# Patient Record
Sex: MALE | Race: WHITE | HISPANIC OR LATINO | Employment: PART TIME | URBAN - METROPOLITAN AREA
[De-identification: names, ages, dates, MRNs, and addresses within clinical notes are randomized per-mention and may not be internally consistent; named-entity substitution may affect disease eponyms.]

---

## 2017-01-25 ENCOUNTER — LAB (OUTPATIENT)
Dept: LAB | Age: 57
End: 2017-01-25
Payer: COMMERCIAL

## 2017-01-25 ENCOUNTER — TRANSCRIBE ORDERS (OUTPATIENT)
Dept: ADMINISTRATIVE | Age: 57
End: 2017-01-25

## 2017-01-25 ENCOUNTER — APPOINTMENT (OUTPATIENT)
Dept: LAB | Age: 57
End: 2017-01-25
Payer: COMMERCIAL

## 2017-01-25 DIAGNOSIS — R74.8 OTHER NONSPECIFIC ABNORMAL SERUM ENZYME LEVELS: Primary | ICD-10-CM

## 2017-01-25 DIAGNOSIS — R74.8 OTHER NONSPECIFIC ABNORMAL SERUM ENZYME LEVELS: ICD-10-CM

## 2017-01-25 DIAGNOSIS — D69.6 THROMBOCYTOPENIA (HCC): ICD-10-CM

## 2017-01-25 DIAGNOSIS — R74.8 ABNORMAL LEVELS OF OTHER SERUM ENZYMES: ICD-10-CM

## 2017-01-25 DIAGNOSIS — E78.5 HYPERLIPIDEMIA, UNSPECIFIED HYPERLIPIDEMIA TYPE: ICD-10-CM

## 2017-01-25 DIAGNOSIS — E78.5 HYPERLIPIDEMIA, UNSPECIFIED HYPERLIPIDEMIA TYPE: Primary | ICD-10-CM

## 2017-01-25 DIAGNOSIS — E66.9 OBESITY: ICD-10-CM

## 2017-01-25 LAB
ALBUMIN SERPL BCP-MCNC: 3.7 G/DL (ref 3.5–5)
ALP SERPL-CCNC: 49 U/L (ref 46–116)
ALT SERPL W P-5'-P-CCNC: 35 U/L (ref 12–78)
ANION GAP SERPL CALCULATED.3IONS-SCNC: 5 MMOL/L (ref 4–13)
AST SERPL W P-5'-P-CCNC: 20 U/L (ref 5–45)
BASOPHILS # BLD AUTO: 0.02 THOUSANDS/ΜL (ref 0–0.1)
BASOPHILS NFR BLD AUTO: 0 % (ref 0–1)
BILIRUB SERPL-MCNC: 0.66 MG/DL (ref 0.2–1)
BUN SERPL-MCNC: 19 MG/DL (ref 5–25)
CALCIUM SERPL-MCNC: 9 MG/DL (ref 8.3–10.1)
CHLORIDE SERPL-SCNC: 105 MMOL/L (ref 100–108)
CHOLEST SERPL-MCNC: 196 MG/DL (ref 50–200)
CO2 SERPL-SCNC: 29 MMOL/L (ref 21–32)
CREAT SERPL-MCNC: 1.06 MG/DL (ref 0.6–1.3)
EOSINOPHIL # BLD AUTO: 0.25 THOUSAND/ΜL (ref 0–0.61)
EOSINOPHIL NFR BLD AUTO: 4 % (ref 0–6)
ERYTHROCYTE [DISTWIDTH] IN BLOOD BY AUTOMATED COUNT: 12.6 % (ref 11.6–15.1)
GFR SERPL CREATININE-BSD FRML MDRD: >60 ML/MIN/1.73SQ M
GLUCOSE SERPL-MCNC: 99 MG/DL (ref 65–140)
HCT VFR BLD AUTO: 42.9 % (ref 36.5–49.3)
HDLC SERPL-MCNC: 50 MG/DL (ref 40–60)
HGB BLD-MCNC: 14.9 G/DL (ref 12–17)
IGA SERPL-MCNC: 456 MG/DL (ref 70–400)
IGG SERPL-MCNC: 1460 MG/DL (ref 700–1600)
IGM SERPL-MCNC: 115 MG/DL (ref 40–230)
LDLC SERPL CALC-MCNC: 125 MG/DL (ref 0–100)
LYMPHOCYTES # BLD AUTO: 1.36 THOUSANDS/ΜL (ref 0.6–4.47)
LYMPHOCYTES NFR BLD AUTO: 23 % (ref 14–44)
MCH RBC QN AUTO: 31 PG (ref 26.8–34.3)
MCHC RBC AUTO-ENTMCNC: 34.7 G/DL (ref 31.4–37.4)
MCV RBC AUTO: 89 FL (ref 82–98)
MONOCYTES # BLD AUTO: 0.6 THOUSAND/ΜL (ref 0.17–1.22)
MONOCYTES NFR BLD AUTO: 10 % (ref 4–12)
NEUTROPHILS # BLD AUTO: 3.59 THOUSANDS/ΜL (ref 1.85–7.62)
NEUTS SEG NFR BLD AUTO: 63 % (ref 43–75)
NRBC BLD AUTO-RTO: 0 /100 WBCS
PLATELET # BLD AUTO: 164 THOUSANDS/UL (ref 149–390)
PMV BLD AUTO: 11.2 FL (ref 8.9–12.7)
POTASSIUM SERPL-SCNC: 4 MMOL/L (ref 3.5–5.3)
PROT SERPL-MCNC: 8.1 G/DL (ref 6.4–8.2)
RBC # BLD AUTO: 4.8 MILLION/UL (ref 3.88–5.62)
SODIUM SERPL-SCNC: 139 MMOL/L (ref 136–145)
TRIGL SERPL-MCNC: 107 MG/DL
WBC # BLD AUTO: 5.84 THOUSAND/UL (ref 4.31–10.16)

## 2017-01-25 PROCEDURE — 80061 LIPID PANEL: CPT

## 2017-01-25 PROCEDURE — 36415 COLL VENOUS BLD VENIPUNCTURE: CPT

## 2017-01-25 PROCEDURE — 83883 ASSAY NEPHELOMETRY NOT SPEC: CPT

## 2017-01-25 PROCEDURE — 84165 PROTEIN E-PHORESIS SERUM: CPT

## 2017-01-25 PROCEDURE — 83704 LIPOPROTEIN BLD QUAN PART: CPT

## 2017-01-25 PROCEDURE — 85025 COMPLETE CBC W/AUTO DIFF WBC: CPT

## 2017-01-25 PROCEDURE — 82784 ASSAY IGA/IGD/IGG/IGM EACH: CPT

## 2017-01-25 PROCEDURE — 80053 COMPREHEN METABOLIC PANEL: CPT

## 2017-01-26 LAB
ALBUMIN SERPL ELPH-MCNC: 4.31 G/DL (ref 3.5–5)
ALBUMIN SERPL ELPH-MCNC: 57.4 % (ref 52–65)
ALPHA1 GLOB SERPL ELPH-MCNC: 0.23 G/DL (ref 0.1–0.4)
ALPHA1 GLOB SERPL ELPH-MCNC: 3 % (ref 2.5–5)
ALPHA2 GLOB SERPL ELPH-MCNC: 0.62 G/DL (ref 0.4–1.2)
ALPHA2 GLOB SERPL ELPH-MCNC: 8.2 % (ref 7–13)
BETA GLOB ABNORMAL SERPL ELPH-MCNC: 0.46 G/DL (ref 0.4–0.8)
BETA1 GLOB SERPL ELPH-MCNC: 6.1 % (ref 5–13)
BETA2 GLOB SERPL ELPH-MCNC: 7.1 % (ref 2–8)
BETA2+GAMMA GLOB SERPL ELPH-MCNC: 0.53 G/DL (ref 0.2–0.5)
CHOLEST SERPL-MCNC: 196 MG/DL (ref 100–199)
GAMMA GLOB ABNORMAL SERPL ELPH-MCNC: 1.37 G/DL (ref 0.5–1.6)
GAMMA GLOB SERPL ELPH-MCNC: 18.2 % (ref 12–22)
HDL SERPL-SCNC: 28.4 UMOL/L
HDLC SERPL-MCNC: 43 MG/DL
IGG/ALB SER: 1.35 {RATIO} (ref 1.1–1.8)
KAPPA LC FREE SER-MCNC: 28.71 MG/L (ref 3.3–19.4)
KAPPA LC FREE/LAMBDA FREE SER: 2.01 {RATIO} (ref 0.26–1.65)
LAMBDA LC FREE SERPL-MCNC: 14.31 MG/L (ref 5.71–26.3)
LDL SERPL QN: 20.8 NM
LDL SERPL QN: 531 NMOL/L
LDL SERPL-SCNC: 1537 NMOL/L
LDLC SERPL CALC-MCNC: 131 MG/DL (ref 0–99)
LP-IR SCORE SERPL: 52
PROT PATTERN SERPL ELPH-IMP: ABNORMAL
PROT SERPL-MCNC: 7.5 G/DL (ref 6.4–8.2)
TRIGL SERPL-MCNC: 111 MG/DL (ref 0–149)

## 2017-01-31 ENCOUNTER — ALLSCRIPTS OFFICE VISIT (OUTPATIENT)
Dept: OTHER | Facility: OTHER | Age: 57
End: 2017-01-31

## 2017-02-03 ENCOUNTER — ALLSCRIPTS OFFICE VISIT (OUTPATIENT)
Dept: OTHER | Facility: OTHER | Age: 57
End: 2017-02-03

## 2017-02-23 ENCOUNTER — ALLSCRIPTS OFFICE VISIT (OUTPATIENT)
Dept: OTHER | Facility: OTHER | Age: 57
End: 2017-02-23

## 2017-07-10 ENCOUNTER — OFFICE VISIT (OUTPATIENT)
Dept: URGENT CARE | Age: 57
End: 2017-07-10
Payer: COMMERCIAL

## 2017-07-10 PROCEDURE — 99213 OFFICE O/P EST LOW 20 MIN: CPT | Performed by: FAMILY MEDICINE

## 2017-07-18 ENCOUNTER — ALLSCRIPTS OFFICE VISIT (OUTPATIENT)
Dept: OTHER | Facility: OTHER | Age: 57
End: 2017-07-18

## 2017-07-27 LAB — 25(OH)D3 SERPL-MCNC: 41 NG/ML (ref 30–100)

## 2017-07-29 ENCOUNTER — LAB CONVERSION - ENCOUNTER (OUTPATIENT)
Dept: OTHER | Facility: OTHER | Age: 57
End: 2017-07-29

## 2017-07-29 LAB
APOLIPOPROTEIN B (HISTORICAL): 83 MG/DL (ref 52–109)
CHOLEST SERPL-MCNC: 172 MG/DL (ref 125–200)
CHOLEST/HDLC SERPL: 4.2 CALC
HDLC SERPL-MCNC: 41 MG/DL
HIGH SENSITIVITY C-REACTIVE PROTEIN (HISTORICAL): 1.9 MG/L
LDL CHOLESTEROL (HISTORICAL): 105 MG/DL
LDL SIZE (HISTORICAL): 217.4 ANGSTROM
LIPOPROTEIN (A) (HISTORICAL): 24 NMOL/L
Lab: 113 NMOL/MIN/ML (ref 70–153)
Lab: 1300 NMOL/L (ref 1016–2185)
Lab: 255 NMOL/L (ref 123–441)
Lab: 377 NMOL/L (ref 167–465)
Lab: 6118 NMOL/L (ref 4334–10815)
Lab: ABNORMAL PATTERN
NON-HDL-CHOL (CHOL-HDL) (HISTORICAL): 131 MG/DL
TRIGL SERPL-MCNC: 131 MG/DL

## 2017-08-01 ENCOUNTER — GENERIC CONVERSION - ENCOUNTER (OUTPATIENT)
Dept: OTHER | Facility: OTHER | Age: 57
End: 2017-08-01

## 2017-08-02 ENCOUNTER — ALLSCRIPTS OFFICE VISIT (OUTPATIENT)
Dept: OTHER | Facility: OTHER | Age: 57
End: 2017-08-02

## 2017-08-03 ENCOUNTER — TRANSCRIBE ORDERS (OUTPATIENT)
Dept: ADMINISTRATIVE | Facility: HOSPITAL | Age: 57
End: 2017-08-03

## 2017-08-03 DIAGNOSIS — R01.1 UNDIAGNOSED CARDIAC MURMURS: Primary | ICD-10-CM

## 2017-08-18 ENCOUNTER — HOSPITAL ENCOUNTER (OUTPATIENT)
Dept: NON INVASIVE DIAGNOSTICS | Facility: CLINIC | Age: 57
Discharge: HOME | End: 2017-08-18
Payer: COMMERCIAL

## 2017-08-18 DIAGNOSIS — R01.1 UNDIAGNOSED CARDIAC MURMURS: ICD-10-CM

## 2017-08-18 PROCEDURE — 93306 TTE W/DOPPLER COMPLETE: CPT

## 2017-08-20 ENCOUNTER — GENERIC CONVERSION - ENCOUNTER (OUTPATIENT)
Dept: OTHER | Facility: OTHER | Age: 57
End: 2017-08-20

## 2017-10-10 ENCOUNTER — GENERIC CONVERSION - ENCOUNTER (OUTPATIENT)
Dept: OTHER | Facility: OTHER | Age: 57
End: 2017-10-10

## 2017-11-28 ENCOUNTER — APPOINTMENT (OUTPATIENT)
Dept: RADIOLOGY | Age: 57
End: 2017-11-28
Payer: COMMERCIAL

## 2017-11-28 ENCOUNTER — TRANSCRIBE ORDERS (OUTPATIENT)
Dept: URGENT CARE | Age: 57
End: 2017-11-28

## 2017-11-28 ENCOUNTER — OFFICE VISIT (OUTPATIENT)
Dept: URGENT CARE | Age: 57
End: 2017-11-28
Payer: COMMERCIAL

## 2017-11-28 DIAGNOSIS — R05.9 COUGH: ICD-10-CM

## 2017-11-28 PROCEDURE — 71020 HB CHEST X-RAY 2VW FRONTAL&LATL: CPT

## 2017-11-28 PROCEDURE — 99213 OFFICE O/P EST LOW 20 MIN: CPT

## 2017-12-04 ENCOUNTER — ALLSCRIPTS OFFICE VISIT (OUTPATIENT)
Dept: OTHER | Facility: OTHER | Age: 57
End: 2017-12-04

## 2017-12-06 NOTE — PROGRESS NOTES
Assessment    1  Asthmatic bronchitis (493 90) (J45 909)  Assessment and plan 1  Asthmatic bronchitis  x-ray was normal at the urgent [facility] patient has completed Z-Maximo without relief of the symptoms I will prescribe the patient Augmentin 875 mg 1 tablet p  o  b i d  times 10 days, albuterol solution for his nebulizer used every 4-6 hours as needed, use Mucinex as directed p r n  plenty of fluids and rest of the symptoms not jhoan next several days he is to notify me RTO as scheduled call if any problems   Plan  Asthmatic bronchitis    · Albuterol Sulfate (2 5 MG/3ML) 0 083% Inhalation Nebulization Solution; USE 1UNIT DOSE EVERY 4-6 HOURS AS NEEDED FOR WHEEZING    · Amoxicillin-Pot Clavulanate 875-125 MG Oral Tablet (Augmentin); TAKE 1 TABLETTWICE DAILY AFTER MEALS UNTIL FINISHED    Chief Complaint  Patient presents today c/o a productive cough with green/white sputum and wheezing since before Thanksgiving  History of Present Illness  HPI: 57-year-old male coming in with a chief complaint of cough he reports me that he developed a scratchy throat before thanks giving , tightness in the chest, hears wheezing , using mucinex green at firs and now white no temp eating and drinking well, mild fatigue he is on call  He reports me on November 28 he did go to [facility] and was      Review of Systems   Constitutional: feeling poorly, but-- no fever,-- no recent weight gain,-- no chills,-- not feeling tired-- and-- no recent weight loss  Cardiovascular: no complaints of slow or fast heart rate, no chest pain, no palpitations, no leg claudication or lower extremity edema  Respiratory: shortness of breath,-- cough,-- wheezing-- and-- with coughing spells, but-- no orthopnea,-- no shortness of breath during exertion-- and-- no PND  Gastrointestinal: no complaints of abdominal pain, no constipation, no nausea or vomiting, no diarrhea or bloody stools    Genitourinary: no complaints of dysuria or incontinence, no hesitancy, no nocturia, no genital lesion, no inadequacy of penile erection  ROS reviewed  Active Problems  1  Abnormal blood sugar (790 29) (R73 09)   2  Acute upper respiratory infection (465 9) (J06 9)   3  Alpha-1-antitrypsin deficiency carrier (V83 89) (E88 01)   4  Blurry vision (368 8) (H53 8)   5  Body aches (780 96) (R52)   6  Cardiac murmur (785 2) (R01 1)   7  Chest pain (786 50) (R07 9)   8  Cough (786 2) (R05)   9  Dry eye syndrome of bilateral lacrimal glands (375 15) (H04 123)   10  Enlarged prostate without lower urinary tract symptoms (luts) (600 00) (N40 0)   11  Esophageal reflux (530 81) (K21 9)   12  Exercise-induced bronchospasm (493 81) (J45 990)   13  Fatigue (780 79) (R53 83)   14  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)   15  Hyperlipidemia (272 4) (E78 5)   16  Interstitial lung disease (515) (J84 9)   17  Lung, cysts, congenital (748 4) (Q33 0)   18  Lymph node enlargement (785 6) (R59 9)   19  Need for diphtheria-tetanus-pertussis (Tdap) vaccine (V06 1) (Z23)   20  Need for pneumococcal vaccination (V03 82) (Z23)   21  Need for prophylactic vaccination and inoculation against influenza (V04 81) (Z23)   22  Obesity (278 00) (E66 9)   23  Organic impotence (607 84) (N52 9)   24  Plantar fasciitis (728 71) (M72 2)   25  Platelets decreased (287 5) (D69 6)   26  Screen for colon cancer (V76 51) (Z12 11)   27  Sinusitis, acute frontal (461 1) (J01 10)   28  Sleep apnea (780 57) (G47 30)   29  Special screening examination for neoplasm of prostate (V76 44) (Z12 5)   30  Strain of thoracic region, initial encounter (847 1) (S29 019A)   31  Upper back pain on left side (724 5) (M54 9)   32  Vitamin B12 deficiency (266 2) (E53 8)   33  Vitamin D deficiency (268 9) (E55 9)    Past Medical History  Active Problems And Past Medical History Reviewed: The active problems and past medical history were reviewed and updated today  Surgical History  Surgical History Reviewed:    The surgical history was reviewed and updated today  Social History     · Being A Social Drinker   · Caffeine Use   · Denied: History of Cigars (0  A Day)   · occasional cigar use   · Denied: History of Drug Use   · Former smoker (V15 82) (D18 287)   · He smoked a pack a day of cigarettes for 8 years  He quit at the age of 22  · Working Full Time   ·  for [employer]  · Work-related Stress (V62 1)  The social history was reviewed and updated today  The social history was reviewed and is unchanged  Family History  Family History Reviewed: The family history was reviewed and updated today  Current Meds   1  Albuterol Sulfate (2 5 MG/3ML) 0 083% Inhalation Nebulization Solution; Therapy: (Recorded:28Nov2017) to Recorded   2  Azithromycin 500 MG Oral Tablet; 1 chito for 5 days; Therapy: 74BEH5683 to (Last Rx:28Nov2017)  Requested for: 28Nov2017 Ordered   3  Benzonatate 100 MG Oral Capsule; TAKE 1 CAPSULE 2-3 TIMES DAILY AS DIRECTED; Therapy: 20WXO5635 to (Trevon Romo)  Requested for: 57AVJ0904; Last Rx:28Nov2017 Ordered   4  Cinnamon 500 MG Oral Capsule; TAKE AS DIRECTED; Therapy: (ZUWEUQAX:68XBR2653) to Recorded   5  [facility] Vitamin B-12 1000 MCG Oral Tablet; Therapy: (ONVYWNQP:82XEO9864) to Recorded   6  Esomeprazole Magnesium 40 MG Oral Capsule Delayed Release; take 1 capsule daily; Therapy: 85HFY8252 to (Last Rx:20Hqy2245)  Requested for: 30Gjc2802 Ordered   7  Fish Oil 1000 MG Oral Capsule; TAKE 3 CAPSULE Daily; Therapy: (FBYJQEFH:50XZK9960) to Recorded   8  ProAir  (90 Base) MCG/ACT Inhalation Aerosol Solution; INHALE 2 PUFFS 15 MINUTES PRIOR TO EXERCISE; Therapy: 54OGJ2983 to (03 17 74 30 53)  Requested for: 68EMK6994; Last Rx:44Jun9035 Ordered   9  Vitamin D 1000 UNIT CAPS; Therapy: (Recorded:28Nov2017) to Recorded    The medication list was reviewed and updated today  Allergies  1  No Known Drug Allergies  2  No Known Environmental Allergies   3   No Known Food Allergies    Vitals   Recorded: 38TFT5547 03:24PM   Temperature 97 9 F   Heart Rate 72   Respiration 16   Systolic 760, LUE, Sitting   Diastolic 74, LUE, Sitting   Height 5 ft 7 in   Weight 231 lb 0 8 oz   BMI Calculated 36 19   BSA Calculated 2 15   O2 Saturation 97       Physical Exam   Constitutional  General appearance: No acute distress, well appearing and well nourished  Eyes  Conjunctiva and lids: No swelling, erythema, or discharge  Pupils and irises: Equal, round and reactive to light  Ears, Nose, Mouth, and Throat  External inspection of ears and nose: Normal    Otoscopic examination: Tympanic membrance translucent with normal light reflex  Canals patent without erythema  Nasal mucosa, septum, and turbinates: Normal without edema or erythema  Oropharynx: Abnormal  -- (Postnasal drip) The posterior pharynx was erythematous, but-- did not have an exudate  Pulmonary  Respiratory effort: No increased work of breathing or signs of respiratory distress  Auscultation of lungs: Clear to auscultation, equal breath sounds bilaterally, no wheezes, no rales, no rhonci  Cardiovascular  Auscultation of heart: Normal rate and rhythm, normal S1 and S2, without murmurs  Lymphatic  Palpation of lymph nodes in neck: No lymphadenopathy     Psychiatric  Mood and affect: Normal          Future Appointments    Date/Time Provider Specialty Site   02/14/2018 05:00 PM Justyna Arellano DO [facility]       Signatures   Electronically signed by : Donna Ly DO; Dec  4 2017  8:32PM EST                       (Author)

## 2017-12-07 NOTE — PROGRESS NOTES
Assessment    1  Acute upper respiratory infection (465 9) (J06 9)    Plan  Acute upper respiratory infection, Alpha-1-antitrypsin deficiency carrier    · Azithromycin 500 MG Oral Tablet; 1 chito for 5 days   · Benzonatate 100 MG Oral Capsule (Tessalon Perles); TAKE 1 CAPSULE 2-3  TIMES DAILY AS DIRECTED    Discussion/Summary  Discussion Summary:   1  Azithromycin as prescribed  2  Increase fluids  3  Tessalon Pearls as prescribed  4  If symptoms worsen or do not improve in 2-3 days, F/U with PCP  Medication Side Effects Reviewed: Possible side effects of new medications were reviewed with the patient/guardian today  Understands and agrees with treatment plan: The treatment plan was reviewed with the patient/guardian  The patient/guardian understands and agrees with the treatment plan      Chief Complaint    1  Cold Symptoms  Chief Complaint Free Text Note Form: x 12 days - has harsh, spasmatic cough with nasal congestion and PND, occ  chills, occ  chest tightness and ALLEN  Notes some wheeze when supine or exertion  Using nebulizer BID (not using HFA) and taking Mucinex DM  No N/V/diarrhea  No Flu vaccine yet  History of Present Illness  HPI: Patient presents with 12 day hx of worsening moist productive cough not relieved by OTC Mucinex  Denies fever but with occasional chills  Denies CP/SOB  Denies N/V/D  Adequate PO intake  Hx of pneumonia 2 years ago  Has been utilizing his PRN nebulizer occasionally  Hospital Based Practices Required Assessment:   Pain Assessment   the patient states they have pain  The pain is located in the cough  (on a scale of 0 to 10, the patient rates the pain at 7 )   Abuse And Domestic Violence Screen    Yes, the patient is safe at home  The patient states no one is hurting them  Depression And Suicide Screen  No, the patient has not had thoughts of hurting themself  No, the patient has not felt depressed in the past 7 days  Prefered Language is  [place]     Primary Language is  Slovenian  Review of Systems  Focused-Male:   Constitutional: chills, but no fever  ENT: no complaints of earache, no loss of hearing, no nosebleeds or nasal discharge, no sore throat or hoarseness  Cardiovascular: no complaints of slow or fast heart rate, no chest pain, no palpitations, no leg claudication or lower extremity edema  Respiratory: cough and wheezing, but no shortness of breath  Genitourinary: no complaints of dysuria or incontinence, no hesitancy, no nocturia, no genital lesion, no inadequacy of penile erection  Musculoskeletal: no complaints of arthralgia, no myalgia, no joint swelling or stiffness, no limb pain or swelling  Integumentary: no complaints of skin rash or lesion, no itching or dry skin, no skin wounds  Neurological: no complaints of headache, no confusion, no numbness or tingling, no dizziness or fainting  Active Problems    1  Abnormal blood sugar (790 29) (R73 09)   2  Acute upper respiratory infection (465 9) (J06 9)   3  Alpha-1-antitrypsin deficiency carrier (V83 89) (E88 01)   4  Blurry vision (368 8) (H53 8)   5  Body aches (780 96) (R52)   6  Cardiac murmur (785 2) (R01 1)   7  Chest pain (786 50) (R07 9)   8  Dry eye syndrome of bilateral lacrimal glands (375 15) (H04 123)   9  Enlarged prostate without lower urinary tract symptoms (luts) (600 00) (N40 0)   10  Esophageal reflux (530 81) (K21 9)   11  Exercise-induced bronchospasm (493 81) (J45 990)   12  Fatigue (780 79) (R53 83)   13  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)   14  Hyperlipidemia (272 4) (E78 5)   15  Interstitial lung disease (515) (J84 9)   16  Lung, cysts, congenital (748 4) (Q33 0)   17  Lymph node enlargement (785 6) (R59 9)   18  Need for diphtheria-tetanus-pertussis (Tdap) vaccine (V06 1) (Z23)   19  Need for pneumococcal vaccination (V03 82) (Z23)   20  Need for prophylactic vaccination and inoculation against influenza (V04 81) (Z23)   21  Obesity (278 00) (E66 9)   22  Organic impotence (607 84) (N52 9)   23  Plantar fasciitis (728 71) (M72 2)   24  Platelets decreased (287 5) (D69 6)   25  Screen for colon cancer (V76 51) (Z12 11)   26  Sinusitis, acute frontal (461 1) (J01 10)   27  Sleep apnea (780 57) (G47 30)   28  Special screening examination for neoplasm of prostate (V76 44) (Z12 5)   29  Strain of thoracic region, initial encounter (847 1) (S29 019A)   30  Upper back pain on left side (724 5) (M54 9)   31  Vitamin B12 deficiency (266 2) (E53 8)   32  Vitamin D deficiency (268 9) (E55 9)    Past Medical History    1  Acute sinusitis (461 9) (J01 90)   2  History of Backache (724 5) (M54 9)   3  History of Erectile dysfunction of non-organic origin (302 72) (F52 21)   4  History of fatigue (V13 89) (Z87 898)   5  Need for pneumococcal vaccination (V03 82) (Z23)  Active Problems And Past Medical History Reviewed: The active problems and past medical history were reviewed and updated today  Family History  Mother    1  Family history of Asthma (V17 5)   2  Family history of Heart Disease (V17 49)   3  Family history of Rheumatoid Arthritis  Father    4  Family history of Diabetes Mellitus (V18 0)   5  Family history of Heart Disease (V17 49)  Son    6  Family history of Thyroid Disorder (V18 19)  Sister    7  Family history of Asthma (V17 5)   8  Family history of Diabetes Mellitus (V18 0)  Family History Reviewed: The family history was reviewed and updated today  Social History    · Being A Social Drinker   · Caffeine Use   · Denied: History of Cigars (0  A Day)   · Denied: History of Drug Use   · Former smoker (V15 82) (H43 478)   · Working Full Time   · Work-related Stress (V62 1)  Social History Reviewed: The social history was reviewed and updated today  Surgical History    1  History of Tonsillectomy With Adenoidectomy   2  History of Umbilical Hernia Repair  Surgical History Reviewed: The surgical history was reviewed and updated today  Current Meds   1  Albuterol Sulfate (2 5 MG/3ML) 0 083% Inhalation Nebulization Solution; Therapy: (Recorded:28Nov2017) to Recorded   2  Cinnamon 500 MG Oral Capsule; TAKE AS DIRECTED; Therapy: (REJCLKPE:66BHD6825) to Recorded   3  [employer]S Vitamin B-12 1000 MCG Oral Tablet; Therapy: (IMKBVERZ:98WEC6004) to Recorded   4  Esomeprazole Magnesium 40 MG Oral Capsule Delayed Release; take 1 capsule daily; Therapy: 08MMZ6381 to (Last Rx:45Vwv2583)  Requested for: 31Pbg6621 Ordered   5  Fish Oil 1000 MG Oral Capsule; TAKE 3 CAPSULE Daily; Therapy: (VLKUHYEK:16CXD6923) to Recorded   6  ProAir  (90 Base) MCG/ACT Inhalation Aerosol Solution; INHALE 2 PUFFS 15   MINUTES PRIOR TO EXERCISE; Therapy: 72EYV2231 to (Femilet Ohm)  Requested for: 50ERK3243; Last   Rx:25Bet1017 Ordered   7  Vitamin D 1000 UNIT CAPS; Therapy: (Recorded:28Nov2017) to Recorded  Medication List Reviewed: The medication list was reviewed and updated today  Allergies    1  No Known Drug Allergies    2  No Known Environmental Allergies   3  No Known Food Allergies    Vitals  Signs   Recorded: 74BJE7359 07:55PM   Temperature: 98 6 F, Oral  Heart Rate: 88  Pulse Quality: Regular  Respiration: 20  Systolic: 571  Diastolic: 72  BP Cuff Size: Large  Height: 5 ft 7 in  Weight: 232 lb 12 8 oz  BMI Calculated: 36 46  BSA Calculated: 2 16  O2 Saturation: 97  Pain Scale: 5    Physical Exam    Constitutional   General appearance: No acute distress, well appearing and well nourished  Eyes   Conjunctiva and lids: No swelling, erythema, or discharge  Pupils and irises: Equal, round and reactive to light  Ears, Nose, Mouth, and Throat   External inspection of ears and nose: Normal     Otoscopic examination: Tympanic membrance translucent with normal light reflex  Canals patent without erythema  Nasal mucosa, septum, and turbinates: Normal without edema or erythema      Oropharynx: Normal with no erythema, edema, exudate or lesions  Pulmonary   Respiratory effort: No increased work of breathing or signs of respiratory distress  Auscultation of lungs: Abnormal   rhonchi bilateral bases  Cardiovascular   Palpation of heart: Normal PMI, no thrills  Auscultation of heart: Normal rate and rhythm, normal S1 and S2, without murmurs  Examination of extremities for edema and/or varicosities: Normal     Abdomen   Abdomen: Non-tender, no masses  Liver and spleen: No hepatomegaly or splenomegaly  Lymphatic   Palpation of lymph nodes in neck: No lymphadenopathy  Skin   Skin and subcutaneous tissue: Normal without rashes or lesions  Psychiatric   Orientation to person, place and time: Normal     Mood and affect: Normal        Results/Data  Diagnostic Studies Reviewed: I personally reviewed the films/images/results in the office today  My interpretation follows  X-ray Review CXR AP/Lateral: No active disease observed        Future Appointments    Date/Time Provider Specialty Site   02/14/2018 05:00 PM Adria Draper DO [facility]     Signatures   Electronically signed by : Cheli Escamilla NP; Nov 28 1700  8:29PM EST                       (Author)

## 2018-01-10 NOTE — RESULT NOTES
Message   Notified the patient elevation of the IgA level again please have the patient follow-up with me to discuss and have the patient see hematology Dr Roxanna Chacon        Verified Results  (1) CELIAC DISEASE AB PROFILE 50Qiw2666 11:19AM Perla Solares     Test Name Result Flag Reference   tTG IGG <2 U/mL  0 - 5   Negative        0 - 5                                Weak Positive   6 - 9                                Positive           >9   tTG IGA <2 U/mL  0 - 3   Negative        0 -  3                                Weak Positive   4 - 10                                Positive           >10   Tissue Transglutaminase (tTG) has been identified   as the endomysial antigen  Studies have demonstr-   ated that endomysial IgA antibodies have over 99%   specificity for gluten sensitive enteropathy     GLIADA 10 units  0 - 19   Negative                   0 - 19                     Weak Positive             20 - 30                     Moderate to Strong Positive   >30   GLIADG 6 units  0 - 19   Negative                   0 - 19                     Weak Positive             20 - 30                     Moderate to Strong Positive   >30   ENDOMYSIAL AB IGA Negative  Negative   Performed at:  [facility] [address]  [id number]  : Clovis Bonner MD, Phone:  [phone]    mg/dL H 90 - 386       Signatures   Electronically signed by : Kianna Evans DO; Oct  2 2016  4:02PM EST                       (Author)

## 2018-01-11 NOTE — RESULT NOTES
Message   Notify the patient the blood culture is negative follow up as scheduled        Verified Results  (1) CULTURE, BLOOD BACT 25PUE3971 08:32AM Yandy Grow Order Number: [id number]     Test Name Result Flag Reference   CLINICAL REPORT (Report)     Test:        Blood culture  Specimen Source:  Arm, Right  Specimen Type:   Blood  Specimen Date:   11/17/2016 8:32 AM  Result Date:    11/22/2016 12:01 PM  Result Status:   Final result  Resulting Lab:   [place] [address]            Tel: [phone]      CULTURE                                       ------------------                                   No Growth After 5 Days         Signatures   Electronically signed by : Moustapha Hobbs DO; Nov 22 2016  7:26PM EST                       (Author)

## 2018-01-11 NOTE — RESULT NOTES
Message   Notify the patient's throat culture negative follow-up as scheduled        Verified Results  (1) THROAT CULTURE (CULTURE, UPPER RESPIRATORY) 25Jan2016 03:52PM Jesus Alberto Shah Order Number: [id number]     Test Name Result Flag Reference   CLINICAL REPORT (Report)     Test:        Throat culture  Specimen Type:   Throat  Specimen Date:   1/25/2016 3:52 PM  Result Date:    1/28/2016 8:10 AM  Result Status:   Final result  Resulting Lab:   [address] [address]            Tel: [phone]                 CULTURE                                       ------------------                                   No Beta-hemolytic Streptococcus Group A, C or G isolated       Signatures   Electronically signed by : Obdulio Martin DO; Jan 29 2016  4:13PM EST                       (Author)

## 2018-01-12 NOTE — RESULT NOTES
Message   notify the pt xray of the chest xray shows cystic changes noted in the lingula - no active pulmonary disease =have the pt see pulmonary Dr Sonu Zelaya and f/u to discuss        Verified Results  * XR CHEST PA & LATERAL [id number] 08:17AM Recardo Crate Order Number: [id number]     Test Name Result Flag Reference   XR CHEST PA & LATERAL (Report)     CHEST      INDICATION: Cold symptoms  History of pneumonia  COMPARISON: 7/8/2016     VIEWS: Frontal and lateral projections; 2 images     FINDINGS:        Cardiomediastinal silhouette appears stable  Cystic changes are noted in the lingula  No pneumothorax or pleural effusion  Visualized osseous structures appear within normal limits for the patient's age  IMPRESSION:     Cystic changes noted in the lingula  No active pulmonary disease  Workstation performed: [id number]     Signed by:    Marylen Glad, MD   11/17/16       Signatures   Electronically signed by : Gloria Rudolph DO; Nov 17 2016  5:55PM EST                       (Author)

## 2018-01-12 NOTE — RESULT NOTES
Sahara Mercedes notify the patient elevation of the IgA level please have the patient recheck an IgA level in 4 weeks follow up as scheduled        Verified Results  (1) IgG,IgA,IgM QUANTITATIVE, BLOOD 18Bia6169 07:46AM Irvin Vila    Order Number: [id number]     Test Name Result Flag Reference   GAMMAGLOBULIN; IGG 1277 mg/dL  700 - 1600    mg/dL  20 - 172    mg/dL H 90 - 386   Performed at:  [address] [place] [address]  [id number]  : Ivanna Hernandez MD, Phone:  [phone]       Signatures   Electronically signed by : Aracely Pierce DO; Aug 16 2016  1:23PM EST                       (Author)

## 2018-01-12 NOTE — RESULT NOTES
Message   Stanislav notify the patient slightly low platelet count and mildly elevated hemoglobin A1c in the prediabetic range; please have the patient recheck a platelet count in 2 weeks and follow up as scheduled        Verified Results  (1) CBC/ PLT (NO DIFF) 37RQE3038 08:32AM Klaus Uribe Order Number: [id number]     Test Name Result Flag Reference   HEMATOCRIT 38 7 %  36 5-49 3   HEMOGLOBIN 12 7 g/dL  12 0-17 0   MCHC 32 8 g/dL  31 4-37 4   MCH 30 0 pg  26 8-34 3   MCV 91 fL  82-98   PLATELET COUNT 203 Thousands/uL L 149-390   RBC COUNT 4 24 Million/uL  3 88-5 62   RDW 12 8 %  11 6-15 1   WBC COUNT 7 90 Thousand/uL  4 31-10 16   MPV 11 4 fL  8 9-12 7     (1) HEMOGLOBIN A1C [id number] 08:32AM Klaus Uribe Order Number: [id number]     Test Name Result Flag Reference   HEMOGLOBIN A1C 5 7 %  4 2-6 3   EST  AVG   GLUCOSE 117 mg/dl         Signatures   Electronically signed by : Teetee Wagoner DO; Nov 17 2016  7:21PM EST                       (Author)

## 2018-01-12 NOTE — RESULT NOTES
Message   Veto Becki Notify the patient chest x-ray persistent although perhaps slightly improved patchy infiltrate laterally in the right mid lung field please have the patient see pulmonary Dr Rosine Klinefelter and recheck the chest x-ray in  months follow-up as scheduled- f/u as scheduled; let me know how the pt is currently doing        Verified Results  * XR CHEST PA & LATERAL [id number] 10:44AM Calvert Brine Order Number: [id number]     Test Name Result Flag Reference   XR CHEST PA & LATERAL (Report)     CHEST      INDICATION: Follow-up pneumonia  Symptoms resolving     COMPARISON: 1/26/2016     VIEWS: Frontal and lateral projections; 2 images     FINDINGS:        Cardiomediastinal silhouette appears unremarkable  The minimal patchy infiltrate laterally in the right midlung field is still visible  It might be very slightly diminished  No new findings  No pleural fluid or pneumothorax  Visualized osseous structures appear within normal limits for the patient's age  IMPRESSION:     Persistent though perhaps slightly improved small patchy infiltrate laterally in the right midlung field  Consider reevaluation in one to 2 months         Workstation performed: [id number]     Signed by:   Caitlin Jimenez MD   2/15/16       Signatures   Electronically signed by : Marleen Magaña DO; Feb 15 2016  4:06PM EST                       (Author)

## 2018-01-13 VITALS
HEART RATE: 68 BPM | RESPIRATION RATE: 25 BRPM | OXYGEN SATURATION: 95 % | WEIGHT: 230.03 LBS | HEIGHT: 66 IN | DIASTOLIC BLOOD PRESSURE: 70 MMHG | BODY MASS INDEX: 36.97 KG/M2 | SYSTOLIC BLOOD PRESSURE: 130 MMHG

## 2018-01-13 VITALS
DIASTOLIC BLOOD PRESSURE: 68 MMHG | RESPIRATION RATE: 16 BRPM | HEIGHT: 66 IN | SYSTOLIC BLOOD PRESSURE: 120 MMHG | BODY MASS INDEX: 36.64 KG/M2 | WEIGHT: 228 LBS | HEART RATE: 83 BPM | TEMPERATURE: 98.3 F | OXYGEN SATURATION: 96 %

## 2018-01-13 NOTE — RESULT NOTES
Verified Results  (Q) CARDIO IQ ADVANCED LIPID PANEL AND INFLAMMATION PANEL [id number] 09:58AM Rut Bang   REPORT COMMENT:  FASTING:YES     Test Name Result Flag Reference   CHOLESTEROL, TOTAL 172 mg/dL  125-200   Adult Reference Ranges for Cholesterol, Total:**     > or = 20 Years:  125-200 mg/dL      <200    (Desirable)   200-239 (Borderline)   >=240   (Higher Risk)     References:     ** An executive summary of the NCEP guidelines,  the 'Third Report of the NCEP Expert Panel on  Detection, Evaluation, and Treatment of High Blood  Cholesterol in Adults ' Journal of the Exam[address]  May 16, 2001  HDL CHOLESTEROL 41 mg/dL  > OR = 40   TRIGLICERIDES 675 mg/dL     Adult Reference Ranges for Triglycerides**:        <150 mg/dL     (Normal)     150-199 mg/dL  (Borderline High)     200-499 mg/dL  (High)     >=500 mg/dL    (Very High)     References:     ** An executive summary of the NCEP guidelines,  the 'Third Report of the NCEP Expert Panel on  Detection, Evaluation, and Treatment of High Blood  Cholesterol in Adults ' Journal of the [employer]am[address]  May 16, 2001  LDL-CHOLESTEROL 105 mg/dL     Reference Range:  <130     (DESIRABLE)  130-159  (BORDERLINE)  >=160    (HIGH)     Desirable range <100 mg/dL for patients with CHD or diabetes  and <70 mg/dL for diabetic patients with known heart  disease  CHOL/HDLC RATIO 4 2 calc  < OR = 5 0   NON HDL CHOLESTEROL 131 mg/dL     Target for non-HDL cholesterol is 30 mg/dL  higher than LDL cholesterol target   LDL PARTICLE NUMBER 1300 nmol/L  6888-4902   Risk: Optimal <1260; Moderate 9673-1866; High >1538   LDL SMALL 255 nmol/L  123-441   Risk: Optimal <162; Moderate 162-217; High >217   LDL MEDIUM 377 nmol/L  167-465   Risk: Optimal <201; Moderate 201-271; High >271   HDL LARGE 6118 nmol/L  4874-96472   Risk: Optimal >9386; Moderate 6603-9703; High <6996   LDL PATTERN B Pattern A A   Risk: Optimal Pattern A;  High Pattern B   LDL PEAK SIZE 217 4 Angstrom L > OR = 218 2   Risk: Optimal >222 5; Moderate 222 5-218 2; High <218 2     Adult cardiovascular event risk category cut points  (optimal, moderate, high) are based on adult [place]  reference  population  Association between lipoprotein subfractions and  cardiovascular events is based on Can et al  Elen Limon  8354;14:4895  This test was developed and its analytical performance  characteristics have been determined by [facility]  It has not been  cleared or approved by FDA  This assay has been validated  pursuant to the CLIA regulations and is used for clinical  purposes  APOLIPOPROTEIN B 83 mg/dL     Risk: Optimal < 80 mg/dL; Moderate  mg/dL; High > or =  120 mg/dL Cardiovascular event risk category cut points  (optimal, moderate, high) are based on National Lipid  Association recommendations - Leona Hodgkin al  J Clin  Lipidol  2011;5:338   LIPOPROTEIN (a) 24 nmol/L  <75   Risk: Optimal < 75 nmol/L; Moderate  nmol/L; High >  125 nmol/L Cardiovascular event risk category cut points  (optimal, moderate, high) are based on Jeffry et al  Clin  Chem  0325;22:9062 and Clemgaakath et al   Heart J   8816;95:0184 (results of meta-analysis and expert panel  recommendations)  HS CRP 1 9 mg/L     For Ages > 17 Years:     hs-CRP mg/L   Risk According to AHA/CDC Guidelines  -----------   ------------------------------------    <1 0      Lower Relative Cardiovascular Risk  1 0-3 0    Average Relative Cardiovascular Risk   3 1-10 0   Higher Relative Cardiovascular Risk  Consider retesting in 1 to 2 weeks to              exclude a benign transient elevation              in the baseline CRP value secondary to              infection or inflammation     >10 0     Persistent elevations upon retesting,              may be associated with infection and              inflammation     LP PLA2 ACTIVITY 113 nmol/min/mL   Risk: Optimal <=123 nmol/min/mL; High >123 nmol/min/mL  The Lp-PLA2 Activity test measures the function of the  Lp-PLA2 enzyme versus the concentration (mass) of the  enzyme  As a result of the differences in reporting ranges,  patient test results from the Lp-PLA2 (PLAC(R)) mass assay  cannot be used for direct comparison to the results of the  Cardio IQ Lp-PLA2 Activity assay, but for your reference the  risk cut points for the discontinued Lp-PLA2 Mass test were  Optimal <200 ng/mL; Moderate 200-235 ng/mL; High >235 ng/mL  This test was developed and its analytical performance  characteristics have been determined by [facility]  It has not been  cleared or approved by FDA  This assay has been validated  pursuant to the CLIA regulations and is used for clinical  purposes

## 2018-01-13 NOTE — RESULT NOTES
Message   Notify the patient the celiac disease antibody profile is positive for elevation of the IgA level otherwise negative please have the patient see GI doctor Adrian follow up as scheduled        Verified Results  (1) CELIAC DISEASE AB PROFILE [id number] 07:46AM Angie Martinez     Test Name Result Flag Reference   tTG IGG <2 U/mL  0 - 5   Negative        0 - 5                                Weak Positive   6 - 9                                Positive           >9   tTG IGA <2 U/mL  0 - 3   Negative        0 -  3                                Weak Positive   4 - 10                                Positive           >10   Tissue Transglutaminase (tTG) has been identified   as the endomysial antigen  Studies have demonstr-   ated that endomysial IgA antibodies have over 99%   specificity for gluten sensitive enteropathy     GLIADA 9 units  0 - 19   Negative                   0 - 19                     Weak Positive             20 - 30                     Moderate to Strong Positive   >30   GLIADG 5 units  0 - 19   Negative                   0 - 19                     Weak Positive             20 - 30                     Moderate to Strong Positive   >30   ENDOMYSIAL AB IGA Negative  Negative   Performed at:  [facility] [address]  [id number]  : Xin Ndiaye MD, Phone:  [phone]    mg/dL H 90 - 386       Signatures   Electronically signed by : Estrella Mccarthy DO; Aug 18 2016  6:46PM EST                       (Author)

## 2018-01-13 NOTE — RESULT NOTES
Message   Notify the patient normal vitamin D level follow up as scheduled        Verified Results  (1) IgG,IgA,IgM QUANTITATIVE, BLOOD [id number] 07:46AM Kasey Pal   TW Order Number: [id number]     Test Name Result Flag Reference   GAMMAGLOBULIN; IGG 1277 mg/dL  700 - 1600    mg/dL  20 - 172    mg/dL H 90 - 386   Performed at:  [address] [place] [address]  [id number]  : Prashant Boyd MD, Phone:  [phone]     (1) VITAMIN D 25-HYDROXY [id number] 07:46AM Kasey Pal     Test Name Result Flag Reference   VIT D 25-HYDROX 43 5 ng/mL  30 0-100 0   This assay is a certified procedure of the CDC Vitamin D Standardization Certification Program (VDSCP)     Deficiency <20ng/ml   Insufficiency 20-30ng/ml   Sufficient  ng/ml     *Patients undergoing fluorescein dye angiography may retain small amounts of fluorescein in the body for 48-72 hours post procedure  Samples containing fluorescein can produce falsely elevated Vitamin D values  If the patient had this procedure, a specimen should be resubmitted post fluorescein clearance         Signatures   Electronically signed by : Rick Caal DO; Aug 16 2016  1:23PM EST                       (Author)

## 2018-01-14 VITALS
WEIGHT: 229.01 LBS | SYSTOLIC BLOOD PRESSURE: 152 MMHG | DIASTOLIC BLOOD PRESSURE: 92 MMHG | HEIGHT: 66 IN | BODY MASS INDEX: 36.81 KG/M2 | HEART RATE: 83 BPM | RESPIRATION RATE: 16 BRPM | OXYGEN SATURATION: 97 %

## 2018-01-14 NOTE — RESULT NOTES
Message   notify the patient ultrasound does show normal-appearing cervical lymph nodes without suspicious mass please have the patient follow up as scheduled to discuss his report        Verified Results  [facility] SOFT TISSUE [id number] 12:09PM Gary Oven     Test Name Result Flag Reference   US HEAD NECK SOFT TISSUE (Report)     Soft tissue neck ULTRASOUND     INDICATION: Enlarged lymph nodes  Coughing and chest cold  Palpable abnormality lateral left neck  COMPARISON: 3/6/2007 thyroid ultrasound     TECHNIQUE:  Real-time ultrasound of the soft tissues of the neck bilaterally was performed with a linear transducer with both volumetric sweeps and still imaging techniques  FINDINGS: Sonographically normal-appearing lymph nodes noted bilaterally  No suspicious collection or mass detected  IMPRESSION:     Scattered sonographically normal-appearing cervical lymph nodes without suspicious mass or collection detected  Further clinical evaluation and follow-up advised  Signed by: Rafat Aguila MD   1/26/16     * XR CHEST PA & LATERAL [id number] 11:53AM Megan Pour Order Number: [id number]     Test Name Result Flag Reference   XR CHEST PA & LATERAL (Report)     CHEST     INDICATION: Cough, congestion, shortness of breath  Evaluate for asthma versus pneumonia  COMPARISON: January 31, 2014     VIEWS: PA and lateral; 2 images     FINDINGS:     The cardiomediastinal silhouette is unremarkable  Small patchy density is noted at the lateral base of the right upper lobe  Remainder of the lungs are well aerated without opacities  No pleural effusion  No pneumothorax  Bony thorax unremarkable  IMPRESSION:     Focal infiltrate right upper lobe suspicious for pneumonia  ##imslh##imslh                   Signed by:   Pao Rolle MD   1/26/16       Signatures   Electronically signed by : Magnus Seip, DO; Jan 26 2016  7:38PM EST (Author)

## 2018-01-15 VITALS
HEIGHT: 66 IN | RESPIRATION RATE: 16 BRPM | WEIGHT: 227.01 LBS | DIASTOLIC BLOOD PRESSURE: 66 MMHG | SYSTOLIC BLOOD PRESSURE: 108 MMHG | HEART RATE: 72 BPM | BODY MASS INDEX: 36.48 KG/M2

## 2018-01-15 NOTE — RESULT NOTES
Message   notify the patient chest x-ray does show a right upper lobe pneumonia, please have the patient complete the antibiotics and have the patient follow-up with me in 1 week, the patient should remain out of work for the next 1 week  Please let me know how the patient is currently doing        Verified Results  * XR CHEST PA & LATERAL [id number] 11:53AM Laxmi Bui Order Number: [id number]     Test Name Result Flag Reference   XR CHEST PA & LATERAL (Report)     CHEST     INDICATION: Cough, congestion, shortness of breath  Evaluate for asthma versus pneumonia  COMPARISON: January 31, 2014     VIEWS: PA and lateral; 2 images     FINDINGS:     The cardiomediastinal silhouette is unremarkable  Small patchy density is noted at the lateral base of the right upper lobe  Remainder of the lungs are well aerated without opacities  No pleural effusion  No pneumothorax  Bony thorax unremarkable  IMPRESSION:     Focal infiltrate right upper lobe suspicious for pneumonia  ##imslh##imslh                   Signed by:   Jorge Koo MD   1/26/16       Signatures   Electronically signed by : Laila Louis DO; Jan 26 2016  7:37PM EST                       (Author)

## 2018-01-16 NOTE — RESULT NOTES
Message   Notify the patient the chest x-ray no acute abnormalities in the chest, resolution of the mid right lung pneumonia follow up as scheduled        Verified Results  * XR CHEST PA & LATERAL [id number] 08:16AM Hayley Somerset Order Number: [id number]     Test Name Result Flag Reference   XR CHEST PA & LATERAL (Report)     CHEST      INDICATION: Follow-up mid right lung infiltrate  COMPARISON: February 15, 2016  VIEWS: Frontal and lateral projections; 2 images     FINDINGS:        Cardiomediastinal silhouette appears unremarkable  The lungs are clear  Patchy opacities in the lateral aspect of the mid right lung, demonstrated on the last study, have resolved  The pleural spaces are clear  Visualized osseous structures appear within normal limits for the patient's age  IMPRESSION:     No acute abnormality in the chest    Resolution of mid right lung infiltrate since February 15, 2016         Workstation performed: [id number]     Signed by:   Disha Katz MD   7/8/16       Signatures   Electronically signed by : Dellia Gosselin, DO; Jul 8 2016  4:56PM EST                       (Author)

## 2018-01-16 NOTE — RESULT NOTES
Message   Notify the patient the echo trace tricuspid and pulmonic valve regurgitation these are very minor findings otherwise it was a normal echo; follow-up as scheduled to discuss; please reassure the patient no major problems        Verified Results  ECHO COMPLETE WITH CONTRAST IF INDICATED [id number] 08:37AM Irvin Vila     Test Name Result Flag Reference   ECHO COMPLETE WITH CONTRAST IF INDICATED (Report)     [address]   [address] [facility]   Ricarda Kamara, [address] [facility]   [phone]     Transthoracic Echocardiogram   2D, M-mode, Doppler, and Color Doppler     Study date: 18-Aug-2017     Patient: Lisseth Manuel   MR number: [id number]   Account number: [de-identified]   : 1960   Age: 59 years   Gender: Male   Status: Outpatient   Location: [address] [place] [address] and [facility]   Height: 66 in   Weight: 227 lb   BP: 108/ 66 mmHg     Indications: Murmur     Diagnoses: R01 1 - Cardiac murmur, unspecified     Sonographer: SHARIF Valencia   Referring Physician: Aracely Pierce DO   Group: [facility]   Interpreting Physician: Candance Netter, MD     SUMMARY     LEFT VENTRICLE:   Size was normal    Systolic function was normal  Ejection fraction was estimated to be 60 %  There were no regional wall motion abnormalities  Wall thickness was at the upper limits of normal      TRICUSPID VALVE:   There was trace regurgitation  PULMONIC VALVE:   There was trace regurgitation  HISTORY: PRIOR HISTORY: Hyperlipidemia, former smoker     PROCEDURE: The study was performed in the [place] [address] and [facility]  This was a routine study  The transthoracic approach was used  The study included complete 2D imaging, M-mode, complete spectral Doppler, and color Doppler  This   was a technically difficult study  LEFT VENTRICLE: Size was normal  Systolic function was normal  Ejection fraction was estimated to be 60 %   There were no regional wall motion abnormalities  Wall thickness was at the upper limits of normal      RIGHT VENTRICLE: The size was normal  Systolic function was normal  Wall thickness was normal      LEFT ATRIUM: Size was at the upper limits of normal      RIGHT ATRIUM: Size was normal      MITRAL VALVE: Valve structure was normal  There was normal leaflet separation  DOPPLER: The transmitral velocity was within the normal range  There was no evidence for stenosis  There was no regurgitation  AORTIC VALVE: The valve was trileaflet  Leaflets exhibited normal thickness and normal cuspal separation  DOPPLER: Transaortic velocity was within the normal range  There was no evidence for stenosis  There was no regurgitation  TRICUSPID VALVE: The valve structure was normal  There was normal leaflet separation  DOPPLER: The transtricuspid velocity was within the normal range  There was no evidence for stenosis  There was trace regurgitation  PULMONIC VALVE: Leaflets exhibited normal thickness, no calcification, and normal cuspal separation  DOPPLER: The transpulmonic velocity was within the normal range  There was trace regurgitation  PERICARDIUM: There was no pericardial effusion  The pericardium was normal in appearance  AORTA: The root exhibited normal size       SYSTEM MEASUREMENT TABLES     2D   %FS: 33 82 %   AV Diam: 3 43 cm   EDV(Teich): 125 26 ml   EF(Cube): 71 02 %   EF(Teich): 62 34 %   ESV(Cube): 39 03 ml   ESV(Teich): 47 17 ml   IVSd: 1 05 cm   LA Area: 18 13 cm2   LA Diam: 3 79 cm   LVEDV MOD A4C: 139 94 ml   LVEF MOD A4C: 54 34 %   LVESV MOD A4C: 63 89 ml   LVIDd: 5 13 cm   LVIDs: 3 39 cm   LVLd A4C: 8 1 cm   LVLs A4C: 6 98 cm   LVPWd: 1 08 cm   RA Area: 16 5 cm2   RV Diam : 2 85 cm   SV MOD A4C: 76 05 ml   SV(Cube): 95 63 ml   SV(Teich): 78 09 ml     MM   TAPSE: 2 1 cm     PW   E': 0 06 m/s   E/E': 15 29   MV A John: 1 11 m/s   MV Dec [place]: 3 21 m/s2   MV DecT: 283 ms   MV E John: 0 91 m/s   MV E/A Ratio: 0 [address] [facility]redited [facility]     Prepared and electronically signed by     Dimple Malone MD   Signed 18-Aug-2017 14:55:07       Signatures   Electronically signed by : Marleen Magaña DO; Aug 20 2017 10:47AM EST                       (Author)

## 2018-01-16 NOTE — PROGRESS NOTES
Assessment    1  Hyperlipidemia (272 4) (E78 5)    Plan  Hyperlipidemia    · Follow-up visit in 4 Months Evaluation and Treatment  Follow-up  Status: Hold For -  Scheduling  Requested for: [id number]   · (Q) CARDIO IQ ADVANCED LIPID PANEL AND INFLAMMATION PANEL; Status:Active; Requested OLJ:[id number];     Discussion/Summary    I had the pleasure of seeing Lora Members for further evaluation or disability me  He is a pleasant 64-year-old gentleman without a history of hypertension  He does have prediabetes with his A1c around 5 7  Does not have a history of dyslipidemia-last lipid profile showed a total cholesterol 196, triglycerides 107, HDL 50,   However also underwent an advanced lipid profile that showed high LDL particle number-1500 (<1000), high small LDL-531 (50th percentile)  At baseline, he is physically active mostly doing weights without any cardiopulmonary symptoms  Aerobic his activity is low because of shortness of breath-from cystic lung disease - apparently he is a carrier for alpha-1 antitrypsin deficiency  He did have an exercise stress test in 2013-12 minutes-13 4 METs, no ischemia    Currently he is asymptomatic from a cardiac standpoint  Plan:    Dyslipidemia: LDL of 107 but high LDL particle number and high small LDL  Overall risk profile is still intermediate considering that overall his LDL level is normal  However he does have the typical pattern that is found in patients with insulin resistance/metabolic syndrome  Therapeutic lifestyle changes were discussed with the patient- Specifically:  1  Decreasing saturated fat intake to less than 13 g per day  Watch intake of cheese  2  Increase soluble fiber in the diet-beans, pears, oatmeal  3  Weight loss of even 5-10 pounds will also help  Decrease caloric intake by about 100 brandon a day-should lead to a weight loss of 1-2 pounds over one month  4  Increase aerobic physical activity    The above changes will decrease his LDL, change the pattern of his LDL subfractions (to lower small LDL levels and particle number) as well as decrease his risk helping diabetes  I will see him in 4 months with a repeat advanced lipid profile  total time of encounter was 45 minutes and 35 minutes was spent counseling  Chief Complaint  Pt is here today for a lipid consult per Dr Dieudonne Martin  Pt has no cardiac complaints  History of Present Illness  I had the pleasure of seeing Ashley Huntley for further evaluation or disability me  He is a pleasant 57-year-old gentleman without a history of hypertension  He does have prediabetes with his A1c around 5 7  Does not have a history of dyslipidemia-last lipid profile showed a total cholesterol 196, triglycerides 107, HDL 50,   However also underwent an advanced lipid profile that showed high LDL particle number-1500 (<1000), high small LDL-531 (50th percentile)  At baseline, he is physically active mostly doing weights without any cardiopulmonary symptoms  Aerobic his activity is low because of shortness of breath-from cystic lung disease - apparently he is a carrier for alpha-1 antitrypsin deficiency  He did have an exercise stress test in 2013-12 minutes-13 4 METs, no ischemia    Currently he is asymptomatic from a cardiac standpoint  Review of Systems      Cardiac: No complaints of chest pain, no palpitations, no fainiting  Skin: No complaints of nonhealing sores or skin rash  Genitourinary: No complaints of recurrent urinary tract infections, frequent urination at night, difficult urination, blood in urine, kidney stones, loss of bladder control, no kidney or prostate problems, no erectile dysfunction  Psychological: No complaints of feeling depressed, anxiety, panic attacks, or difficulty concentrating  General: No complaints of trouble sleeping, lack of energy, fatigue, appetite changes, weight changes, fever, frequent infections, or night sweats     Respiratory: No complaints of shortness of breath, cough with sputum, or wheezing  HEENT: No complaints of serious problems, hearing problems, nose problems, throat problems, or snoring  Gastrointestinal: No complaints of liver problems, nausea, vomiting, heartburn, constipation, bloody stools, diarrhea, problems swallowing, adbominal pain, or rectal bleeding  Hematologic: No complaints of bleeding disorders, anemia, blood clots, or excessive brusing  Neurological: No complaints of numbness, tingling, dizziness, weakness, seizures, headaches, syncope or fainting, AM fatigue, daytime sleepiness, no witnessed apnea episodes  Musculoskeletal: No complaints of arthritis, back pain, or painfull swelling  Active Problems    1  Abnormal blood sugar (790 29) (R73 09)   2  Acute upper respiratory infection (465 9) (J06 9)   3  Alpha-1-antitrypsin deficiency carrier (V83 89) (E88 01)   4  Blurry vision (368 8) (H53 8)   5  Body aches (780 96) (R52)   6  Chest pain (786 50) (R07 9)   7  Dry eye syndrome of bilateral lacrimal glands (375 15) (H04 123)   8  Enlarged prostate without lower urinary tract symptoms (luts) (600 00) (N40 0)   9  Esophageal reflux (530 81) (K21 9)   10  Exercise-induced bronchospasm (493 81) (J45 990)   11  Fatigue (780 79) (R53 83)   12  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)   13  Hyperlipidemia (272 4) (E78 5)   14  Interstitial lung disease (515) (J84 9)   15  Lung, cysts, congenital (748 4) (Q33 0)   16  Lymph node enlargement (785 6) (R59 9)   17  Need for diphtheria-tetanus-pertussis (Tdap) vaccine (V06 1) (Z23)   18  Need for pneumococcal vaccination (V03 82) (Z23)   19  Need for prophylactic vaccination and inoculation against influenza (V04 81) (Z23)   20  Obesity (278 00) (E66 9)   21  Organic impotence (607 84) (N52 9)   22  Plantar fasciitis (728 71) (M72 2)   23  Platelets decreased (287 5) (D69 6)   24  Screen for colon cancer (V76 51) (Z12 11)   25   Sinusitis, acute frontal (461 1) (J01 10)   26  Sleep apnea (780 57) (G47 30)   27  Special screening examination for neoplasm of prostate (V76 44) (Z12 5)   28  Vitamin B12 deficiency (266 2) (E53 8)   29  Vitamin D deficiency (268 9) (E55 9)    Past Medical History    1  Acute sinusitis (461 9) (J01 90)   2  History of Backache (724 5) (M54 9)   3  History of Erectile dysfunction of non-organic origin (302 72) (F52 21)   4  History of fatigue (V13 89) (Z87 898)   5  Need for pneumococcal vaccination (V03 82) (Z23)    The active problems and past medical history were reviewed and updated today  Surgical History    1  History of Tonsillectomy With Adenoidectomy   2  History of Umbilical Hernia Repair    The surgical history was reviewed and updated today  Family History  Mother    1  Family history of Asthma (V17 5)   2  Family history of Heart Disease (V17 49)   3  Family history of Rheumatoid Arthritis  Father    4  Family history of Diabetes Mellitus (V18 0)   5  Family history of Heart Disease (V17 49)  Son    6  Family history of Thyroid Disorder (V18 19)  Sister    7  Family history of Asthma (V17 5)   8  Family history of Diabetes Mellitus (V18 0)    The family history was reviewed and updated today  Social History    · Being A Social Drinker   · Caffeine Use   · Denied: History of Cigars (0  A Day)   · Denied: History of Drug Use   · Former smoker (V15 82) (W47 444)   · Working Full Time   · Work-related Stress (V62 1)  The social history was reviewed and updated today  The social history was reviewed and is unchanged  Current Meds   1  Cinnamon 500 MG Oral Capsule; TAKE AS DIRECTED; Therapy: (BWNJXDZY:02KDT0741) to Recorded   2  [employer] Vitamin B-12 1000 MCG Oral Tablet; Therapy: (VHIGMMMK:40GME8679) to Recorded   3  Esomeprazole Magnesium 40 MG Oral Capsule Delayed Release; take 1 capsule daily; Therapy: 81RDL4600 to (Last Rx:89Lhe1652)  Requested for: 29Uyx6585 Ordered   4   Fish Oil 1000 MG Oral Capsule; TAKE 3 CAPSULE Daily; Therapy: (JHEKMZYW:87ANO8097) to Recorded   5  ProAir  (90 Base) MCG/ACT Inhalation Aerosol Solution; INHALE 2 PUFFS 15   MINUTES PRIOR TO EXERCISE; Therapy: 40ATQ7133 to (Vikas Sebastian)  Requested for: 02DYB5233; Last   Rx:23Oji6440 Ordered   6  Vitamin D 2000 UNIT Oral Tablet; Take 1 tablet daily; Therapy: (Recorded:46Bdv2582) to Recorded    The medication list was reviewed and updated today  Allergies    1  No Known Drug Allergies    2  No Known Environmental Allergies   3  No Known Food Allergies    Vitals  Signs   Recorded: 23Feb2017 03:57PM   Heart Rate: 76  Systolic: 621, RUE, Sitting  Diastolic: 84, RUE, Sitting  Height: 5 ft 6 in  Weight: 230 lb 8 oz  BMI Calculated: 37 2  BSA Calculated: 2 12    Physical Exam    Constitutional   General appearance: No acute distress, well appearing and well nourished  Eyes   Conjunctiva and Sclera examination: Conjunctiva pink, sclera anicteric  Ears, Nose, Mouth, and Throat - Oropharynx: Clear, nares are clear, mucous membranes are moist    Neck   Neck and thyroid: Normal, supple, trachea midline, no thyromegaly  Pulmonary   Respiratory effort: No increased work of breathing or signs of respiratory distress  Auscultation of lungs: Clear to auscultation, no rales, no rhonchi, no wheezing, good air movement  Cardiovascular   Auscultation of heart: Normal rate and rhythm, normal S1 and S2, no murmurs  Carotid pulses: Normal, 2+ bilaterally  Peripheral vascular exam: Normal pulses throughout, no tenderness, erythema or swelling  Pedal pulses: Normal, 2+ bilaterally  Examination of extremities for edema and/or varicosities: Normal     Abdomen   Abdomen: Non-tender and no distention  Liver and spleen: No hepatomegaly or splenomegaly  Musculoskeletal Gait and station: Normal gait  Digits and nails: Normal without clubbing or cyanosis   Inspection/palpation of joints, bones, and muscles: Normal, ROM normal  Skin - Skin and subcutaneous tissue: Normal without rashes or lesions  Skin is warm and well perfused, normal turgor  Neurologic - Cranial nerves: II - XII intact  Speech: Normal     Psychiatric - Orientation to person, place, and time: Normal  Mood and affect: Normal       End of Encounter Meds    1  Esomeprazole Magnesium 40 MG Oral Capsule Delayed Release; take 1 capsule daily; Therapy: 10DJI9901 to (Last Rx:03Ycr9203)  Requested for: 76Egd2074 Ordered    2  ProAir  (90 Base) MCG/ACT Inhalation Aerosol Solution; INHALE 2 PUFFS 15   MINUTES PRIOR TO EXERCISE; Therapy: 61ZPY9856 to [phone])  Requested for: 19ZXE8368; Last   Rx:05Gcd2561 Ordered    3  Cinnamon 500 MG Oral Capsule; TAKE AS DIRECTED; Therapy: (REPLEZJF:70IEN6916) to Recorded   4  [employer] Vitamin B-12 1000 MCG Oral Tablet; Therapy: (AYMNSCXO:35OQE0483) to Recorded   5  Fish Oil 1000 MG Oral Capsule; TAKE 3 CAPSULE Daily; Therapy: (FEOJTIDM:14FCB8786) to Recorded   6  Vitamin D 2000 UNIT Oral Tablet; Take 1 tablet daily; Therapy: (Recorded:92Ffw7806) to Recorded    Future Appointments    Date/Time Provider Specialty Site   08/02/2017 05:00 PM Wellington Peraza DO Internal Medicine [facility]     Signatures   Electronically signed by :  DANDRE Sanches ; Feb 23 2017  5:01PM EST                       (Author)

## 2018-01-18 NOTE — RESULT NOTES
Message   notify the patient pertussis test was negative follow-up as scheduled        Verified Results  (1) B  PERTUSSIS/ PARAPERTUSSIS BY PCR [id number] 08:27PM Holly Shine   A Negative result does not rule out the presence of PCR inhibitors in the specimen or Bordetella DNA concentrations below the level of detection of the assay  Test performed at [place], [place]  A negative result does not rule out the presence of PCR inhibitors in the specimen or Bordetella DNA concentrations below the level of detection of the assay  Test performed at [place], [place]  Test Name Result Flag Reference   B  Parapertussis, PCR Negative  Negative   B   Pertussis,PCR Negative  Negative       Signatures   Electronically signed by : Vasyl Cornelius DO; Jan 28 2016  5:19PM EST                       (Author)

## 2018-01-22 VITALS
HEART RATE: 76 BPM | WEIGHT: 230.5 LBS | HEIGHT: 66 IN | BODY MASS INDEX: 37.04 KG/M2 | DIASTOLIC BLOOD PRESSURE: 84 MMHG | SYSTOLIC BLOOD PRESSURE: 128 MMHG

## 2018-01-23 VITALS
WEIGHT: 231.05 LBS | HEIGHT: 67 IN | HEART RATE: 72 BPM | RESPIRATION RATE: 16 BRPM | DIASTOLIC BLOOD PRESSURE: 74 MMHG | BODY MASS INDEX: 36.26 KG/M2 | SYSTOLIC BLOOD PRESSURE: 119 MMHG | TEMPERATURE: 97.9 F | OXYGEN SATURATION: 97 %

## 2018-02-02 DIAGNOSIS — R01.1 CARDIAC MURMUR: ICD-10-CM

## 2018-02-02 DIAGNOSIS — R73.09 OTHER ABNORMAL GLUCOSE: ICD-10-CM

## 2018-02-02 DIAGNOSIS — E53.8 DEFICIENCY OF OTHER SPECIFIED B GROUP VITAMINS: ICD-10-CM

## 2018-02-11 ENCOUNTER — APPOINTMENT (OUTPATIENT)
Dept: LAB | Age: 58
End: 2018-02-11
Payer: COMMERCIAL

## 2018-02-11 ENCOUNTER — TRANSCRIBE ORDERS (OUTPATIENT)
Dept: ADMINISTRATIVE | Age: 58
End: 2018-02-11

## 2018-02-11 DIAGNOSIS — E55.9 AVITAMINOSIS D: ICD-10-CM

## 2018-02-11 DIAGNOSIS — R01.1 CARDIAC MURMUR: ICD-10-CM

## 2018-02-11 DIAGNOSIS — E53.8 DEFICIENCY OF OTHER SPECIFIED B GROUP VITAMINS: ICD-10-CM

## 2018-02-11 DIAGNOSIS — E78.5 HYPERLIPIDEMIA, UNSPECIFIED HYPERLIPIDEMIA TYPE: Primary | ICD-10-CM

## 2018-02-11 DIAGNOSIS — R73.09 OTHER ABNORMAL GLUCOSE: ICD-10-CM

## 2018-02-11 DIAGNOSIS — E78.5 HYPERLIPIDEMIA, UNSPECIFIED HYPERLIPIDEMIA TYPE: ICD-10-CM

## 2018-02-11 LAB
25(OH)D3 SERPL-MCNC: 25.4 NG/ML (ref 30–100)
ALBUMIN SERPL BCP-MCNC: 3.8 G/DL (ref 3.5–5)
ALP SERPL-CCNC: 48 U/L (ref 46–116)
ALT SERPL W P-5'-P-CCNC: 31 U/L (ref 12–78)
ANION GAP SERPL CALCULATED.3IONS-SCNC: 3 MMOL/L (ref 4–13)
AST SERPL W P-5'-P-CCNC: 14 U/L (ref 5–45)
BILIRUB SERPL-MCNC: 0.34 MG/DL (ref 0.2–1)
BUN SERPL-MCNC: 17 MG/DL (ref 5–25)
CALCIUM SERPL-MCNC: 9.4 MG/DL (ref 8.3–10.1)
CHLORIDE SERPL-SCNC: 106 MMOL/L (ref 100–108)
CO2 SERPL-SCNC: 30 MMOL/L (ref 21–32)
CREAT SERPL-MCNC: 1.05 MG/DL (ref 0.6–1.3)
EST. AVERAGE GLUCOSE BLD GHB EST-MCNC: 108 MG/DL
GFR SERPL CREATININE-BSD FRML MDRD: 77 ML/MIN/1.73SQ M
GLUCOSE P FAST SERPL-MCNC: 104 MG/DL (ref 65–99)
HBA1C MFR BLD: 5.4 % (ref 4.2–6.3)
POTASSIUM SERPL-SCNC: 4.5 MMOL/L (ref 3.5–5.3)
PROT SERPL-MCNC: 8 G/DL (ref 6.4–8.2)
SODIUM SERPL-SCNC: 139 MMOL/L (ref 136–145)
VIT B12 SERPL-MCNC: 654 PG/ML (ref 100–900)

## 2018-02-11 PROCEDURE — 80053 COMPREHEN METABOLIC PANEL: CPT

## 2018-02-11 PROCEDURE — 83036 HEMOGLOBIN GLYCOSYLATED A1C: CPT

## 2018-02-11 PROCEDURE — 82607 VITAMIN B-12: CPT

## 2018-02-11 PROCEDURE — 36415 COLL VENOUS BLD VENIPUNCTURE: CPT

## 2018-02-11 PROCEDURE — 82306 VITAMIN D 25 HYDROXY: CPT

## 2018-02-12 RX ORDER — CHLORAL HYDRATE 500 MG
3 CAPSULE ORAL DAILY
COMMUNITY

## 2018-02-12 RX ORDER — ESOMEPRAZOLE MAGNESIUM 40 MG/1
1 CAPSULE, DELAYED RELEASE ORAL DAILY
COMMUNITY
Start: 2016-03-01 | End: 2018-08-17 | Stop reason: ALTCHOICE

## 2018-02-12 RX ORDER — AMPICILLIN TRIHYDRATE 250 MG
CAPSULE ORAL
COMMUNITY

## 2018-02-12 RX ORDER — MAG HYDROX/ALUMINUM HYD/SIMETH 400-400-40
5000 SUSPENSION, ORAL (FINAL DOSE FORM) ORAL
COMMUNITY

## 2018-02-12 RX ORDER — ALBUTEROL SULFATE 2.5 MG/3ML
SOLUTION RESPIRATORY (INHALATION)
COMMUNITY

## 2018-02-12 RX ORDER — ALBUTEROL SULFATE 90 UG/1
2 AEROSOL, METERED RESPIRATORY (INHALATION)
COMMUNITY
Start: 2016-03-02 | End: 2018-10-10 | Stop reason: SDUPTHER

## 2018-02-14 ENCOUNTER — OFFICE VISIT (OUTPATIENT)
Dept: INTERNAL MEDICINE CLINIC | Facility: CLINIC | Age: 58
End: 2018-02-14
Payer: COMMERCIAL

## 2018-02-14 VITALS
WEIGHT: 229.2 LBS | BODY MASS INDEX: 35.97 KG/M2 | RESPIRATION RATE: 16 BRPM | HEART RATE: 72 BPM | HEIGHT: 67 IN | OXYGEN SATURATION: 96 % | DIASTOLIC BLOOD PRESSURE: 70 MMHG | SYSTOLIC BLOOD PRESSURE: 128 MMHG

## 2018-02-14 DIAGNOSIS — R73.09 ABNORMAL BLOOD SUGAR: ICD-10-CM

## 2018-02-14 DIAGNOSIS — K21.9 GASTROESOPHAGEAL REFLUX DISEASE WITHOUT ESOPHAGITIS: Primary | ICD-10-CM

## 2018-02-14 DIAGNOSIS — E66.09 CLASS 2 OBESITY DUE TO EXCESS CALORIES WITHOUT SERIOUS COMORBIDITY WITH BODY MASS INDEX (BMI) OF 35.0 TO 35.9 IN ADULT: ICD-10-CM

## 2018-02-14 DIAGNOSIS — Z12.11 SCREENING FOR MALIGNANT NEOPLASM OF COLON: ICD-10-CM

## 2018-02-14 DIAGNOSIS — G47.30 SLEEP APNEA, UNSPECIFIED TYPE: ICD-10-CM

## 2018-02-14 DIAGNOSIS — E55.9 VITAMIN D DEFICIENCY: ICD-10-CM

## 2018-02-14 DIAGNOSIS — E78.5 HYPERLIPIDEMIA, UNSPECIFIED HYPERLIPIDEMIA TYPE: ICD-10-CM

## 2018-02-14 PROCEDURE — 99214 OFFICE O/P EST MOD 30 MIN: CPT | Performed by: INTERNAL MEDICINE

## 2018-02-14 NOTE — PROGRESS NOTES
Assessment/Plan:    No problem-specific Assessment & Plan notes found for this encounter  Diagnoses and all orders for this visit:    Gastroesophageal reflux disease without esophagitis  Comments:  Currently stable continue with PPI    Sleep apnea, unspecified type  Comments:  Continue with CPAP, recommend weight loss    Abnormal blood sugar  Comments:  I have counselled the pt to follow a healthy and balanced diet ,and recommend routine exercise  Class 2 obesity due to excess calories without serious comorbidity with body mass index (BMI) of 35 0 to 35 9 in adult  Comments:  Obesity -I have counseled patient following healthy and balanced diet, I would like the patient to lose weight, I would like the patient exercise routinely;   Orders:  -     Comprehensive metabolic panel; Future  -     Hemoglobin A1c; Future  -     Lipid Panel with Direct LDL reflex; Future    Hyperlipidemia, unspecified hyperlipidemia type  Comments:  Per Cardiology, low-cholesterol diet    Screening for malignant neoplasm of colon  Comments:  I will set up a screening colonoscopy  Orders:  -     Cologuard    Vitamin D deficiency  Comments:  Patient will take vitamin D 5000 international units once a day and will recheck a vitamin-D level  Orders:  -     Vitamin B12; Future  -     Vitamin D 25 hydroxy; Future        Return to office 6 months  call if any problems  Subjective:      Patient ID: Trace So is a 59 y o  male  HPI [age]-year old -year old male coming in for a follow up visit regarding obesity, hyperlipidemia, vitamin-D deficiency, prediabetes, GERD and vitamin-D deficiency; The patient reports me compliant taking medications without untoward side effects the  The patient is here to review his medical condition, update me on the medical condition and the patient reports me no hospitalizations and no ER visits    Patient does report me less active but now starting to become more active with the baseball season he is trying to follow healthy diet  The following portions of the patient's history were reviewed and updated as appropriate: allergies, current medications, past family history, past social history, past surgical history and problem list     Review of Systems   Constitutional: Negative for activity change, chills, fatigue and fever  HENT: Negative for congestion and postnasal drip  Respiratory: Negative for cough and shortness of breath  Cardiovascular: Negative for chest pain and palpitations  Psychiatric/Behavioral: The patient is not nervous/anxious  Objective:    Vitals:    02/14/18 1648   BP: 128/70   Pulse: 72   Resp: 16   SpO2: 96%                     No Follow-up on file  No Known Allergies    Past Medical History:   Diagnosis Date    Erectile dysfunction of non-organic origin     Resolved: 10/15/2014     Past Surgical History:   Procedure Laterality Date    TONSILLECTOMY AND ADENOIDECTOMY      UMBILICAL HERNIA REPAIR       Current Outpatient Prescriptions on File Prior to Visit   Medication Sig Dispense Refill    albuterol (2 5 mg/3 mL) 0 083 % nebulizer solution Inhale      albuterol (PROAIR HFA) 90 mcg/act inhaler Inhale 2 puffs      Cholecalciferol (VITAMIN D3) 1000 units CAPS Take by mouth      Cinnamon 500 MG capsule Take by mouth      cyanocobalamin ([employer] VITAMIN B-12) 1000 MCG tablet Take by mouth      esomeprazole (NexIUM) 40 MG capsule Take 1 capsule by mouth daily      Omega-3 Fatty Acids (FISH OIL) 1,000 mg Take 3 capsules by mouth daily       No current facility-administered medications on file prior to visit        Family History   Problem Relation Age of Onset    Asthma Mother     Heart disease Mother     Rheum arthritis Mother     Diabetes Father     Heart disease Father     Asthma Sister     Diabetes Sister     Other Son      Thyroid disorder     Social History     Social History    Marital status: /Civil Union     Spouse name: N/NIKKO Mancia Number of children: N/A    Years of education: N/A     Occupational History    Not on file  Social History Main Topics    Smoking status: Former Smoker    Smokeless tobacco: Never Used      Comment: He smoked a pack a day of cigarettes for 8 years  He quite at the age of 22  Occasional cigar use   Alcohol use Yes      Comment: Social drinker    Drug use: No    Sexual activity: Not on file     Other Topics Concern    Not on file     Social History Narrative    Caffeine use    Work-related stress     Vitals:    02/14/18 1648   BP: 128/70   BP Location: Left arm   Patient Position: Sitting   Cuff Size: Large   Pulse: 72   Resp: 16   SpO2: 96%   Weight: 104 kg (229 lb 3 2 oz)   Height: 5' 7" (1 702 m)     Results for orders placed or performed in visit on 02/11/18   Comprehensive metabolic panel   Result Value Ref Range    Sodium 139 136 - 145 mmol/L    Potassium 4 5 3 5 - 5 3 mmol/L    Chloride 106 100 - 108 mmol/L    CO2 30 21 - 32 mmol/L    Anion Gap 3 (L) 4 - 13 mmol/L    BUN 17 5 - 25 mg/dL    Creatinine 1 05 0 60 - 1 30 mg/dL    Glucose, Fasting 104 (H) 65 - 99 mg/dL    Calcium 9 4 8 3 - 10 1 mg/dL    AST 14 5 - 45 U/L    ALT 31 12 - 78 U/L    Alkaline Phosphatase 48 46 - 116 U/L    Total Protein 8 0 6 4 - 8 2 g/dL    Albumin 3 8 3 5 - 5 0 g/dL    Total Bilirubin 0 34 0 20 - 1 00 mg/dL    eGFR 77 ml/min/1 73sq m   Hemoglobin A1c   Result Value Ref Range    Hemoglobin A1C 5 4 4 2 - 6 3 %     mg/dl   Vitamin B12   Result Value Ref Range    Vitamin B-12 654 100 - 900 pg/mL   Vitamin D 25 hydroxy   Result Value Ref Range    Vit D, 25-Hydroxy 25 4 (L) 30 0 - 100 0 ng/mL     Weight (last 2 days)     Date/Time   Weight    02/14/18 1648  104 (229 2)            Body mass index is 35 9 kg/m²    BP      Temp      Pulse     Resp      SpO2        Vitals:    02/14/18 1648   Weight: 104 kg (229 lb 3 2 oz)     Vitals:    02/14/18 1648   Weight: 104 kg (229 lb 3 2 oz)        Physical Exam   Constitutional: He appears well-developed and well-nourished  No distress  HENT:   Head: Normocephalic and atraumatic  Right Ear: External ear normal    Left Ear: External ear normal    Mouth/Throat: Oropharynx is clear and moist    Eyes: Conjunctivae are normal  Pupils are equal, round, and reactive to light  Right eye exhibits no discharge  Left eye exhibits no discharge  No scleral icterus  Neck: Neck supple  Cardiovascular: Normal rate, regular rhythm and normal heart sounds  Exam reveals no gallop and no friction rub  No murmur heard  Pulmonary/Chest: No respiratory distress  He has no wheezes  He has no rales  Abdominal: Soft  Bowel sounds are normal  He exhibits no distension and no mass  There is no tenderness  There is no rebound and no guarding  Musculoskeletal: He exhibits no edema or deformity  Lymphadenopathy:     He has no cervical adenopathy  Neurological: He is alert  Skin: He is not diaphoretic  Psychiatric: He has a normal mood and affect

## 2018-03-12 LAB
APO B SERPL-MCNC: 84 MG/DL (ref 52–109)
CHOLEST SERPL-MCNC: 166 MG/DL
CHOLEST/HDLC SERPL: 3.5 CALC
CRP SERPL HS-MCNC: 1.1 MG/L
HDLC SERPL-MCNC: 48 MG/DL
HLD.LARGE SERPL-SCNC: 4534 NMOL/L (ref 4334–10815)
LDL SERPL QN: 214 ANGSTROM
LDL SERPL-SCNC: 1189 NMOL/L (ref 1016–2185)
LDL SMALL SERPL-SCNC: 272 NMOL/L (ref 123–441)
LDLC REAL SIZE PAT SERPL: ABNORMAL PATTERN
LDLC SERPL CALC-MCNC: 101 MG/DL
LP-PLA2 SERPL-CCNC: 111 NMOL/MIN/ML (ref 70–153)
LPA SERPL-SCNC: 16 NMOL/L
NONHDLC SERPL-MCNC: 118 MG/DL (CALC)
SL AMB LDL MEDIUM: 272 NMOL/L (ref 167–465)
TRIGL SERPL-MCNC: 78 MG/DL

## 2018-06-18 ENCOUNTER — OFFICE VISIT (OUTPATIENT)
Dept: CARDIOLOGY CLINIC | Facility: CLINIC | Age: 58
End: 2018-06-18
Payer: COMMERCIAL

## 2018-06-18 VITALS
BODY MASS INDEX: 35.79 KG/M2 | SYSTOLIC BLOOD PRESSURE: 110 MMHG | DIASTOLIC BLOOD PRESSURE: 62 MMHG | HEART RATE: 77 BPM | HEIGHT: 67 IN | WEIGHT: 228 LBS

## 2018-06-18 DIAGNOSIS — E78.5 HYPERLIPIDEMIA, UNSPECIFIED HYPERLIPIDEMIA TYPE: Primary | ICD-10-CM

## 2018-06-18 DIAGNOSIS — Z01.810 PREOP CARDIOVASCULAR EXAM: ICD-10-CM

## 2018-06-18 PROCEDURE — 99214 OFFICE O/P EST MOD 30 MIN: CPT | Performed by: INTERNAL MEDICINE

## 2018-06-18 PROCEDURE — 93000 ELECTROCARDIOGRAM COMPLETE: CPT | Performed by: INTERNAL MEDICINE

## 2018-06-18 NOTE — PROGRESS NOTES
Cardiology Follow Up    Marcelino Pacheco  1960  [id number]  [address] [facility]  [address] [place] [address] [address]    1  Hyperlipidemia, unspecified hyperlipidemia type  POCT ECG       I had the pleasure of seeing Marcelino Pacheco for a follow up visit  Interval History: none    History of the presenting illness, Discussion/Summary and My Plan are as follows:    He is a pleasant now 78-year-old gentleman without a history of hypertension,   With mild dyslipidemia with elevated LDL particle number and small LDL -last evaluated by me in 2017  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  He did have prediabetes with his A1c was around 5 7 - most recently 5 4 (Feb 2018)  In 2017 lipid profile showed a total cholesterol 196, triglycerides 107, HDL 50,    in February 2017- underwent an advanced lipid profile that showed high LDL particle number-1500 (<1000), high small LDL-531 (50th percentile)  more recently -March 2018 -total cholesterol 166, triglycerides 78, average LDL size-214, density pattern -pattern B   LDL particle number-1300 in  [id number], small LDL particle measurements were not available      At baseline, he is physically active mostly doing weights without any cardiopulmonary symptoms  Aerobic his activity is low because of shortness of breath-from cystic lung disease - apparently he is a carrier for alpha-1 antitrypsin deficiency      He did have an exercise stress test in 2013-12 minutes-13 4 METs, no ischemia     Currently he is asymptomatic from a cardiac standpoint      Plan:     Dyslipidemia: LDL of 107  In January 2017, 105-July 2017, but high LDL particle number and high small LDL  As above, marginal improvement  Over the last 18 months   Overall risk profile is still intermediate considering that overall his LDL level is normal  However he does have the typical pattern that is found in patients with insulin resistance/metabolic syndrome       Therapeutic lifestyle changes were discussed with the patient- Specifically:  1  Decreasing saturated fat intake to less than 13 g per day  Watch intake of cheese  2  Increase soluble fiber in the diet-beans, pears, oatmeal  3  Weight loss of even 5-10 pounds will also help  Decrease caloric intake by about 100 brandon a day-should lead to a weight loss of 1-2 pounds over one month -   This is very important if we need to avoid any medications in the future  4  Increase aerobic physical activity -   Does mostly aerobic activity -doing weights  The above changes will decrease his LDL, change the pattern of his LDL subfractions (to lower small LDL levels and particle number) as well as decrease his risk helping diabetes  Preoperative evaluation prior to bilateral blepharoplasty:  Can proceed at low cardiac risk without further cardiac testing  He remains asymptomatic at good levels of physical activity  I will recheck an advanced lipid profile in 6 months  I will follow up with him in 1 year  Patient Active Problem List   Diagnosis    Abnormal blood sugar    Enlarged prostate without lower urinary tract symptoms (luts)    Esophageal reflux    Obesity    Sleep apnea    Hyperlipidemia     Past Medical History:   Diagnosis Date    Erectile dysfunction of non-organic origin     Resolved: 10/15/2014     Social History     Social History    Marital status: /Civil Union     Spouse name: N/A    Number of children: N/A    Years of education: N/A     Occupational History    Not on file  Social History Main Topics    Smoking status: Former Smoker    Smokeless tobacco: Never Used      Comment: He smoked a pack a day of cigarettes for 8 years  He quite at the age of 22  Occasional cigar use      Alcohol use Yes      Comment: Social drinker    Drug use: No    Sexual activity: Not on file     Other Topics Concern    Not on file     Social History Narrative    Caffeine use    Work-related stress      Family History   Problem Relation Age of Onset    Asthma Mother     Heart disease Mother     Rheum arthritis Mother     Diabetes Father     Heart disease Father     Asthma Sister     Diabetes Sister     Other Son         Thyroid disorder     Past Surgical History:   Procedure Laterality Date    TONSILLECTOMY AND ADENOIDECTOMY      UMBILICAL HERNIA REPAIR         Current Outpatient Prescriptions:     albuterol (2 5 mg/3 mL) 0 083 % nebulizer solution, Inhale, Disp: , Rfl:     albuterol (PROAIR HFA) 90 mcg/act inhaler, Inhale 2 puffs, Disp: , Rfl:     Cholecalciferol (VITAMIN D3) 5000 units CAPS, Take by mouth, Disp: , Rfl:     Cinnamon 500 MG capsule, Take by mouth, Disp: , Rfl:     cyanocobalamin (CVS VITAMIN B-12) 1000 MCG tablet, Take by mouth, Disp: , Rfl:     Omega-3 Fatty Acids (FISH OIL) 1,000 mg, Take 3 capsules by mouth daily, Disp: , Rfl:     esomeprazole (NexIUM) 40 MG capsule, Take 1 capsule by mouth daily, Disp: , Rfl:   No Known Allergies    Labs:not applicable  Imaging: No results found  Review of Systems:  Review of Systems   Constitutional: Negative  HENT: Negative  Eyes: Negative  Respiratory: Negative  Cardiovascular: Negative  Gastrointestinal: Negative  Endocrine: Negative  Genitourinary: Negative  Musculoskeletal: Negative  Skin: Negative  Allergic/Immunologic: Negative  Neurological: Negative  Hematological: Negative  Psychiatric/Behavioral: Negative  Physical Exam:  Physical Exam   Constitutional: He appears well-developed and well-nourished  No distress  HENT:   Head: Normocephalic and atraumatic  Eyes: Conjunctivae and EOM are normal  Pupils are equal, round, and reactive to light  Right eye exhibits no discharge  Left eye exhibits no discharge  Neck: Normal range of motion  Neck supple  No JVD present  No tracheal deviation present   No thyromegaly present  Cardiovascular: Normal rate, normal heart sounds and intact distal pulses  Exam reveals no friction rub  No murmur heard  Pulmonary/Chest: Effort normal  No stridor  No respiratory distress  He has no wheezes  He has no rales  He exhibits no tenderness  Abdominal: Soft  He exhibits no distension  There is no tenderness  There is no rebound  Musculoskeletal: Normal range of motion  He exhibits no edema or deformity  Neurological: He is alert  No cranial nerve deficit  Coordination normal    Skin: Skin is warm and dry  No rash noted  He is not diaphoretic  No erythema  No pallor

## 2018-07-11 ENCOUNTER — OFFICE VISIT (OUTPATIENT)
Dept: INTERNAL MEDICINE CLINIC | Facility: CLINIC | Age: 58
End: 2018-07-11
Payer: COMMERCIAL

## 2018-07-11 VITALS
HEIGHT: 67 IN | HEART RATE: 82 BPM | WEIGHT: 226 LBS | OXYGEN SATURATION: 96 % | SYSTOLIC BLOOD PRESSURE: 114 MMHG | RESPIRATION RATE: 16 BRPM | DIASTOLIC BLOOD PRESSURE: 72 MMHG | BODY MASS INDEX: 35.47 KG/M2

## 2018-07-11 DIAGNOSIS — Z01.818 PREOP EXAMINATION: Primary | ICD-10-CM

## 2018-07-11 DIAGNOSIS — G47.30 SLEEP APNEA, UNSPECIFIED TYPE: ICD-10-CM

## 2018-07-11 PROCEDURE — 99214 OFFICE O/P EST MOD 30 MIN: CPT | Performed by: INTERNAL MEDICINE

## 2018-07-11 PROCEDURE — 3008F BODY MASS INDEX DOCD: CPT | Performed by: INTERNAL MEDICINE

## 2018-07-11 NOTE — PROGRESS NOTES
Assessment/Plan:           Problem List Items Addressed This Visit     Sleep apnea     Continue CPAP the patient is to bring the CPAP along with him to be used in the postop time frame         Preop examination - Primary     Requested by Ophthalmology for lid lift, the patient will be having this procedure at an outpatient center the patient is low risk and this procedure is low risk; the patient is clinically stable I did ask the patient to notify the anesthesiologist about his CPAP and sleep apnea and to bring the CPAP along with him   He is to stop aspirin and anti-inflammatories/fish oil 7 days prior to the procedure  The patient is clinically stable doing well  RTO as scheduled call if any problems                 Subjective:      Patient ID: Omar Malik is a 59 y o  male  HPI 59year old male coming in for a preop evaluation requested by Ophthalmology; the patient does have a history of obstructive sleep apnea; the patient does report me that he is doing clinically well no chest pain, no exertional chest pain, no shortness of breath  No DVT, no PE, no liver problems, no kidney problem  He does not have easy bleeding, he does not have difficulty with anesthesia  The patient does exercise on routine basis at a high intensity without any difficulty no chest pain  The following portions of the patient's history were reviewed and updated as appropriate: allergies, current medications, past family history, past medical history, past social history, past surgical history and problem list     Review of Systems   Constitutional: Negative for activity change, appetite change and unexpected weight change  HENT: Negative for postnasal drip  Eyes: Negative for visual disturbance  Respiratory: Negative for cough and shortness of breath  Cardiovascular: Negative for chest pain  Gastrointestinal: Negative for abdominal pain, diarrhea, nausea and vomiting     Neurological: Negative for dizziness, light-headedness and headaches  Hematological: Negative for adenopathy  Objective:                    No Follow-up on file  No Known Allergies    Past Medical History:   Diagnosis Date    Erectile dysfunction of non-organic origin     Resolved: 10/15/2014     Past Surgical History:   Procedure Laterality Date    TONSILLECTOMY AND ADENOIDECTOMY      UMBILICAL HERNIA REPAIR       Current Outpatient Prescriptions on File Prior to Visit   Medication Sig Dispense Refill    albuterol (2 5 mg/3 mL) 0 083 % nebulizer solution Inhale      albuterol (PROAIR HFA) 90 mcg/act inhaler Inhale 2 puffs      Cholecalciferol (VITAMIN D3) 5000 units CAPS Take by mouth      Cinnamon 500 MG capsule Take by mouth      cyanocobalamin (CVS VITAMIN B-12) 1000 MCG tablet Take by mouth      esomeprazole (NexIUM) 40 MG capsule Take 1 capsule by mouth daily      Omega-3 Fatty Acids (FISH OIL) 1,000 mg Take 3 capsules by mouth daily       No current facility-administered medications on file prior to visit  Family History   Problem Relation Age of Onset    Asthma Mother     Heart disease Mother     Rheum arthritis Mother     Diabetes Father     Heart disease Father     Asthma Sister     Diabetes Sister     Other Son         Thyroid disorder     Social History     Social History    Marital status: /Civil Union     Spouse name: N/A    Number of children: N/A    Years of education: N/A     Occupational History    Not on file  Social History Main Topics    Smoking status: Former Smoker    Smokeless tobacco: Never Used      Comment: He smoked a pack a day of cigarettes for 8 years  He quite at the age of 22  Occasional cigar use      Alcohol use Yes      Comment: Social drinker    Drug use: No    Sexual activity: Not on file     Other Topics Concern    Not on file     Social History Narrative    Caffeine use    Work-related stress     Vitals:    07/11/18 1155   BP: 114/72   BP Location: Right arm   Patient Position: Sitting   Cuff Size: Standard   Pulse: 82   Resp: 16   SpO2: 96%   Weight: 103 kg (226 lb)   Height: 5' 7" (1 702 m)     Results for orders placed or performed in visit on 03/08/18   CARDIO IQ(R) ADVANCED LIPID PANEL & INFLAMMATION PANEL   Result Value Ref Range    Total Cholesterol 166 <200 mg/dL    HDL 48 >40 mg/dL    Triglycerides 78 <150 mg/dL    LDL Direct 101 (H) <100 mg/dL    Chol HDLC Ratio 3 5 <5 0 calc    Non-HDL Cholesterol 118 <130 mg/dL (calc)    LDL-P 1,189 1,016 - 2,185 nmol/L    LDL Small 272 123 - 441 nmol/L    LDL Medium 272 167 - 465 nmol/L    HDL Large 4,534 4,334 - 10,815 nmol/L    LDL Density Pattern B (A) A Pattern    LDL Size 214 0 (L) > OR = 218 2 Angstrom    Apolipoprotein B 84 52 - 109 mg/dL    Lipoprotein (a) 16 <75 nmol/L    HS CRP 1 1 mg/L    LP PLAC2 ACTIVITY 111 70 - 153 nmol/min/mL     Weight (last 2 days)     Date/Time   Weight    07/11/18 1155  103 (226)            Body mass index is 35 4 kg/m²  BP      Temp      Pulse     Resp      SpO2        Vitals:    07/11/18 1155   Weight: 103 kg (226 lb)     Vitals:    07/11/18 1155   Weight: 103 kg (226 lb)       /72 (BP Location: Right arm, Patient Position: Sitting, Cuff Size: Standard)   Pulse 82   Resp 16   Ht 5' 7" (1 702 m)   Wt 103 kg (226 lb)   SpO2 96%   BMI 35 40 kg/m²          Physical Exam   Constitutional: He appears well-developed and well-nourished  No distress  HENT:   Head: Normocephalic and atraumatic  Right Ear: External ear normal    Left Ear: External ear normal    Mouth/Throat: Oropharynx is clear and moist    Eyes: Conjunctivae are normal  Pupils are equal, round, and reactive to light  Right eye exhibits no discharge  Left eye exhibits no discharge  No scleral icterus  Neck: Neck supple  Cardiovascular: Normal rate, regular rhythm and normal heart sounds  Exam reveals no gallop and no friction rub  No murmur heard  Pulmonary/Chest: No respiratory distress   He has no wheezes  He has no rales  Abdominal: Soft  Bowel sounds are normal  He exhibits no distension and no mass  There is no tenderness  There is no rebound and no guarding  Musculoskeletal: He exhibits no edema or deformity  Lymphadenopathy:     He has no cervical adenopathy  Neurological: He is alert  Skin: He is not diaphoretic  Psychiatric: He has a normal mood and affect

## 2018-07-12 NOTE — ASSESSMENT & PLAN NOTE
Requested by Ophthalmology for lid lift, the patient will be having this procedure at an outpatient center the patient is low risk and this procedure is low risk; the patient is clinically stable I did ask the patient to notify the anesthesiologist about his CPAP and sleep apnea and to bring the CPAP along with him   He is to stop aspirin and anti-inflammatories/fish oil 7 days prior to the procedure  The patient is clinically stable doing well    RTO as scheduled call if any problems

## 2018-08-01 RX ORDER — ERYTHROMYCIN 5 MG/G
OINTMENT OPHTHALMIC
Refills: 1 | COMMUNITY
Start: 2018-07-16 | End: 2018-08-17 | Stop reason: ALTCHOICE

## 2018-08-13 ENCOUNTER — TRANSCRIBE ORDERS (OUTPATIENT)
Dept: ADMINISTRATIVE | Age: 58
End: 2018-08-13

## 2018-08-13 ENCOUNTER — APPOINTMENT (OUTPATIENT)
Dept: LAB | Age: 58
End: 2018-08-13
Payer: COMMERCIAL

## 2018-08-13 DIAGNOSIS — E55.9 VITAMIN D DEFICIENCY: ICD-10-CM

## 2018-08-13 DIAGNOSIS — E66.09 CLASS 2 OBESITY DUE TO EXCESS CALORIES WITHOUT SERIOUS COMORBIDITY WITH BODY MASS INDEX (BMI) OF 35.0 TO 35.9 IN ADULT: ICD-10-CM

## 2018-08-13 LAB
25(OH)D3 SERPL-MCNC: 47.9 NG/ML (ref 30–100)
ALBUMIN SERPL BCP-MCNC: 3.6 G/DL (ref 3.5–5)
ALP SERPL-CCNC: 43 U/L (ref 46–116)
ALT SERPL W P-5'-P-CCNC: 36 U/L (ref 12–78)
ANION GAP SERPL CALCULATED.3IONS-SCNC: 7 MMOL/L (ref 4–13)
AST SERPL W P-5'-P-CCNC: 23 U/L (ref 5–45)
BILIRUB SERPL-MCNC: 0.66 MG/DL (ref 0.2–1)
BUN SERPL-MCNC: 19 MG/DL (ref 5–25)
CALCIUM SERPL-MCNC: 8.7 MG/DL (ref 8.3–10.1)
CHLORIDE SERPL-SCNC: 106 MMOL/L (ref 100–108)
CHOLEST SERPL-MCNC: 168 MG/DL (ref 50–200)
CO2 SERPL-SCNC: 26 MMOL/L (ref 21–32)
CREAT SERPL-MCNC: 1.03 MG/DL (ref 0.6–1.3)
EST. AVERAGE GLUCOSE BLD GHB EST-MCNC: 114 MG/DL
GFR SERPL CREATININE-BSD FRML MDRD: 79 ML/MIN/1.73SQ M
GLUCOSE P FAST SERPL-MCNC: 89 MG/DL (ref 65–99)
HBA1C MFR BLD: 5.6 % (ref 4.2–6.3)
HDLC SERPL-MCNC: 46 MG/DL (ref 40–60)
LDLC SERPL CALC-MCNC: 100 MG/DL (ref 0–100)
POTASSIUM SERPL-SCNC: 4 MMOL/L (ref 3.5–5.3)
PROT SERPL-MCNC: 7.4 G/DL (ref 6.4–8.2)
SODIUM SERPL-SCNC: 139 MMOL/L (ref 136–145)
TRIGL SERPL-MCNC: 109 MG/DL
VIT B12 SERPL-MCNC: 596 PG/ML (ref 100–900)

## 2018-08-13 PROCEDURE — 82306 VITAMIN D 25 HYDROXY: CPT

## 2018-08-13 PROCEDURE — 83036 HEMOGLOBIN GLYCOSYLATED A1C: CPT

## 2018-08-13 PROCEDURE — 80061 LIPID PANEL: CPT

## 2018-08-13 PROCEDURE — 80053 COMPREHEN METABOLIC PANEL: CPT

## 2018-08-13 PROCEDURE — 36415 COLL VENOUS BLD VENIPUNCTURE: CPT

## 2018-08-13 PROCEDURE — 82607 VITAMIN B-12: CPT

## 2018-08-17 ENCOUNTER — OFFICE VISIT (OUTPATIENT)
Dept: INTERNAL MEDICINE CLINIC | Facility: CLINIC | Age: 58
End: 2018-08-17
Payer: COMMERCIAL

## 2018-08-17 VITALS
OXYGEN SATURATION: 96 % | HEIGHT: 67 IN | HEART RATE: 58 BPM | BODY MASS INDEX: 35.41 KG/M2 | WEIGHT: 225.6 LBS | RESPIRATION RATE: 16 BRPM | SYSTOLIC BLOOD PRESSURE: 118 MMHG | DIASTOLIC BLOOD PRESSURE: 68 MMHG

## 2018-08-17 DIAGNOSIS — K21.9 GASTROESOPHAGEAL REFLUX DISEASE WITHOUT ESOPHAGITIS: ICD-10-CM

## 2018-08-17 DIAGNOSIS — E66.09 CLASS 2 OBESITY DUE TO EXCESS CALORIES WITHOUT SERIOUS COMORBIDITY WITH BODY MASS INDEX (BMI) OF 35.0 TO 35.9 IN ADULT: Primary | ICD-10-CM

## 2018-08-17 DIAGNOSIS — E55.9 VITAMIN D DEFICIENCY: ICD-10-CM

## 2018-08-17 DIAGNOSIS — J30.2 SEASONAL ALLERGIC RHINITIS, UNSPECIFIED TRIGGER: ICD-10-CM

## 2018-08-17 DIAGNOSIS — E78.5 HYPERLIPIDEMIA, UNSPECIFIED HYPERLIPIDEMIA TYPE: ICD-10-CM

## 2018-08-17 DIAGNOSIS — G47.30 SLEEP APNEA, UNSPECIFIED TYPE: ICD-10-CM

## 2018-08-17 DIAGNOSIS — R73.09 ABNORMAL BLOOD SUGAR: ICD-10-CM

## 2018-08-17 DIAGNOSIS — Z11.59 ENCOUNTER FOR HEPATITIS C SCREENING TEST FOR LOW RISK PATIENT: ICD-10-CM

## 2018-08-17 DIAGNOSIS — Z11.4 SCREENING FOR HIV (HUMAN IMMUNODEFICIENCY VIRUS): ICD-10-CM

## 2018-08-17 PROCEDURE — 99214 OFFICE O/P EST MOD 30 MIN: CPT | Performed by: INTERNAL MEDICINE

## 2018-08-17 RX ORDER — FLUTICASONE PROPIONATE 50 MCG
SPRAY, SUSPENSION (ML) NASAL
Qty: 16 G | Refills: 0 | Status: SHIPPED | OUTPATIENT
Start: 2018-08-17 | End: 2018-10-04 | Stop reason: SDUPTHER

## 2018-08-17 NOTE — ASSESSMENT & PLAN NOTE
Reduce carbohydrates, reduce sweets, reduce weight continue with a healthy imbalance diet the fasting blood sugars mildly elevated but the hemoglobin A1c is currently stable 5 6

## 2018-08-17 NOTE — ASSESSMENT & PLAN NOTE
Will check a vitamin-D level patient does not want to reduce the dose of vitamin-D at this point in time  I think it is okay and reasonable the fact that we are coming up on the winter months but I would still like to monitor his vitamin-D level  In the past when we had reduce his dose of vitamin-D level had dropped

## 2018-08-17 NOTE — PROGRESS NOTES
Assessment/Plan:           Problem List Items Addressed This Visit        Digestive    Esophageal reflux     Very mild currently stable doing well I have asked the patient to discontinue the Nexium he may use either Pepcid AC or Zantac OTC or Tums as needed his symptoms are very minimal and infrequent at this point in time  Respiratory    Sleep apnea     Currently stable continue with CPAP continue with weight loss         Seasonal allergic rhinitis     Will treat with Flonase 2 sprays in each nostril once a day as needed         Relevant Medications    fluticasone (FLONASE) 50 mcg/act nasal spray       Other    Abnormal blood sugar     Reduce carbohydrates, reduce sweets, reduce weight continue with a healthy imbalance diet the fasting blood sugars mildly elevated but the hemoglobin A1c is currently stable 5 6         Relevant Orders    Comprehensive metabolic panel    Hemoglobin A1C    Obesity - Primary     Obesity -I have counseled patient following healthy and balanced diet, I would like the patient to lose weight, I would like the patient exercise routinely; we will continue monitor the patient's progress  Hyperlipidemia     Hyperlipidemia controlled continue with current medical regiment recommend a low-cholesterol diet and recommend routine exercise we will continue to monitor the progress  Relevant Orders    Lipid Panel with Direct LDL reflex    Screening for HIV (human immunodeficiency virus)     Counseled, will check a 4 generation HIV combination  Relevant Orders    HIV 1/2 AG-AB combo    Encounter for hepatitis C screening test for low risk patient    Relevant Orders    Hepatitis C antibody    Vitamin D deficiency     Will check a vitamin-D level patient does not want to reduce the dose of vitamin-D at this point in time  I think it is okay and reasonable the fact that we are coming up on the winter months but I would still like to monitor his vitamin-D level    In the past when we had reduce his dose of vitamin-D level had dropped  Relevant Orders    Vitamin D 25 hydroxy          Return to office 6  months  call if any problems  Subjective:      Patient ID: Manda Greenfield is a 59 y o  male  HPI 62-year old male coming in for a follow up visit regarding obesity, hyperlipidemia, GERD, abnormal blood sugar, allergic rhinitis, screening for hepatitis-C, screening for HIV, vitamin-D deficiency and sleep apnea; The patient reports me compliant taking medications without untoward side effects the  The patient is here to review his medical condition, update me on the medical condition and the patient reports me no hospitalizations and no ER visits  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  He is now part-time at work he is spending more time exercising and also doing cardiovascular exercise  diet good and exercize good and now part time     The following portions of the patient's history were reviewed and updated as appropriate: allergies, current medications, past family history, past medical history, past social history, past surgical history and problem list     Review of Systems   Constitutional: Negative for activity change, appetite change and unexpected weight change  HENT: Positive for postnasal drip  Negative for congestion  Eyes: Negative for visual disturbance  Respiratory: Negative for cough and shortness of breath  Cardiovascular: Negative for chest pain  Gastrointestinal: Negative for abdominal pain, diarrhea, nausea and vomiting  Objective:                  No Follow-up on file        No Known Allergies    Past Medical History:   Diagnosis Date    Erectile dysfunction of non-organic origin     Resolved: 10/15/2014     Past Surgical History:   Procedure Laterality Date    TONSILLECTOMY AND ADENOIDECTOMY      UMBILICAL HERNIA REPAIR       Current Outpatient Prescriptions on File Prior to Visit   Medication Sig Dispense Refill    albuterol (2 5 mg/3 mL) 0 083 % nebulizer solution Inhale      albuterol (PROAIR HFA) 90 mcg/act inhaler Inhale 2 puffs      Cholecalciferol (VITAMIN D3) 5000 units CAPS Take by mouth      Cinnamon 500 MG capsule Take by mouth      cyanocobalamin (CVS VITAMIN B-12) 1000 MCG tablet Take by mouth      Omega-3 Fatty Acids (FISH OIL) 1,000 mg Take 3 capsules by mouth daily      [DISCONTINUED] erythromycin (ILOTYCIN) ophthalmic ointment 1/4"STRIP OINTMENT (GRAM) OPHTHALMICALLY TWICE A DAY IN EACH EYE APPLY TO INCISIONS X 10 DAYS  1    [DISCONTINUED] esomeprazole (NexIUM) 40 MG capsule Take 1 capsule by mouth daily       No current facility-administered medications on file prior to visit  Family History   Problem Relation Age of Onset    Asthma Mother     Heart disease Mother     Rheum arthritis Mother     Diabetes Father     Heart disease Father     Asthma Sister     Diabetes Sister     Other Son         Thyroid disorder     Social History     Social History    Marital status: /Civil Union     Spouse name: N/A    Number of children: N/A    Years of education: N/A     Occupational History    Not on file  Social History Main Topics    Smoking status: Former Smoker    Smokeless tobacco: Never Used      Comment: He smoked a pack a day of cigarettes for 8 years  He quite at the age of 22  Occasional cigar use      Alcohol use Yes      Comment: Social drinker    Drug use: No    Sexual activity: Not on file     Other Topics Concern    Not on file     Social History Narrative    Caffeine use    Work-related stress     Vitals:    08/17/18 0800   BP: 118/68   BP Location: Left arm   Patient Position: Sitting   Cuff Size: Large   Pulse: 58   Resp: 16   SpO2: 96%   Weight: 102 kg (225 lb 9 6 oz)   Height: 5' 7" (1 702 m)     Results for orders placed or performed in visit on 08/13/18   Comprehensive metabolic panel   Result Value Ref Range    Sodium 139 136 - 145 mmol/L    Potassium 4 0 3 5 - 5 3 mmol/L    Chloride 106 100 - 108 mmol/L    CO2 26 21 - 32 mmol/L    Anion Gap 7 4 - 13 mmol/L    BUN 19 5 - 25 mg/dL    Creatinine 1 03 0 60 - 1 30 mg/dL    Glucose, Fasting 89 65 - 99 mg/dL    Calcium 8 7 8 3 - 10 1 mg/dL    AST 23 5 - 45 U/L    ALT 36 12 - 78 U/L    Alkaline Phosphatase 43 (L) 46 - 116 U/L    Total Protein 7 4 6 4 - 8 2 g/dL    Albumin 3 6 3 5 - 5 0 g/dL    Total Bilirubin 0 66 0 20 - 1 00 mg/dL    eGFR 79 ml/min/1 73sq m   Hemoglobin A1c   Result Value Ref Range    Hemoglobin A1C 5 6 4 2 - 6 3 %     mg/dl   Lipid Panel with Direct LDL reflex   Result Value Ref Range    Cholesterol 168 50 - 200 mg/dL    Triglycerides 109 <=150 mg/dL    HDL, Direct 46 40 - 60 mg/dL    LDL Calculated 100 0 - 100 mg/dL   Vitamin B12   Result Value Ref Range    Vitamin B-12 596 100 - 900 pg/mL   Vitamin D 25 hydroxy   Result Value Ref Range    Vit D, 25-Hydroxy 47 9 30 0 - 100 0 ng/mL     Weight (last 2 days)     Date/Time   Weight    08/17/18 0800  102 (225 6)            Body mass index is 35 33 kg/m²  BP      Temp      Pulse     Resp      SpO2        Vitals:    08/17/18 0800   Weight: 102 kg (225 lb 9 6 oz)     Vitals:    08/17/18 0800   Weight: 102 kg (225 lb 9 6 oz)         /68 (BP Location: Left arm, Patient Position: Sitting, Cuff Size: Large)   Pulse 58   Resp 16   Ht 5' 7" (1 702 m)   Wt 102 kg (225 lb 9 6 oz)   SpO2 96%   BMI 35 33 kg/m²          Physical Exam   Constitutional: He appears well-developed and well-nourished  No distress  HENT:   Head: Normocephalic and atraumatic  Right Ear: External ear normal    Left Ear: External ear normal    Mouth/Throat: Oropharynx is clear and moist    Eyes: Conjunctivae are normal  Pupils are equal, round, and reactive to light  Right eye exhibits no discharge  Left eye exhibits no discharge  No scleral icterus  Neck: Neck supple     Cardiovascular: Normal rate, regular rhythm and normal heart sounds  Exam reveals no gallop and no friction rub  No murmur heard  Pulmonary/Chest: No respiratory distress  He has no wheezes  He has no rales  Abdominal: Soft  Bowel sounds are normal  He exhibits no distension and no mass  There is no tenderness  There is no rebound and no guarding  Musculoskeletal: He exhibits no edema or deformity  Lymphadenopathy:     He has no cervical adenopathy  Neurological: He is alert  Skin: He is not diaphoretic  Psychiatric: He has a normal mood and affect

## 2018-08-17 NOTE — ASSESSMENT & PLAN NOTE
Very mild currently stable doing well I have asked the patient to discontinue the Nexium he may use either Pepcid AC or Zantac OTC or Tums as needed his symptoms are very minimal and infrequent at this point in time

## 2018-10-04 DIAGNOSIS — J30.2 SEASONAL ALLERGIC RHINITIS, UNSPECIFIED TRIGGER: ICD-10-CM

## 2018-10-04 RX ORDER — FLUTICASONE PROPIONATE 50 MCG
SPRAY, SUSPENSION (ML) NASAL
Qty: 1 BOTTLE | Refills: 0 | Status: SHIPPED | OUTPATIENT
Start: 2018-10-04 | End: 2018-11-06 | Stop reason: SDUPTHER

## 2018-10-10 DIAGNOSIS — J45.909 MILD ASTHMA WITHOUT COMPLICATION, UNSPECIFIED WHETHER PERSISTENT: Primary | ICD-10-CM

## 2018-10-10 RX ORDER — ALBUTEROL SULFATE 90 UG/1
AEROSOL, METERED RESPIRATORY (INHALATION)
Qty: 18 G | Refills: 1 | Status: SHIPPED | OUTPATIENT
Start: 2018-10-10 | End: 2018-11-06 | Stop reason: SDUPTHER

## 2018-11-06 ENCOUNTER — OFFICE VISIT (OUTPATIENT)
Dept: PULMONOLOGY | Facility: CLINIC | Age: 58
End: 2018-11-06
Payer: COMMERCIAL

## 2018-11-06 VITALS
HEART RATE: 72 BPM | OXYGEN SATURATION: 96 % | HEIGHT: 67 IN | SYSTOLIC BLOOD PRESSURE: 126 MMHG | BODY MASS INDEX: 36.29 KG/M2 | WEIGHT: 231.2 LBS | DIASTOLIC BLOOD PRESSURE: 68 MMHG | TEMPERATURE: 96.9 F

## 2018-11-06 DIAGNOSIS — J30.2 SEASONAL ALLERGIC RHINITIS, UNSPECIFIED TRIGGER: ICD-10-CM

## 2018-11-06 DIAGNOSIS — J45.990 EXERCISE-INDUCED ASTHMA: Primary | ICD-10-CM

## 2018-11-06 DIAGNOSIS — Q33.0: ICD-10-CM

## 2018-11-06 DIAGNOSIS — Z14.8 ALPHA-1-ANTITRYPSIN DEFICIENCY CARRIER: ICD-10-CM

## 2018-11-06 DIAGNOSIS — J84.9 ILD (INTERSTITIAL LUNG DISEASE) (HCC): ICD-10-CM

## 2018-11-06 DIAGNOSIS — Z23 INFLUENZA VACCINE NEEDED: ICD-10-CM

## 2018-11-06 PROCEDURE — 90471 IMMUNIZATION ADMIN: CPT

## 2018-11-06 PROCEDURE — 90682 RIV4 VACC RECOMBINANT DNA IM: CPT

## 2018-11-06 PROCEDURE — 99213 OFFICE O/P EST LOW 20 MIN: CPT | Performed by: PHYSICIAN ASSISTANT

## 2018-11-06 RX ORDER — ALBUTEROL SULFATE 90 UG/1
AEROSOL, METERED RESPIRATORY (INHALATION)
Qty: 18 G | Refills: 11 | Status: SHIPPED | OUTPATIENT
Start: 2018-11-06 | End: 2019-08-19 | Stop reason: SDUPTHER

## 2018-11-06 RX ORDER — FLUTICASONE PROPIONATE 50 MCG
SPRAY, SUSPENSION (ML) NASAL
Qty: 1 BOTTLE | Refills: 11 | Status: SHIPPED | OUTPATIENT
Start: 2018-11-06 | End: 2020-03-17 | Stop reason: SDUPTHER

## 2018-11-06 NOTE — PROGRESS NOTES
Assessment:    1  Exercise-induced asthma  influenza vaccine, [id number], quadrivalent, recombinant, PF, 0 5 mL, for patients 18 yr+ (FLUBLOK)    albuterol (PROAIR HFA) 90 mcg/act inhaler   2  Lung, cysts, congenital     3  Alpha-1-antitrypsin deficiency carrier ([facility] [address] )     4  ILD (interstitial lung disease) ([facility] [address] )     5  Seasonal allergic rhinitis, unspecified trigger  fluticasone (FLONASE) 50 mcg/act nasal spray   6  Influenza vaccine needed  influenza vaccine, [id number], quadrivalent, recombinant, PF, 0 5 mL, for patients 18 yr+ (FLUBLOK)     Chest x-ray PA and lateral 11/28/2017 reviewed  Showing no active pulmonary disease  CT of chest without contrast dated 09/08/2016 reviewed patient  Showing stable thin wall cyst scattered throughout the lungs  Plan:       Mr David Perez here today for routine follow-up office visit  States his Pulmonary/breathing  Symptoms have remained stable  Admits to occasional shortness of breath/wheezing if does not use inhaler prior to exercise  Otherwise denies any shortness of breath  ProAir reordered with 2 inhalations Q 4 p r n  And prior to exercise with 11 refills  Also Flonase was reordered 2 sprays each nostril q day with 11 refills  Patient scheduled to follow-up with us in 1 year or sooner if needed  Influenza vaccination was administered in our office today  Patient instructed to call us with any questions/ concerns or worsening / recurrent symptoms  Subjective:     Patient ID: Ramon Willis is a 59 y o  male  Chief Complaint:  HPI    59-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  with history of exercise-induced asthma, multiple lung cysts which are stable on CT, alpha 1 antitrypsin carrier who presents today for routine follow-up visit  Patient last seen in our office September 2016  Patient ran out of his ProAir and needs refill    Patient states pulmonary symptoms are stable and is still using his ProAir inhaler prior to exercise which seems to help quite a bit  Patient states that he does not uses inhaler he does notice some audible wheezing with exercise  Otherwise review of systems is negative except for occasional watery eyes /sneezing and environmental allergy symptoms  Patient states Flonase does seem to help control of symptoms  Patient states that his son's spouse was tested for alpha 1 antitrypsin deficiency which was negative  His daughter in law is currently pregnant  Review of Systems   Constitutional: Negative for activity change, appetite change, chills, diaphoresis, fatigue and fever  HENT: Positive for congestion (  Reports occasional clear nasal secretions and sneezing) and sneezing  Negative for postnasal drip, rhinorrhea, sinus pain, sinus pressure and sore throat  Respiratory: Positive for wheezing ( occasional audible wheezing with exercise)  Negative for apnea, cough, choking, chest tightness, shortness of breath and stridor  Cardiovascular: Negative  Negative for chest pain, palpitations and leg swelling  Gastrointestinal: Negative  Negative for abdominal pain  Genitourinary: Negative  Musculoskeletal: Negative for myalgias, neck pain and neck stiffness  Skin: Negative for rash  Neurological: Negative for dizziness and headaches  Psychiatric/Behavioral: Negative  Past Medical History:   Diagnosis Date    Asthma     Erectile dysfunction of non-organic origin     Resolved: 10/15/2014       Current Outpatient Prescriptions on File Prior to Visit   Medication Sig Dispense Refill    albuterol (2 5 mg/3 mL) 0 083 % nebulizer solution Inhale      Cholecalciferol (VITAMIN D3) 5000 units CAPS Take by mouth      Cinnamon 500 MG capsule Take by mouth      cyanocobalamin ([employer] VITAMIN B-12) 1000 MCG tablet Take by mouth      Omega-3 Fatty Acids (FISH OIL) 1,000 mg Take 3 capsules by mouth daily      [DISCONTINUED] albuterol (PROAIR HFA) 90 mcg/act inhaler Use 2 puffs prior to exercise   18 g 1    [DISCONTINUED] fluticasone (FLONASE) 50 mcg/act nasal spray INHALE TWO SPRAYS INTO EACH NOSTRIL ONCE A DAY AS NEEDED 1 Bottle 0     No current facility-administered medications on file prior to visit  Objective:  /68 (BP Location: Left arm, Patient Position: Sitting)   Pulse 72   Temp (!) 96 9 °F (36 1 °C) (Tympanic)   Ht 5' 7" (1 702 m)   Wt 105 kg (231 lb 3 2 oz)   SpO2 96%   BMI 36 21 kg/m²    room-air  Physical Exam   Constitutional: He appears well-developed and well-nourished  No distress  HENT:   Head: Normocephalic and atraumatic  Nose: Nose normal    Mouth/Throat: Oropharynx is clear and moist    Eyes: Pupils are equal, round, and reactive to light  Conjunctivae are normal  No scleral icterus  Neck: Neck supple  Cardiovascular: Normal rate, regular rhythm, normal heart sounds and intact distal pulses  Exam reveals no gallop and no friction rub  No murmur heard  Pulmonary/Chest: Effort normal and breath sounds normal  No respiratory distress  He has no wheezes  He has no rales  He exhibits no tenderness  Abdominal: Soft  Bowel sounds are normal  There is no tenderness  Musculoskeletal: He exhibits no edema or tenderness  Lymphadenopathy:     He has no cervical adenopathy  Neurological: He is alert  Skin: No rash noted  He is not diaphoretic  Psychiatric: He has a normal mood and affect  Nursing note and vitals reviewed

## 2018-12-28 ENCOUNTER — TELEPHONE (OUTPATIENT)
Dept: INTERNAL MEDICINE CLINIC | Facility: CLINIC | Age: 58
End: 2018-12-28

## 2018-12-28 ENCOUNTER — OFFICE VISIT (OUTPATIENT)
Dept: URGENT CARE | Age: 58
End: 2018-12-28
Payer: COMMERCIAL

## 2018-12-28 VITALS
OXYGEN SATURATION: 97 % | SYSTOLIC BLOOD PRESSURE: 128 MMHG | HEART RATE: 74 BPM | HEIGHT: 67 IN | WEIGHT: 232 LBS | DIASTOLIC BLOOD PRESSURE: 64 MMHG | BODY MASS INDEX: 36.41 KG/M2 | RESPIRATION RATE: 16 BRPM | TEMPERATURE: 98.3 F

## 2018-12-28 DIAGNOSIS — J20.8 ACUTE BACTERIAL BRONCHITIS: Primary | ICD-10-CM

## 2018-12-28 DIAGNOSIS — B96.89 ACUTE BACTERIAL BRONCHITIS: Primary | ICD-10-CM

## 2018-12-28 PROCEDURE — 99213 OFFICE O/P EST LOW 20 MIN: CPT | Performed by: FAMILY MEDICINE

## 2018-12-28 RX ORDER — DOXYCYCLINE 100 MG/1
100 TABLET ORAL 2 TIMES DAILY
Qty: 20 TABLET | Refills: 0 | Status: SHIPPED | OUTPATIENT
Start: 2018-12-28 | End: 2019-01-07

## 2018-12-28 RX ORDER — METHYLPREDNISOLONE 4 MG/1
TABLET ORAL
Qty: 21 TABLET | Refills: 0 | Status: SHIPPED | OUTPATIENT
Start: 2018-12-28 | End: 2019-02-25 | Stop reason: ALTCHOICE

## 2018-12-29 NOTE — PATIENT INSTRUCTIONS
Rest and drink extra fluids  Start antibiotic  Take probiotic  Cough medication as prescribed  Cool mist humidification can be helpful  Follow up with PCP if no improvement  Go to ER with worsening symptoms  Acute Bronchitis   AMBULATORY CARE:   Acute bronchitis  is swelling and irritation in the air passages of your lungs  This irritation may cause you to cough or have other breathing problems  Acute bronchitis often starts because of another illness, such as a cold or the flu  The illness spreads from your nose and throat to your windpipe and airways  Bronchitis is often called a chest cold  Acute bronchitis lasts about 3 to 6 weeks and is usually not a serious illness  Your cough can last for several weeks  You may have any of the following symptoms:   · A cough with sputum that may be clear, yellow, or green    · Feeling more tired than usual, and body aches    · A fever and chills    · Wheezing when you breathe    · A tight chest or pain when you breathe or cough  Seek care immediately if:   · You cough up blood  · Your lips or fingernails turn blue  · You feel like you are not getting enough air when you breathe  Contact your healthcare provider if:   · You have a fever  · Your breathing problems do not go away or get worse  · Your cough does not get better within 4 weeks  · You have questions or concerns about your condition or care  Self-care:   · Get more rest   Rest helps your body to heal  Slowly start to do more each day  Rest when you feel it is needed  · Avoid irritants in the air  Avoid chemicals, fumes, and dust  Wear a face mask if you must work around dust or fumes  Stay inside on days when air pollution levels are high  If you have allergies, stay inside when pollen counts are high  Do not use aerosol products, such as spray-on deodorant, bug spray, and hair spray  · Do not smoke or be around others who smoke    Nicotine and other chemicals in cigarettes and cigars damages the cilia that move mucus out of your lungs  Ask your healthcare provider for information if you currently smoke and need help to quit  E-cigarettes or smokeless tobacco still contain nicotine  Talk to your healthcare provider before you use these products  · Drink liquids as directed  Liquids help keep your air passages moist and help you cough up mucus  You may need to drink more liquids when you have acute bronchitis  Ask how much liquid to drink each day and which liquids are best for you  · Use a humidifier or vaporizer  Use a cool mist humidifier or a vaporizer to increase air moisture in your home  This may make it easier for you to breathe and help decrease your cough  Prevent acute bronchitis by doing the following:   · Get the vaccinations you need  Ask your healthcare provider if you should get vaccinated against the flu or pneumonia  · Prevent the spread of germs  You can decrease your risk of acute bronchitis and other illnesses by doing the following:     [facility] your hands often with soap and water  Carry germ-killing hand lotion or gel with you  You can use the lotion or gel to clean your hands when soap and water are not available  ¨ Do not touch your eyes, nose, or mouth unless you have washed your hands first     ¨ Always cover your mouth when you cough to prevent the spread of germs  It is best to cough into a tissue or your shirt sleeve instead of into your hand  Ask those around you cover their mouths when they cough  ¨ Try to avoid people who have a cold or the flu  If you are sick, stay away from others as much as possible  Medicines: Your healthcare provider may  give you any of the following:  · Ibuprofen or acetaminophen  are medicines that help lower your fever  They are available without a doctor's order  Ask your healthcare provider which medicine is right for you  Ask how much to take and how often to take it  Follow directions   These medicines can cause stomach bleeding if not taken correctly  Ibuprofen can cause kidney damage  Do not take ibuprofen if you have kidney disease, an ulcer, or allergies to aspirin  Acetaminophen can cause liver damage  Do not take more than 4,000 milligrams in 24 hours  · Decongestants  help loosen mucus in your lungs and make it easier to cough up  This can help you breathe easier  · Cough suppressants  decrease your urge to cough  If your cough produces mucus, do not take a cough suppressant unless your healthcare provider tells you to  Your healthcare provider may suggest that you take a cough suppressant at night so you can rest     · Inhalers  may be given  Your healthcare provider may give you one or more inhalers to help you breathe easier and cough less  An inhaler gives your medicine to open your airways  Ask your healthcare provider to show you how to use your inhaler correctly  Follow up with your healthcare provider as directed:  Write down questions you have so you will remember to ask them during your follow-up visits  © 2017 [facility] Information is for End User's use only and may not be sold, redistributed or otherwise used for commercial purposes  All illustrations and images included in CareNotes® are the copyrighted property of [employer]  or [employer]  The above information is an  only  It is not intended as medical advice for individual conditions or treatments  Talk to your doctor, nurse or pharmacist before following any medical regimen to see if it is safe and effective for you

## 2018-12-29 NOTE — PROGRESS NOTES
[address]        NAME: Star Ramos is a 59 y o  male  : 1960    MRN: [id number]  DATE: 2018  TIME: 10:22 PM    Assessment and Plan   Acute bacterial bronchitis [J20 8, B96 89]  1  Acute bacterial bronchitis  doxycycline (ADOXA) 100 MG tablet    methylprednisolone (MEDROL) 4 mg tablet         Patient Instructions     Patient Instructions     Rest and drink extra fluids  Start antibiotic  Take probiotic  Cough medication as prescribed  Cool mist humidification can be helpful  Follow up with PCP if no improvement  Go to ER with worsening symptoms  Chief Complaint     Chief Complaint   Patient presents with    Cough     pt c/o cough x10 days and chest congestion that started today, pt states he uses a cpap at night and has a productive cough only in the morning with mostly clear mucus         History of Present Illness   Star Ramos presents to the clinic c/o    pt c/o coug    This is a 59year old male here today with cough and congestion  Symptoms started about 10 days ago  He states cough worsened today  He denies any fever, body aches or chills  He has had some chest tightness and wheezing  He has exercise induced asthma  He used inhaler at 4:30 pm which help  Sputum is clear, worse in AM    No chest pain  No fever, body aches or chills  He states zpack does not work for him  Review of Systems   Review of Systems   Constitutional: Positive for activity change and fatigue  Negative for chills and fever  HENT: Positive for congestion and sore throat  Negative for sinus pain and sinus pressure  Respiratory: Positive for cough, chest tightness and wheezing  Negative for shortness of breath  Cardiovascular: Negative  Neurological: Negative  Psychiatric/Behavioral: Negative            Current Medications     Long-Term Prescriptions   Medication Sig Dispense Refill    Cholecalciferol (VITAMIN D3) 5000 units CAPS Take by mouth      cyanocobalamin ([employer] VITAMIN B-12) 1000 MCG tablet Take by mouth      fluticasone (FLONASE) 50 mcg/act nasal spray Two sprays each nostril once daily as needed  1 Bottle 11    Omega-3 Fatty Acids (FISH OIL) 1,000 mg Take 3 capsules by mouth daily         Current Allergies     Allergies as of 12/28/2018    (No Known Allergies)            The following portions of the patient's history were reviewed and updated as appropriate: allergies, current medications, past family history, past medical history, past social history, past surgical history and problem list     Objective   /64 (BP Location: Right arm, Patient Position: Sitting, Cuff Size: Standard)   Pulse 74   Temp 98 3 °F (36 8 °C) (Temporal)   Resp 16   Ht 5' 7" (1 702 m)   Wt 105 kg (232 lb)   SpO2 97%   BMI 36 34 kg/m²        Physical Exam     Physical Exam   Constitutional: He is oriented to person, place, and time  He appears well-developed and well-nourished  HENT:   Head: Normocephalic  Right Ear: External ear normal    Left Ear: External ear normal    Neck: Normal range of motion  Neck supple  Cardiovascular: Normal rate, regular rhythm and normal heart sounds  Pulmonary/Chest: Effort normal and breath sounds normal  No respiratory distress  He has no wheezes  He has no rales  He exhibits no tenderness  No wheeze on exam     Musculoskeletal: Normal range of motion  Neurological: He is alert and oriented to person, place, and time  Psychiatric: He has a normal mood and affect  His behavior is normal    Nursing note and vitals reviewed

## 2019-02-22 ENCOUNTER — APPOINTMENT (OUTPATIENT)
Dept: LAB | Age: 59
End: 2019-02-22
Payer: COMMERCIAL

## 2019-02-22 ENCOUNTER — TRANSCRIBE ORDERS (OUTPATIENT)
Dept: ADMINISTRATIVE | Age: 59
End: 2019-02-22

## 2019-02-22 DIAGNOSIS — Z11.4 SCREENING FOR HIV (HUMAN IMMUNODEFICIENCY VIRUS): ICD-10-CM

## 2019-02-22 DIAGNOSIS — Z11.59 ENCOUNTER FOR HEPATITIS C SCREENING TEST FOR LOW RISK PATIENT: ICD-10-CM

## 2019-02-22 DIAGNOSIS — R73.09 ABNORMAL BLOOD SUGAR: ICD-10-CM

## 2019-02-22 DIAGNOSIS — E78.5 HYPERLIPIDEMIA, UNSPECIFIED HYPERLIPIDEMIA TYPE: ICD-10-CM

## 2019-02-22 DIAGNOSIS — E55.9 VITAMIN D DEFICIENCY: ICD-10-CM

## 2019-02-22 LAB
25(OH)D3 SERPL-MCNC: 56.2 NG/ML (ref 30–100)
ALBUMIN SERPL BCP-MCNC: 3.7 G/DL (ref 3.5–5)
ALP SERPL-CCNC: 45 U/L (ref 46–116)
ALT SERPL W P-5'-P-CCNC: 39 U/L (ref 12–78)
ANION GAP SERPL CALCULATED.3IONS-SCNC: 9 MMOL/L (ref 4–13)
AST SERPL W P-5'-P-CCNC: 20 U/L (ref 5–45)
BILIRUB SERPL-MCNC: 0.66 MG/DL (ref 0.2–1)
BUN SERPL-MCNC: 23 MG/DL (ref 5–25)
CALCIUM SERPL-MCNC: 9.3 MG/DL (ref 8.3–10.1)
CHLORIDE SERPL-SCNC: 108 MMOL/L (ref 100–108)
CHOLEST SERPL-MCNC: 176 MG/DL (ref 50–200)
CO2 SERPL-SCNC: 23 MMOL/L (ref 21–32)
CREAT SERPL-MCNC: 1.01 MG/DL (ref 0.6–1.3)
EST. AVERAGE GLUCOSE BLD GHB EST-MCNC: 117 MG/DL
GFR SERPL CREATININE-BSD FRML MDRD: 80 ML/MIN/1.73SQ M
GLUCOSE P FAST SERPL-MCNC: 102 MG/DL (ref 65–99)
HBA1C MFR BLD: 5.7 % (ref 4.2–6.3)
HCV AB SER QL: NORMAL
HDLC SERPL-MCNC: 49 MG/DL (ref 40–60)
LDLC SERPL CALC-MCNC: 105 MG/DL (ref 0–100)
POTASSIUM SERPL-SCNC: 4.2 MMOL/L (ref 3.5–5.3)
PROT SERPL-MCNC: 7.7 G/DL (ref 6.4–8.2)
SODIUM SERPL-SCNC: 140 MMOL/L (ref 136–145)
TRIGL SERPL-MCNC: 110 MG/DL

## 2019-02-22 PROCEDURE — 36415 COLL VENOUS BLD VENIPUNCTURE: CPT

## 2019-02-22 PROCEDURE — 83036 HEMOGLOBIN GLYCOSYLATED A1C: CPT

## 2019-02-22 PROCEDURE — 86803 HEPATITIS C AB TEST: CPT

## 2019-02-22 PROCEDURE — 87389 HIV-1 AG W/HIV-1&-2 AB AG IA: CPT

## 2019-02-22 PROCEDURE — 82306 VITAMIN D 25 HYDROXY: CPT

## 2019-02-22 PROCEDURE — 80053 COMPREHEN METABOLIC PANEL: CPT

## 2019-02-22 PROCEDURE — 80061 LIPID PANEL: CPT

## 2019-02-23 LAB — HIV 1+2 AB+HIV1 P24 AG SERPL QL IA: NORMAL

## 2019-02-25 ENCOUNTER — OFFICE VISIT (OUTPATIENT)
Dept: INTERNAL MEDICINE CLINIC | Facility: CLINIC | Age: 59
End: 2019-02-25
Payer: COMMERCIAL

## 2019-02-25 VITALS
HEIGHT: 67 IN | WEIGHT: 226.6 LBS | DIASTOLIC BLOOD PRESSURE: 72 MMHG | BODY MASS INDEX: 35.56 KG/M2 | HEART RATE: 72 BPM | SYSTOLIC BLOOD PRESSURE: 116 MMHG | OXYGEN SATURATION: 97 %

## 2019-02-25 DIAGNOSIS — R73.09 ABNORMAL BLOOD SUGAR: Primary | ICD-10-CM

## 2019-02-25 DIAGNOSIS — E66.09 CLASS 2 OBESITY DUE TO EXCESS CALORIES WITHOUT SERIOUS COMORBIDITY WITH BODY MASS INDEX (BMI) OF 35.0 TO 35.9 IN ADULT: ICD-10-CM

## 2019-02-25 DIAGNOSIS — E55.9 VITAMIN D DEFICIENCY: ICD-10-CM

## 2019-02-25 DIAGNOSIS — Z12.5 SCREENING PSA (PROSTATE SPECIFIC ANTIGEN): ICD-10-CM

## 2019-02-25 DIAGNOSIS — E78.5 HYPERLIPIDEMIA, UNSPECIFIED HYPERLIPIDEMIA TYPE: ICD-10-CM

## 2019-02-25 PROBLEM — Z11.59 ENCOUNTER FOR HEPATITIS C SCREENING TEST FOR LOW RISK PATIENT: Status: RESOLVED | Noted: 2018-08-17 | Resolved: 2019-02-25

## 2019-02-25 PROCEDURE — 3008F BODY MASS INDEX DOCD: CPT | Performed by: INTERNAL MEDICINE

## 2019-02-25 PROCEDURE — 1036F TOBACCO NON-USER: CPT | Performed by: INTERNAL MEDICINE

## 2019-02-25 PROCEDURE — 99214 OFFICE O/P EST MOD 30 MIN: CPT | Performed by: INTERNAL MEDICINE

## 2019-02-25 NOTE — PROGRESS NOTES
Assessment/Plan:    Abnormal blood sugar  Pre diabetes -I have counseled the patient to follow a healthy balanced diet, I have counseled patient reduce carbohydrates and sweets in the diet, I would like the patient exercise routinely  I will be checking hemoglobin A1c and comprehensive metabolic panel  Have counseled patient about the prevention of diabetes, and the risk of progression to type 2 diabetes  Hyperlipidemia  Hyperlipidemia controlled continue with current medical regiment recommend a low-cholesterol diet and recommend routine exercise we will continue to monitor the progress  Obesity  Obesity -I have counseled patient following healthy and balanced diet, I would like the patient to lose weight, I would like the patient exercise routinely; we will continue monitor the patient's progress  Screening PSA (prostate specific antigen)  Counseled, will check a screening PSA    Vitamin D deficiency  Repleted will have the patient reduce the vitamin-D 3   4000 international units once a day check a vitamin-D level in 6 months  Problem List Items Addressed This Visit        Other    Abnormal blood sugar     Pre diabetes -I have counseled the patient to follow a healthy balanced diet, I have counseled patient reduce carbohydrates and sweets in the diet, I would like the patient exercise routinely  I will be checking hemoglobin A1c and comprehensive metabolic panel  Have counseled patient about the prevention of diabetes, and the risk of progression to type 2 diabetes  Relevant Orders    Comprehensive metabolic panel    Hemoglobin A1C    Obesity     Obesity -I have counseled patient following healthy and balanced diet, I would like the patient to lose weight, I would like the patient exercise routinely; we will continue monitor the patient's progress           Hyperlipidemia     Hyperlipidemia controlled continue with current medical regiment recommend a low-cholesterol diet and recommend routine exercise we will continue to monitor the progress  Relevant Orders    Lipid Panel with Direct LDL reflex    Screening PSA (prostate specific antigen)     Counseled, will check a screening PSA         Relevant Orders    PSA, Total Screen    Vitamin D deficiency     Repleted will have the patient reduce the vitamin-D 3   4000 international units once a day check a vitamin-D level in 6 months  Relevant Orders    Vitamin D 25 hydroxy    RESOLVED: Encounter for hepatitis C screening test for low risk patient - Primary          Return to office 6  months  call if any problems  Subjective:      Patient ID: Octavia Thorne is a 62 y o  male  HPI 62 male prediabetes, hyperlipidemia, vitamin-D deficiency, obesity; The patient reports me compliant taking medications without untoward side effects the  The patient is here to review his medical condition, update me on the medical condition and the patient reports me no hospitalizations and no ER visits  He is here to review laboratories  Overall is feeling well he is now partially retired  rhinorrorea  Since the cold  No sinus pain and pressure using flonse; diet doing good , and doing cardion with the illness    The following portions of the patient's history were reviewed and updated as appropriate: allergies, current medications, past family history, past medical history, past social history, past surgical history and problem list     Review of Systems   Constitutional: Negative for activity change, appetite change and unexpected weight change  HENT: Negative for congestion and postnasal drip  Clear rhinorrhea   Eyes: Negative for visual disturbance  Respiratory: Negative for cough and shortness of breath  Cardiovascular: Negative for chest pain  Gastrointestinal: Negative for abdominal pain, diarrhea, nausea and vomiting  Neurological: Negative for dizziness, light-headedness and headaches     Hematological: Negative for adenopathy  Objective:    No follow-ups on file  No results found  Recent Results (from the past 09570 hour(s))   POCT ECG    Impression    Normal sinus rhythm  Septal Q waves  Unchanged ECG   Echo complete with contrast if indicated    Narrative    [facility]  [address]  [phone]    Transthoracic Echocardiogram  2D, M-mode, Doppler, and Color Doppler    Study date:  18-Aug-2017    Patient: Ayush Graham  MR number: [id number]  Account number: [de-identified]  : 1960  Age: 59 years  Gender: Male  Status: Outpatient  Location: [address] [place] [address] [facility] [place]  Height: 66 in  Weight: 227 lb  BP: 108/ 66 mmHg    Indications: Murmur    Diagnoses: R01 1 - Cardiac murmur, unspecified    Sonographer:  SHRAIF Feldman  Referring Physician:  Ashley Moy DO  Group:  [id number]  Interpreting Physician:  Dilia Covarrubias MD    SUMMARY    LEFT VENTRICLE:  Size was normal   Systolic function was normal  Ejection fraction was estimated to be 60 %  There were no regional wall motion abnormalities  Wall thickness was at the upper limits of normal     TRICUSPID VALVE:  There was trace regurgitation  PULMONIC VALVE:  There was trace regurgitation  HISTORY: PRIOR HISTORY: Hyperlipidemia, former smoker    PROCEDURE: The study was performed in the [address] [facility] [place]  This was a routine study  The transthoracic approach was used  The study included complete 2D imaging, M-mode, complete spectral Doppler, and color Doppler  This  was a technically difficult study  LEFT VENTRICLE: Size was normal  Systolic function was normal  Ejection fraction was estimated to be 60 %  There were no regional wall motion abnormalities   Wall thickness was at the upper limits of normal     RIGHT VENTRICLE: The size was normal  Systolic function was normal  Wall thickness was normal     LEFT ATRIUM: Size was at the upper limits of normal     RIGHT ATRIUM: Size was normal     MITRAL VALVE: Valve structure was normal  There was normal leaflet separation  DOPPLER: The transmitral velocity was within the normal range  There was no evidence for stenosis  There was no regurgitation  AORTIC VALVE: The valve was trileaflet  Leaflets exhibited normal thickness and normal cuspal separation  DOPPLER: Transaortic velocity was within the normal range  There was no evidence for stenosis  There was no regurgitation  TRICUSPID VALVE: The valve structure was normal  There was normal leaflet separation  DOPPLER: The transtricuspid velocity was within the normal range  There was no evidence for stenosis  There was trace regurgitation  PULMONIC VALVE: Leaflets exhibited normal thickness, no calcification, and normal cuspal separation  DOPPLER: The transpulmonic velocity was within the normal range  There was trace regurgitation  PERICARDIUM: There was no pericardial effusion  The pericardium was normal in appearance  AORTA: The root exhibited normal size      SYSTEM MEASUREMENT TABLES    2D  %FS: 33 82 %  AV Diam: 3 43 cm  EDV(Teich): 125 26 ml  EF(Cube): 71 02 %  EF(Teich): 62 34 %  ESV(Cube): 39 03 ml  ESV(Teich): 47 17 ml  IVSd: 1 05 cm  LA Area: 18 13 cm2  LA Diam: 3 79 cm  LVEDV MOD A4C: 139 94 ml  LVEF MOD A4C: 54 34 %  LVESV MOD A4C: 63 89 ml  LVIDd: 5 13 cm  LVIDs: 3 39 cm  LVLd A4C: 8 1 cm  LVLs A4C: 6 98 cm  LVPWd: 1 08 cm  RA Area: 16 5 cm2  RV Diam : 2 85 cm  SV MOD A4C: 76 05 ml  SV(Cube): 95 63 ml  SV(Teich): 78 09 ml    MM  TAPSE: 2 1 cm    PW  E': 0 06 m/s  E/E': 15 29  MV A John: 1 11 m/s  MV Dec [place]: 3 21 m/s2  MV DecT: 283 ms  MV E John: 0 91 m/s  MV E/A Ratio: 0 82    [employer] Accredited Echocardiography Laboratory    Prepared and electronically signed by    Abelino Doyle MD  Signed 18-Aug-2017 14:55:07         No Known Allergies    Past Medical History:   Diagnosis Date    Asthma     Erectile dysfunction of non-organic origin     Resolved: 10/15/2014     Past Surgical History:   Procedure Laterality Date    EYE SURGERY      TONSILLECTOMY AND ADENOIDECTOMY      UMBILICAL HERNIA REPAIR       Current Outpatient Medications on File Prior to Visit   Medication Sig Dispense Refill    albuterol (2 5 mg/3 mL) 0 083 % nebulizer solution Inhale      albuterol (PROAIR HFA) 90 mcg/act inhaler Use 2 puffs prior to exercise  18 g 11    Cholecalciferol (VITAMIN D3) 5000 units CAPS Take by mouth      Cinnamon 500 MG capsule Take by mouth      cyanocobalamin ([employer] VITAMIN B-12) 1000 MCG tablet Take by mouth      fluticasone (FLONASE) 50 mcg/act nasal spray Two sprays each nostril once daily as needed  1 Bottle 11    Omega-3 Fatty Acids (FISH OIL) 1,000 mg Take 3 capsules by mouth daily      [DISCONTINUED] methylprednisolone (MEDROL) 4 mg tablet Medrol dose pack Take as directed  21 tablet 0     No current facility-administered medications on file prior to visit        Family History   Problem Relation Age of Onset   Layojessica Liming Asthma Mother     Heart disease Mother     Rheum arthritis Mother     Diabetes Father     Heart disease Father     Asthma Sister     Diabetes Sister     Other Son         Thyroid disorder     Social History     Socioeconomic History    Marital status: /Civil Union     Spouse name: Not on file    Number of children: Not on file    Years of education: Not on file    Highest education level: Not on file   Occupational History    Not on file   Social Needs    Financial resource strain: Not on file    Food insecurity:     Worry: Not on file     Inability: Not on file    Transportation needs:     Medical: Not on file     Non-medical: Not on file   Tobacco Use    Smoking status: Former Smoker     Packs/day: 1 00     Years: 13 00     Pack years: 13 00     Types: Cigarettes     Last attempt to quit:      Years since quittin 1    Smokeless tobacco: Never Used    Tobacco comment: He smoked a pack a day of cigarettes for 8 years  He quite at the age of 22  Occasional cigar use     Substance and Sexual Activity    Alcohol use: Yes     Comment: Social drinker    Drug use: No    Sexual activity: Not on file   Lifestyle    Physical activity:     Days per week: Not on file     Minutes per session: Not on file    Stress: Not on file   Relationships    Social connections:     Talks on phone: Not on file     Gets together: Not on file     Attends Nondenominational service: Not on file     Active member of club or organization: Not on file     Attends meetings of clubs or organizations: Not on file     Relationship status: Not on file    Intimate partner violence:     Fear of current or ex partner: Not on file     Emotionally abused: Not on file     Physically abused: Not on file     Forced sexual activity: Not on file   Other Topics Concern    Not on file   Social History Narrative    Caffeine use    Work-related stress     Vitals:    02/25/19 0758   BP: 116/72   Pulse: 72   SpO2: 97%   Weight: 103 kg (226 lb 9 6 oz)   Height: 5' 7" (1 702 m)     Results for orders placed or performed in visit on 02/22/19   Hepatitis C antibody   Result Value Ref Range    Hepatitis C Ab Non-reactive Non-reactive   Comprehensive metabolic panel   Result Value Ref Range    Sodium 140 136 - 145 mmol/L    Potassium 4 2 3 5 - 5 3 mmol/L    Chloride 108 100 - 108 mmol/L    CO2 23 21 - 32 mmol/L    ANION GAP 9 4 - 13 mmol/L    BUN 23 5 - 25 mg/dL    Creatinine 1 01 0 60 - 1 30 mg/dL    Glucose, Fasting 102 (H) 65 - 99 mg/dL    Calcium 9 3 8 3 - 10 1 mg/dL    AST 20 5 - 45 U/L    ALT 39 12 - 78 U/L    Alkaline Phosphatase 45 (L) 46 - 116 U/L    Total Protein 7 7 6 4 - 8 2 g/dL    Albumin 3 7 3 5 - 5 0 g/dL    Total Bilirubin 0 66 0 20 - 1 00 mg/dL    eGFR 80 ml/min/1 73sq m   Hemoglobin A1C   Result Value Ref Range    Hemoglobin A1C 5 7 4 2 - 6 3 %     mg/dl   HIV 1/2 AG-AB combo   Result Value Ref Range    HIV-1/HIV-2 Ab Non-Reactive Non-Reactive   Lipid Panel with Direct LDL reflex   Result Value Ref Range    Cholesterol 176 50 - 200 mg/dL    Triglycerides 110 <=150 mg/dL    HDL, Direct 49 40 - 60 mg/dL    LDL Calculated 105 (H) 0 - 100 mg/dL   Vitamin D 25 hydroxy   Result Value Ref Range    Vit D, 25-Hydroxy 56 2 30 0 - 100 0 ng/mL     Weight (last 2 days)     Date/Time   Weight    02/25/19 0758   103 (226 6)            Body mass index is 35 49 kg/m²  BP      Temp      Pulse     Resp      SpO2        Vitals:    02/25/19 0758   Weight: 103 kg (226 lb 9 6 oz)     Vitals:    02/25/19 0758   Weight: 103 kg (226 lb 9 6 oz)       /72   Pulse 72   Ht 5' 7" (1 702 m)   Wt 103 kg (226 lb 9 6 oz)   SpO2 97%   BMI 35 49 kg/m²          Physical Exam   Constitutional: He appears well-developed and well-nourished  No distress  HENT:   Head: Normocephalic and atraumatic  Right Ear: External ear normal    Left Ear: External ear normal    Mouth/Throat: Oropharynx is clear and moist    Eyes: Pupils are equal, round, and reactive to light  Conjunctivae are normal  Right eye exhibits no discharge  Left eye exhibits no discharge  No scleral icterus  Neck: Neck supple  Cardiovascular: Normal rate, regular rhythm and normal heart sounds  Exam reveals no gallop and no friction rub  No murmur heard  Pulmonary/Chest: No respiratory distress  He has no wheezes  He has no rales  Abdominal: Soft  Bowel sounds are normal  He exhibits no distension and no mass  There is no tenderness  There is no rebound and no guarding  Musculoskeletal: He exhibits no edema or deformity  Lymphadenopathy:     He has no cervical adenopathy  Neurological: He is alert  Skin: He is not diaphoretic  Psychiatric: He has a normal mood and affect

## 2019-02-26 NOTE — ASSESSMENT & PLAN NOTE
Repleted will have the patient reduce the vitamin-D 3   4000 international units once a day check a vitamin-D level in 6 months

## 2019-05-31 LAB
APO B SERPL-MCNC: 86 MG/DL (ref 52–109)
CHOLEST SERPL-MCNC: 168 MG/DL
CHOLEST/HDLC SERPL: 3.3 CALC
HDLC SERPL-MCNC: 51 MG/DL
HLD.LARGE SERPL-SCNC: 5064 NMOL/L (ref 3382–9376)
LDL SERPL QN: 215 ANGSTROM
LDL SERPL-SCNC: 1278 NMOL/L (ref 732–2035)
LDL SMALL SERPL-SCNC: 267 NMOL/L (ref 85–473)
LDLC REAL SIZE PAT SERPL: ABNORMAL PATTERN
LDLC SERPL CALC-MCNC: 98 MG/DL
LPA SERPL-SCNC: 13 NMOL/L
NONHDLC SERPL-MCNC: 117 MG/DL (CALC)
SL AMB LDL MEDIUM: 330 NMOL/L (ref 122–498)
TRIGL SERPL-MCNC: 98 MG/DL

## 2019-06-04 ENCOUNTER — TELEPHONE (OUTPATIENT)
Dept: CARDIOLOGY CLINIC | Facility: CLINIC | Age: 59
End: 2019-06-04

## 2019-06-05 ENCOUNTER — OFFICE VISIT (OUTPATIENT)
Dept: CARDIOLOGY CLINIC | Facility: CLINIC | Age: 59
End: 2019-06-05
Payer: COMMERCIAL

## 2019-06-05 VITALS
DIASTOLIC BLOOD PRESSURE: 74 MMHG | HEART RATE: 73 BPM | BODY MASS INDEX: 35.2 KG/M2 | SYSTOLIC BLOOD PRESSURE: 118 MMHG | HEIGHT: 67 IN | OXYGEN SATURATION: 95 % | WEIGHT: 224.3 LBS

## 2019-06-05 DIAGNOSIS — E78.5 HYPERLIPIDEMIA, UNSPECIFIED HYPERLIPIDEMIA TYPE: Primary | ICD-10-CM

## 2019-06-05 PROCEDURE — 99214 OFFICE O/P EST MOD 30 MIN: CPT | Performed by: INTERNAL MEDICINE

## 2019-06-13 ENCOUNTER — DOCUMENTATION (OUTPATIENT)
Dept: CARDIOLOGY CLINIC | Facility: CLINIC | Age: 59
End: 2019-06-13

## 2019-06-17 ENCOUNTER — HOSPITAL ENCOUNTER (OUTPATIENT)
Dept: CT IMAGING | Facility: HOSPITAL | Age: 59
Discharge: HOME | End: 2019-06-17
Attending: INTERNAL MEDICINE
Payer: COMMERCIAL

## 2019-06-17 DIAGNOSIS — E78.5 HYPERLIPIDEMIA, UNSPECIFIED HYPERLIPIDEMIA TYPE: ICD-10-CM

## 2019-06-25 ENCOUNTER — TELEPHONE (OUTPATIENT)
Dept: CARDIOLOGY CLINIC | Facility: CLINIC | Age: 59
End: 2019-06-25

## 2019-06-28 ENCOUNTER — TELEPHONE (OUTPATIENT)
Dept: INTERNAL MEDICINE CLINIC | Facility: CLINIC | Age: 59
End: 2019-06-28

## 2019-06-28 NOTE — TELEPHONE ENCOUNTER
Patient has been having pain on the outside of his R foot    Patient is asking if you can refer him to a podiatrist     Please advise

## 2019-07-01 DIAGNOSIS — M79.671 RIGHT FOOT PAIN: Primary | ICD-10-CM

## 2019-07-05 ENCOUNTER — CONSULT (OUTPATIENT)
Dept: OBGYN CLINIC | Facility: CLINIC | Age: 59
End: 2019-07-05
Payer: COMMERCIAL

## 2019-07-05 ENCOUNTER — APPOINTMENT (OUTPATIENT)
Dept: RADIOLOGY | Facility: AMBULARY SURGERY CENTER | Age: 59
End: 2019-07-05
Attending: ORTHOPAEDIC SURGERY
Payer: COMMERCIAL

## 2019-07-05 ENCOUNTER — TELEPHONE (OUTPATIENT)
Dept: OBGYN CLINIC | Facility: CLINIC | Age: 59
End: 2019-07-05

## 2019-07-05 VITALS
HEART RATE: 79 BPM | WEIGHT: 234 LBS | HEIGHT: 67 IN | DIASTOLIC BLOOD PRESSURE: 79 MMHG | BODY MASS INDEX: 36.73 KG/M2 | SYSTOLIC BLOOD PRESSURE: 126 MMHG

## 2019-07-05 DIAGNOSIS — M72.2 PLANTAR FASCIAL FIBROMATOSIS: ICD-10-CM

## 2019-07-05 DIAGNOSIS — M72.2 PLANTAR FASCIITIS OF RIGHT FOOT: Primary | ICD-10-CM

## 2019-07-05 DIAGNOSIS — M72.2 PLANTAR FASCIITIS, RIGHT: ICD-10-CM

## 2019-07-05 DIAGNOSIS — M79.671 RIGHT FOOT PAIN: ICD-10-CM

## 2019-07-05 PROCEDURE — 73630 X-RAY EXAM OF FOOT: CPT

## 2019-07-05 PROCEDURE — 99203 OFFICE O/P NEW LOW 30 MIN: CPT | Performed by: PHYSICIAN ASSISTANT

## 2019-07-05 NOTE — TELEPHONE ENCOUNTER
Dr Sarmad Elena' [employer] is requiring prior authorization in order for him to attend physical therapy for his r plantar fasciitis  Please call once received so he can schedule physical therapy    [phone]  Thank you

## 2019-07-05 NOTE — PROGRESS NOTES
DANDRE Jo  Attending, Orthopaedic Surgery  [facility] [address] [facility]        ORTHOPAEDIC FOOT AND ANKLE CLINIC VISIT     Assessment:     Encounter Diagnoses   Name Primary?  Right foot pain     Plantar fascial fibromatosis     Plantar fasciitis of right foot Yes              Plan:   · The patient verbalized understanding of exam findings and treatment plan  We engaged in the shared decision-making process and treatment options were discussed at length with the patient  Surgical and conservative management discussed today along with risks and benefits  · Visco gel heel cups were ordered and instructed to be worn in all shoes  · Night splint also ordered and instructed to be worn every night for bed  · Plantar fascitis specific PT stretching ordered  Return in about 3 months (around 10/5/2019)  History of Present Illness:   Chief Complaint:   Chief Complaint   Patient presents with    Right Foot - Pain     Vasyl Nathan is a 62 y o  male who is being seen for right foot pain  Patiuent was referred to be by Dr Luz Miguel their PCP for consultation of right foot pain  Pain is localized at lateral aspect of foot over the cuboid with minimal radiating and described as sharp and severe  He stated that he has been suffering from Plantars fascitis for the last 3-4 years but over the last 3-4 months the pain has increased in intensity and frequency  His first step of the morning is painful and it will decrease throughout the day but he will experience pain at the end of the day  He likes to work out on treadmill and after long peroids of walking the pain increases and with limp afterwards and long periods of standing  He has been taking tylenol for the pain and will use a tennis ball to stretch the bottom of the foot  But he has never performed formal physical   Patient denies numbness, tingling or radicular pain  Denies history of neuropathy    Patient does not smoke, does not have diabetes and does not take blood thinners  Patient denies family history of anesthesia complications and has not had any complications with anesthesia  Pain/symptom timing:  Worse during the day when active  Pain/symptom context:  Worse with activites and work  Pain/symptom modifying factors:  Rest makes better, activities make worse  Pain/symptom associated signs/symptoms: none    Prior treatment   · NSAIDsNo    · Injections No   · Bracing/Orthotics No   · Physical Therapy No     Orthopedic Surgical History:   None    Past Medical, Surgical and Social History:  Past Medical History:  has a past medical history of Asthma and Erectile dysfunction of non-organic origin  Problem List: does not have any pertinent problems on file  Past Surgical History:  has a past surgical history that includes Tonsillectomy and adenoidectomy; Umbilical hernia repair; and Eye surgery  Family History: family history includes Asthma in his mother and sister; Diabetes in his father and sister; Heart disease in his father and mother; Other in his son; Rheum arthritis in his mother  Social History:  reports that he quit smoking about 40 years ago  His smoking use included cigarettes  He has a 13 00 pack-year smoking history  He has never used smokeless tobacco  He reports that he drinks alcohol  He reports that he does not use drugs  Current Medications: has a current medication list which includes the following prescription(s): albuterol, albuterol, vitamin d3, cinnamon, cyanocobalamin, fluticasone, and fish oil  Allergies: has No Known Allergies       Review of Systems:  General- denies fever/chills  HEENT- denies hearing loss or sore throat  Eyes- denies eye pain or visual disturbances, denies red eyes  Respiratory- denies cough or SOB  Cardio- denies chest pain or palpitations  GI- denies abdominal pain  Endocrine- denies urinary frequency  Urinary- denies pain with urination  Musculoskeletal- Negative except noted above  Skin- denies rashes or wounds  Neurological- denies dizziness or headache  Psychiatric- denies anxiety or difficulty concentrating    Physical Exam:   /79 (BP Location: Left arm, Patient Position: Sitting, Cuff Size: Adult)   Pulse 79   Ht 5' 7" (1 702 m)   Wt 106 kg (234 lb)   BMI 36 65 kg/m²   General/Constitutional: No apparent distress: well-nourished and well developed  Eyes: normal ocular motion  Lymphatic: No appreciable lymphadenopathy  Respiratory: Non-labored breathing  Vascular: No edema, swelling or tenderness, except as noted in detailed exam   Integumentary: No impressive skin lesions present, except as noted in detailed exam   Neuro: No ataxia or tremors noted  Psych: Normal mood and affect, oriented to person, place and time  Appropriate affect  Musculoskeletal: Normal, except as noted in detailed exam and in HPI  Examination    Right    Gait Normal   Musculoskeletal Tender to palpation at medial calcaneal tuberosity    Skin Normal     Nails Normal    Range of Motion  20 degrees dorsiflexion, 40 degrees plantarflexion  Subtalar motion: normal    Stability Stable    Muscle Strength 5/5 tibialis anterior  5/5 gastrocnemius-soleus  5/5 posterior tibialis  5/5 peroneal/eversion strength  5/5 EHL  5/5 FHL    Neurologic Normal    Sensation Intact to light touch throughout sural, saphenous, superficial peroneal, deep peroneal and medial/lateral plantar nerve distributions  Valmora-Ashli 5 07 filament (10g) testing deferred  Cardiovascular Brisk capillary refill < 2 seconds,intact DP and PT pulses    Special Tests None      Imaging Studies:   3 views of the right foot were taken, reviewed and interpreted independently that demonstrate no acute fracture or osseous abnormality  Reviewed by me personally  Calton Elder Lachman, MD  Foot & Ankle Surgery   Department [address]      I personally performed the service  Rosalind Riding Lachman, MD

## 2019-07-31 ENCOUNTER — EVALUATION (OUTPATIENT)
Dept: PHYSICAL THERAPY | Facility: REHABILITATION | Age: 59
End: 2019-07-31
Payer: COMMERCIAL

## 2019-07-31 DIAGNOSIS — G89.29 CHRONIC PAIN IN RIGHT FOOT: Primary | ICD-10-CM

## 2019-07-31 DIAGNOSIS — M79.671 CHRONIC PAIN IN RIGHT FOOT: Primary | ICD-10-CM

## 2019-07-31 DIAGNOSIS — M72.2 PLANTAR FASCIAL FIBROMATOSIS: ICD-10-CM

## 2019-07-31 PROCEDURE — 97161 PT EVAL LOW COMPLEX 20 MIN: CPT | Performed by: PHYSICAL THERAPIST

## 2019-07-31 PROCEDURE — 97140 MANUAL THERAPY 1/> REGIONS: CPT | Performed by: PHYSICAL THERAPIST

## 2019-07-31 NOTE — PROGRESS NOTES
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  He reports that he has most of his pain when he getting up out of bed, or after sitting for awhile  He reports the pain is not related to his level of activity days prior  He had an x-ray completed which was negative  He is a  part time, and reports pain when in weight bearing, he reports the pain is worse with dress shoes on  He reports he is wearing a heel cup with gel to help support his plantars fasciitis  He has had this lateral foot pain in February or March  Patient complains of tenderness on tubercle of 5th metatarsal, as well as superiorly towards lateral malleolus  He has no limitations in range of motion or strength, compared to left  Patient made aware of condition as well as the proposed treatment plan, including risks, benefits and alternatives  Impairments: abnormal or restricted ROM, activity intolerance, impaired physical strength, lacks appropriate home exercise program and pain with function     Prognosis: good    Goals  ST- demonstrate compliancy with HEP in 1 week     Decrease reported pain to 3/10 at worst and with activity, to improve ability to complete tasks at home, in 2 weeks    LT- Improve FOTO score to specified value to improve patients perceived benefit of therapy, in 8 weeks   Return to full gym activities with no complaints of pain, in 10 weeks     Plan  Plan details: Physical therapy with focus on there ex and manual therapy to improve ability to complete tasks around the house and complete functional activities, use of modalities as needed  Patient would benefit from: skilled physical therapy  Referral necessary: No  Planned modality interventions: cryotherapy, TENS and thermotherapy: hydrocollator packs  Planned therapy interventions: ADL training, balance, balance/weight bearing training, gait training, manual therapy, joint mobilization, neuromuscular re-education, strengthening, stretching, therapeutic activities and therapeutic exercise  Frequency: 2x week  Duration in weeks: 12  Plan of Care beginning date: 2019  Plan of Care expiration date: 10/23/2019  Treatment plan discussed with: patient        Subjective Evaluation    History of Present Illness  Date of onset: 2019  Mechanism of injury: Increase in activity with baseball   Pain  Current pain ratin  At best pain ratin  At worst pain ratin  Location: lateral R foot   Quality: sharp  Relieving factors: change in position, relaxation and rest  Aggravating factors: standing, walking and stair climbing    Patient Goals  Patient goals for therapy: decreased pain and independence with ADLs/IADLs  Patient goal: complete workouts with no complaints of pain         Objective     General Comments:       Ankle/Foot Comments   Patient presents with mildly flat footed posture statically and dynamically     ttp along 5th metatarsal posteriorly     Strength   Ankle DF 5/5 B/L  Ankle PF 5/5 B/L  Ankle inversion 5/5 B/L  Ankle eversion 5/5 B/L     ROM  Ankle Df L=12 R=10   Ankle PF L=40 R=38  Ankle inversion 32 B/L   Ankle eversion L=20 R=18     Special tests  (-) talar tilt, windlass                  Precautions: none      Manual              IASTM R 5th met tubercle  CW 10'                                                                     Exercise Diary              Bike Towel squeezes              Inversion/eversion stretch             Inv/chad for sliding board                                                                                                                                                                                                                                  Modalities

## 2019-08-08 ENCOUNTER — EVALUATION (OUTPATIENT)
Dept: PHYSICAL THERAPY | Facility: REHABILITATION | Age: 59
End: 2019-08-08
Payer: COMMERCIAL

## 2019-08-08 DIAGNOSIS — M72.2 PLANTAR FASCIAL FIBROMATOSIS: Primary | ICD-10-CM

## 2019-08-08 DIAGNOSIS — G89.29 CHRONIC PAIN IN RIGHT FOOT: ICD-10-CM

## 2019-08-08 DIAGNOSIS — M79.671 CHRONIC PAIN IN RIGHT FOOT: ICD-10-CM

## 2019-08-08 PROCEDURE — 97140 MANUAL THERAPY 1/> REGIONS: CPT | Performed by: PHYSICAL THERAPIST

## 2019-08-08 PROCEDURE — 97110 THERAPEUTIC EXERCISES: CPT | Performed by: PHYSICAL THERAPIST

## 2019-08-08 NOTE — PROGRESS NOTES
Daily Note     Today's date: 2019  Patient name: Cassandra Osborn  : 1960  MRN: [id number]  Referring provider: Orlando Dia PA-C  Dx:   Encounter Diagnosis     ICD-10-CM    1  Plantar fascial fibromatosis M72 2    2  Chronic pain in right foot M79 671     G89 29                   Subjective: Patient reports he feels better today than how he felt in the past 4-5 months  Objective: See treatment diary below      Assessment: Tolerated treatment well  Patient would benefit from continued PT, reported decrease in pain with IASTM, as well as with cuboid whip manual technique  Plan: Continue per plan of care        Precautions: none      Manual             IASTM R 5th met tubercle  CW 10'  CW 10'            Cuboid whip R   CW 2'                                                       Exercise Diary               Bike 7'             Towel squeezes  np            Inversion/eversion stretch 5x30"                                                                                                                                                                                                                                              Modalities

## 2019-08-19 DIAGNOSIS — J45.990 EXERCISE-INDUCED ASTHMA: ICD-10-CM

## 2019-08-19 RX ORDER — ALBUTEROL SULFATE 90 UG/1
2 AEROSOL, METERED RESPIRATORY (INHALATION) 2 TIMES DAILY PRN
Qty: 18 G | Refills: 5 | Status: SHIPPED | OUTPATIENT
Start: 2019-08-19 | End: 2020-03-16 | Stop reason: SDUPTHER

## 2019-08-22 ENCOUNTER — OFFICE VISIT (OUTPATIENT)
Dept: PHYSICAL THERAPY | Facility: REHABILITATION | Age: 59
End: 2019-08-22
Payer: COMMERCIAL

## 2019-08-22 DIAGNOSIS — M72.2 PLANTAR FASCIAL FIBROMATOSIS: Primary | ICD-10-CM

## 2019-08-22 DIAGNOSIS — G89.29 CHRONIC PAIN IN RIGHT FOOT: ICD-10-CM

## 2019-08-22 DIAGNOSIS — M79.671 CHRONIC PAIN IN RIGHT FOOT: ICD-10-CM

## 2019-08-22 PROCEDURE — 97110 THERAPEUTIC EXERCISES: CPT | Performed by: PHYSICAL THERAPIST

## 2019-08-22 PROCEDURE — 97140 MANUAL THERAPY 1/> REGIONS: CPT | Performed by: PHYSICAL THERAPIST

## 2019-08-22 NOTE — PROGRESS NOTES
Daily Note     Today's date: 2019  Patient name: Lalit Velez  : 1960  MRN: [id number]  Referring provider: Benedict Graham PA-C  Dx:   Encounter Diagnosis     ICD-10-CM    1  Plantar fascial fibromatosis M72 2    2  Chronic pain in right foot M79 671     G89 29                   Subjective: Reports having a little more soreness in his right foot today  Objective: See treatment diary below      Assessment: Tolerated treatment well  Patient would benefit from continued PT, demonstrated increased restriction on his lateral R foot today, was able to reduce with IASTM, patient reported some discomfort with reduction but felt better following,       Plan: Continue per plan of care        Precautions: none      Manual            IASTM R 5th met tubercle  CW 10'  CW 10'  CW 10'           Cuboid whip R   CW 2'  CW 2'                                                      Exercise Diary             Bike 7'  10'            Towel squeezes/arch lifts  np 2'           Inversion/eversion stretch 5x30"  5x30"                                                                                                                                                                                                                                             Modalities

## 2019-09-03 ENCOUNTER — TRANSCRIBE ORDERS (OUTPATIENT)
Dept: ADMINISTRATIVE | Age: 59
End: 2019-09-03

## 2019-09-03 ENCOUNTER — APPOINTMENT (OUTPATIENT)
Dept: LAB | Age: 59
End: 2019-09-03
Payer: COMMERCIAL

## 2019-09-03 DIAGNOSIS — Z11.59 ENCOUNTER FOR HEPATITIS C SCREENING TEST FOR LOW RISK PATIENT: Primary | ICD-10-CM

## 2019-09-03 DIAGNOSIS — Z12.5 SCREENING PSA (PROSTATE SPECIFIC ANTIGEN): ICD-10-CM

## 2019-09-03 DIAGNOSIS — R73.09 ABNORMAL BLOOD SUGAR: ICD-10-CM

## 2019-09-03 DIAGNOSIS — Z11.59 ENCOUNTER FOR HEPATITIS C SCREENING TEST FOR LOW RISK PATIENT: ICD-10-CM

## 2019-09-03 DIAGNOSIS — E55.9 VITAMIN D DEFICIENCY: ICD-10-CM

## 2019-09-03 DIAGNOSIS — E78.5 HYPERLIPIDEMIA, UNSPECIFIED HYPERLIPIDEMIA TYPE: ICD-10-CM

## 2019-09-03 LAB
25(OH)D3 SERPL-MCNC: 34.1 NG/ML (ref 30–100)
ALBUMIN SERPL BCP-MCNC: 3.7 G/DL (ref 3.5–5)
ALP SERPL-CCNC: 54 U/L (ref 46–116)
ALT SERPL W P-5'-P-CCNC: 44 U/L (ref 12–78)
ANION GAP SERPL CALCULATED.3IONS-SCNC: 6 MMOL/L (ref 4–13)
AST SERPL W P-5'-P-CCNC: 30 U/L (ref 5–45)
BILIRUB SERPL-MCNC: 0.4 MG/DL (ref 0.2–1)
BUN SERPL-MCNC: 22 MG/DL (ref 5–25)
CALCIUM SERPL-MCNC: 9.3 MG/DL (ref 8.3–10.1)
CHLORIDE SERPL-SCNC: 105 MMOL/L (ref 100–108)
CHOLEST SERPL-MCNC: 176 MG/DL (ref 50–200)
CO2 SERPL-SCNC: 27 MMOL/L (ref 21–32)
CREAT SERPL-MCNC: 1.07 MG/DL (ref 0.6–1.3)
EST. AVERAGE GLUCOSE BLD GHB EST-MCNC: 108 MG/DL
GFR SERPL CREATININE-BSD FRML MDRD: 75 ML/MIN/1.73SQ M
GLUCOSE P FAST SERPL-MCNC: 99 MG/DL (ref 65–99)
HBA1C MFR BLD: 5.4 % (ref 4.2–6.3)
HCV AB SER QL: NORMAL
HDLC SERPL-MCNC: 49 MG/DL (ref 40–60)
LDLC SERPL CALC-MCNC: 104 MG/DL (ref 0–100)
POTASSIUM SERPL-SCNC: 4.5 MMOL/L (ref 3.5–5.3)
PROT SERPL-MCNC: 7.9 G/DL (ref 6.4–8.2)
PSA SERPL-MCNC: 0.2 NG/ML (ref 0–4)
SODIUM SERPL-SCNC: 138 MMOL/L (ref 136–145)
TRIGL SERPL-MCNC: 114 MG/DL

## 2019-09-03 PROCEDURE — 80053 COMPREHEN METABOLIC PANEL: CPT

## 2019-09-03 PROCEDURE — 36415 COLL VENOUS BLD VENIPUNCTURE: CPT

## 2019-09-03 PROCEDURE — 83036 HEMOGLOBIN GLYCOSYLATED A1C: CPT

## 2019-09-03 PROCEDURE — 80061 LIPID PANEL: CPT

## 2019-09-03 PROCEDURE — 82306 VITAMIN D 25 HYDROXY: CPT

## 2019-09-03 PROCEDURE — 86803 HEPATITIS C AB TEST: CPT

## 2019-09-03 PROCEDURE — G0103 PSA SCREENING: HCPCS

## 2019-09-09 ENCOUNTER — OFFICE VISIT (OUTPATIENT)
Dept: INTERNAL MEDICINE CLINIC | Facility: CLINIC | Age: 59
End: 2019-09-09
Payer: COMMERCIAL

## 2019-09-09 VITALS
OXYGEN SATURATION: 97 % | HEART RATE: 76 BPM | SYSTOLIC BLOOD PRESSURE: 124 MMHG | BODY MASS INDEX: 35.82 KG/M2 | HEIGHT: 67 IN | DIASTOLIC BLOOD PRESSURE: 68 MMHG | RESPIRATION RATE: 16 BRPM | WEIGHT: 228.2 LBS

## 2019-09-09 DIAGNOSIS — E78.5 HYPERLIPIDEMIA, UNSPECIFIED HYPERLIPIDEMIA TYPE: ICD-10-CM

## 2019-09-09 DIAGNOSIS — R73.09 ABNORMAL BLOOD SUGAR: ICD-10-CM

## 2019-09-09 DIAGNOSIS — Z23 NEED FOR VACCINATION: ICD-10-CM

## 2019-09-09 DIAGNOSIS — J30.2 SEASONAL ALLERGIC RHINITIS, UNSPECIFIED TRIGGER: ICD-10-CM

## 2019-09-09 DIAGNOSIS — E66.09 CLASS 2 OBESITY DUE TO EXCESS CALORIES WITHOUT SERIOUS COMORBIDITY WITH BODY MASS INDEX (BMI) OF 35.0 TO 35.9 IN ADULT: ICD-10-CM

## 2019-09-09 DIAGNOSIS — Z13.1 SCREENING FOR DIABETES MELLITUS: ICD-10-CM

## 2019-09-09 DIAGNOSIS — E55.9 VITAMIN D DEFICIENCY: Primary | ICD-10-CM

## 2019-09-09 PROBLEM — E66.812 CLASS 2 OBESITY DUE TO EXCESS CALORIES WITHOUT SERIOUS COMORBIDITY WITH BODY MASS INDEX (BMI) OF 35.0 TO 35.9 IN ADULT: Status: ACTIVE | Noted: 2019-09-09

## 2019-09-09 PROCEDURE — 90471 IMMUNIZATION ADMIN: CPT

## 2019-09-09 PROCEDURE — 90750 HZV VACC RECOMBINANT IM: CPT

## 2019-09-09 PROCEDURE — 99214 OFFICE O/P EST MOD 30 MIN: CPT | Performed by: INTERNAL MEDICINE

## 2019-09-09 NOTE — PROGRESS NOTES
BMI Counseling: Body mass index is 35 74 kg/m²  Discussed the patient's BMI with him  The BMI is above average  BMI counseling and education was provided to the patient  Nutrition recommendations include reducing portion sizes  Assessment/Plan:    Seasonal allergic rhinitis  Use Flonase 2 sprays each nostril once a day to the 1st frost he may also add Claritin 10 mg once daily as needed if the symptoms not improve with Flonase  Abnormal blood sugar  Improvement of the prediabetes recommend reducing carbohydrates, sweets recommend weight loss and walking will continue monitor the fasting blood sugar and hemoglobin A1c  Class 2 obesity due to excess calories without serious comorbidity with body mass index (BMI) of 35 0 to 35 9 in adult  Obesity -I have counseled patient following healthy and balanced diet, I would like the patient to lose weight, I would like the patient exercise routinely; we will continue monitor the patient's progress  Hyperlipidemia  Hyperlipidemia controlled continue with current medical regiment recommend a low-cholesterol diet and recommend routine exercise we will continue to monitor the progress  Vitamin D deficiency  He has had a lowering of the vitamin-D level with reduction of vitamin-D, with the winter months I had a bus I will increase his vitamin D 3  5000 international units once daily and recheck a vitamin-D level with the next set of labs  Problem List Items Addressed This Visit        Respiratory    Seasonal allergic rhinitis     Use Flonase 2 sprays each nostril once a day to the 1st frost he may also add Claritin 10 mg once daily as needed if the symptoms not improve with Flonase  Other    Abnormal blood sugar     Improvement of the prediabetes recommend reducing carbohydrates, sweets recommend weight loss and walking will continue monitor the fasting blood sugar and hemoglobin A1c           Hyperlipidemia     Hyperlipidemia controlled continue with current medical regiment recommend a low-cholesterol diet and recommend routine exercise we will continue to monitor the progress  Relevant Orders    Comprehensive metabolic panel    Lipid Panel with Direct LDL reflex    Vitamin D deficiency - Primary     He has had a lowering of the vitamin-D level with reduction of vitamin-D, with the winter months I had a bus I will increase his vitamin D 3  5000 international units once daily and recheck a vitamin-D level with the next set of labs  Relevant Orders    Vitamin D 25 hydroxy    Screening for diabetes mellitus    Relevant Orders    Hemoglobin A1C    Need for vaccination    Relevant Orders    Zoster Vaccine Recombinant IM (Completed)    Class 2 obesity due to excess calories without serious comorbidity with body mass index (BMI) of 35 0 to 35 9 in adult     Obesity -I have counseled patient following healthy and balanced diet, I would like the patient to lose weight, I would like the patient exercise routinely; we will continue monitor the patient's progress  Return to office 6  months  call if any problems  Subjective:      Patient ID: Claudia You is a 62 y o  male  HPI 58-year old male coming in for a follow up visit regarding vitamin-D deficiency, hyperlipidemia, obesity, prediabetes and allergies; The patient reports me compliant taking medications without untoward side effects the  The patient is here to review his medical condition, update me on the medical condition and the patient reports me no hospitalizations and no ER visits  He reports me congestion postnasal drip secondary to fall allergies  pain on the outer aspect of his right foot     Because the they do no injury account no GI issues The following portions of the patient's history were reviewed and updated as appropriate: allergies, current medications, past family history, past medical history, past social history, past surgical history and problem list   Patient is working with Orthopedics Dr Jerel Mohan regarding the foot pain and has undergone physical therapy there was some relief it 1st with physical therapy but his symptoms  Review of Systems   Constitutional: Negative for activity change, appetite change and unexpected weight change  HENT: Positive for congestion and postnasal drip  Eyes: Negative for visual disturbance  Respiratory: Negative for cough and shortness of breath  Cardiovascular: Negative for chest pain  Gastrointestinal: Negative for abdominal pain, diarrhea, nausea and vomiting  Neurological: Negative for dizziness, light-headedness and headaches  Hematological: Negative for adenopathy  Objective:    No follow-ups on file  No results found  Recent Results (from the past 82493 hour(s))   POCT ECG    Impression    Normal sinus rhythm  Septal Q waves  Unchanged ECG   Echo complete with contrast if indicated    Narrative    [address]  [phone]    Transthoracic Echocardiogram  2D, M-mode, Doppler, and Color Doppler    Study date:  18-Aug-2017    Patient: Leslee Sever  MR number: [id number]  Account number: [de-identified]  : 1960  Age: 59 years  Gender: Male  Status: Outpatient  Location: [address] [place] [address] [facility]  Height: 66 in  Weight: 227 lb  BP: 108/ 66 mmHg    Indications: Murmur    Diagnoses: R01 1 - Cardiac murmur, unspecified    Sonographer:  SHARIF Rodriguez  Referring Physician:  Negin Atwood DO  Group:  [id number]  Interpreting Physician:  Victorina Donald MD    SUMMARY    LEFT VENTRICLE:  Size was normal   Systolic function was normal  Ejection fraction was estimated to be 60 %  There were no regional wall motion abnormalities  Wall thickness was at the upper limits of normal     TRICUSPID VALVE:  There was trace regurgitation      PULMONIC VALVE:  There was trace regurgitation  HISTORY: PRIOR HISTORY: Hyperlipidemia, former smoker    PROCEDURE: The study was performed in the [address]  This was a routine study  The transthoracic approach was used  The study included complete 2D imaging, M-mode, complete spectral Doppler, and color Doppler  This  was a technically difficult study  LEFT VENTRICLE: Size was normal  Systolic function was normal  Ejection fraction was estimated to be 60 %  There were no regional wall motion abnormalities  Wall thickness was at the upper limits of normal     RIGHT VENTRICLE: The size was normal  Systolic function was normal  Wall thickness was normal     LEFT ATRIUM: Size was at the upper limits of normal     RIGHT ATRIUM: Size was normal     MITRAL VALVE: Valve structure was normal  There was normal leaflet separation  DOPPLER: The transmitral velocity was within the normal range  There was no evidence for stenosis  There was no regurgitation  AORTIC VALVE: The valve was trileaflet  Leaflets exhibited normal thickness and normal cuspal separation  DOPPLER: Transaortic velocity was within the normal range  There was no evidence for stenosis  There was no regurgitation  TRICUSPID VALVE: The valve structure was normal  There was normal leaflet separation  DOPPLER: The transtricuspid velocity was within the normal range  There was no evidence for stenosis  There was trace regurgitation  PULMONIC VALVE: Leaflets exhibited normal thickness, no calcification, and normal cuspal separation  DOPPLER: The transpulmonic velocity was within the normal range  There was trace regurgitation  PERICARDIUM: There was no pericardial effusion  The pericardium was normal in appearance  AORTA: The root exhibited normal size      SYSTEM MEASUREMENT TABLES    2D  %FS: 33 82 %  AV Diam: 3 43 cm  EDV(Teich): 125 26 ml  EF(Cube): 71 02 %  EF(Teich): 62 34 %  ESV(Cube): 39 03 ml  ESV(Teich): 47 17 ml  IVSd: 1 05 cm  LA Area: 18 13 cm2  LA Diam: 3 79 cm  LVEDV MOD A4C: 139 94 ml  LVEF MOD A4C: 54 34 %  LVESV MOD A4C: 63 89 ml  LVIDd: 5 13 cm  LVIDs: 3 39 cm  LVLd A4C: 8 1 cm  LVLs A4C: 6 98 cm  LVPWd: 1 08 cm  RA Area: 16 5 cm2  RV Diam : 2 85 cm  SV MOD A4C: 76 05 ml  SV(Cube): 95 63 ml  SV(Teich): 78 09 ml    MM  TAPSE: 2 1 cm    PW  E': 0 06 m/s  E/E': 15 29  MV A John: 1 11 m/s  MV Dec Radford: 3 21 m/s2  MV DecT: 283 ms  MV E John: 0 91 m/s  MV E/A Ratio: 0 82    [employer] Accredited Echocardiography [facility]    Prepared and electronically signed by    Karen Jensen MD  Signed 18-Aug-2017 14:55:07         No Known Allergies    Past Medical History:   Diagnosis Date    Asthma     Erectile dysfunction of non-organic origin     Resolved: 10/15/2014     Past Surgical History:   Procedure Laterality Date    EYE SURGERY      TONSILLECTOMY AND ADENOIDECTOMY      UMBILICAL HERNIA REPAIR       Current Outpatient Medications on File Prior to Visit   Medication Sig Dispense Refill    albuterol (2 5 mg/3 mL) 0 083 % nebulizer solution Inhale      albuterol (PROAIR HFA) 90 mcg/act inhaler Inhale 2 puffs 2 (two) times a day as needed for wheezing Use 2 puffs prior to exercise  18 g 5    Cholecalciferol (VITAMIN D3) 5000 units CAPS Take 3,000 Units by mouth       Cinnamon 500 MG capsule Take by mouth      cyanocobalamin ([employer] VITAMIN B-12) 1000 MCG tablet Take by mouth      fluticasone (FLONASE) 50 mcg/act nasal spray Two sprays each nostril once daily as needed  1 Bottle 11    Omega-3 Fatty Acids (FISH OIL) 1,000 mg Take 3 capsules by mouth daily       No current facility-administered medications on file prior to visit        Family History   Problem Relation Age of Onset    Asthma Mother     Heart disease Mother     Rheum arthritis Mother     Diabetes Father     Heart disease Father    Brenda Abad Asthma Sister     Diabetes Sister     Other Son         Thyroid disorder     Social History     Socioeconomic History    Marital status: /Civil Union     Spouse name: Not on file    Number of children: Not on file    Years of education: Not on file    Highest education level: Not on file   Occupational History    Not on file   Social Needs    Financial resource strain: Not on file    Food insecurity:     Worry: Not on file     Inability: Not on file    Transportation needs:     Medical: Not on file     Non-medical: Not on file   Tobacco Use    Smoking status: Former Smoker     Packs/day: 1 00     Years: 13 00     Pack years: 13 00     Types: Cigarettes     Last attempt to quit:      Years since quittin 7    Smokeless tobacco: Never Used    Tobacco comment: He smoked a pack a day of cigarettes for 8 years  He quite at the age of 22  Occasional cigar use     Substance and Sexual Activity    Alcohol use: Yes     Comment: Social drinker    Drug use: No    Sexual activity: Not on file   Lifestyle    Physical activity:     Days per week: Not on file     Minutes per session: Not on file    Stress: Not on file   Relationships    Social connections:     Talks on phone: Not on file     Gets together: Not on file     Attends [employer] service: Not on file     Active member of club or organization: Not on file     Attends meetings of clubs or organizations: Not on file     Relationship status: Not on file    Intimate partner violence:     Fear of current or ex partner: Not on file     Emotionally abused: Not on file     Physically abused: Not on file     Forced sexual activity: Not on file   Other Topics Concern    Not on file   Social History Narrative    Caffeine use    Work-related stress     Vitals:    19 1138   BP: 124/68   Pulse: 76   Resp: 16   SpO2: 97%   Weight: 104 kg (228 lb 3 2 oz)   Height: 5' 7" (1 702 m)     Results for orders placed or performed in visit on 19   Comprehensive metabolic panel   Result Value Ref Range    Sodium 138 136 - 145 mmol/L    Potassium 4 5 3 5 - 5 3 mmol/L    Chloride 105 100 - 108 mmol/L    CO2 27 21 - 32 mmol/L    ANION GAP 6 4 - 13 mmol/L    BUN 22 5 - 25 mg/dL    Creatinine 1 07 0 60 - 1 30 mg/dL    Glucose, Fasting 99 65 - 99 mg/dL    Calcium 9 3 8 3 - 10 1 mg/dL    AST 30 5 - 45 U/L    ALT 44 12 - 78 U/L    Alkaline Phosphatase 54 46 - 116 U/L    Total Protein 7 9 6 4 - 8 2 g/dL    Albumin 3 7 3 5 - 5 0 g/dL    Total Bilirubin 0 40 0 20 - 1 00 mg/dL    eGFR 75 ml/min/1 73sq m   Hemoglobin A1C   Result Value Ref Range    Hemoglobin A1C 5 4 4 2 - 6 3 %     mg/dl   Lipid Panel with Direct LDL reflex   Result Value Ref Range    Cholesterol 176 50 - 200 mg/dL    Triglycerides 114 <=150 mg/dL    HDL, Direct 49 40 - 60 mg/dL    LDL Calculated 104 (H) 0 - 100 mg/dL   Vitamin D 25 hydroxy   Result Value Ref Range    Vit D, 25-Hydroxy 34 1 30 0 - 100 0 ng/mL   PSA, Total Screen   Result Value Ref Range    PSA 0 2 0 0 - 4 0 ng/mL   Hepatitis C antibody   Result Value Ref Range    Hepatitis C Ab Non-reactive Non-reactive     Weight (last 2 days)     Date/Time   Weight    09/09/19 1138   104 (228 2)            Body mass index is 35 74 kg/m²  BP      Temp      Pulse     Resp      SpO2        Vitals:    09/09/19 1138   Weight: 104 kg (228 lb 3 2 oz)     Vitals:    09/09/19 1138   Weight: 104 kg (228 lb 3 2 oz)       /68   Pulse 76   Resp 16   Ht 5' 7" (1 702 m)   Wt 104 kg (228 lb 3 2 oz)   SpO2 97%   BMI 35 74 kg/m²          Physical Exam   Constitutional: He appears well-developed and well-nourished  No distress  HENT:   Head: Normocephalic and atraumatic  Right Ear: External ear normal    Left Ear: External ear normal    Mouth/Throat: Oropharynx is clear and moist    Eyes: Pupils are equal, round, and reactive to light  Conjunctivae are normal  Right eye exhibits no discharge  Left eye exhibits no discharge  No scleral icterus  Neck: Neck supple  Cardiovascular: Normal rate, regular rhythm and normal heart sounds  Exam reveals no gallop and no friction rub  No murmur heard  Pulmonary/Chest: No respiratory distress  He has no wheezes  He has no rales  Abdominal: Soft  Bowel sounds are normal  He exhibits no distension and no mass  There is no tenderness  There is no rebound and no guarding  Musculoskeletal: He exhibits no edema or deformity  Lymphadenopathy:     He has no cervical adenopathy  Neurological: He is alert  Skin: He is not diaphoretic  Psychiatric: He has a normal mood and affect

## 2019-09-10 ENCOUNTER — TELEPHONE (OUTPATIENT)
Dept: INTERNAL MEDICINE CLINIC | Facility: CLINIC | Age: 59
End: 2019-09-10

## 2019-09-10 NOTE — ASSESSMENT & PLAN NOTE
Use Flonase 2 sprays each nostril once a day to the 1st frost he may also add Claritin 10 mg once daily as needed if the symptoms not improve with Flonase

## 2019-09-10 NOTE — ASSESSMENT & PLAN NOTE
Improvement of the prediabetes recommend reducing carbohydrates, sweets recommend weight loss and walking will continue monitor the fasting blood sugar and hemoglobin A1c

## 2019-09-10 NOTE — ASSESSMENT & PLAN NOTE
He has had a lowering of the vitamin-D level with reduction of vitamin-D, with the winter months I had a bus I will increase his vitamin D 3  5000 international units once daily and recheck a vitamin-D level with the next set of labs

## 2019-10-08 ENCOUNTER — APPOINTMENT (OUTPATIENT)
Dept: RADIOLOGY | Facility: AMBULARY SURGERY CENTER | Age: 59
End: 2019-10-08
Attending: ORTHOPAEDIC SURGERY
Payer: COMMERCIAL

## 2019-10-08 ENCOUNTER — OFFICE VISIT (OUTPATIENT)
Dept: OBGYN CLINIC | Facility: CLINIC | Age: 59
End: 2019-10-08
Payer: COMMERCIAL

## 2019-10-08 VITALS
WEIGHT: 228 LBS | DIASTOLIC BLOOD PRESSURE: 84 MMHG | HEIGHT: 67 IN | SYSTOLIC BLOOD PRESSURE: 127 MMHG | HEART RATE: 69 BPM | BODY MASS INDEX: 35.79 KG/M2

## 2019-10-08 DIAGNOSIS — M84.374A STRESS FRACTURE OF METATARSAL BONE OF RIGHT FOOT, INITIAL ENCOUNTER: Primary | ICD-10-CM

## 2019-10-08 DIAGNOSIS — M79.671 RIGHT FOOT PAIN: ICD-10-CM

## 2019-10-08 PROCEDURE — 99213 OFFICE O/P EST LOW 20 MIN: CPT | Performed by: ORTHOPAEDIC SURGERY

## 2019-10-08 PROCEDURE — 73630 X-RAY EXAM OF FOOT: CPT

## 2019-10-08 NOTE — PROGRESS NOTES
DANDRE Miller  Attending, Orthopaedic Surgery  [facility] [address] [facility]      ORTHOPAEDIC [facility] VISIT     Assessment:     Encounter Diagnoses   Name Primary?  Right foot pain     Stress fracture of metatarsal bone of right foot, initial encounter Yes            Plan:   · The patient verbalized understanding of exam findings and treatment plan  We engaged in the shared decision-making process and treatment options were discussed at length with the patient  Surgical and conservative management discussed today along with risks and benefits  · A MRI of the right foot was ordered to evaluate for a 5th metatarsal stress fracture  · He was advised to avoid high impact exercising at this time  · Rest, ice and elevation was advised  · He can take Tylenol for pain control  Return for Recheck right foot 5th metatarsal stress fracture after MRI  Beatriz Wilde History of Present Illness:   Chief Complaint:   Chief Complaint   Patient presents with    Right Foot - Follow-up     Nikia Gamez is a 62 y o  male who is being seen in follow-up for Right plantar fasciitis  When we last saw he we recommended visco heel cups, night splint, physical therapy and HEP  Pain has 100% improved of plantar fasciitis symptoms, pain present of the peroneal tendon and cuboid bone  Residual pain is localized at peroneal tendon with minimal radiating and described as sharp and severe        Pain/symptom timing:  Worse during the day when active  Pain/symptom context:  Worse with activites and work  Pain/symptom modifying factors:  Rest makes better, activities make worse  Pain/symptom associated signs/symptoms: none    Prior treatment   · NSAIDsYes   · Injections No   · Bracing/Orthotics Yes    · Physical Therapy Yes     Orthopedic Surgical History:   See below    Past Medical, Surgical and Social History:  Past Medical History:  has a past medical history of Asthma and Erectile dysfunction of non-organic origin  Problem List: does not have any pertinent problems on file  Past Surgical History:  has a past surgical history that includes Tonsillectomy and adenoidectomy; Umbilical hernia repair; and Eye surgery  Family History: family history includes Asthma in his mother and sister; Diabetes in his father and sister; Heart disease in his father and mother; Other in his son; Rheum arthritis in his mother  Social History:  reports that he quit smoking about 40 years ago  His smoking use included cigarettes  He has a 13 00 pack-year smoking history  He has never used smokeless tobacco  He reports that he drinks alcohol  He reports that he does not use drugs  Current Medications: has a current medication list which includes the following prescription(s): albuterol, albuterol, vitamin d3, cinnamon, cyanocobalamin, fluticasone, and fish oil  Allergies: has No Known Allergies  Review of Systems:  General- denies fever/chills  HEENT- denies hearing loss or sore throat  Eyes- denies eye pain or visual disturbances, denies red eyes  Respiratory- denies cough or SOB  Cardio- denies chest pain or palpitations  GI- denies abdominal pain  Endocrine- denies urinary frequency  Urinary- denies pain with urination  Musculoskeletal- Negative except noted above  Skin- denies rashes or wounds  Neurological- denies dizziness or headache  Psychiatric- denies anxiety or difficulty concentrating    Physical Exam:   /84 (BP Location: Left arm, Patient Position: Sitting, Cuff Size: Adult)   Pulse 69   Ht 5' 7" (1 702 m)   Wt 103 kg (228 lb)   BMI 35 71 kg/m²   General/Constitutional: No apparent distress: well-nourished and well developed    Eyes: normal ocular motion  Lymphatic: No appreciable lymphadenopathy  Respiratory: Non-labored breathing  Vascular: No edema, swelling or tenderness, except as noted in detailed exam   Integumentary: No impressive skin lesions present, except as noted in detailed exam   Neuro: No ataxia or tremors noted  Psych: Normal mood and affect, oriented to person, place and time  Appropriate affect  Musculoskeletal: Normal, except as noted in detailed exam and in HPI  Examination    Right    Gait Normal   Musculoskeletal Tender to palpation at peroneal tendon,  base of the 5th metatarsal, no ttp of the cuboid bone    Skin Normal      Nails Normal    Range of Motion  15 degrees dorsiflexion, 40 degrees plantarflexion  Subtalar motion: wnl    Stability Stable    Muscle Strength 5/5 tibialis anterior  5/5 gastrocnemius-soleus  5/5 posterior tibialis  5/5 peroneal/eversion strength  5/5 EHL  5/5 FHL    Neurologic Normal    Sensation Intact to light touch throughout sural, saphenous, superficial peroneal, deep peroneal and medial/lateral plantar nerve distributions  [place] 5 07 filament (10g) testing deferred  Cardiovascular Brisk capillary refill < 2 seconds,intact DP and PT pulses    Special Tests [facility]:  Able to perform      Imaging Studies:   Xray 3 views right foot weight bearing: no visible fracture present, no dislocation  No degenerative changes present  Reviewed by me personally  Scribe Attestation    I,:   Tamiko Ugarte am acting as a scribe while in the presence of the attending physician :        I,:   Judge Guillaume MD personally performed the services described in this documentation    as scribed in my presence :                Esau Soho Lachman, MD  Foot & Ankle Surgery   Department [address]      I personally performed the service  Esau Soho Lachman, MD

## 2019-10-16 ENCOUNTER — HOSPITAL ENCOUNTER (OUTPATIENT)
Dept: RADIOLOGY | Age: 59
Discharge: HOME | End: 2019-10-16
Payer: COMMERCIAL

## 2019-10-16 DIAGNOSIS — M84.374A STRESS FRACTURE OF METATARSAL BONE OF RIGHT FOOT, INITIAL ENCOUNTER: ICD-10-CM

## 2019-10-16 PROCEDURE — 73718 MRI LOWER EXTREMITY W/O DYE: CPT

## 2019-10-18 ENCOUNTER — TELEPHONE (OUTPATIENT)
Dept: OBGYN CLINIC | Facility: HOSPITAL | Age: 59
End: 2019-10-18

## 2019-10-18 NOTE — TELEPHONE ENCOUNTER
[facility]  [phone]    Dr Lachman    Patient had MRI and is going on trip tomorrow and is asking if you can review them and call back if he is not suppose to be walking before his f/u on 10/29

## 2019-10-21 NOTE — TELEPHONE ENCOUNTER
Called patient twice and got voicemail both times  Let patient know that his MRI shows no fracture but there is mild bone marrow edema at the base of the 5th metatarsal consistent with a stress reaction of the bone  We recommend weight bearing as tolerated in a walking cam boot  I am not sure if the patient has a cam boot at home  If he needs an order for a boot, he will call the office, have the message forwarded to me and I will put in the order  Thanks!

## 2019-10-25 NOTE — TELEPHONE ENCOUNTER
Called and spoke with patient  We had recommended a cam boot 4 days ago via voicemail because he was out of town and I could not reach him  He called back when he got back from his vacation and will be coming by the clinic this afternoon to be fitted for a cam boot

## 2019-10-29 ENCOUNTER — OFFICE VISIT (OUTPATIENT)
Dept: OBGYN CLINIC | Facility: CLINIC | Age: 59
End: 2019-10-29
Payer: COMMERCIAL

## 2019-10-29 VITALS — BODY MASS INDEX: 36.13 KG/M2 | HEIGHT: 67 IN | WEIGHT: 230.2 LBS

## 2019-10-29 DIAGNOSIS — M84.30XA STRESS REACTION OF BONE: Primary | ICD-10-CM

## 2019-10-29 PROCEDURE — 99213 OFFICE O/P EST LOW 20 MIN: CPT | Performed by: ORTHOPAEDIC SURGERY

## 2019-10-29 NOTE — PROGRESS NOTES
DANDRE Patten  Attending, Orthopaedic Surgery  Foot and [facility] [address] [facility]      ORTHOPAEDIC FOOT AND ANKLE CLINIC VISIT     Assessment:     Encounter Diagnosis   Name Primary?  Stress reaction of bone Yes            Plan:   · The patient verbalized understanding of exam findings and treatment plan  We engaged in the shared decision-making process and treatment options were discussed at length with the patient  Surgical and conservative management discussed today along with risks and benefits  · MRI of the right foot demonstrated bone marrow edema within the 5th metatarsal, consistent with stress reaction of the bone  · Instructed to wear a cam boot while weight bearing for 6 weeks  · Advised on rest, ice, elevation  Return in about 6 weeks (around 12/10/2019)  with x-rays of the right foot only if he is having pain  History of Present Illness:   Chief Complaint:   Chief Complaint   Patient presents with   Henry Ernesto is a 62 y o  male who is being seen in follow-up for left lateral foot pain  When we last saw he we recommended MRI to evaluate for stress fracture of the 5th metatarsal   Pain has somewhat improved  Residual pain is localized at base of the 5th metatarsal with minimal radiating and described as sharp and severe  Pain/symptom timing:  Worse during the day when active  Pain/symptom context:  Worse with activites and work  Pain/symptom modifying factors:  Rest makes better, activities make worse  Pain/symptom associated signs/symptoms: none    Prior treatment   · NSAIDsYes   · Injections No   · Bracing/Orthotics Yes    · Physical Therapy No     Orthopedic Surgical History:   See below     Past Medical, Surgical and Social History:  Past Medical History:  has a past medical history of Asthma and Erectile dysfunction of non-organic origin  Problem List: does not have any pertinent problems on file    Past Surgical History:  has a past surgical history that includes Tonsillectomy and adenoidectomy; Umbilical hernia repair; and Eye surgery  Family History: family history includes Asthma in his mother and sister; Diabetes in his father and sister; Heart disease in his father and mother; Other in his son; Rheum arthritis in his mother  Social History:  reports that he quit smoking about 40 years ago  His smoking use included cigarettes  He has a 13 00 pack-year smoking history  He has never used smokeless tobacco  He reports that he drinks alcohol  He reports that he does not use drugs  Current Medications: has a current medication list which includes the following prescription(s): albuterol, albuterol, vitamin d3, cinnamon, cyanocobalamin, fluticasone, and fish oil  Allergies: has No Known Allergies  Review of Systems:  General- denies fever/chills  HEENT- denies hearing loss or sore throat  Eyes- denies eye pain or visual disturbances, denies red eyes  Respiratory- denies cough or SOB  Cardio- denies chest pain or palpitations  GI- denies abdominal pain  Endocrine- denies urinary frequency  Urinary- denies pain with urination  Musculoskeletal- Negative except noted above  Skin- denies rashes or wounds  Neurological- denies dizziness or headache  Psychiatric- denies anxiety or difficulty concentrating    Physical Exam:   Ht 5' 7" (1 702 m)   Wt 104 kg (230 lb 3 2 oz)   BMI 36 05 kg/m²   General/Constitutional: No apparent distress: well-nourished and well developed  Eyes: normal ocular motion  Lymphatic: No appreciable lymphadenopathy  Respiratory: Non-labored breathing  Vascular: No edema, swelling or tenderness, except as noted in detailed exam   Integumentary: No impressive skin lesions present, except as noted in detailed exam   Neuro: No ataxia or tremors noted  Psych: Normal mood and affect, oriented to person, place and time  Appropriate affect    Musculoskeletal: Normal, except as noted in detailed exam and in HPI     Examination    Left    Gait Normal   Musculoskeletal Tender to palpation at base of the 5th metatarsal    Skin Normal       Nails Normal    Range of Motion  20 degrees dorsiflexion, 40 degrees plantarflexion  Subtalar motion: normal    Stability Stable    Muscle Strength 5/5 tibialis anterior  5/5 gastrocnemius-soleus  5/5 posterior tibialis  5/5 peroneal/eversion strength  5/5 EHL  5/5 FHL    Neurologic Normal    Sensation Intact to light touch throughout sural, saphenous, superficial peroneal, deep peroneal and medial/lateral plantar nerve distributions  [place]-Ashli 5 07 filament (10g) testing deferred  Cardiovascular Brisk capillary refill < 2 seconds,intact DP and PT pulses    Special Tests None      Imaging Studies:   MRI available for review of left foot which demonstrates edema in the base of the 5th MT with no definitive fracture line  Reviewed by me personally  Scribe Attestation    I,:   Tracey Guzman PA-C am acting as a scribe while in the presence of the attending physician :        I,:   Len Person MD personally performed the services described in this documentation    as scribed in my presence :                  Jeremy Hull Lachman, MD  Foot & Ankle Surgery   Department of [address] [place] [address]      I personally performed the service  Jeremy Hull Lachman, MD

## 2019-11-04 ENCOUNTER — IMMUNIZATIONS (OUTPATIENT)
Dept: INTERNAL MEDICINE CLINIC | Facility: CLINIC | Age: 59
End: 2019-11-04
Payer: COMMERCIAL

## 2019-11-04 DIAGNOSIS — Z23 NEED FOR INFLUENZA VACCINATION: Primary | ICD-10-CM

## 2019-11-04 PROCEDURE — 90682 RIV4 VACC RECOMBINANT DNA IM: CPT

## 2019-11-04 PROCEDURE — 90471 IMMUNIZATION ADMIN: CPT

## 2019-12-10 ENCOUNTER — OFFICE VISIT (OUTPATIENT)
Dept: OBGYN CLINIC | Facility: CLINIC | Age: 59
End: 2019-12-10
Payer: COMMERCIAL

## 2019-12-10 VITALS
DIASTOLIC BLOOD PRESSURE: 75 MMHG | BODY MASS INDEX: 36.1 KG/M2 | HEIGHT: 67 IN | HEART RATE: 67 BPM | SYSTOLIC BLOOD PRESSURE: 120 MMHG | WEIGHT: 230 LBS

## 2019-12-10 DIAGNOSIS — M84.374D STRESS REACTION OF RIGHT FOOT WITH ROUTINE HEALING, SUBSEQUENT ENCOUNTER: Primary | ICD-10-CM

## 2019-12-10 PROCEDURE — 99213 OFFICE O/P EST LOW 20 MIN: CPT | Performed by: ORTHOPAEDIC SURGERY

## 2019-12-10 NOTE — PROGRESS NOTES
DANDRE Peña  Attending, Orthopaedic Surgery  Foot and [facility] [address] [facility]      ORTHOPAEDIC FOOT AND ANKLE CLINIC VISIT     Assessment:     Encounter Diagnosis   Name Primary?  Stress reaction of right foot with routine healing, subsequent encounter Yes            Plan:   · The patient verbalized understanding of exam findings and treatment plan  We engaged in the shared decision-making process and treatment options were discussed at length with the patient  Surgical and conservative management discussed today along with risks and benefits  · Patient may begin weaning out of the Cam boot with protocol outlined in the patient instructions  · Anti-inflammatories as needed for discomfort  Patient may return exercise and roughly 6 weeks, though he was cautioned to begin this exercise slowly as he has become deconditioned in the boot  May follow up as needed      History of Present Illness:   Chief Complaint:   Chief Complaint   Patient presents with    Right Foot - Follow-up     Rigo Smith is a 62 y o  male who is being seen in follow-up for Right foot stress reaction at the base of the 5th metatarsal  When we last saw he we recommended weight-bearing as tolerated and Cam boot walker  Pain has significantly improved and he has been mostly compliant with the Cam boot  Slight residual pain is localized at the base of the 5th metatarsal which does not radiate        Pain/symptom timing:  With increased activity  Pain/symptom context:  Worse with activites and work  Pain/symptom modifying factors:  Rest makes better, activities make worse  Pain/symptom associated signs/symptoms: none    Prior treatment   · NSAIDsNo   · Injections No   · Bracing/Orthotics Yes    · Physical Therapy No     Orthopedic Surgical History:   Not applicable    Past Medical, Surgical and Social History:  Past Medical History:  has a past medical history of Asthma and Erectile dysfunction of non-organic origin  Problem List: does not have any pertinent problems on file  Past Surgical History:  has a past surgical history that includes Tonsillectomy and adenoidectomy; Umbilical hernia repair; and Eye surgery  Family History: family history includes Asthma in his mother and sister; Diabetes in his father and sister; Heart disease in his father and mother; Other in his son; Rheum arthritis in his mother  Social History:  reports that he quit smoking about 40 years ago  His smoking use included cigarettes  He has a 13 00 pack-year smoking history  He has never used smokeless tobacco  He reports that he drinks alcohol  He reports that he does not use drugs  Current Medications: has a current medication list which includes the following prescription(s): albuterol, albuterol, vitamin d3, cinnamon, cyanocobalamin, fluticasone, and fish oil  Allergies: has No Known Allergies  Review of Systems:  General- denies fever/chills  HEENT- denies hearing loss or sore throat  Eyes- denies eye pain or visual disturbances, denies red eyes  Respiratory- denies cough or SOB  Cardio- denies chest pain or palpitations  GI- denies abdominal pain  Endocrine- denies urinary frequency  Urinary- denies pain with urination  Musculoskeletal- Negative except noted above  Skin- denies rashes or wounds  Neurological- denies dizziness or headache  Psychiatric- denies anxiety or difficulty concentrating    Physical Exam:   /75 (BP Location: Left arm, Patient Position: Sitting, Cuff Size: Adult)   Pulse 67   Ht 5' 7" (1 702 m)   Wt 104 kg (230 lb)   BMI 36 02 kg/m²   General/Constitutional: No apparent distress: well-nourished and well developed    Eyes: normal ocular motion  Lymphatic: No appreciable lymphadenopathy  Respiratory: Non-labored breathing  Vascular: No edema, swelling or tenderness, except as noted in detailed exam   Integumentary: No impressive skin lesions present, except as noted in detailed exam   Neuro: No ataxia or tremors noted  Psych: Normal mood and affect, oriented to person, place and time  Appropriate affect  Musculoskeletal: Normal, except as noted in detailed exam and in HPI  Examination    Right    Gait Normal   Musculoskeletal Tender to palpation at base of the 5th metatarsal    Skin Normal       Nails Normal    Range of Motion  Full dorsiflexion, full plantarflexion  Subtalar motion:  Full and painless    Stability Stable    Muscle Strength 5/5 tibialis anterior  5/5 gastrocnemius-soleus  5/5 posterior tibialis  5/5 peroneal/eversion strength  5/5 EHL  5/5 FHL    Neurologic Normal    Sensation Intact to light touch throughout sural, saphenous, superficial peroneal, deep peroneal and medial/lateral plantar nerve distributions  [place]-Ashli 5 07 filament (10g) testing deferred  Cardiovascular Brisk capillary refill < 2 seconds,intact DP and PT pulses    Special Tests None      Imaging Studies:   No new imaging    3 views of the Right foot were obtained at previous visit, reviewed and interpreted independently which demonstrate no acute fractures or dislocations  Reviewed by me personally  MRI available for review of Right foot obtained at previous visit demonstrates stress reaction to base of 5th metatarsal  Reviewed by me personally  Margene Maroon Lachman, MD  Foot & Ankle Surgery   Department of [address] [place] [address]      I personally performed the service  Margene Maroon Lachman, MD

## 2019-12-10 NOTE — PATIENT INSTRUCTIONS
You may begin weaning your boot and transitioning to a sneaker (12/10)  It is important to do this gradually to avoid aggravating the healing process  1  12/10, you may come out of the boot into a sneaker for 2 hours  2  12/11, you may come out of the boot into a sneaker for 4 hours,  3  The next day, you may come out of the boot into a sneaker for 6 hours  4  Continue this (adding 2 hours per day) as you tolerate  For example, if you do 6 hours out of the boot into a sneaker and your foot swells more than usual at night and it is difficult to control the discomfort, do not advance to 8 hours the next day, stay at 6 hours until you are able to tolerate it  Elevation, Ice and tylenol and staying off of it at night will be important to aide in this transition out of the boot  Swelling and soreness are normal as you begin to do more with the injured leg  May DC aspirin/lovenox, no longer needed  Compression stocking (Knee high, 20-30mm Hg) to be worn as needed  Can purchase at any pharmacy

## 2019-12-23 ENCOUNTER — CLINICAL SUPPORT (OUTPATIENT)
Dept: INTERNAL MEDICINE CLINIC | Facility: CLINIC | Age: 59
End: 2019-12-23
Payer: COMMERCIAL

## 2019-12-23 DIAGNOSIS — Z23 NEED FOR VACCINATION: Primary | ICD-10-CM

## 2019-12-23 PROCEDURE — 90750 HZV VACC RECOMBINANT IM: CPT

## 2019-12-23 PROCEDURE — 90471 IMMUNIZATION ADMIN: CPT

## 2019-12-23 NOTE — PROGRESS NOTES
The patient reports mild soreness in his arm after first Shingrix  The patient has no cold or fevers at this time  Second injection completed

## 2020-03-10 ENCOUNTER — APPOINTMENT (OUTPATIENT)
Dept: LAB | Age: 60
End: 2020-03-10
Payer: COMMERCIAL

## 2020-03-10 ENCOUNTER — TRANSCRIBE ORDERS (OUTPATIENT)
Dept: ADMINISTRATIVE | Age: 60
End: 2020-03-10

## 2020-03-10 DIAGNOSIS — E55.9 VITAMIN D DEFICIENCY: ICD-10-CM

## 2020-03-10 DIAGNOSIS — Z13.1 SCREENING FOR DIABETES MELLITUS: ICD-10-CM

## 2020-03-10 DIAGNOSIS — E78.5 HYPERLIPIDEMIA, UNSPECIFIED HYPERLIPIDEMIA TYPE: ICD-10-CM

## 2020-03-10 LAB
25(OH)D3 SERPL-MCNC: 49 NG/ML (ref 30–100)
ALBUMIN SERPL BCP-MCNC: 3.6 G/DL (ref 3.5–5)
ALP SERPL-CCNC: 47 U/L (ref 46–116)
ALT SERPL W P-5'-P-CCNC: 35 U/L (ref 12–78)
ANION GAP SERPL CALCULATED.3IONS-SCNC: 6 MMOL/L (ref 4–13)
AST SERPL W P-5'-P-CCNC: 18 U/L (ref 5–45)
BILIRUB SERPL-MCNC: 0.66 MG/DL (ref 0.2–1)
BUN SERPL-MCNC: 23 MG/DL (ref 5–25)
CALCIUM SERPL-MCNC: 8.9 MG/DL (ref 8.3–10.1)
CHLORIDE SERPL-SCNC: 111 MMOL/L (ref 100–108)
CHOLEST SERPL-MCNC: 183 MG/DL (ref 50–200)
CO2 SERPL-SCNC: 26 MMOL/L (ref 21–32)
CREAT SERPL-MCNC: 0.99 MG/DL (ref 0.6–1.3)
EST. AVERAGE GLUCOSE BLD GHB EST-MCNC: 114 MG/DL
GFR SERPL CREATININE-BSD FRML MDRD: 81 ML/MIN/1.73SQ M
GLUCOSE P FAST SERPL-MCNC: 102 MG/DL (ref 65–99)
HBA1C MFR BLD: 5.6 %
HDLC SERPL-MCNC: 45 MG/DL
LDLC SERPL CALC-MCNC: 112 MG/DL (ref 0–100)
POTASSIUM SERPL-SCNC: 4.1 MMOL/L (ref 3.5–5.3)
PROT SERPL-MCNC: 7.6 G/DL (ref 6.4–8.2)
SODIUM SERPL-SCNC: 143 MMOL/L (ref 136–145)
TRIGL SERPL-MCNC: 131 MG/DL

## 2020-03-10 PROCEDURE — 36415 COLL VENOUS BLD VENIPUNCTURE: CPT

## 2020-03-10 PROCEDURE — 80053 COMPREHEN METABOLIC PANEL: CPT

## 2020-03-10 PROCEDURE — 80061 LIPID PANEL: CPT

## 2020-03-10 PROCEDURE — 82306 VITAMIN D 25 HYDROXY: CPT

## 2020-03-10 PROCEDURE — 83036 HEMOGLOBIN GLYCOSYLATED A1C: CPT

## 2020-03-16 ENCOUNTER — OFFICE VISIT (OUTPATIENT)
Dept: INTERNAL MEDICINE CLINIC | Facility: CLINIC | Age: 60
End: 2020-03-16
Payer: COMMERCIAL

## 2020-03-16 VITALS
WEIGHT: 237.4 LBS | HEART RATE: 71 BPM | HEIGHT: 67 IN | RESPIRATION RATE: 16 BRPM | SYSTOLIC BLOOD PRESSURE: 124 MMHG | BODY MASS INDEX: 37.26 KG/M2 | DIASTOLIC BLOOD PRESSURE: 72 MMHG | OXYGEN SATURATION: 97 %

## 2020-03-16 DIAGNOSIS — K21.9 GASTROESOPHAGEAL REFLUX DISEASE WITHOUT ESOPHAGITIS: ICD-10-CM

## 2020-03-16 DIAGNOSIS — J45.990 EXERCISE-INDUCED ASTHMA: ICD-10-CM

## 2020-03-16 DIAGNOSIS — R73.09 ABNORMAL BLOOD SUGAR: ICD-10-CM

## 2020-03-16 DIAGNOSIS — E66.09 CLASS 2 OBESITY DUE TO EXCESS CALORIES WITHOUT SERIOUS COMORBIDITY WITH BODY MASS INDEX (BMI) OF 35.0 TO 35.9 IN ADULT: ICD-10-CM

## 2020-03-16 DIAGNOSIS — G47.30 SLEEP APNEA, UNSPECIFIED TYPE: ICD-10-CM

## 2020-03-16 DIAGNOSIS — J30.2 SEASONAL ALLERGIC RHINITIS, UNSPECIFIED TRIGGER: Primary | ICD-10-CM

## 2020-03-16 DIAGNOSIS — Z12.5 SCREENING PSA (PROSTATE SPECIFIC ANTIGEN): ICD-10-CM

## 2020-03-16 DIAGNOSIS — E78.5 HYPERLIPIDEMIA, UNSPECIFIED HYPERLIPIDEMIA TYPE: ICD-10-CM

## 2020-03-16 DIAGNOSIS — Z00.00 WELLNESS EXAMINATION: ICD-10-CM

## 2020-03-16 PROCEDURE — 99214 OFFICE O/P EST MOD 30 MIN: CPT | Performed by: INTERNAL MEDICINE

## 2020-03-16 PROCEDURE — 3008F BODY MASS INDEX DOCD: CPT | Performed by: INTERNAL MEDICINE

## 2020-03-16 PROCEDURE — 1036F TOBACCO NON-USER: CPT | Performed by: INTERNAL MEDICINE

## 2020-03-16 PROCEDURE — 99396 PREV VISIT EST AGE 40-64: CPT | Performed by: INTERNAL MEDICINE

## 2020-03-16 RX ORDER — DESLORATADINE 5 MG/1
5 TABLET ORAL DAILY
Qty: 90 TABLET | Refills: 1 | Status: SHIPPED | OUTPATIENT
Start: 2020-03-16 | End: 2020-08-12

## 2020-03-16 RX ORDER — ALBUTEROL SULFATE 90 UG/1
2 AEROSOL, METERED RESPIRATORY (INHALATION) EVERY 6 HOURS PRN
Qty: 18 G | Refills: 5 | Status: SHIPPED | OUTPATIENT
Start: 2020-03-16

## 2020-03-16 NOTE — ASSESSMENT & PLAN NOTE
Clinically stable and doing well continue the current medical regiment will continue monitor    Request refill of ProAir HFA 2 puffs every 4-6 hours as needed

## 2020-03-16 NOTE — PROGRESS NOTES
Assessment/Plan:    Wellness examination  Assessment and plan 1  Health maintenance annual wellness examination overall the patient is clinically stable and doing well, we encouraged the patient to follow a healthy and balanced diet  We recommend that the patient exercise routinely approximately 30 minutes 5 times per week   We have reviewed the patient's vaccines and have made recommendations for updates if necessary annual flu vaccine, consider new shingles vaccine  Check screening PSA       We will be ordering screening laboratories which are age appropriate  Return to the office in 6 months     call if any problems  Problem List Items Addressed This Visit        Respiratory    Seasonal allergic rhinitis - Primary    Relevant Medications    desloratadine (CLARINEX) 5 MG tablet    Other Relevant Orders    [facility] Allergy Panel, Adult       Other    Abnormal blood sugar    Relevant Orders    Hemoglobin A1C    Hyperlipidemia    Relevant Orders    Comprehensive metabolic panel    Lipid Panel with Direct LDL reflex    Screening PSA (prostate specific antigen)    Relevant Orders    PSA, Total Screen    Wellness examination     Assessment and plan 1  Health maintenance annual wellness examination overall the patient is clinically stable and doing well, we encouraged the patient to follow a healthy and balanced diet  We recommend that the patient exercise routinely approximately 30 minutes 5 times per week   We have reviewed the patient's vaccines and have made recommendations for updates if necessary annual flu vaccine, consider new shingles vaccine  Check screening PSA       We will be ordering screening laboratories which are age appropriate  Return to the office in 6 months     call if any problems             Other Visit Diagnoses     Exercise-induced asthma        Relevant Medications    albuterol (ProAir HFA) 90 mcg/act inhaler          Return to office 6  months  call if any problems  Subjective:      Patient ID: Kaleb Arora is a 56 y o  male  HPI 58-year-old male coming in for annual wellness examination overall the patient is clinically stable doing well he reports me drives a car safely wears a seatbelt he sees a dentist for routine cleanings and brushes/losses is teeth  Within his home he has a smoke alarm, fire extinguisher, he reports me he he wears a sunscreen  He is up-to-date on his colonoscopy  He is here to review laboratories wears seat bealt, dosent wear sunscreen ,  Brush and     The following portions of the patient's history were reviewed and updated as appropriate: allergies, current medications, past family history, past medical history, past social history, past surgical history and problem list     Review of Systems   Constitutional: Negative for activity change, appetite change and unexpected weight change  HENT: Negative for congestion and postnasal drip  Eyes: Negative for visual disturbance  Respiratory: Negative for cough and shortness of breath  Cardiovascular: Negative for chest pain  Gastrointestinal: Negative for abdominal pain, diarrhea, nausea and vomiting  Neurological: Negative for dizziness, light-headedness and headaches  Hematological: Negative for adenopathy  Objective:    Return in about 6 months (around 9/23/2020)  No results found        No Known Allergies    Past Medical History:   Diagnosis Date    Asthma     Erectile dysfunction of non-organic origin     Resolved: 10/15/2014     Past Surgical History:   Procedure Laterality Date    EYE SURGERY      TONSILLECTOMY AND ADENOIDECTOMY      UMBILICAL HERNIA REPAIR       Current Outpatient Medications on File Prior to Visit   Medication Sig Dispense Refill    albuterol (2 5 mg/3 mL) 0 083 % nebulizer solution Inhale      Cholecalciferol (VITAMIN D3) 5000 units CAPS Take 3,000 Units by mouth       Cinnamon 500 MG capsule Take by mouth      cyanocobalamin ([employer] VITAMIN B-12) 1000 MCG tablet Take by mouth      fluticasone (FLONASE) 50 mcg/act nasal spray Two sprays each nostril once daily as needed  1 Bottle 11    Omega-3 Fatty Acids (FISH OIL) 1,000 mg Take 3 capsules by mouth daily      [DISCONTINUED] albuterol (PROAIR HFA) 90 mcg/act inhaler Inhale 2 puffs 2 (two) times a day as needed for wheezing Use 2 puffs prior to exercise  18 g 5     No current facility-administered medications on file prior to visit  Family History   Problem Relation Age of Onset   Darrel Bones Asthma Mother     Heart disease Mother     Rheum arthritis Mother     Diabetes Father     Heart disease Father     Asthma Sister     Diabetes Sister     Other Son         Thyroid disorder     Social History     Socioeconomic History    Marital status: /Civil Union     Spouse name: Not on file    Number of children: Not on file    Years of education: Not on file    Highest education level: Not on file   Occupational History    Not on file   Social Needs    Financial resource strain: Not on file    Food insecurity:     Worry: Not on file     Inability: Not on file    Transportation needs:     Medical: Not on file     Non-medical: Not on file   Tobacco Use    Smoking status: Former Smoker     Packs/day: 1 00     Years: 13 00     Pack years: 13 00     Types: Cigarettes     Last attempt to quit:      Years since quittin 2    Smokeless tobacco: Never Used    Tobacco comment: He smoked a pack a day of cigarettes for 8 years  He quite at the age of 22  Occasional cigar use     Substance and Sexual Activity    Alcohol use: Yes     Comment: Social drinker    Drug use: No    Sexual activity: Not on file   Lifestyle    Physical activity:     Days per week: Not on file     Minutes per session: Not on file    Stress: Not on file   Relationships    Social connections:     Talks on phone: Not on file     Gets together: Not on file     Attends Mandaen service: Not on file Active member of club or organization: Not on file     Attends meetings of clubs or organizations: Not on file     Relationship status: Not on file    Intimate partner violence:     Fear of current or ex partner: Not on file     Emotionally abused: Not on file     Physically abused: Not on file     Forced sexual activity: Not on file   Other Topics Concern    Not on file   Social History Narrative    Caffeine use    Work-related stress     Vitals:    03/16/20 0759   BP: 124/72   Pulse: 71   Resp: 16   SpO2: 97%   Weight: 108 kg (237 lb 6 4 oz)   Height: 5' 7" (1 702 m)     Results for orders placed or performed in visit on 03/10/20   Vitamin D 25 hydroxy   Result Value Ref Range    Vit D, 25-Hydroxy 49 0 30 0 - 100 0 ng/mL   Comprehensive metabolic panel   Result Value Ref Range    Sodium 143 136 - 145 mmol/L    Potassium 4 1 3 5 - 5 3 mmol/L    Chloride 111 (H) 100 - 108 mmol/L    CO2 26 21 - 32 mmol/L    ANION GAP 6 4 - 13 mmol/L    BUN 23 5 - 25 mg/dL    Creatinine 0 99 0 60 - 1 30 mg/dL    Glucose, Fasting 102 (H) 65 - 99 mg/dL    Calcium 8 9 8 3 - 10 1 mg/dL    AST 18 5 - 45 U/L    ALT 35 12 - 78 U/L    Alkaline Phosphatase 47 46 - 116 U/L    Total Protein 7 6 6 4 - 8 2 g/dL    Albumin 3 6 3 5 - 5 0 g/dL    Total Bilirubin 0 66 0 20 - 1 00 mg/dL    eGFR 81 ml/min/1 73sq m   Hemoglobin A1C   Result Value Ref Range    Hemoglobin A1C 5 6 Normal 3 8-5 6%; PreDiabetic 5 7-6 4%; Diabetic >=6 5%; Glycemic control for adults with diabetes <7 0% %     mg/dl   Lipid Panel with Direct LDL reflex   Result Value Ref Range    Cholesterol 183 50 - 200 mg/dL    Triglycerides 131 <=150 mg/dL    HDL, Direct 45 >=40 mg/dL    LDL Calculated 112 (H) 0 - 100 mg/dL     Weight (last 2 days)     Date/Time   Weight    03/16/20 0759   108 (237 4)            Body mass index is 37 18 kg/m²    BP      Temp      Pulse     Resp      SpO2        Vitals:    03/16/20 0759   Weight: 108 kg (237 lb 6 4 oz)     Vitals:    03/16/20 0759 Weight: 108 kg (237 lb 6 4 oz)       /72   Pulse 71   Resp 16   Ht 5' 7" (1 702 m)   Wt 108 kg (237 lb 6 4 oz)   SpO2 97%   BMI 37 18 kg/m²          Physical Exam   Constitutional: He appears well-developed and well-nourished  No distress  HENT:   Head: Normocephalic and atraumatic  Right Ear: External ear normal    Left Ear: External ear normal    Mouth/Throat: Oropharynx is clear and moist    Eyes: Pupils are equal, round, and reactive to light  Conjunctivae are normal  Right eye exhibits no discharge  Left eye exhibits no discharge  No scleral icterus  Neck: Neck supple  Cardiovascular: Normal rate, regular rhythm and normal heart sounds  Exam reveals no gallop and no friction rub  No murmur heard  Pulmonary/Chest: No respiratory distress  He has no wheezes  He has no rales  Abdominal: Soft  Bowel sounds are normal  He exhibits no distension and no mass  There is no tenderness  There is no rebound and no guarding  Musculoskeletal: He exhibits no edema or deformity  Lymphadenopathy:     He has no cervical adenopathy  Neurological: He is alert  Skin: He is not diaphoretic  Psychiatric: He has a normal mood and affect

## 2020-03-16 NOTE — ASSESSMENT & PLAN NOTE
Start Clarinex 5 mg once daily, Flonase 2 sprays each nostril once a day as needed check respiratory allergy profile

## 2020-03-16 NOTE — PATIENT INSTRUCTIONS
Low Fat Diet   WHAT YOU NEED TO KNOW:   A low-fat diet is an eating plan that is low in total fat, unhealthy fat, and cholesterol  You may need to follow a low-fat diet if you have trouble digesting or absorbing fat  You may also need to follow this diet if you have high cholesterol  You can also lower your cholesterol by increasing the amount of fiber in your diet  Soluble fiber is a type of fiber that helps to decrease cholesterol levels  DISCHARGE INSTRUCTIONS:   Different types of fat in food:   · Limit unhealthy fats  A diet that is high in cholesterol, saturated fat, and trans fat may cause unhealthy cholesterol levels  Unhealthy cholesterol levels increase your risk of heart disease  ¨ Cholesterol:  Limit intake of cholesterol to less than 200 mg per day  Cholesterol is found in meat, eggs, and dairy  ¨ Saturated fat:  Limit saturated fat to less than 7% of your total daily calories  Ask your dietitian how many calories you need each day  Saturated fat is found in butter, cheese, ice cream, whole milk, and palm oil  Saturated fat is also found in meat, such as beef, pork, chicken skin, and processed meats  Processed meats include sausage, hot dogs, and bologna  ¨ Trans fat:  Avoid trans fat as much as possible  Trans fat is used in fried and baked foods  Foods that say trans fat free on the label may still have up to 0 5 grams of trans fat per serving  · Include healthy fats  Replace foods that are high in saturated and trans fat with foods high in healthy fats  This may help to decrease high cholesterol levels  ¨ Monounsaturated fats: These are found in avocados, nuts, and vegetable oils, such as olive, canola, and sunflower oil  ¨ Polyunsaturated fats: These can be found in vegetable oils, such as soybean or corn oil  Omega-3 fats can help to decrease the risk of heart disease  Omega-3 fats are found in fish, such as salmon, herring, trout, and tuna   Omega-3 fats can also be found in plant foods, such as walnuts, flaxseed, soybeans, and canola oil    Foods to limit or avoid:   · Grains:      ¨ Snacks that are made with partially hydrogenated oils, such as chips, regular crackers, and butter-flavored popcorn    ¨ High-fat baked goods, such as biscuits, croissants, doughnuts, pies, cookies, and pastries    · Dairy:      ¨ Whole milk, 2% milk, and yogurt and ice cream made with whole milk    ¨ Half and half creamer, heavy cream, and whipping cream    ¨ Cheese, cream cheese, and sour cream    · Meats and proteins:      ¨ High-fat cuts of meat (T-bone steak, regular hamburger, and ribs)    ¨ Fried meat, poultry (turkey and chicken), and fish    ¨ Poultry (chicken and turkey) with skin    ¨ Cold cuts (salami or bologna), hot dogs, corbett, and sausage    ¨ Whole eggs and egg yolks    · Vegetables and fruits with added fat:      ¨ Fried vegetables or vegetables in butter or high-fat sauces, such as cream or cheese sauces    ¨ Fried fruit or fruit served with butter or cream    · Fats:      ¨ Butter, stick margarine, and shortening    ¨ Coconut, palm oil, and palm kernel oil  Foods to include:   · Grains:      ¨ Whole-grain breads, cereals, pasta, and brown rice    ¨ Low-fat crackers and pretzels    · Vegetables and fruits:      ¨ Fresh, frozen, or canned vegetables (no salt or low-sodium)    ¨ Fresh, frozen, dried, or canned fruit (canned in light syrup or fruit juice)    ¨ Avocado    · Low-fat dairy products:      ¨ Nonfat (skim) or 1% milk    ¨ Nonfat or low-fat cheese, yogurt, and cottage cheese    · Meats and proteins:      ¨ Chicken or turkey with no skin    ¨ Baked or broiled fish    ¨ Lean beef and pork (loin, round, extra lean hamburger)    ¨ Beans and peas, unsalted nuts, soy products    ¨ Egg whites and substitutes    ¨ Seeds and nuts    · Fats:      ¨ Unsaturated oil, such as canola, olive, peanut, soybean, or sunflower oil    ¨ Soft or liquid margarine and vegetable oil spread    ¨ Low-fat salad dressing  Other ways to decrease fat:   · Read food labels before you buy foods  Choose foods that have less than 30% of calories from fat  Choose low-fat or fat-free dairy products  Remember that fat free does not mean calorie free  These foods still contain calories, and too many calories can lead to weight gain  · Trim fat from meat and avoid fried food  Trim all visible fat from meat before you cook it  Remove the skin from poultry  Do not jackman meat, fish, or poultry  Bake, roast, boil, or broil these foods instead  Avoid fried foods  Eat a baked potato instead of [place] fries  Steam vegetables instead of sautéing them in butter  · Add less fat to foods  Use imitation corbett bits on salads and baked potatoes instead of regular corbett bits  Use fat-free or low-fat salad dressings instead of regular dressings  Use low-fat or nonfat butter-flavored topping instead of regular butter or margarine on popcorn and other foods  Ways to decrease fat in recipes:  Replace high-fat ingredients with low-fat or nonfat ones  This may cause baked goods to be drier than usual  You may need to use nonfat cooking spray on pans to prevent food from sticking  You also may need to change the amount of other ingredients, such as water, in the recipe  Try the following:  · Use low-fat or light margarine instead of regular margarine or shortening  · Use lean ground turkey breast or chicken, or lean ground beef (less than 5% fat) instead of hamburger  · Add 1 teaspoon of canola oil to 8 ounces of skim milk instead of using cream or half and half  · Use grated zucchini, carrots, or apples in breads instead of coconut  · Use blenderized, low-fat cottage cheese, plain tofu, or low-fat ricotta cheese instead of cream cheese  · Use 1 egg white and 1 teaspoon of canola oil, or use ¼ cup (2 ounces) of fat-free egg substitute instead of a whole egg       · Replace half of the oil that is called for in a recipe with applesauce when you bake  Use 3 tablespoons of cocoa powder and 1 tablespoon of canola oil instead of a square of baking chocolate  How to increase fiber:  Eat enough high-fiber foods to get 20 to 30 grams of fiber every day  Slowly increase your fiber intake to avoid stomach cramps, gas, and other problems  · Eat 3 ounces of whole-grain foods each day  An ounce is about 1 slice of bread  Eat whole-grain breads, such as whole-wheat bread  Whole wheat, whole-wheat flour, or other whole grains should be listed as the first ingredient on the food label  Replace white flour with whole-grain flour or use half of each in recipes  Whole-grain flour is heavier than white flour, so you may have to add more yeast or baking powder  · Eat a high-fiber cereal for breakfast   Oatmeal is a good source of soluble fiber  Look for cereals that have bran or fiber in the name  Choose whole-grain products, such as brown rice, barley, and whole-wheat pasta  · Eat more beans, peas, and lentils  For example, add beans to soups or salads  Eat at least 5 cups of fruits and vegetables each day  Eat fruits and vegetables with the peel because the peel is high in fiber  © 2017 [employer]  Information is for End User's use only and may not be sold, redistributed or otherwise used for commercial purposes  All illustrations and images included in CareNotes® are the copyrighted property of [employer]  or [employer]  The above information is an  only  It is not intended as medical advice for individual conditions or treatments  Talk to your doctor, nurse or pharmacist before following any medical regimen to see if it is safe and effective for you

## 2020-03-16 NOTE — PROGRESS NOTES
BMI Counseling: Body mass index is 37 18 kg/m²  The BMI is above normal  Nutrition recommendations include decreasing portion sizes, encouraging healthy choices of fruits and vegetables, moderation in carbohydrate intake and increasing intake of lean protein  Exercise recommendations include exercising 3-5 times per week  Assessment/Plan:    Wellness examination  Assessment and plan 1  Health maintenance annual wellness examination overall the patient is clinically stable and doing well, we encouraged the patient to follow a healthy and balanced diet  We recommend that the patient exercise routinely approximately 30 minutes 5 times per week   We have reviewed the patient's vaccines and have made recommendations for updates if necessary annual flu vaccine, consider new shingles vaccine  Check screening PSA       We will be ordering screening laboratories which are age appropriate  Return to the office in 6 months     call if any problems  Gastroesophageal reflux disease without esophagitis  Clinically stable and doing well continue the current medical regiment will continue monitor  Exercise-induced asthma  Clinically stable and doing well continue the current medical regiment will continue monitor  Request refill of ProAir HFA 2 puffs every 4-6 hours as needed    Seasonal allergic rhinitis  Start Clarinex 5 mg once daily, Flonase 2 sprays each nostril once a day as needed check respiratory allergy profile    Sleep apnea  Currently stable he is compliant wearing CPAP weight loss encourage    Class 2 obesity due to excess calories without serious comorbidity with body mass index (BMI) of 35 0 to 35 9 in adult  Obesity -I have counseled patient following healthy and balanced diet, I would like the patient to lose weight, I would like the patient exercise routinely; we will continue monitor the patient's progress      Hyperlipidemia  Hyperlipidemia controlled continue with current medical regiment recommend a low-cholesterol diet and recommend routine exercise we will continue to monitor the progress  Screening PSA (prostate specific antigen)  Counseled, check screening PSA         Problem List Items Addressed This Visit        Digestive    Gastroesophageal reflux disease without esophagitis     Clinically stable and doing well continue the current medical regiment will continue monitor  Respiratory    Sleep apnea     Currently stable he is compliant wearing CPAP weight loss encourage         Seasonal allergic rhinitis - Primary     Start Clarinex 5 mg once daily, Flonase 2 sprays each nostril once a day as needed check respiratory allergy profile         Relevant Medications    desloratadine (CLARINEX) 5 MG tablet    Other Relevant Orders    [facility]ergy Panel, Adult    Exercise-induced asthma     Clinically stable and doing well continue the current medical regiment will continue monitor  Request refill of ProAir HFA 2 puffs every 4-6 hours as needed         Relevant Medications    albuterol (ProAir HFA) 90 mcg/act inhaler       Other    Abnormal blood sugar    Relevant Orders    Hemoglobin A1C    Hyperlipidemia     Hyperlipidemia controlled continue with current medical regiment recommend a low-cholesterol diet and recommend routine exercise we will continue to monitor the progress  Relevant Orders    Comprehensive metabolic panel    Lipid Panel with Direct LDL reflex    Screening PSA (prostate specific antigen)     Counseled, check screening PSA         Relevant Orders    PSA, Total Screen    Class 2 obesity due to excess calories without serious comorbidity with body mass index (BMI) of 35 0 to 35 9 in adult     Obesity -I have counseled patient following healthy and balanced diet, I would like the patient to lose weight, I would like the patient exercise routinely; we will continue monitor the patient's progress  Wellness examination     Assessment and plan 1    Health maintenance annual wellness examination overall the patient is clinically stable and doing well, we encouraged the patient to follow a healthy and balanced diet  We recommend that the patient exercise routinely approximately 30 minutes 5 times per week   We have reviewed the patient's vaccines and have made recommendations for updates if necessary annual flu vaccine, consider new shingles vaccine  Check screening PSA       We will be ordering screening laboratories which are age appropriate  Return to the office in 6 months     call if any problems  Return to office 6  months  call if any problems  Subjective:      Patient ID: Dru Muñoz is a 56 y o  male  HPI 57-year old male coming in for a follow up visit regarding allergies, hyperlipidemia, prediabetes, exercise-induced asthma and obesity; The patient reports me compliant taking medications without untoward side effects the  The patient is here to review his medical condition, update me on the medical condition and the patient reports me no hospitalizations and no ER visits  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  He reports me he has been less active because of a bone contusion of the foot he has recovered and will be returning to gym in near future he had been following a healthy diet  He reports me allergy symptoms including postnasal drip and a cough  from the postnasal drip  The following portions of the patient's history were reviewed and updated as appropriate: allergies, current medications, past family history, past medical history, past social history, past surgical history and problem list     Review of Systems   Constitutional: Negative for activity change, appetite change and unexpected weight change  HENT: Positive for congestion and postnasal drip  Eyes: Negative for visual disturbance  Respiratory: Positive for cough  Negative for shortness of breath  Cardiovascular: Negative for chest pain  Gastrointestinal: Negative for abdominal pain, diarrhea, nausea and vomiting  Neurological: Negative for dizziness, light-headedness and headaches  Hematological: Negative for adenopathy  Objective:    Return in about 6 months (around 9/23/2020)  No results found  No Known Allergies    Past Medical History:   Diagnosis Date    Asthma     Erectile dysfunction of non-organic origin     Resolved: 10/15/2014     Past Surgical History:   Procedure Laterality Date    EYE SURGERY      TONSILLECTOMY AND ADENOIDECTOMY      UMBILICAL HERNIA REPAIR       Current Outpatient Medications on File Prior to Visit   Medication Sig Dispense Refill    albuterol (2 5 mg/3 mL) 0 083 % nebulizer solution Inhale      Cholecalciferol (VITAMIN D3) 5000 units CAPS Take 3,000 Units by mouth       Cinnamon 500 MG capsule Take by mouth      cyanocobalamin ([facility] VITAMIN B-12) 1000 MCG tablet Take by mouth      fluticasone (FLONASE) 50 mcg/act nasal spray Two sprays each nostril once daily as needed  1 Bottle 11    Omega-3 Fatty Acids (FISH OIL) 1,000 mg Take 3 capsules by mouth daily      [DISCONTINUED] albuterol (PROAIR HFA) 90 mcg/act inhaler Inhale 2 puffs 2 (two) times a day as needed for wheezing Use 2 puffs prior to exercise  18 g 5     No current facility-administered medications on file prior to visit        Family History   Problem Relation Age of Onset   Junius Amaro Asthma Mother     Heart disease Mother     Rheum arthritis Mother     Diabetes Father     Heart disease Father     Asthma Sister     Diabetes Sister     Other Son         Thyroid disorder     Social History     Socioeconomic History    Marital status: /Civil Union     Spouse name: Not on file    Number of children: Not on file    Years of education: Not on file    Highest education level: Not on file   Occupational History    Not on file   Social Needs    Financial resource strain: Not on file    Food insecurity:     Worry: Not on file     Inability: Not on file    Transportation needs:     Medical: Not on file     Non-medical: Not on file   Tobacco Use    Smoking status: Former Smoker     Packs/day:  00     Years:      Pack years:      Types: Cigarettes     Last attempt to quit:      Years since quittin 2    Smokeless tobacco: Never Used    Tobacco comment: He smoked a pack a day of cigarettes for 8 years  He quite at the age of 22  Occasional cigar use     Substance and Sexual Activity    Alcohol use: Yes     Comment: Social drinker    Drug use: No    Sexual activity: Not on file   Lifestyle    Physical activity:     Days per week: Not on file     Minutes per session: Not on file    Stress: Not on file   Relationships    Social connections:     Talks on phone: Not on file     Gets together: Not on file     Attends Yazdanism service: Not on file     Active member of club or organization: Not on file     Attends meetings of clubs or organizations: Not on file     Relationship status: Not on file    Intimate partner violence:     Fear of current or ex partner: Not on file     Emotionally abused: Not on file     Physically abused: Not on file     Forced sexual activity: Not on file   Other Topics Concern    Not on file   Social History Narrative    Caffeine use    Work-related stress     Vitals:    20 0759   BP: 124/72   Pulse: 71   Resp: 16   SpO2: 97%   Weight: 108 kg (237 lb 6 4 oz)   Height: 5' 7" (1 702 m)     Results for orders placed or performed in visit on 03/10/20   Vitamin D 25 hydroxy   Result Value Ref Range    Vit D, 25-Hydroxy 49 0 30 0 - 100 0 ng/mL   Comprehensive metabolic panel   Result Value Ref Range    Sodium 143 136 - 145 mmol/L    Potassium 4 1 3 5 - 5 3 mmol/L    Chloride 111 (H) 100 - 108 mmol/L    CO2 26 21 - 32 mmol/L    ANION GAP 6 4 - 13 mmol/L    BUN 23 5 - 25 mg/dL    Creatinine 0 99 0 60 - 1 30 mg/dL    Glucose, Fasting 102 (H) 65 - 99 mg/dL    Calcium 8 9 8 3 - 10 1 mg/dL AST 18 5 - 45 U/L    ALT 35 12 - 78 U/L    Alkaline Phosphatase 47 46 - 116 U/L    Total Protein 7 6 6 4 - 8 2 g/dL    Albumin 3 6 3 5 - 5 0 g/dL    Total Bilirubin 0 66 0 20 - 1 00 mg/dL    eGFR 81 ml/min/1 73sq m   Hemoglobin A1C   Result Value Ref Range    Hemoglobin A1C 5 6 Normal 3 8-5 6%; PreDiabetic 5 7-6 4%; Diabetic >=6 5%; Glycemic control for adults with diabetes <7 0% %     mg/dl   Lipid Panel with Direct LDL reflex   Result Value Ref Range    Cholesterol 183 50 - 200 mg/dL    Triglycerides 131 <=150 mg/dL    HDL, Direct 45 >=40 mg/dL    LDL Calculated 112 (H) 0 - 100 mg/dL     Weight (last 2 days)     Date/Time   Weight    03/16/20 0759   108 (237 4)            Body mass index is 37 18 kg/m²  BP      Temp      Pulse     Resp      SpO2        Vitals:    03/16/20 0759   Weight: 108 kg (237 lb 6 4 oz)     Vitals:    03/16/20 0759   Weight: 108 kg (237 lb 6 4 oz)       /72   Pulse 71   Resp 16   Ht 5' 7" (1 702 m)   Wt 108 kg (237 lb 6 4 oz)   SpO2 97%   BMI 37 18 kg/m²          Physical Exam   Constitutional: He appears well-developed and well-nourished  No distress  HENT:   Head: Normocephalic and atraumatic  Right Ear: External ear normal    Left Ear: External ear normal    Mouth/Throat: Oropharynx is clear and moist    Eyes: Pupils are equal, round, and reactive to light  Conjunctivae are normal  Right eye exhibits no discharge  Left eye exhibits no discharge  No scleral icterus  Neck: Neck supple  Cardiovascular: Normal rate, regular rhythm and normal heart sounds  Exam reveals no gallop and no friction rub  No murmur heard  Pulmonary/Chest: No respiratory distress  He has no wheezes  He has no rales  Abdominal: Soft  Bowel sounds are normal  He exhibits no distension and no mass  There is no tenderness  There is no rebound and no guarding  Musculoskeletal: He exhibits no edema or deformity  Lymphadenopathy:     He has no cervical adenopathy     Neurological: He is alert    Skin: He is not diaphoretic  Psychiatric: He has a normal mood and affect

## 2020-03-16 NOTE — ASSESSMENT & PLAN NOTE
Assessment and plan 1  Health maintenance annual wellness examination overall the patient is clinically stable and doing well, we encouraged the patient to follow a healthy and balanced diet  We recommend that the patient exercise routinely approximately 30 minutes 5 times per week   We have reviewed the patient's vaccines and have made recommendations for updates if necessary annual flu vaccine, consider new shingles vaccine  Check screening PSA       We will be ordering screening laboratories which are age appropriate  Return to the office in 6 months     call if any problems

## 2020-03-17 DIAGNOSIS — J30.2 SEASONAL ALLERGIC RHINITIS, UNSPECIFIED TRIGGER: ICD-10-CM

## 2020-03-17 RX ORDER — FLUTICASONE PROPIONATE 50 MCG
SPRAY, SUSPENSION (ML) NASAL
Qty: 1 BOTTLE | Refills: 11 | Status: SHIPPED | OUTPATIENT
Start: 2020-03-17 | End: 2021-06-07

## 2020-07-09 ENCOUNTER — OFFICE VISIT (OUTPATIENT)
Dept: CARDIOLOGY CLINIC | Facility: CLINIC | Age: 60
End: 2020-07-09
Payer: COMMERCIAL

## 2020-07-09 VITALS
TEMPERATURE: 97.4 F | DIASTOLIC BLOOD PRESSURE: 66 MMHG | BODY MASS INDEX: 36.51 KG/M2 | HEART RATE: 77 BPM | SYSTOLIC BLOOD PRESSURE: 130 MMHG | HEIGHT: 67 IN | WEIGHT: 232.6 LBS

## 2020-07-09 DIAGNOSIS — E78.5 HYPERLIPIDEMIA, UNSPECIFIED HYPERLIPIDEMIA TYPE: Primary | ICD-10-CM

## 2020-07-09 PROCEDURE — 1036F TOBACCO NON-USER: CPT | Performed by: INTERNAL MEDICINE

## 2020-07-09 PROCEDURE — 3008F BODY MASS INDEX DOCD: CPT | Performed by: INTERNAL MEDICINE

## 2020-07-09 PROCEDURE — 99214 OFFICE O/P EST MOD 30 MIN: CPT | Performed by: INTERNAL MEDICINE

## 2020-07-09 PROCEDURE — 93000 ELECTROCARDIOGRAM COMPLETE: CPT | Performed by: INTERNAL MEDICINE

## 2020-07-09 NOTE — PROGRESS NOTES
Cardiology Follow Up    Allan Kinsey  1960  [id number]  [facility]  [address] [facility] [address]  [address]  [phone]    1  Hyperlipidemia, unspecified hyperlipidemia type  POCT ECG       Diagnoses and all orders for this visit:    Hyperlipidemia, unspecified hyperlipidemia type  -     POCT ECG      I had the pleasure of seeing Allan Kinsey for a follow up visit  Interval History: none    History of the presenting illness, Discussion/Summary and My Plan are as follows:::    He is a pleasant now 76-year-old gentleman without a history of hypertension but with mild dyslipidemia with elevated LDL particle number and small LDL, which with lifestyle changes has improved  He did have prediabetes with his A1c was around 5 7 - most recently 5 4 (Feb 2018)      In 2017 lipid profile showed a total cholesterol 196, triglycerides 107, HDL 50,    in February 2017- underwent an advanced lipid profile that showed high LDL particle number-1500 (<1000), high small LDL-531 (50th percentile)  March 2018 -total cholesterol 166, triglycerides 78, average LDL size-214, density pattern -pattern B    May 2019: Total cholesterol 168, triglycerides 98, HDL 51, LDL 98, non , LDL particle 1278 (optimal< 1138, moderate 2687-3442), small  (optimal< 142), LDL density pattern B, LDL size to 15 (optimal>223), apolipoprotein B 86 (optimal<80), lipoprotein a:  13 (<75)     March 2020: Total cholesterol 183, triglycerides 131, HDL 45,      At baseline, he is physically active now doing weights as well as more cardio now without any chest pains  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 - apparently he is a carrier for alpha-1 antitrypsin deficiency      Family history of coronary artery disease in his father in his 76s, possibly in his mother also in her 76s, no family history of premature vascular disease in first-degree relatives      He did have an exercise stress test in 2013-12 minutes-13 4 METs, no ischemia, remains physically active and is asymptomatic  Normal echo in 2017  Had done some weight due to a foot injury and now losing it     Plan:     Dyslipidemia:  Direct LDL, 'apo B both within normal limits, marginally low LDL size,   LDL particle number is elevated but improved since 2017 -see results above   A1c has been stable  He remains on fish oil alone  Overall risk profile is still intermediate considering that overall his LDL level is normal  However he does have the typical pattern that is found in patients with insulin resistance/metabolic syndrome       Therapeutic lifestyle changes were reiterated with the patient- Specifically:  1  Decreasing saturated fat intake to less than 13 g per day  Watch intake of cheese  2  Increase soluble fiber in the diet-beans, pears, oatmeal  3  Weight loss of even 5-10 pounds will also help  Decrease caloric intake by about 100 brandon a day-should lead to a weight loss of 1-2 pounds over one month -   This is very important if we need to avoid any medications in the future  4  Increase aerobic physical activity -   Does mostly aerobic activity -doing weights  The above changes will decrease his LDL, change the pattern of his LDL subfractions (to lower small LDL levels and particle number) as well as decrease his risk helping diabetes  Calcium score was 1- 29th percentile for age matched controls, this is reassuring  His personal preference is to stay of a statin  Will order an advanced lipid profile  Follow-up in 1 year    Results for [facility] (MRN [id number]) as of 7/9/2020 08:20   Ref   Range 1/25/2017 12:45 7/26/2017 09:58 3/8/2018 08:15 8/13/2018 08:12 2/22/2019 08:26 5/28/2019 08:48 9/3/2019 09:38 3/10/2020 07:33   Cholesterol Latest Ref Range: 50 - 200 mg/dL 196 172 166 168 176 168 176 183   Triglycerides Latest Ref Range: <=150 mg/dL  131 78 109 110 98 114 131   HDL Latest Ref Range: >=40 mg/dL  41 48 46 49 51 49 45   HDL-P(TOTAL) Latest Ref Range: >=30 5 umol/L 28 4 (L)          HDL CHOLESTEROL LIPOPROTEIN Latest Ref Range: >39 mg/dL 43          Non-HDL Cholesterol Latest Ref Range: <130 mg/dL (calc)  131 118   117     LDL CHOLESTEROL Latest Units: mg/dL  105         LDL Calculated Latest Ref Range: 0 - 100 mg/dL 131 (H)  101 (H) 100 105 (H) 98 104 (H) 112 (H)   LDL DENSITY PATTERN Latest Ref Range: A Pattern  B (A) B (A)   B (A)     Small LDL-P Latest Ref Range: <=527 nmol/L 531 (H)          LDL SIZE Latest Ref Range: > OR = 217 4 Angstrom 20 8 217 4 (L) 214 0 (L)   215 0 (L)     Chol HDLC Ratio Latest Ref Range: <5 0 calc  4 2 3 5   3 3     APOLIPOPROTEIN B Latest Ref Range: 52 - 109 mg/dL  83 84   86     LP-IR SCORE Latest Ref Range: <=45  52 (H)          LDL PARTICLE NUMBER Latest Ref Range: 1016 - 2185 nmol/L  1300         LIPOPROTEIN (A) Latest Ref Range: <75 nmol/L  24 16   13     Triglycerides Latest Ref Range: 0 - 149 mg/dL 111          Results for April Slot (MRN [id number]) as of 7/9/2020 08:20   Ref   Range 2/11/2018 10:41 8/13/2018 08:12 2/22/2019 08:26 9/3/2019 09:38 3/10/2020 07:33   Hemoglobin A1C  5 4 5 6 5 7 5 4 5 6           Patient Active Problem List   Diagnosis    Abnormal blood sugar    Enlarged prostate without lower urinary tract symptoms (luts)    Esophageal reflux    Obesity    Sleep apnea    Hyperlipidemia    Screening PSA (prostate specific antigen)    Seasonal allergic rhinitis    Vitamin D deficiency    Plantar fasciitis of right foot    Screening for diabetes mellitus    Need for vaccination    Class 2 obesity due to excess calories without serious comorbidity with body mass index (BMI) of 35 0 to 35 9 in adult    Wellness examination    Exercise-induced asthma    Gastroesophageal reflux disease without esophagitis     Past Medical History:   Diagnosis Date    Asthma     Erectile dysfunction of non-organic origin     Resolved: 10/15/2014     Social History     Socioeconomic History    Marital status: /Civil Union     Spouse name: Not on file    Number of children: Not on file    Years of education: Not on file    Highest education level: Not on file   Occupational History    Not on file   Social Needs    Financial resource strain: Not on file    Food insecurity:     Worry: Not on file     Inability: Not on file    Transportation needs:     Medical: Not on file     Non-medical: Not on file   Tobacco Use    Smoking status: Former Smoker     Packs/day: 1 00     Years: 13 00     Pack years: 13      Types: Cigarettes     Last attempt to quit:      Years since quittin 5    Smokeless tobacco: Never Used    Tobacco comment: He smoked a pack a day of cigarettes for 8 years  He quite at the age of 22  Occasional cigar use     Substance and Sexual Activity    Alcohol use: Yes     Comment: Social drinker    Drug use: No    Sexual activity: Not on file   Lifestyle    Physical activity:     Days per week: Not on file     Minutes per session: Not on file    Stress: Not on file   Relationships    Social connections:     Talks on phone: Not on file     Gets together: Not on file     Attends Jain service: Not on file     Active member of club or organization: Not on file     Attends meetings of clubs or organizations: Not on file     Relationship status: Not on file    Intimate partner violence:     Fear of current or ex partner: Not on file     Emotionally abused: Not on file     Physically abused: Not on file     Forced sexual activity: Not on file   Other Topics Concern    Not on file   Social History Narrative    Caffeine use    Work-related stress      Family History   Problem Relation Age of Onset    Asthma Mother     Heart disease Mother     Rheum arthritis Mother     Diabetes Father     Heart disease Father     Asthma Sister     Diabetes Sister    Tiara Bowers Other Son Thyroid disorder     Past Surgical History:   Procedure Laterality Date    EYE SURGERY      TONSILLECTOMY AND ADENOIDECTOMY      UMBILICAL HERNIA REPAIR         Current Outpatient Medications:     albuterol (ProAir HFA) 90 mcg/act inhaler, Inhale 2 puffs every 6 (six) hours as needed for wheezing Use 2 puffs prior to exercise , Disp: 18 g, Rfl: 5    Cholecalciferol (VITAMIN D3) 5000 units CAPS, Take 5,000 Units by mouth , Disp: , Rfl:     Cinnamon 500 MG capsule, Take by mouth, Disp: , Rfl:     cyanocobalamin (CVS VITAMIN B-12) 1000 MCG tablet, Take by mouth, Disp: , Rfl:     desloratadine (CLARINEX) 5 MG tablet, Take 1 tablet (5 mg total) by mouth daily Take till mid June (when allergy sx improve then prn), Disp: 90 tablet, Rfl: 1    fluticasone (FLONASE) 50 mcg/act nasal spray, Two sprays each nostril once daily as needed  , Disp: 1 Bottle, Rfl: 11    Omega-3 Fatty Acids (FISH OIL) 1,000 mg, Take 3 capsules by mouth daily, Disp: , Rfl:     albuterol (2 5 mg/3 mL) 0 083 % nebulizer solution, Inhale, Disp: , Rfl:   No Known Allergies    Imaging: No results found  Review of Systems:  Review of Systems   Constitutional: Negative  HENT: Negative  Eyes: Negative  Respiratory: Negative  Cardiovascular: Negative  Gastrointestinal: Negative  Endocrine: Negative  Musculoskeletal: Negative  Allergic/Immunologic: Negative  Hematological: Negative  Physical Exam:  /66 (BP Location: Right arm, Patient Position: Sitting, Cuff Size: Large)   Pulse 77   Temp (!) 97 4 °F (36 3 °C)   Ht 5' 7" (1 702 m)   Wt 106 kg (232 lb 9 6 oz)   BMI 36 43 kg/m²   Physical Exam   Constitutional: He appears well-developed and well-nourished  No distress  HENT:   Head: Normocephalic and atraumatic  Eyes: Pupils are equal, round, and reactive to light  Conjunctivae are normal  Right eye exhibits no discharge  Left eye exhibits no discharge  No scleral icterus     Neck: Normal range of motion  No JVD present  No tracheal deviation present  No thyromegaly present  Cardiovascular: Normal rate and regular rhythm  Exam reveals no friction rub  No murmur heard  Pulmonary/Chest: Effort normal and breath sounds normal  No stridor  No respiratory distress  Abdominal: Soft  Bowel sounds are normal  He exhibits no distension  There is no tenderness  There is no guarding  Musculoskeletal: Normal range of motion  He exhibits no edema or deformity  Skin: Skin is warm  No rash noted  He is not diaphoretic  No erythema  No pallor

## 2020-08-07 ENCOUNTER — TELEPHONE (OUTPATIENT)
Dept: INTERNAL MEDICINE CLINIC | Facility: CLINIC | Age: 60
End: 2020-08-07

## 2020-08-07 NOTE — TELEPHONE ENCOUNTER
Pt wants to know if there is another diagnosis code that can be used on his lipid panel Dr Celia Pittman ordered that would cover for him to have the test done? Says he spoke to his insurance and since E78 5 is used on both the IQ(R) Advanced Lipid Panel from Dr Arun Roth and Lipid Panel with Direct LDL reflex from Dr Celia Pittman they won't pay for both if the codes are the same    Spoke with Ochoa Tolentino and she said to have him do the IQ(R) Advanced Lipid Panel from Dr Arun Roth since he ordered it for a specific reason but pt is going tomorrow morning at 8:30am and just wants to know if both could be done if the diagnosis on Dr Shelley Edwards was changed    please advise, ty

## 2020-08-11 LAB
A ALTERNATA IGE QN: <0.1 KU/L
ALBUMIN SERPL-MCNC: 4.1 G/DL (ref 3.6–5.1)
ALBUMIN/GLOB SERPL: 1.5 (CALC) (ref 1–2.5)
ALP SERPL-CCNC: 43 U/L (ref 35–144)
ALT SERPL-CCNC: 29 U/L (ref 9–46)
AST SERPL-CCNC: 19 U/L (ref 10–35)
BILIRUB SERPL-MCNC: 0.5 MG/DL (ref 0.2–1.2)
BUN SERPL-MCNC: 22 MG/DL (ref 7–25)
BUN/CREAT SERPL: NORMAL (CALC) (ref 6–22)
C HERBARUM IGE QN: <0.1 KU/L
CALCIUM SERPL-MCNC: 9.3 MG/DL (ref 8.6–10.3)
CAT DANDER IGE QN: 0.36 KU/L
CHLORIDE SERPL-SCNC: 106 MMOL/L (ref 98–110)
CO2 SERPL-SCNC: 26 MMOL/L (ref 20–32)
COMMON RAGWEED IGE QN: <0.1 KU/L
CREAT SERPL-MCNC: 0.98 MG/DL (ref 0.7–1.25)
D FARINAE IGE QN: <0.1 KU/L
DEPRECATED A ALTERNATA IGE RAST QL: 0
DEPRECATED C HERBARUM IGE RAST QL: 0
DEPRECATED CAT DANDER IGE RAST QL: 1
DEPRECATED COMMON RAGWEED IGE RAST QL: 0
DEPRECATED D FARINAE IGE RAST QL: 0
DEPRECATED DOG DANDER IGE RAST QL: 0
DEPRECATED GOOSEFOOT IGE RAST QL: 0
DEPRECATED KENT BLUE GRASS IGE RAST QL: 0
DEPRECATED TIMOTHY IGE RAST QL: 0
DEPRECATED WHITE OAK IGE RAST QL: 0
DOG DANDER IGE QN: <0.1 KU/L
GLOBULIN SER CALC-MCNC: 2.8 G/DL (CALC) (ref 1.9–3.7)
GLUCOSE SERPL-MCNC: 99 MG/DL (ref 65–99)
GOOSEFOOT IGE QN: <0.1 KU/L
HBA1C MFR BLD: 5.7 % OF TOTAL HGB
KENT BLUE GRASS IGE QN: <0.1 KU/L
POTASSIUM SERPL-SCNC: 4.3 MMOL/L (ref 3.5–5.3)
PROT SERPL-MCNC: 6.9 G/DL (ref 6.1–8.1)
PSA SERPL-MCNC: 0.2 NG/ML
SL AMB EGFR AFRICAN AMERICAN: 95 ML/MIN/1.73M2
SL AMB EGFR NON AFRICAN AMERICAN: 82 ML/MIN/1.73M2
SODIUM SERPL-SCNC: 139 MMOL/L (ref 135–146)
TIMOTHY IGE QN: <0.1 KU/L
WHITE OAK IGE QN: <0.1 KU/L

## 2020-08-12 DIAGNOSIS — J30.2 SEASONAL ALLERGIC RHINITIS, UNSPECIFIED TRIGGER: ICD-10-CM

## 2020-08-12 RX ORDER — DESLORATADINE 5 MG/1
5 TABLET ORAL DAILY
Qty: 90 TABLET | Refills: 1 | Status: SHIPPED | OUTPATIENT
Start: 2020-08-12 | End: 2020-08-14

## 2020-08-13 LAB
APO B SERPL-MCNC: 95 MG/DL
CHOLEST SERPL-MCNC: 181 MG/DL
CHOLEST/HDLC SERPL: 3.9 CALC
HDLC SERPL-MCNC: 46 MG/DL
HLD.LARGE SERPL-SCNC: 5248 NMOL/L
LDL SERPL QN: 215.6 ANGSTROM
LDL SERPL-SCNC: 910 NMOL/L
LDL SMALL SERPL-SCNC: 197 NMOL/L
LDLC REAL SIZE PAT SERPL: ABNORMAL PATTERN
LDLC SERPL CALC-MCNC: 112 MG/DL (CALC)
LPA SERPL-SCNC: 16 NMOL/L
NONHDLC SERPL-MCNC: 135 MG/DL (CALC)
SL AMB LDL MEDIUM: 217 NMOL/L
TRIGL SERPL-MCNC: 120 MG/DL

## 2020-08-14 ENCOUNTER — DOCUMENTATION (OUTPATIENT)
Dept: NON INVASIVE DIAGNOSTICS | Facility: HOSPITAL | Age: 60
End: 2020-08-14

## 2020-08-14 DIAGNOSIS — J30.2 SEASONAL ALLERGIC RHINITIS, UNSPECIFIED TRIGGER: ICD-10-CM

## 2020-08-14 RX ORDER — DESLORATADINE 5 MG/1
5 TABLET ORAL DAILY
Qty: 90 TABLET | Refills: 1 | Status: SHIPPED | OUTPATIENT
Start: 2020-08-14 | End: 2020-09-22

## 2020-08-14 NOTE — PROGRESS NOTES
Update of advanced lipid profile:    May 2019:   LDL particle 1278 (optimal< 1138, moderate 8888-9919), small  (optimal< 142)    Currently:  August 2020: LDL: 98, LDL particle-910, small LDL-197, both have improved since the last 1 in May 2019    His personal preference has always been to stay off of a statin  No changes at the current time  Results were discussed with him on the phone

## 2020-09-22 ENCOUNTER — OFFICE VISIT (OUTPATIENT)
Dept: INTERNAL MEDICINE CLINIC | Facility: CLINIC | Age: 60
End: 2020-09-22
Payer: COMMERCIAL

## 2020-09-22 ENCOUNTER — TELEPHONE (OUTPATIENT)
Dept: INTERNAL MEDICINE CLINIC | Facility: CLINIC | Age: 60
End: 2020-09-22

## 2020-09-22 VITALS
HEART RATE: 71 BPM | RESPIRATION RATE: 16 BRPM | WEIGHT: 228.2 LBS | TEMPERATURE: 98.3 F | OXYGEN SATURATION: 99 % | SYSTOLIC BLOOD PRESSURE: 122 MMHG | DIASTOLIC BLOOD PRESSURE: 78 MMHG | HEIGHT: 67 IN | BODY MASS INDEX: 35.82 KG/M2

## 2020-09-22 DIAGNOSIS — J45.990 EXERCISE-INDUCED ASTHMA: ICD-10-CM

## 2020-09-22 DIAGNOSIS — G47.30 SLEEP APNEA, UNSPECIFIED TYPE: ICD-10-CM

## 2020-09-22 DIAGNOSIS — E78.5 HYPERLIPIDEMIA, UNSPECIFIED HYPERLIPIDEMIA TYPE: ICD-10-CM

## 2020-09-22 DIAGNOSIS — Z12.5 SCREENING PSA (PROSTATE SPECIFIC ANTIGEN): ICD-10-CM

## 2020-09-22 DIAGNOSIS — Q33.0 CYSTIC LUNG, CONGENITAL: ICD-10-CM

## 2020-09-22 DIAGNOSIS — J30.2 SEASONAL ALLERGIES: ICD-10-CM

## 2020-09-22 DIAGNOSIS — E66.09 CLASS 2 OBESITY DUE TO EXCESS CALORIES WITHOUT SERIOUS COMORBIDITY WITH BODY MASS INDEX (BMI) OF 35.0 TO 35.9 IN ADULT: Primary | ICD-10-CM

## 2020-09-22 DIAGNOSIS — J45.909 MILD ASTHMA WITHOUT COMPLICATION, UNSPECIFIED WHETHER PERSISTENT: ICD-10-CM

## 2020-09-22 DIAGNOSIS — R73.09 ABNORMAL BLOOD SUGAR: ICD-10-CM

## 2020-09-22 PROBLEM — R73.03 PREDIABETES: Status: ACTIVE | Noted: 2020-09-22

## 2020-09-22 PROCEDURE — 1036F TOBACCO NON-USER: CPT | Performed by: INTERNAL MEDICINE

## 2020-09-22 PROCEDURE — 99214 OFFICE O/P EST MOD 30 MIN: CPT | Performed by: INTERNAL MEDICINE

## 2020-09-22 PROCEDURE — 3725F SCREEN DEPRESSION PERFORMED: CPT | Performed by: INTERNAL MEDICINE

## 2020-09-22 RX ORDER — LEVOCETIRIZINE DIHYDROCHLORIDE 5 MG/1
5 TABLET, FILM COATED ORAL EVERY EVENING
Qty: 30 TABLET | Refills: 3 | Status: SHIPPED | OUTPATIENT
Start: 2020-09-22 | End: 2021-01-24

## 2020-09-22 NOTE — PROGRESS NOTES
Assessment/Plan:    Sleep apnea  CPAP machine is old, he has lost some weight at this point time he is interested in new CPAP machine will check a CPAP titration for re calibration in settings on the new machine    Exercise-induced asthma  Clinically stable and doing well continue the current medical regiment will continue monitor  Screening PSA (prostate specific antigen)  Counseled, check screening PSA    Hyperlipidemia  Hyperlipidemia low-cholesterol diet/Mediterranean diet working with cardiology    Class 2 obesity due to excess calories without serious comorbidity with body mass index (BMI) of 35 0 to 35 9 in adult  Obesity -I have counseled patient following healthy and balanced diet, I would like the patient to lose weight, I would like the patient exercise routinely; we will continue monitor the patient's progress  Abnormal blood sugar  Pre diabetes -I have counseled the patient to follow a healthy balanced diet, I have counseled patient reduce carbohydrates and sweets in the diet, I would like the patient exercise routinely  I will be checking hemoglobin A1c and comprehensive metabolic panel  Have counseled patient about the prevention of diabetes, and the risk of progression to type 2 diabetes  Problem List Items Addressed This Visit        Respiratory    Sleep apnea     CPAP machine is old, he has lost some weight at this point time he is interested in new CPAP machine will check a CPAP titration for re calibration in settings on the new machine         Relevant Orders    Diagnostic Sleep Study    Exercise-induced asthma     Clinically stable and doing well continue the current medical regiment will continue monitor  Other    Abnormal blood sugar     Pre diabetes -I have counseled the patient to follow a healthy balanced diet, I have counseled patient reduce carbohydrates and sweets in the diet, I would like the patient exercise routinely    I will be checking hemoglobin A1c and comprehensive metabolic panel  Have counseled patient about the prevention of diabetes, and the risk of progression to type 2 diabetes  Relevant Orders    Hemoglobin A1C    Hyperlipidemia     Hyperlipidemia low-cholesterol diet/Mediterranean diet working with cardiology         Relevant Orders    Comprehensive metabolic panel    Lipid Panel with Direct LDL reflex    Screening PSA (prostate specific antigen)     Counseled, check screening PSA         Relevant Orders    PSA, Total Screen    Class 2 obesity due to excess calories without serious comorbidity with body mass index (BMI) of 35 0 to 35 9 in adult - Primary     Obesity -I have counseled patient following healthy and balanced diet, I would like the patient to lose weight, I would like the patient exercise routinely; we will continue monitor the patient's progress  Other Visit Diagnoses     Mild asthma without complication, unspecified whether persistent        Seasonal allergies        Relevant Medications    levocetirizine (XYZAL) 5 MG tablet    Cystic lung, congenital        Relevant Medications    levocetirizine (XYZAL) 5 MG tablet    Other Relevant Orders    Ambulatory referral to Pulmonology          RTO in 6 months call if any problems  Subjective:      Patient ID: Dean Bustamante is a 56 y o  male  HPI 57-year old male coming in for a follow up visit regarding obesity, hyperlipidemia, mild asthma, sleep apnea, prediabetes, cystic lung disease and exercise-induced asthma; The patient reports me compliant taking medications without untoward side effects the  The patient is here to review his medical condition, update me on the medical condition and the patient reports me no hospitalizations and no ER visits  Reports me his lungs are doing well asthma stable reports me he has not been to Pulmonary in long period time he is trying to follow healthy/balance diet following the Mediterranean diet he continues exercise    Overall is feeling well here to review his laboratories reports me allergies ongoing    The following portions of the patient's history were reviewed and updated as appropriate: allergies, current medications, past family history, past medical history, past social history, past surgical history and problem list     Review of Systems      Objective:    No follow-ups on file  No results found  No Known Allergies    Past Medical History:   Diagnosis Date    Asthma     Erectile dysfunction of non-organic origin     Resolved: 10/15/2014     Past Surgical History:   Procedure Laterality Date    EYE SURGERY      TONSILLECTOMY AND ADENOIDECTOMY      UMBILICAL HERNIA REPAIR       Current Outpatient Medications on File Prior to Visit   Medication Sig Dispense Refill    albuterol (2 5 mg/3 mL) 0 083 % nebulizer solution Inhale      albuterol (ProAir HFA) 90 mcg/act inhaler Inhale 2 puffs every 6 (six) hours as needed for wheezing Use 2 puffs prior to exercise  18 g 5    Cholecalciferol (VITAMIN D3) 5000 units CAPS Take 5,000 Units by mouth       Cinnamon 500 MG capsule Take by mouth      cyanocobalamin (CVS VITAMIN B-12) 1000 MCG tablet Take by mouth      fluticasone (FLONASE) 50 mcg/act nasal spray Two sprays each nostril once daily as needed  1 Bottle 11    Omega-3 Fatty Acids (FISH OIL) 1,000 mg Take 3 capsules by mouth daily      [DISCONTINUED] desloratadine (CLARINEX) 5 MG tablet TAKE 1 TABLET (5 MG TOTAL) BY MOUTH DAILY TAKE TILL MID JUNE (WHEN ALLERGY SX IMPROVE THEN PRN) 90 tablet 1     No current facility-administered medications on file prior to visit        Family History   Problem Relation Age of Onset    Asthma Mother     Heart disease Mother     Rheum arthritis Mother     Diabetes Father     Heart disease Father     Asthma Sister     Diabetes Sister     Other Son         Thyroid disorder     Social History     Socioeconomic History    Marital status: /Civil Union     Spouse name: Not on file    Number of children: Not on file    Years of education: Not on file    Highest education level: Not on file   Occupational History    Not on file   Social Needs    Financial resource strain: Not on file    Food insecurity     Worry: Not on file     Inability: Not on file    Transportation needs     Medical: Not on file     Non-medical: Not on file   Tobacco Use    Smoking status: Former Smoker     Packs/day: 1 00     Years: 13 00     Pack years: 13 00     Types: Cigarettes     Last attempt to quit:      Years since quittin 7    Smokeless tobacco: Never Used    Tobacco comment: He smoked a pack a day of cigarettes for 8 years  He quite at the age of 22  Occasional cigar use     Substance and Sexual Activity    Alcohol use: Yes     Comment: Social drinker    Drug use: No    Sexual activity: Not on file   Lifestyle    Physical activity     Days per week: Not on file     Minutes per session: Not on file    Stress: Not on file   Relationships    Social connections     Talks on phone: Not on file     Gets together: Not on file     Attends Sikhism service: Not on file     Active member of club or organization: Not on file     Attends meetings of clubs or organizations: Not on file     Relationship status: Not on file    Intimate partner violence     Fear of current or ex partner: Not on file     Emotionally abused: Not on file     Physically abused: Not on file     Forced sexual activity: Not on file   Other Topics Concern    Not on file   Social History Narrative    Caffeine use    Work-related stress     Vitals:    20 0832   BP: 122/78   Pulse: 71   Resp: 16   Temp: 98 3 °F (36 8 °C)   TempSrc: Temporal   SpO2: 99%   Weight: 104 kg (228 lb 3 2 oz)   Height: 5' 7" (1 702 m)     Results for orders placed or performed in visit on 20   Cardio IQ(R) Advanced Lipid Panel   Result Value Ref Range    Total Cholesterol 181 <200 mg/dL    HDL 46 > OR = 40 mg/dL    Triglycerides 120 <150 mg/dL    LDL Calculated 112 (H) <100 mg/dL (calc)    Chol HDLC Ratio 3 9 <5 0 calc    Non-HDL Cholesterol 135 (H) <130 mg/dL (calc)    LDL-P 910 <1,138 nmol/L    LDL Small 197 (H) <142 nmol/L    LDL Medium 217 (H) <215 nmol/L    HDL Large 5,248 (L) >6,729 nmol/L    LDL Density Pattern B (A) A Pattern    LDL Size 215 6 (L) >222 9 Angstrom    Apolipoprotein B 95 (H) mg/dL    Lipoprotein (a) 16 <75 nmol/L     Weight (last 2 days)     Date/Time   Weight    09/22/20 0832   104 (228 2)            Body mass index is 35 74 kg/m²    BP      Temp      Pulse     Resp      SpO2        Vitals:    09/22/20 0832   Weight: 104 kg (228 lb 3 2 oz)     Vitals:    09/22/20 0832   Weight: 104 kg (228 lb 3 2 oz)       /78   Pulse 71   Temp 98 3 °F (36 8 °C) (Temporal)   Resp 16   Ht 5' 7" (1 702 m)   Wt 104 kg (228 lb 3 2 oz)   SpO2 99%   BMI 35 74 kg/m²          Physical Exam

## 2020-09-22 NOTE — ASSESSMENT & PLAN NOTE
CPAP machine is old, he has lost some weight at this point time he is interested in new CPAP machine will check a CPAP titration for re calibration in settings on the new machine

## 2020-09-23 ENCOUNTER — TELEPHONE (OUTPATIENT)
Dept: INTERNAL MEDICINE CLINIC | Facility: CLINIC | Age: 60
End: 2020-09-23

## 2020-09-23 NOTE — TELEPHONE ENCOUNTER
Patient scheduled an appointment with Dr Jonathan Strickland on 11/17  Dr Jonathan Strickland is requesting a chest xray done prior to this appoinement  Patient was advised to call his PCP to put the order in        Please advise

## 2020-09-24 DIAGNOSIS — Q33.0 CONGENITAL CYSTIC LUNG: Primary | ICD-10-CM

## 2020-10-29 ENCOUNTER — IMMUNIZATIONS (OUTPATIENT)
Dept: INTERNAL MEDICINE CLINIC | Facility: CLINIC | Age: 60
End: 2020-10-29
Payer: COMMERCIAL

## 2020-10-29 DIAGNOSIS — Z23 NEED FOR VACCINATION: Primary | ICD-10-CM

## 2020-10-29 PROCEDURE — 90682 RIV4 VACC RECOMBINANT DNA IM: CPT

## 2020-10-29 PROCEDURE — 90471 IMMUNIZATION ADMIN: CPT

## 2020-11-05 ENCOUNTER — TRANSCRIBE ORDERS (OUTPATIENT)
Dept: ADMINISTRATIVE | Age: 60
End: 2020-11-05

## 2020-11-06 ENCOUNTER — APPOINTMENT (OUTPATIENT)
Dept: RADIOLOGY | Age: 60
End: 2020-11-06
Payer: COMMERCIAL

## 2020-11-06 DIAGNOSIS — Q33.0 CONGENITAL CYSTIC LUNG: ICD-10-CM

## 2020-11-06 PROCEDURE — 71046 X-RAY EXAM CHEST 2 VIEWS: CPT

## 2020-11-18 ENCOUNTER — TELEPHONE (OUTPATIENT)
Dept: SLEEP CENTER | Facility: CLINIC | Age: 60
End: 2020-11-18

## 2020-12-08 ENCOUNTER — HOSPITAL ENCOUNTER (OUTPATIENT)
Dept: SLEEP CENTER | Facility: CLINIC | Age: 60
Discharge: HOME | End: 2020-12-08
Payer: COMMERCIAL

## 2020-12-08 DIAGNOSIS — G47.30 SLEEP APNEA, UNSPECIFIED TYPE: ICD-10-CM

## 2020-12-08 PROCEDURE — 95810 POLYSOM 6/> YRS 4/> PARAM: CPT

## 2020-12-15 ENCOUNTER — TELEPHONE (OUTPATIENT)
Dept: SLEEP CENTER | Facility: CLINIC | Age: 60
End: 2020-12-15

## 2020-12-23 ENCOUNTER — OFFICE VISIT (OUTPATIENT)
Dept: PULMONOLOGY | Facility: CLINIC | Age: 60
End: 2020-12-23
Payer: COMMERCIAL

## 2020-12-23 VITALS
WEIGHT: 237.2 LBS | SYSTOLIC BLOOD PRESSURE: 140 MMHG | TEMPERATURE: 96.4 F | HEIGHT: 67 IN | HEART RATE: 82 BPM | DIASTOLIC BLOOD PRESSURE: 80 MMHG | BODY MASS INDEX: 37.23 KG/M2 | OXYGEN SATURATION: 96 %

## 2020-12-23 DIAGNOSIS — R06.00 DYSPNEA ON EXERTION: ICD-10-CM

## 2020-12-23 DIAGNOSIS — G47.30 SLEEP APNEA, UNSPECIFIED TYPE: ICD-10-CM

## 2020-12-23 DIAGNOSIS — J45.990 EXERCISE-INDUCED ASTHMA: Primary | ICD-10-CM

## 2020-12-23 DIAGNOSIS — J30.2 SEASONAL ALLERGIC RHINITIS, UNSPECIFIED TRIGGER: ICD-10-CM

## 2020-12-23 DIAGNOSIS — J98.4 MULTIPLE IDIOPATHIC CYSTS OF LUNG: ICD-10-CM

## 2020-12-23 PROBLEM — R06.09 DYSPNEA ON EXERTION: Status: ACTIVE | Noted: 2020-12-23

## 2020-12-23 PROBLEM — Z14.8 CARRIER OF GENETIC DISORDER: Status: ACTIVE | Noted: 2020-12-23

## 2020-12-23 PROBLEM — Q33.0: Status: RESOLVED | Noted: 2020-09-22 | Resolved: 2020-12-23

## 2020-12-23 PROCEDURE — 3008F BODY MASS INDEX DOCD: CPT | Performed by: INTERNAL MEDICINE

## 2020-12-23 PROCEDURE — 99214 OFFICE O/P EST MOD 30 MIN: CPT | Performed by: INTERNAL MEDICINE

## 2020-12-23 PROCEDURE — 1036F TOBACCO NON-USER: CPT | Performed by: INTERNAL MEDICINE

## 2021-01-13 ENCOUNTER — TELEPHONE (OUTPATIENT)
Dept: SLEEP CENTER | Facility: CLINIC | Age: 61
End: 2021-01-13

## 2021-01-13 ENCOUNTER — IMMUNIZATIONS (OUTPATIENT)
Dept: FAMILY MEDICINE CLINIC | Facility: HOSPITAL | Age: 61
End: 2021-01-13

## 2021-01-13 ENCOUNTER — OFFICE VISIT (OUTPATIENT)
Dept: SLEEP CENTER | Facility: CLINIC | Age: 61
End: 2021-01-13
Payer: COMMERCIAL

## 2021-01-13 VITALS
DIASTOLIC BLOOD PRESSURE: 76 MMHG | BODY MASS INDEX: 37.2 KG/M2 | WEIGHT: 237 LBS | HEIGHT: 67 IN | SYSTOLIC BLOOD PRESSURE: 130 MMHG

## 2021-01-13 DIAGNOSIS — Z23 ENCOUNTER FOR IMMUNIZATION: ICD-10-CM

## 2021-01-13 DIAGNOSIS — G47.33 OSA (OBSTRUCTIVE SLEEP APNEA): Primary | ICD-10-CM

## 2021-01-13 PROCEDURE — 3008F BODY MASS INDEX DOCD: CPT | Performed by: INTERNAL MEDICINE

## 2021-01-13 PROCEDURE — 1036F TOBACCO NON-USER: CPT | Performed by: INTERNAL MEDICINE

## 2021-01-13 PROCEDURE — 0011A SARS-COV-2 / COVID-19 MRNA VACCINE (MODERNA) 100 MCG: CPT

## 2021-01-13 PROCEDURE — 99203 OFFICE O/P NEW LOW 30 MIN: CPT | Performed by: INTERNAL MEDICINE

## 2021-01-13 PROCEDURE — 91301 SARS-COV-2 / COVID-19 MRNA VACCINE (MODERNA) 100 MCG: CPT

## 2021-01-13 NOTE — PROGRESS NOTES
Consultation - [facility]  : 1960  MRN: [id number]      Assessment:  The patient has severe obstructive sleep apnea re-diagnosed on recent polysomnography  His AHI = 39 7  He denies significant daytime sleepiness, but does awaken feeling unrefreshed and fatigued  He had frequent periodic limb movements on his diagnostic study, which were often associated with arousal   It is possible that this is a cause for his poor sleep quality  Plan:  Positive airway pressure titration study    Follow up: After testing  I will also order a new machine, pending results of his positive airway pressure titration  History of Present Illness:   56 y o male with a history of obstructive sleep apnea diagnosed in the early   He denies daytime sleepiness, but does not get refreshing sleep  He underwent recent diagnostic polysomnography which showed AHI = 39 7  He also had frequent periodic limb movements which were often associated with arousal   He tries to get as little sleep as possible, because he dislikes it so much  Review of Systems      Genitourinary none   Cardiology none   Gastrointestinal none   Neurology none   Constitutional none   Integumentary none   Psychiatry none   Musculoskeletal none   Pulmonary shortness of breath with activity, wheezing and snoring   ENT throat clearing   Endocrine none   Hematological none         I have reviewed and updated the review of systems as necessary    Historical Information    Past Medical History:   Allergic rhinitis, GERD, asthma, abnormal chest x-ray/chest CT    Family History: non-contributory    Social History     Socioeconomic History    Marital status: /Civil Union     Spouse name: None    Number of children: None    Years of education: None    Highest education level: None   Occupational History    None   Social Needs    Financial resource strain: None    Food insecurity     Worry: None     Inability: None    Transportation needs     Medical: None     Non-medical: None   Tobacco Use    Smoking status: Former Smoker     Packs/day: 1 00     Years: 13 00     Pack years: 13 00     Types: Cigarettes     Quit date:      Years since quittin 0    Smokeless tobacco: Never Used    Tobacco comment: He smoked a pack a day of cigarettes for 8 years  He quite at the age of 22  Occasional cigar use  Substance and Sexual Activity    Alcohol use: Yes     Comment: Social drinker    Drug use: No    Sexual activity: None   Lifestyle    Physical activity     Days per week: None     Minutes per session: None    Stress: None   Relationships    Social connections     Talks on phone: None     Gets together: None     Attends Synagogue service: None     Active member of club or organization: None     Attends meetings of clubs or organizations: None     Relationship status: None    Intimate partner violence     Fear of current or ex partner: None     Emotionally abused: None     Physically abused: None     Forced sexual activity: None   Other Topics Concern    None   Social History Narrative    Caffeine use    Work-related stress         Sleep Schedule: unremarkable    Snoring:  No    Witnessed Apnea:  No    Medications/Allergies:    Current Outpatient Medications:     albuterol (2 5 mg/3 mL) 0 083 % nebulizer solution, Inhale, Disp: , Rfl:     albuterol (ProAir HFA) 90 mcg/act inhaler, Inhale 2 puffs every 6 (six) hours as needed for wheezing Use 2 puffs prior to exercise , Disp: 18 g, Rfl: 5    Cholecalciferol (VITAMIN D3) 5000 units CAPS, Take 5,000 Units by mouth , Disp: , Rfl:     Cinnamon 500 MG capsule, Take by mouth, Disp: , Rfl:     cyanocobalamin (CVS VITAMIN B-12) 1000 MCG tablet, Take by mouth, Disp: , Rfl:     fluticasone (FLONASE) 50 mcg/act nasal spray, Two sprays each nostril once daily as needed  , Disp: 1 Bottle, Rfl: 11    levocetirizine (XYZAL) 5 MG tablet, Take 1 tablet (5 mg total) by mouth every evening, Disp: 30 tablet, Rfl: 3    Omega-3 Fatty Acids (FISH OIL) 1,000 mg, Take 3 capsules by mouth daily, Disp: , Rfl:         No notes on file                  Objective:    Vital Signs:   Vitals:    01/13/21 1352   BP: 130/76   Weight: 108 kg (237 lb)   Height: 5' 7" (1 702 m)     Neck Circumference: 17      [place] Sleepiness Scale: Total score: 2    Physical Exam:    General: Alert, appropriate, cooperative, overweight    Head: NC/AT, mild retrognathia    Nose: No septal deviation    Throat: Airway diminished, tongue base thickened, no tonsils visualized    Neck: Normal, no thyromegaly or lymphadenopathy, no JVD    Heart: RR, normal S1 and S2, no murmurs    Chest: Clear bilaterally    Extremity: No clubbing, cyanosis, no edema    Skin: Warm, dry    Neuro: No motor abnormalities, cranial nerves appear intact    Sleep Study Results:   AHI = 39 7  PAP Pressure: To be determined  DME Provider: [address] [place] [address] Medical Equipment    Counseling / Coordination of Care  A description of the counseling / coordination of care:  Discuss treatment of SWAPNIL  Reviewed his sleep study  Board Certified Sleep Specialist    Portions of the record may have been created with voice recognition software  Occasional wrong word or "sound a like" substitutions may have occurred due to the inherent limitations of voice recognition software  Read the chart carefully and recognize, using context, where substitutions have occurred

## 2021-01-13 NOTE — TELEPHONE ENCOUNTER
1/13/21 Pt is schedule for Sleep CPAP Study on 2/15/21 in [facility]  Pt was explained about Covid test lc  Patient informed that COVID testing is to be performed 10 days prior to PAP study  Patient is to tell site that it is needed for a procedure so it is expedited  Testing site information provided

## 2021-01-14 ENCOUNTER — HOSPITAL ENCOUNTER (OUTPATIENT)
Dept: CT IMAGING | Facility: HOSPITAL | Age: 61
Discharge: HOME | End: 2021-01-14
Attending: INTERNAL MEDICINE
Payer: COMMERCIAL

## 2021-01-14 ENCOUNTER — HOSPITAL ENCOUNTER (OUTPATIENT)
Dept: PULMONOLOGY | Facility: HOSPITAL | Age: 61
Discharge: HOME | End: 2021-01-14
Attending: INTERNAL MEDICINE
Payer: COMMERCIAL

## 2021-01-14 ENCOUNTER — TELEPHONE (OUTPATIENT)
Dept: SLEEP CENTER | Facility: CLINIC | Age: 61
End: 2021-01-14

## 2021-01-14 DIAGNOSIS — J45.990 EXERCISE-INDUCED ASTHMA: ICD-10-CM

## 2021-01-14 DIAGNOSIS — R06.00 DYSPNEA ON EXERTION: ICD-10-CM

## 2021-01-14 DIAGNOSIS — J98.4 MULTIPLE IDIOPATHIC CYSTS OF LUNG: ICD-10-CM

## 2021-01-14 DIAGNOSIS — Z20.822 ENCOUNTER FOR PREPROCEDURE SCREENING LABORATORY TESTING FOR COVID-19: Primary | ICD-10-CM

## 2021-01-14 DIAGNOSIS — Z01.812 ENCOUNTER FOR PREPROCEDURE SCREENING LABORATORY TESTING FOR COVID-19: Primary | ICD-10-CM

## 2021-01-14 PROCEDURE — 94729 DIFFUSING CAPACITY: CPT

## 2021-01-14 PROCEDURE — 94726 PLETHYSMOGRAPHY LUNG VOLUMES: CPT | Performed by: INTERNAL MEDICINE

## 2021-01-14 PROCEDURE — 94726 PLETHYSMOGRAPHY LUNG VOLUMES: CPT

## 2021-01-14 PROCEDURE — 71250 CT THORAX DX C-: CPT

## 2021-01-14 PROCEDURE — G1004 CDSM NDSC: HCPCS

## 2021-01-14 PROCEDURE — 94010 BREATHING CAPACITY TEST: CPT | Performed by: INTERNAL MEDICINE

## 2021-01-14 PROCEDURE — 94010 BREATHING CAPACITY TEST: CPT

## 2021-01-14 PROCEDURE — 94760 N-INVAS EAR/PLS OXIMETRY 1: CPT

## 2021-01-14 PROCEDURE — 94729 DIFFUSING CAPACITY: CPT | Performed by: INTERNAL MEDICINE

## 2021-01-14 RX ADMIN — IOHEXOL 100 ML: 350 INJECTION, SOLUTION INTRAVENOUS at 06:48

## 2021-01-14 NOTE — TELEPHONE ENCOUNTER
1/14 Patient informed that COVID testing is to be performed 10 days prior to PAP study  Patient is to tell site that it is needed for a procedure so it is expedited  Testing site information provided  -[place]

## 2021-01-20 DIAGNOSIS — R91.1 PULMONARY NODULE: Primary | ICD-10-CM

## 2021-01-20 NOTE — PROGRESS NOTES
Telephone note- Pulmonary Medicine   Annie Morrell 56 y o  male MRN: [id number]    Reason for call:  Pulmonary function test and CT scan results      Pertinent details:  Pulmonary function test shows mild restriction and very mild impairment in diffusing capacity  This is likely on the basis of his weight and body habitus    CT scan of the chest does show stable cystic changes but did demonstrate a new pulmonary nodule which is 7 mm in size  He did have pneumonia on 2 occasions in the same lung in the interim since his last scan  This may be a scar  Overall risk factors are low but given the size, would recommend a six-month follow-up CT scan      Alex Townsend MD

## 2021-01-24 DIAGNOSIS — J30.2 SEASONAL ALLERGIES: ICD-10-CM

## 2021-01-24 RX ORDER — LEVOCETIRIZINE DIHYDROCHLORIDE 5 MG/1
TABLET, FILM COATED ORAL
Qty: 30 TABLET | Refills: 3 | Status: SHIPPED | OUTPATIENT
Start: 2021-01-24 | End: 2021-06-06

## 2021-02-05 DIAGNOSIS — Z01.812 ENCOUNTER FOR PREPROCEDURE SCREENING LABORATORY TESTING FOR COVID-19: ICD-10-CM

## 2021-02-05 DIAGNOSIS — Z20.822 ENCOUNTER FOR PREPROCEDURE SCREENING LABORATORY TESTING FOR COVID-19: ICD-10-CM

## 2021-02-06 ENCOUNTER — TELEPHONE (OUTPATIENT)
Dept: OTHER | Facility: OTHER | Age: 61
End: 2021-02-06

## 2021-02-06 ENCOUNTER — TELEMEDICINE (OUTPATIENT)
Dept: FAMILY MEDICINE CLINIC | Facility: CLINIC | Age: 61
End: 2021-02-06
Payer: COMMERCIAL

## 2021-02-06 DIAGNOSIS — U07.1 COVID-19: Primary | ICD-10-CM

## 2021-02-06 PROCEDURE — 1036F TOBACCO NON-USER: CPT | Performed by: INTERNAL MEDICINE

## 2021-02-06 PROCEDURE — 99213 OFFICE O/P EST LOW 20 MIN: CPT | Performed by: INTERNAL MEDICINE

## 2021-02-06 NOTE — TELEPHONE ENCOUNTER
[phone]/YZ is Maxwell mccallum [birth year]  Pt tested positive for Covid and is unsure if should go on Monday for his 2nd Covid Vaccine  Vaccine is at 0800  Pt would like a Dr to call him for some advice and guidance  Pt does not have symptoms  Test was only done for a Sleep study pt was supposed to have on Monday      Dr María Parr was paged at [phone]

## 2021-02-06 NOTE — PROGRESS NOTES
Virtual Regular Visit      Assessment/Plan:    Problem List Items Addressed This Visit        Other    [id number] - Primary    Relevant Orders    PAT Covid Screening               Reason for visit is   Chief Complaint   Patient presents with    Virtual Regular Visit        Encounter provider Portia Abdullahi MD    Provider located at [address] [place] [address] [address] [facility] [address] [phone]      Recent Visits  No visits were found meeting these conditions  Showing recent visits within past 7 days and meeting all other requirements     Today's Visits  Date Type Provider Dept   02/06/21 Telemedicine Ernesto Anderson MD  [facility]   Showing today's visits and meeting all other requirements     Future Appointments  No visits were found meeting these conditions  Showing future appointments within next 150 days and meeting all other requirements        The patient was identified by name and date of birth  Gia Murdock was informed that this is a telemedicine visit and that the visit is being conducted through [employer] and patient was informed that this is not a secure, HIPAA-compliant platform  He agrees to proceed     My office door was closed  No one else was in the room  He acknowledged consent and understanding of privacy and security of the video platform  The patient has agreed to participate and understands they can discontinue the visit at any time  Patient is aware this is a billable service  Subjective  Gia Murdock is a 56 y o  male   Patient had a virtual video consultation today due to his preprocedure COVID test came back positive  Patient has not been experiencing any COVID related symptoms  He denies any fever or chills  No body ache or headache or weakness  No gastrointestinal symptoms  No loss of taste or smell  He has a history of asthma with chronic cough    No known exposure to any COVID-19 infected person  He denies any other complaint   HPI     Past Medical History:   Diagnosis Date    Asthma     Erectile dysfunction of non-organic origin     Resolved: 10/15/2014       Past Surgical History:   Procedure Laterality Date    EYE SURGERY      TONSILLECTOMY AND ADENOIDECTOMY      UMBILICAL HERNIA REPAIR         Current Outpatient Medications   Medication Sig Dispense Refill    albuterol (2 5 mg/3 mL) 0 083 % nebulizer solution Inhale      albuterol (ProAir HFA) 90 mcg/act inhaler Inhale 2 puffs every 6 (six) hours as needed for wheezing Use 2 puffs prior to exercise  18 g 5    Cholecalciferol (VITAMIN D3) 5000 units CAPS Take 5,000 Units by mouth       Cinnamon 500 MG capsule Take by mouth      cyanocobalamin (CVS VITAMIN B-12) 1000 MCG tablet Take by mouth      fluticasone (FLONASE) 50 mcg/act nasal spray Two sprays each nostril once daily as needed  1 Bottle 11    levocetirizine (XYZAL) 5 MG tablet TAKE 1 TABLET BY MOUTH EVERY DAY IN THE EVENING 30 tablet 3    Omega-3 Fatty Acids (FISH OIL) 1,000 mg Take 3 capsules by mouth daily       No current facility-administered medications for this visit  No Known Allergies    Review of Systems   Constitutional: Negative for chills, fatigue and fever  HENT: Negative for congestion, nosebleeds and rhinorrhea  Eyes: Negative for discharge and visual disturbance  Respiratory: Positive for cough  Negative for chest tightness, shortness of breath and wheezing  Cardiovascular: Negative for chest pain  Gastrointestinal: Negative for abdominal distention, abdominal pain, constipation, diarrhea and vomiting  Endocrine: Negative for polydipsia and polyuria  Genitourinary: Negative for difficulty urinating, frequency and hematuria  Musculoskeletal: Negative for arthralgias, back pain and myalgias  Skin: Negative for rash  Allergic/Immunologic: Negative for environmental allergies     Neurological: Negative for dizziness, syncope, weakness, light-headedness and headaches  Hematological: Negative  Psychiatric/Behavioral: Negative for agitation, confusion, decreased concentration and dysphoric mood  The patient is not nervous/anxious  All other systems reviewed and are negative  Video Exam    There were no vitals filed for this visit  Physical Exam  Constitutional:       General: He is not in acute distress  Appearance: Normal appearance  He is obese  He is not ill-appearing  HENT:      Head: Normocephalic and atraumatic  Pulmonary:      Effort: Pulmonary effort is normal       Breath sounds: Normal breath sounds  Neurological:      Mental Status: He is alert and oriented to person, place, and time  Psychiatric:         Mood and Affect: Mood normal          Behavior: Behavior normal           I spent 21 minutes directly with the patient during this visit      Johana Saba acknowledges that he has consented to an online visit or consultation  He understands that the online visit is based solely on information provided by him, and that, in the absence of a face-to-face physical evaluation by the physician, the diagnosis he receives is both limited and provisional in terms of accuracy and completeness  This is not intended to replace a full medical face-to-face evaluation by the physician  Camryn Daniels understands and accepts these terms

## 2021-02-08 ENCOUNTER — IMMUNIZATIONS (OUTPATIENT)
Dept: FAMILY MEDICINE CLINIC | Facility: HOSPITAL | Age: 61
End: 2021-02-08
Payer: COMMERCIAL

## 2021-02-08 DIAGNOSIS — U07.1 COVID-19: ICD-10-CM

## 2021-02-08 DIAGNOSIS — Z23 ENCOUNTER FOR IMMUNIZATION: Primary | ICD-10-CM

## 2021-02-08 PROCEDURE — 0012A SARS-COV-2 / COVID-19 MRNA VACCINE (MODERNA) 100 MCG: CPT

## 2021-02-08 PROCEDURE — 91301 SARS-COV-2 / COVID-19 MRNA VACCINE (MODERNA) 100 MCG: CPT

## 2021-02-08 NOTE — TELEPHONE ENCOUNTER
Spoke to patient  He will be going for a repeat Covid test this morning per Dr Ally Cedeno  Patient was advised to quarantine

## 2021-02-09 LAB — SARS-COV-2 RNA RESP QL NAA+PROBE: NEGATIVE

## 2021-02-11 ENCOUNTER — TELEPHONE (OUTPATIENT)
Dept: SLEEP CENTER | Facility: CLINIC | Age: 61
End: 2021-02-11

## 2021-02-11 LAB — SARS-COV-2 RNA RESP QL NAA+PROBE: NEGATIVE

## 2021-02-11 NOTE — TELEPHONE ENCOUNTER
Received call from patient who states he had a COVID test on 2/5/21 for his CPAP study and it was positive but was tested 3 days later and it was negative  He's not sure what to do  According to chart, COVID test done 2/5/21 is negative  Comment under negative result states: This is a corrected result  Previous result was Positive on 2/5/2021 at 1640 EST    Patient made aware that both COVID tests are negative  Patient relieved to hear this  Advised will proceed with sleep study as scheduled on 2/15/2021

## 2021-02-16 ENCOUNTER — HOSPITAL ENCOUNTER (OUTPATIENT)
Dept: SLEEP CENTER | Facility: CLINIC | Age: 61
Discharge: HOME | End: 2021-02-16
Payer: COMMERCIAL

## 2021-02-16 DIAGNOSIS — G47.33 OSA (OBSTRUCTIVE SLEEP APNEA): ICD-10-CM

## 2021-02-16 PROCEDURE — 95811 POLYSOM 6/>YRS CPAP 4/> PARM: CPT

## 2021-02-17 NOTE — PROGRESS NOTES
Sleep Study Documentation    Pre-Sleep Study       Sleep testing procedure explained to patient:YES    Patient napped prior to study:NO    Caffeine:Dayshift worker after 12PM   Caffeine use:NO    Alcohol:Dayshift workers after 5PM: Alcohol use:NO    Typical day for patient:YES       Study Documentation    Sleep Study Indications: G47 33    Sleep Study: Treatment   Optimal PAP pressure:10   Leak:Small  Snore:Eliminated  REM Obtained:yes  Supplemental O2: no    Minimum SaO2 82  Baseline SaO2 96  PAP mask tried (list all) Respironics Dreamwisp and Resmed Airfit N30 I   PAP mask choice (final) Respironics Dreamwisp  PAP mask type:pillows  PAP pressure at which snoring was eliminated 7  Minimum SaO2 at final PAP pressure 88  Mode of Therapy:CPAP  ETCO2:No  CPAP changed to BiPAP:No    Mode of Therapy:    EKG abnormalities: no     EEG abnormalities: no    Sleep Study Recorded < 2 hours: N/A    Sleep Study Recorded > 2 hours but incomplete study: N/A    Sleep Study Recorded 6 hours but no sleep obtained: NO    Patient classification: employed       Post-Sleep Study    Medication used at bedtime or during sleep study:NO    Patient reports time it took to fall asleep:30 to 60 minutes    Patient reports waking up during study:1 to 2 times  Patient reports returning to sleep in 10 to 30 minutes  Patient reports sleeping 4 to 6 hours without dreaming  Patient reports sleep during study:typical    Patient rated sleepiness: Somewhat sleepy or tired    PAP treatment:yes: Post PAP treatment patient reports feeling unchanged and would wear PAP mask at home

## 2021-02-18 DIAGNOSIS — G47.33 OSA (OBSTRUCTIVE SLEEP APNEA): Primary | ICD-10-CM

## 2021-02-19 ENCOUNTER — TELEPHONE (OUTPATIENT)
Dept: SLEEP CENTER | Facility: CLINIC | Age: 61
End: 2021-02-19

## 2021-02-19 NOTE — TELEPHONE ENCOUNTER
Advised sleep study resulted and Dr Devika Escalera ordered CPAP  DME set up scheduled with compliance appointment     Appointment information emailed to [employer]

## 2021-03-05 ENCOUNTER — TELEPHONE (OUTPATIENT)
Dept: SLEEP CENTER | Facility: CLINIC | Age: 61
End: 2021-03-05

## 2021-03-22 ENCOUNTER — OFFICE VISIT (OUTPATIENT)
Dept: URGENT CARE | Age: 61
End: 2021-03-22
Payer: COMMERCIAL

## 2021-03-22 VITALS
TEMPERATURE: 98 F | OXYGEN SATURATION: 98 % | HEART RATE: 69 BPM | DIASTOLIC BLOOD PRESSURE: 82 MMHG | SYSTOLIC BLOOD PRESSURE: 170 MMHG | RESPIRATION RATE: 18 BRPM

## 2021-03-22 DIAGNOSIS — S61.412A LACERATION OF LEFT HAND WITHOUT FOREIGN BODY, INITIAL ENCOUNTER: Primary | ICD-10-CM

## 2021-03-22 PROCEDURE — 90715 TDAP VACCINE 7 YRS/> IM: CPT

## 2021-03-22 PROCEDURE — 12001 RPR S/N/AX/GEN/TRNK 2.5CM/<: CPT | Performed by: NURSE PRACTITIONER

## 2021-03-22 PROCEDURE — 90471 IMMUNIZATION ADMIN: CPT | Performed by: NURSE PRACTITIONER

## 2021-03-22 PROCEDURE — 99213 OFFICE O/P EST LOW 20 MIN: CPT | Performed by: NURSE PRACTITIONER

## 2021-03-22 NOTE — PATIENT INSTRUCTIONS
Care For Your Stitches   WHAT YOU NEED TO KNOW:   Stitches, or sutures, are used to close cuts and wounds on the skin  Stitches need to be removed after your wound has healed  DISCHARGE INSTRUCTIONS:   Return to the emergency department if:   · Your stitches come apart  · Blood soaks through your bandages  · You suddenly cannot move your injured joint  · You have sudden numbness around your wound  · You see red streaks coming from your wound  Contact your healthcare provider if:   · You have a fever and chills  · Your wound is red, warm, swollen, or leaking pus  · There is a bad smell coming from your wound  · You have increased pain in the wound area  · You have questions or concerns about your condition or care  Care for your stitches:   · Protect the stitches  You may need to cover your stitches with a bandage for 24 to 48 hours, or as directed  Do not bump or hit the suture area  This could open the wound  Do not trim or shorten the ends of your stitches  If they rub on your clothing, put a gauze bandage between the stitches and your clothes  · Clean the area as directed  Carefully wash your wound with soap and water  For mouth and lip wounds, rinse your mouth after meals and at bedtime  Ask your healthcare provider what to use to rinse your mouth  If you have a scalp wound, you may gently wash your hair every 2 days with mild shampoo  Do not use hair products, such as hair spray  Check your wound for signs of infection when you clean it  Signs include redness, swelling, and pus  · Keep the area dry as directed  Wait 12 to 24 hours after you receive your stitches before you take a shower  Take showers instead of baths  Do not take a bath or swim  Your healthcare provider will give you instructions for bathing with your stitches  Help your wound heal:   · Elevate your wound  Keep your wound above the level of your heart as often as you can   This will help decrease swelling and pain  Prop the area on pillows or blankets, if possible, to keep it elevated comfortably  · Limit activity  Do not stretch the skin around your wound  This will help prevent bleeding and swelling  Follow up with your healthcare provider as directed: You may need to return to have your stitches removed  Write down your questions so you remember to ask them during your visits  © Copyright [address] Information is for End User's use only and may not be sold, redistributed or otherwise used for commercial purposes  All illustrations and images included in CareNotes® are the copyrighted property of [employer]  or [address] [place], [place] [address]   The above information is an  only  It is not intended as medical advice for individual conditions or treatments  Talk to your doctor, nurse or pharmacist before following any medical regimen to see if it is safe and effective for you

## 2021-03-22 NOTE — PROGRESS NOTES
[facility] [facility]        NAME: Monica Mckenzie is a 59 y o  male  : 1960    MRN: [id number]  DATE: 2021  TIME: 11:43 AM    Assessment and Plan   Laceration of left hand without foreign body, initial encounter [S61 412A]  1  Laceration of left hand without foreign body, initial encounter  Laceration repair    TDAP Vaccine greater than or equal to 6yo         Patient Instructions     RTC in 1 week for suture removal  Wound care instructions given  Tylenol or motrin for pain prn  tdap administered   Follow up with PCP in 3-5 days  Proceed to  ER if symptoms worsen  Chief Complaint     Chief Complaint   Patient presents with    Hand Injury     left hand laceration , cut on sheet metal x approx  1 hour ago, tetanus =2015         History of Present Illness       HPI   Reports was working outside and and accidentally cut hit right hand with a sheet metal  This was about 1 hr ago  Last tetanus vaccine   Review of Systems   Review of Systems   Constitutional: Negative for fever  Musculoskeletal: Negative for arthralgias and myalgias  Skin: Positive for wound (left hand)  Negative for color change  Neurological: Negative for numbness  Current Medications       Current Outpatient Medications:     albuterol (2 5 mg/3 mL) 0 083 % nebulizer solution, Inhale, Disp: , Rfl:     albuterol (ProAir HFA) 90 mcg/act inhaler, Inhale 2 puffs every 6 (six) hours as needed for wheezing Use 2 puffs prior to exercise , Disp: 18 g, Rfl: 5    Cholecalciferol (VITAMIN D3) 5000 units CAPS, Take 5,000 Units by mouth , Disp: , Rfl:     Cinnamon 500 MG capsule, Take by mouth, Disp: , Rfl:     cyanocobalamin (CVS VITAMIN B-12) 1000 MCG tablet, Take by mouth, Disp: , Rfl:     fluticasone (FLONASE) 50 mcg/act nasal spray, Two sprays each nostril once daily as needed  , Disp: 1 Bottle, Rfl: 11    levocetirizine (XYZAL) 5 MG tablet, TAKE 1 TABLET BY MOUTH EVERY DAY IN THE EVENING, Disp: 30 tablet, Rfl: 3    Omega-3 Fatty Acids (FISH OIL) 1,000 mg, Take 3 capsules by mouth daily, Disp: , Rfl:     Current Allergies     Allergies as of 03/22/2021    (No Known Allergies)            The following portions of the patient's history were reviewed and updated as appropriate: allergies, current medications, past family history, past medical history, past social history, past surgical history and problem list      Past Medical History:   Diagnosis Date    Asthma     Erectile dysfunction of non-organic origin     Resolved: 10/15/2014       Past Surgical History:   Procedure Laterality Date    EYE SURGERY      TONSILLECTOMY AND ADENOIDECTOMY      UMBILICAL HERNIA REPAIR         Family History   Problem Relation Age of Onset    Asthma Mother     Heart disease Mother     Rheum arthritis Mother     Diabetes Father     Heart disease Father     Asthma Sister     Diabetes Sister     Other Son         Thyroid disorder         Medications have been verified  Objective   /82   Pulse 69   Temp 98 °F (36 7 °C)   Resp 18   SpO2 98%   No LMP for male patient  Physical Exam     Physical Exam  Musculoskeletal:         General: Tenderness (with palpation of the area of the laceration) present  No swelling  Skin:     Capillary Refill: Capillary refill takes less than 2 seconds  Comments: There is a wound along the web between the 3rd and 4th fingers  Mild bleeding  Stopped with application of pressure, using gauze  Laceration repair    Date/Time: 3/22/2021 11:29 AM  Performed by: UZIEL Block  Authorized by: UZIEL Block   Consent: Verbal consent obtained    Consent given by: patient  Patient understanding: patient states understanding of the procedure being performed  Site marked: the operative site was marked  Patient identity confirmed: verbally with patient  Body area: upper extremity  Location details: right hand  Laceration length: 1 cm  Foreign bodies: no foreign bodies  Tendon involvement: none  Nerve involvement: none  Vascular damage: no  Anesthesia: local infiltration    Anesthesia:  Local Anesthetic: lidocaine 1% without epinephrine  Anesthetic total: 5 mL    Wound Dehiscence:  Superficial Wound Dehiscence: simple closure      Procedure Details:  Preparation: Patient was prepped and draped in the usual sterile fashion    Irrigation solution: saline  Irrigation method: jet lavage and syringe  Amount of cleaning: standard  Debridement: none  Degree of undermining: none  Skin closure: 3-0 nylon  Number of sutures: 2  Technique: simple  Approximation: close  Approximation difficulty: simple  Dressing: 4x4 sterile gauze and antibiotic ointment  Patient tolerance: patient tolerated the procedure well with no immediate complications

## 2021-03-29 ENCOUNTER — OFFICE VISIT (OUTPATIENT)
Dept: URGENT CARE | Age: 61
End: 2021-03-29
Payer: COMMERCIAL

## 2021-03-29 VITALS
TEMPERATURE: 97.8 F | RESPIRATION RATE: 18 BRPM | SYSTOLIC BLOOD PRESSURE: 139 MMHG | OXYGEN SATURATION: 98 % | HEART RATE: 69 BPM | DIASTOLIC BLOOD PRESSURE: 63 MMHG

## 2021-03-29 DIAGNOSIS — S61.412D LACERATION OF LEFT HAND WITH COMPLICATION, SUBSEQUENT ENCOUNTER: Primary | ICD-10-CM

## 2021-03-29 PROCEDURE — 99213 OFFICE O/P EST LOW 20 MIN: CPT | Performed by: NURSE PRACTITIONER

## 2021-03-29 NOTE — PROGRESS NOTES
[facility]        NAME: Keren Gutierrez is a 59 y o  male  : 1960    MRN: [id number]  DATE: 2021  TIME: 1:13 PM    Assessment and Plan   Laceration of left hand with complication, subsequent encounter [W71 [id number]]  1  Laceration of left hand with complication, subsequent encounter  Suture removal         Patient Instructions     Cont with wound care  Take it easy withy the left hand x 1 week  Reports any signs of infection  Follow up with PCP in 3-5 days  Proceed to  ER if symptoms worsen  Chief Complaint     Chief Complaint   Patient presents with    Suture / Staple Removal     suture removal left hand         History of Present Illness       HPI   Here today for suture removal  Was placed 1 week ago, on the right hand  There were 2 sutures, between the 2nd and 3rd fingers  Denies any problems with sutures at home  Review of Systems   Review of Systems   Constitutional: Negative for chills and fever  HENT: Negative for sore throat  Skin: Positive for wound (left hand)  Negative for color change and pallor  Neurological: Negative for weakness and numbness  Current Medications       Current Outpatient Medications:     albuterol (2 5 mg/3 mL) 0 083 % nebulizer solution, Inhale, Disp: , Rfl:     albuterol (ProAir HFA) 90 mcg/act inhaler, Inhale 2 puffs every 6 (six) hours as needed for wheezing Use 2 puffs prior to exercise , Disp: 18 g, Rfl: 5    Cholecalciferol (VITAMIN D3) 5000 units CAPS, Take 5,000 Units by mouth , Disp: , Rfl:     Cinnamon 500 MG capsule, Take by mouth, Disp: , Rfl:     cyanocobalamin (CVS VITAMIN B-12) 1000 MCG tablet, Take by mouth, Disp: , Rfl:     fluticasone (FLONASE) 50 mcg/act nasal spray, Two sprays each nostril once daily as needed  , Disp: 1 Bottle, Rfl: 11    levocetirizine (XYZAL) 5 MG tablet, TAKE 1 TABLET BY MOUTH EVERY DAY IN THE EVENING, Disp: 30 tablet, Rfl: 3    Omega-3 Fatty Acids (FISH OIL) 1,000 mg, Take 3 capsules by mouth daily, Disp: , Rfl:     Current Allergies     Allergies as of 03/29/2021    (No Known Allergies)            The following portions of the patient's history were reviewed and updated as appropriate: allergies, current medications, past family history, past medical history, past social history, past surgical history and problem list      Past Medical History:   Diagnosis Date    Asthma     Erectile dysfunction of non-organic origin     Resolved: 10/15/2014       Past Surgical History:   Procedure Laterality Date    EYE SURGERY      TONSILLECTOMY AND ADENOIDECTOMY      UMBILICAL HERNIA REPAIR         Family History   Problem Relation Age of Onset    Asthma Mother     Heart disease Mother     Rheum arthritis Mother     Diabetes Father     Heart disease Father     Asthma Sister     Diabetes Sister     Other Son         Thyroid disorder         Medications have been verified  Objective   /63   Pulse 69   Temp 97 8 °F (36 6 °C)   Resp 18   SpO2 98%   No LMP for male patient  Physical Exam     Physical Exam  Skin:     General: Skin is dry  Capillary Refill: Capillary refill takes less than 2 seconds  Findings: No bruising, erythema or rash  Comments: Wound on the left hand, between the 2nd and 3rd fingers  Healing well  Surrounding skin is normal  Movement of the fingers is normal  Nails are normal      Suture removal    Date/Time: 3/29/2021 1:12 PM  Performed by: UZIEL Phelan  Authorized by: UZIEL Phelan   Universal Protocol:  Consent: Verbal consent obtained  Consent given by: patient  Patient understanding: patient states understanding of the procedure being performed  Patient identity confirmed: verbally with patient        Patient location:  Clinic  Location:     Laterality:  Left    Location:  Upper extremity    Upper extremity location:  Hand    Hand location: between the 2nd and 3rd digits  Procedure details:      Tools used:  Suture removal kit    Wound appearance:  No sign(s) of infection, clean, good wound healing and nonpurulent    Number of sutures removed:  1  Post-procedure details:     Patient tolerance of procedure: Tolerated well, no immediate complications  Comments:      Post removal, area is cleaned with betadine

## 2021-03-30 ENCOUNTER — APPOINTMENT (OUTPATIENT)
Dept: LAB | Age: 61
End: 2021-03-30
Payer: COMMERCIAL

## 2021-03-30 DIAGNOSIS — R73.09 ABNORMAL BLOOD SUGAR: ICD-10-CM

## 2021-03-30 DIAGNOSIS — E78.5 HYPERLIPIDEMIA, UNSPECIFIED HYPERLIPIDEMIA TYPE: ICD-10-CM

## 2021-03-30 LAB
ALBUMIN SERPL BCP-MCNC: 3.8 G/DL (ref 3.5–5)
ALP SERPL-CCNC: 53 U/L (ref 46–116)
ALT SERPL W P-5'-P-CCNC: 32 U/L (ref 12–78)
ANION GAP SERPL CALCULATED.3IONS-SCNC: 2 MMOL/L (ref 4–13)
AST SERPL W P-5'-P-CCNC: 15 U/L (ref 5–45)
BILIRUB SERPL-MCNC: 0.61 MG/DL (ref 0.2–1)
BUN SERPL-MCNC: 19 MG/DL (ref 5–25)
CALCIUM SERPL-MCNC: 9.3 MG/DL (ref 8.3–10.1)
CHLORIDE SERPL-SCNC: 108 MMOL/L (ref 100–108)
CHOLEST SERPL-MCNC: 175 MG/DL (ref 50–200)
CO2 SERPL-SCNC: 29 MMOL/L (ref 21–32)
CREAT SERPL-MCNC: 1.19 MG/DL (ref 0.6–1.3)
EST. AVERAGE GLUCOSE BLD GHB EST-MCNC: 117 MG/DL
GFR SERPL CREATININE-BSD FRML MDRD: 65 ML/MIN/1.73SQ M
GLUCOSE P FAST SERPL-MCNC: 106 MG/DL (ref 65–99)
HBA1C MFR BLD: 5.7 %
HDLC SERPL-MCNC: 48 MG/DL
LDLC SERPL CALC-MCNC: 105 MG/DL (ref 0–100)
POTASSIUM SERPL-SCNC: 5 MMOL/L (ref 3.5–5.3)
PROT SERPL-MCNC: 7.7 G/DL (ref 6.4–8.2)
SODIUM SERPL-SCNC: 139 MMOL/L (ref 136–145)
TRIGL SERPL-MCNC: 112 MG/DL

## 2021-03-30 PROCEDURE — 36415 COLL VENOUS BLD VENIPUNCTURE: CPT

## 2021-03-30 PROCEDURE — 80061 LIPID PANEL: CPT

## 2021-03-30 PROCEDURE — 83036 HEMOGLOBIN GLYCOSYLATED A1C: CPT

## 2021-03-30 PROCEDURE — 80053 COMPREHEN METABOLIC PANEL: CPT

## 2021-04-05 ENCOUNTER — OFFICE VISIT (OUTPATIENT)
Dept: INTERNAL MEDICINE CLINIC | Facility: CLINIC | Age: 61
End: 2021-04-05
Payer: COMMERCIAL

## 2021-04-05 VITALS
DIASTOLIC BLOOD PRESSURE: 70 MMHG | BODY MASS INDEX: 36.68 KG/M2 | RESPIRATION RATE: 16 BRPM | HEART RATE: 71 BPM | OXYGEN SATURATION: 97 % | WEIGHT: 234.2 LBS | SYSTOLIC BLOOD PRESSURE: 130 MMHG

## 2021-04-05 DIAGNOSIS — E78.5 HYPERLIPIDEMIA, UNSPECIFIED HYPERLIPIDEMIA TYPE: ICD-10-CM

## 2021-04-05 DIAGNOSIS — R73.03 PREDIABETES: ICD-10-CM

## 2021-04-05 DIAGNOSIS — R89.9 ABNORMAL LABORATORY TEST: ICD-10-CM

## 2021-04-05 DIAGNOSIS — E55.9 VITAMIN D DEFICIENCY: ICD-10-CM

## 2021-04-05 DIAGNOSIS — E53.8 VITAMIN B12 DEFICIENCY: ICD-10-CM

## 2021-04-05 DIAGNOSIS — M72.2 PLANTAR FASCIITIS: Primary | ICD-10-CM

## 2021-04-05 DIAGNOSIS — Z00.00 WELLNESS EXAMINATION: ICD-10-CM

## 2021-04-05 DIAGNOSIS — Z12.11 SCREENING FOR MALIGNANT NEOPLASM OF COLON: ICD-10-CM

## 2021-04-05 PROCEDURE — 99396 PREV VISIT EST AGE 40-64: CPT | Performed by: INTERNAL MEDICINE

## 2021-04-05 PROCEDURE — 3725F SCREEN DEPRESSION PERFORMED: CPT | Performed by: INTERNAL MEDICINE

## 2021-04-05 NOTE — PROGRESS NOTES
Assessment/Plan:    Plantar fasciitis   I would like the patient go for physical therapy    Abnormal laboratory test   Low anion gap check SPEP /UPEP    Hyperlipidemia   Hyperlipidemia   Low-cholesterol diet, consider trying CholestOff he would like to hold off on a statin at this point will recheck a lipid profile in 6 months    Prediabetes   Pre diabetes -I have counseled the patient to follow a healthy balanced diet, I have counseled patient reduce carbohydrates and sweets in the diet, I would like the patient exercise routinely  I will be checking hemoglobin A1c and comprehensive metabolic panel  Have counseled patient about the prevention of diabetes, and the risk of progression to type 2 diabetes  Vitamin B12 deficiency    Will check a vitamin B12 level    Vitamin D deficiency   Will check a vitamin-D level continue with current dose of vitamin-D    Wellness examination   Assessment and plan 1  Health maintenance annual wellness examination overall the patient is clinically stable and doing well, we encouraged the patient to follow a healthy and balanced diet  We recommend that the patient exercise routinely approximately 30 minutes 5 times per week   We have reviewed the patient's vaccines and have made recommendations for updates if necessary   He has had the COVID vaccine, flu vaccine in the fall recommended      We will be ordering screening laboratories which are age appropriate  Return to the office in    6 months   call if any problems           Problem List Items Addressed This Visit        Musculoskeletal and Integument    Plantar fasciitis - Primary      I would like the patient go for physical therapy         Relevant Orders    Ambulatory referral to Physical Therapy       Other    Hyperlipidemia      Hyperlipidemia   Low-cholesterol diet, consider trying CholestOff he would like to hold off on a statin at this point will recheck a lipid profile in 6 months         Relevant Orders Comprehensive metabolic panel    Lipid Panel with Direct LDL reflex    Vitamin D deficiency      Will check a vitamin-D level continue with current dose of vitamin-D         Relevant Orders    Vitamin D 25 hydroxy    Wellness examination      Assessment and plan 1  Health maintenance annual wellness examination overall the patient is clinically stable and doing well, we encouraged the patient to follow a healthy and balanced diet  We recommend that the patient exercise routinely approximately 30 minutes 5 times per week   We have reviewed the patient's vaccines and have made recommendations for updates if necessary   He has had the COVID vaccine, flu vaccine in the fall recommended      We will be ordering screening laboratories which are age appropriate  Return to the office in    6 months   call if any problems  Prediabetes      Pre diabetes -I have counseled the patient to follow a healthy balanced diet, I have counseled patient reduce carbohydrates and sweets in the diet, I would like the patient exercise routinely  I will be checking hemoglobin A1c and comprehensive metabolic panel  Have counseled patient about the prevention of diabetes, and the risk of progression to type 2 diabetes  Relevant Orders    Hemoglobin A1C    Abnormal laboratory test      Low anion gap check SPEP /UPEP         Relevant Orders    Protein electrophoresis, serum    Protein electrophoresis, urine    Vitamin B12 deficiency       Will check a vitamin B12 level         Relevant Orders    Vitamin B12      Other Visit Diagnoses     Screening for malignant neoplasm of colon        Relevant Orders    Cologuard           RTO in 6 months  Subjective:      Patient ID: Ciera Maza is a 59 y o  male      HPI annual physical , drives a car safely, wears a seatbelt, brushes and flosses is teeth, sees a dentist routinely cov vaccine , dental once 2-3, exercise lifting , but difficulty with  No sunscreen,  Home safe smoke alarm fire extinguisher    The following portions of the patient's history were reviewed and updated as appropriate: allergies, current medications, past family history, past medical history, past social history, past surgical history and problem list     Review of Systems   Constitutional: Negative for activity change, appetite change and unexpected weight change  HENT: Negative for congestion and postnasal drip  Eyes: Negative for visual disturbance  Respiratory: Negative for cough and shortness of breath  Cardiovascular: Negative for chest pain  Gastrointestinal: Negative for abdominal pain, diarrhea, nausea and vomiting  Neurological: Negative for dizziness, light-headedness and headaches  Objective:    No follow-ups on file  No results found  No Known Allergies    Past Medical History:   Diagnosis Date    Asthma     Erectile dysfunction of non-organic origin     Resolved: 10/15/2014     Past Surgical History:   Procedure Laterality Date    EYE SURGERY      TONSILLECTOMY AND ADENOIDECTOMY      UMBILICAL HERNIA REPAIR       Current Outpatient Medications on File Prior to Visit   Medication Sig Dispense Refill    albuterol (2 5 mg/3 mL) 0 083 % nebulizer solution Inhale      albuterol (ProAir HFA) 90 mcg/act inhaler Inhale 2 puffs every 6 (six) hours as needed for wheezing Use 2 puffs prior to exercise  18 g 5    Cholecalciferol (VITAMIN D3) 5000 units CAPS Take 5,000 Units by mouth       Cinnamon 500 MG capsule Take by mouth      cyanocobalamin (CVS VITAMIN B-12) 1000 MCG tablet Take by mouth      fluticasone (FLONASE) 50 mcg/act nasal spray Two sprays each nostril once daily as needed  1 Bottle 11    levocetirizine (XYZAL) 5 MG tablet TAKE 1 TABLET BY MOUTH EVERY DAY IN THE EVENING 30 tablet 3    Omega-3 Fatty Acids (FISH OIL) 1,000 mg Take 3 capsules by mouth daily       No current facility-administered medications on file prior to visit        Family History   Problem Relation Age of Onset    Asthma Mother     Heart disease Mother     Rheum arthritis Mother     Diabetes Father     Heart disease Father     Asthma Sister     Diabetes Sister     Other Son         Thyroid disorder     Social History     Socioeconomic History    Marital status: /Civil Union     Spouse name: Not on file    Number of children: Not on file    Years of education: Not on file    Highest education level: Not on file   Occupational History    Not on file   Social Needs    Financial resource strain: Not on file    Food insecurity     Worry: Not on file     Inability: Not on file   [employer] needs     Medical: Not on file     Non-medical: Not on file   Tobacco Use    Smoking status: Former Smoker     Packs/day: 1      Years:      Pack years:      Types: Cigarettes     Quit date:      Years since quittin 2    Smokeless tobacco: Never Used    Tobacco comment: He smoked a pack a day of cigarettes for 8 years  He quite at the age of 22  Occasional cigar use     Substance and Sexual Activity    Alcohol use: Yes     Comment: Social drinker    Drug use: No    Sexual activity: Not on file   Lifestyle    Physical activity     Days per week: Not on file     Minutes per session: Not on file    Stress: Not on file   Relationships    Social connections     Talks on phone: Not on file     Gets together: Not on file     Attends Jewish service: Not on file     Active member of club or organization: Not on file     Attends meetings of clubs or organizations: Not on file     Relationship status: Not on file    Intimate partner violence     Fear of current or ex partner: Not on file     Emotionally abused: Not on file     Physically abused: Not on file     Forced sexual activity: Not on file   Other Topics Concern    Not on file   Social History Narrative    Caffeine use    Work-related stress     Vitals:    21 0820   BP: 130/70   Pulse: 71   Resp: 16   SpO2: 97%   Weight: 106 kg (234 lb 3 2 oz)     Results for orders placed or performed in visit on 03/30/21   Hemoglobin A1C   Result Value Ref Range    Hemoglobin A1C 5 7 (H) Normal 3 8-5 6%; PreDiabetic 5 7-6 4%; Diabetic >=6 5%; Glycemic control for adults with diabetes <7 0% %     mg/dl   Lipid Panel with Direct LDL reflex   Result Value Ref Range    Cholesterol 175 50 - 200 mg/dL    Triglycerides 112 <=150 mg/dL    HDL, Direct 48 >=40 mg/dL    LDL Calculated 105 (H) 0 - 100 mg/dL   Comprehensive metabolic panel   Result Value Ref Range    Sodium 139 136 - 145 mmol/L    Potassium 5 0 3 5 - 5 3 mmol/L    Chloride 108 100 - 108 mmol/L    CO2 29 21 - 32 mmol/L    ANION GAP 2 (L) 4 - 13 mmol/L    BUN 19 5 - 25 mg/dL    Creatinine 1 19 0 60 - 1 30 mg/dL    Glucose, Fasting 106 (H) 65 - 99 mg/dL    Calcium 9 3 8 3 - 10 1 mg/dL    AST 15 5 - 45 U/L    ALT 32 12 - 78 U/L    Alkaline Phosphatase 53 46 - 116 U/L    Total Protein 7 7 6 4 - 8 2 g/dL    Albumin 3 8 3 5 - 5 0 g/dL    Total Bilirubin 0 61 0 20 - 1 00 mg/dL    eGFR 65 ml/min/1 73sq m     Weight (last 2 days)     Date/Time   Weight    04/05/21 0820   106 (234 2)            Body mass index is 36 68 kg/m²  BP      Temp      Pulse     Resp      SpO2        Vitals:    04/05/21 0820   Weight: 106 kg (234 lb 3 2 oz)     Vitals:    04/05/21 0820   Weight: 106 kg (234 lb 3 2 oz)       /70   Pulse 71   Resp 16   Wt 106 kg (234 lb 3 2 oz)   SpO2 97%   BMI 36 68 kg/m²          Physical Exam  Constitutional:       General: He is not in acute distress  Appearance: He is well-developed  He is not diaphoretic  HENT:      Head: Normocephalic and atraumatic  Right Ear: External ear normal       Left Ear: External ear normal    Eyes:      General: No scleral icterus  Right eye: No discharge  Left eye: No discharge  Conjunctiva/sclera: Conjunctivae normal       Pupils: Pupils are equal, round, and reactive to light     Neck: Musculoskeletal: Neck supple  Cardiovascular:      Rate and Rhythm: Normal rate and regular rhythm  Heart sounds: Normal heart sounds  No murmur  No friction rub  No gallop  Pulmonary:      Effort: No respiratory distress  Breath sounds: No wheezing or rales  Abdominal:      General: Bowel sounds are normal  There is no distension  Palpations: Abdomen is soft  There is no mass  Tenderness: There is no abdominal tenderness  There is no guarding or rebound  Musculoskeletal:         General: No deformity  Lymphadenopathy:      Cervical: No cervical adenopathy  Neurological:      Mental Status: He is alert

## 2021-04-05 NOTE — ASSESSMENT & PLAN NOTE
Assessment and plan 1  Health maintenance annual wellness examination overall the patient is clinically stable and doing well, we encouraged the patient to follow a healthy and balanced diet  We recommend that the patient exercise routinely approximately 30 minutes 5 times per week   We have reviewed the patient's vaccines and have made recommendations for updates if necessary   He has had the COVID vaccine, flu vaccine in the fall recommended      We will be ordering screening laboratories which are age appropriate  Return to the office in    6 months   call if any problems

## 2021-04-05 NOTE — ASSESSMENT & PLAN NOTE
Hyperlipidemia   Low-cholesterol diet, consider trying CholestOff he would like to hold off on a statin at this point will recheck a lipid profile in 6 months

## 2021-04-08 ENCOUNTER — OFFICE VISIT (OUTPATIENT)
Dept: URGENT CARE | Age: 61
End: 2021-04-08
Payer: COMMERCIAL

## 2021-04-08 ENCOUNTER — APPOINTMENT (OUTPATIENT)
Dept: RADIOLOGY | Age: 61
End: 2021-04-08
Payer: COMMERCIAL

## 2021-04-08 VITALS
TEMPERATURE: 97.7 F | DIASTOLIC BLOOD PRESSURE: 92 MMHG | RESPIRATION RATE: 18 BRPM | OXYGEN SATURATION: 97 % | HEART RATE: 73 BPM | SYSTOLIC BLOOD PRESSURE: 155 MMHG

## 2021-04-08 DIAGNOSIS — S93.492A SPRAIN OF OTHER LIGAMENT OF LEFT ANKLE, INITIAL ENCOUNTER: ICD-10-CM

## 2021-04-08 DIAGNOSIS — S93.492A SPRAIN OF OTHER LIGAMENT OF LEFT ANKLE, INITIAL ENCOUNTER: Primary | ICD-10-CM

## 2021-04-08 PROCEDURE — 99213 OFFICE O/P EST LOW 20 MIN: CPT | Performed by: PHYSICIAN ASSISTANT

## 2021-04-08 PROCEDURE — 73610 X-RAY EXAM OF ANKLE: CPT

## 2021-04-08 PROCEDURE — 73630 X-RAY EXAM OF FOOT: CPT

## 2021-04-08 NOTE — PROGRESS NOTES
[facility]        NAME: Hayes Schultz is a 59 y o  male  : 1960    MRN: [id number]  DATE: 2021  TIME: 2:42 PM    Assessment and Plan   Sprain of other ligament of left ankle, initial encounter [S93 [id number]]  1  Sprain of other ligament of left ankle, initial encounter  XR foot 3+ vw left    XR ankle 3+ vw left    Ambulatory referral to Orthopedic Surgery         Patient Instructions       X-rays done today show no acute osseous abnormality  Radiologist reading pending  You will be called with any positive findings  Continue to rest, ice, compress the affected extremity  Advance activity as tolerated  Use over-the-counter pain relief alternating between Tylenol ibuprofen to help with pain  Follow-up with primary care physician 3-5 days for continued symptoms  Proceed to the emergency room with any worsening of symptoms  , inability to ambulate, loss of sensation numbness or tingling in the foot  Follow up with PCP in 3-5 days  Proceed to  ER if symptoms worsen  Chief Complaint     Chief Complaint   Patient presents with    Ankle Injury     twisted left ankle this AM         History of Present Illness       61-year-old male presents the office for chief complaint of twisting of his left ankle that happened this morning for him  Approximately 8:30 a m  Juaquin De Oliveira Patient with  Getting up from a seated position when he had an inversion injury to his left ankle  Right after injury he noticed swelling to the lateral aspect of the ankle as well as anteriorly  He felt a pop to the area  0/10 pain at rest however 4/10 pain with standing  He has been able to ambulate on the affected extremity however has increased pain with doing so  Denies any previous injuries or surgical intervention to this left ankle  Pain with palpation over the area as well as turning the foot inwards  Use use ice which has helped significantly with swelling however continues to have swelling to the area  Denies seeing any bruising or redness  Did take 100 mg of ibuprofen which has helped with pain  Patient denies any radiation of his pain or any other injury at the time of his incident  Denies any head injury or loss of consciousness  Ankle Injury   The incident occurred 3 to 6 hours ago  The incident occurred at home  The injury mechanism was an inversion injury  Pain location: left ankle  The pain is at a severity of 4/10 (throbbing sensation )  Associated symptoms include a loss of motion (painful motion )  Pertinent negatives include no inability to bear weight, loss of sensation, numbness or tingling  The symptoms are aggravated by movement, palpation and weight bearing  He has tried ice and acetaminophen for the symptoms  The treatment provided mild relief  Review of Systems   Review of Systems   Constitutional: Negative for chills and fever  Respiratory: Negative for chest tightness and shortness of breath  Cardiovascular: Negative for chest pain and palpitations  Musculoskeletal: Positive for arthralgias, gait problem and myalgias  Neurological: Negative for dizziness, tingling, numbness and headaches  Current Medications       Current Outpatient Medications:     albuterol (2 5 mg/3 mL) 0 083 % nebulizer solution, Inhale, Disp: , Rfl:     albuterol (ProAir HFA) 90 mcg/act inhaler, Inhale 2 puffs every 6 (six) hours as needed for wheezing Use 2 puffs prior to exercise , Disp: 18 g, Rfl: 5    Cholecalciferol (VITAMIN D3) 5000 units CAPS, Take 5,000 Units by mouth , Disp: , Rfl:     Cinnamon 500 MG capsule, Take by mouth, Disp: , Rfl:     cyanocobalamin (CVS VITAMIN B-12) 1000 MCG tablet, Take by mouth, Disp: , Rfl:     fluticasone (FLONASE) 50 mcg/act nasal spray, Two sprays each nostril once daily as needed  , Disp: 1 Bottle, Rfl: 11    levocetirizine (XYZAL) 5 MG tablet, TAKE 1 TABLET BY MOUTH EVERY DAY IN THE EVENING, Disp: 30 tablet, Rfl: 3    Omega-3 Fatty Acids (FISH OIL) 1,000 mg, Take 3 capsules by mouth daily, Disp: , Rfl:     Current Allergies     Allergies as of 04/08/2021    (No Known Allergies)            The following portions of the patient's history were reviewed and updated as appropriate: allergies, current medications, past family history, past medical history, past social history, past surgical history and problem list      Past Medical History:   Diagnosis Date    Asthma     Erectile dysfunction of non-organic origin     Resolved: 10/15/2014       Past Surgical History:   Procedure Laterality Date    EYE SURGERY      TONSILLECTOMY AND ADENOIDECTOMY      UMBILICAL HERNIA REPAIR         Family History   Problem Relation Age of Onset    Asthma Mother     Heart disease Mother     Rheum arthritis Mother     Diabetes Father     Heart disease Father     Asthma Sister     Diabetes Sister     Other Son         Thyroid disorder         Medications have been verified  Objective   /92   Pulse 73   Temp 97 7 °F (36 5 °C)   Resp 18   SpO2 97%   No LMP for male patient  Physical Exam     Physical Exam  Vitals signs and nursing note reviewed  Constitutional:       General: He is not in acute distress  Appearance: He is well-developed  HENT:      Head: Normocephalic and atraumatic  Right Ear: Hearing and external ear normal       Left Ear: Hearing and external ear normal       Nose: Nose normal    Eyes:      Conjunctiva/sclera: Conjunctivae normal       Pupils: Pupils are equal, round, and reactive to light  Neck:      Musculoskeletal: Normal range of motion  Cardiovascular:      Rate and Rhythm: Normal rate and regular rhythm  Pulses: Normal pulses  Heart sounds: Normal heart sounds  No murmur  No friction rub  No gallop  Pulmonary:      Effort: Pulmonary effort is normal  No respiratory distress  Breath sounds: Normal breath sounds  No wheezing or rales  Musculoskeletal:         General: No deformity  Left ankle: He exhibits decreased range of motion and swelling  He exhibits no laceration  Tenderness  AITFL tenderness found  No head of 5th metatarsal and no proximal fibula tenderness found  Left foot: Decreased range of motion  Normal capillary refill  Tenderness and swelling present  No bony tenderness or laceration  Feet:    Skin:     General: Skin is warm and dry  Capillary Refill: Capillary refill takes less than 2 seconds  Findings: No ecchymosis, erythema or laceration  Neurological:      Mental Status: He is alert  Sensory: No sensory deficit  Motor: No abnormal muscle tone  Deep Tendon Reflexes: Reflexes normal    Psychiatric:         Behavior: Behavior is cooperative

## 2021-04-08 NOTE — PATIENT INSTRUCTIONS
X-rays done today show no acute osseous abnormality  Radiologist reading pending  You will be called with any positive findings  Continue to rest, ice, compress the affected extremity  Use Ace bandage as instructed   Advance activity as tolerated  Use over-the-counter pain relief alternating between Tylenol ibuprofen to help with pain  Follow-up with primary care physician 3-5 days for continued symptoms  Proceed to the emergency room with any worsening of symptoms  , inability to ambulate, loss of sensation numbness or tingling in the foot  Ankle Sprain   WHAT YOU NEED TO KNOW:   An ankle sprain happens when 1 or more ligaments in your ankle joint stretch or tear  Ligaments are tough tissues that connect bones  Ligaments support your joints and keep your bones in place  DISCHARGE INSTRUCTIONS:   Return to the emergency department if:   · You have severe pain in your ankle  · Your foot or toes are cold or numb  · Your ankle becomes more weak or unstable (wobbly)  · You are unable to put any weight on your ankle or foot  · Your swelling has increased or returned  Call your doctor if:   · Your pain does not go away, even after treatment  · You have questions or concerns about your condition or care  Medicines: You may need any of the following:  · NSAIDs , such as ibuprofen, help decrease swelling, pain, and fever  This medicine is available with or without a doctor's order  NSAIDs can cause stomach bleeding or kidney problems in certain people  If you take blood thinner medicine, always ask your healthcare provider if NSAIDs are safe for you  Always read the medicine label and follow directions  · Acetaminophen  decreases pain and fever  It is available without a doctor's order  Ask how much to take and how often to take it  Follow directions   Read the labels of all other medicines you are using to see if they also contain acetaminophen, or ask your doctor or pharmacist  Acetaminophen can cause liver damage if not taken correctly  Do not use more than 4 grams (4,000 milligrams) total of acetaminophen in one day  · Prescription pain medicine  may be given  Ask your healthcare provider how to take this medicine safely  Some prescription pain medicines contain acetaminophen  Do not take other medicines that contain acetaminophen without talking to your healthcare provider  Too much acetaminophen may cause liver damage  Prescription pain medicine may cause constipation  Ask your healthcare provider how to prevent or treat constipation  · Take your medicine as directed  Contact your healthcare provider if you think your medicine is not helping or if you have side effects  Tell him or her if you are allergic to any medicine  Keep a list of the medicines, vitamins, and herbs you take  Include the amounts, and when and why you take them  Bring the list or the pill bottles to follow-up visits  Carry your medicine list with you in case of an emergency  Self-care:   · Use support devices,  such as a brace, cast, or splint, to limit your movement and protect your joint  You may need to use crutches to decrease your pain as you move around  · Go to physical therapy as directed  A physical therapist teaches you exercises to help improve movement and strength, and to decrease pain  · Rest  your ankle so that it can heal  Return to normal activities as directed  · Apply ice  on your ankle for 15 to 20 minutes every hour or as directed  Use an ice pack, or put crushed ice in a plastic bag  Cover it with a towel  Ice helps prevent tissue damage and decreases swelling and pain  · Compress  your ankle  Ask if you should wrap an elastic bandage around your injured ligament  An elastic bandage provides support and helps decrease swelling and movement so your joint can heal  Wear as long as directed  · Elevate  your ankle above the level of your heart as often as you can   This will help decrease swelling and pain  Prop your ankle on pillows or blankets to keep it elevated comfortably  Prevent another ankle sprain:   · Let your ankle heal   Find out how long your ligament needs to heal  Do not do any physical activity until your healthcare provider says it is okay  If you start activity too soon, you may develop a more serious injury  · Always warm up and stretch  before you exercise or play sports  · Use the right equipment  Always wear shoes that fit well and are made for the activity that you are doing  You may also need ankle supports, elbow and knee pads, or braces  Follow up with your doctor as directed:  Write down your questions so you remember to ask them during your visits  © Copyright [address] Information is for End User's use only and may not be sold, redistributed or otherwise used for commercial purposes  All illustrations and images included in CareNotes® are the copyrighted property of A D A M , Inc  or [facility] Brenda Hanna   The above information is an  only  It is not intended as medical advice for individual conditions or treatments  Talk to your doctor, nurse or pharmacist before following any medical regimen to see if it is safe and effective for you

## 2021-04-19 ENCOUNTER — EVALUATION (OUTPATIENT)
Dept: PHYSICAL THERAPY | Facility: CLINIC | Age: 61
End: 2021-04-19
Payer: COMMERCIAL

## 2021-04-19 DIAGNOSIS — S93.492D SPRAIN OF ANTERIOR TALOFIBULAR LIGAMENT OF LEFT ANKLE, SUBSEQUENT ENCOUNTER: ICD-10-CM

## 2021-04-19 DIAGNOSIS — M72.2 PLANTAR FASCIITIS: Primary | ICD-10-CM

## 2021-04-19 PROCEDURE — 97162 PT EVAL MOD COMPLEX 30 MIN: CPT | Performed by: PHYSICAL THERAPIST

## 2021-04-19 PROCEDURE — 97110 THERAPEUTIC EXERCISES: CPT | Performed by: PHYSICAL THERAPIST

## 2021-04-19 PROCEDURE — 97140 MANUAL THERAPY 1/> REGIONS: CPT | Performed by: PHYSICAL THERAPIST

## 2021-04-19 NOTE — PROGRESS NOTES
PT Evaluation     Today's date: 2021  Patient name: Yefri Alonzo  : 1960  MRN: [id number]  Referring provider: Shanice Hester DO  Dx:   Encounter Diagnosis     ICD-10-CM    1  Sprain of anterior talofibular ligament of left ankle, subsequent encounter  S93 492D    2  Plantar fasciitis  M72 2 Ambulatory referral to Physical Therapy                  Assessment  Assessment details: Pt presents with s/s consistent with B plantar fascitis and a L ATFL sprain  Pt will benefit from PT to address impairments and restore function  Impairments: abnormal gait, abnormal or restricted ROM, activity intolerance, impaired physical strength, lacks appropriate home exercise program and pain with function  Prognosis details: ST-4 weeks  1   Pt will decrease pain > 50%  2   Pt will increase B DF PROM > 10 deg  LT-6 weeks  1   Pt will decrease pain > 75%  2   Pt will demonstrate a normalized gait pattern  Plan  Planned modality interventions: low level laser therapy  Planned therapy interventions: manual therapy, neuromuscular re-education, patient education, body mechanics training, strengthening, stretching, therapeutic activities, therapeutic exercise, therapeutic training, functional ROM exercises, gait training, graded activity, graded exercise and home exercise program  Frequency: 2x week  Duration in weeks: 6        Subjective Evaluation    History of Present Illness  Mechanism of injury: Pt is a 59 yom c/o B plantar foot pain that began after walking a lot on vacation with poor arch support in his sneakers 1 5 years ago  Pt also reports rolling his L ankle on 21 and c/o lateral ankle pain    Pain  Current pain ratin  At best pain ratin  At worst pain ratin  Quality: sharp  Relieving factors: rest      Diagnostic Tests  X-ray: abnormal (21 L ankle: no fracture, marked swelling )  Patient Goals  Patient goals for therapy: decreased edema, decreased pain, improved balance, increased motion, increased strength, independence with ADLs/IADLs, return to sport/leisure activities and return to work          Objective     Passive Range of Motion   Left Ankle/Foot    Dorsiflexion (ke): -5 degrees with pain  Plantar flexion: 60 degrees   Inversion: 15 degrees with pain  Eversion: 10 degrees with pain  Great toe extension: 30 degrees     Right Ankle/Foot    Dorsiflexion (ke): 0 degrees   Plantar flexion: 65 degrees   Inversion: 40 degrees   Eversion: 20 degrees   Great toe extension: 45 degrees     Additional Passive Range of Motion Details  TTP: longitudional arch B, L ATFL  Gait: Mod R hip ER, min L hip ER, moderately increased ANDREE, mod R lateral trunk lean during midstance  Balance: SLS: R: 25 sec, L: 15 sec  Edema: 3+ around L foot and ankle     Tests   Left Ankle/Foot   Positive for inversion talar tilt  Negative for syndesmosis squeeze and syndesmosis external rotation  Precautions: none  DX: B plantar fascitis, L ATFL sprain        Manuals 4/19            Laser B plantar fascia 2000J ea            Laser L ATFL 3000J            Graston B plantar fascia                          Neuro Re-Ed             Balance: SLS 1x20" ea                                                                                          Ther Ex             DL ecc heelraises 2x15 step           Standing GA stretch 15"x2 ea            Seated towel scrunch 1x30 ea            Towel GA/PF stretch 15"x2 ea                                                                Ther Activity                                       Gait Training             Alter G   Heel-toe 60% BW                        Modalities

## 2021-04-22 ENCOUNTER — OFFICE VISIT (OUTPATIENT)
Dept: SLEEP CENTER | Facility: CLINIC | Age: 61
End: 2021-04-22
Payer: COMMERCIAL

## 2021-04-22 VITALS
BODY MASS INDEX: 37.54 KG/M2 | WEIGHT: 239.2 LBS | DIASTOLIC BLOOD PRESSURE: 90 MMHG | SYSTOLIC BLOOD PRESSURE: 140 MMHG | HEIGHT: 67 IN

## 2021-04-22 DIAGNOSIS — G47.33 OSA (OBSTRUCTIVE SLEEP APNEA): Primary | ICD-10-CM

## 2021-04-22 PROCEDURE — 1036F TOBACCO NON-USER: CPT | Performed by: INTERNAL MEDICINE

## 2021-04-22 PROCEDURE — 99213 OFFICE O/P EST LOW 20 MIN: CPT | Performed by: INTERNAL MEDICINE

## 2021-04-22 PROCEDURE — 3008F BODY MASS INDEX DOCD: CPT | Performed by: INTERNAL MEDICINE

## 2021-04-22 NOTE — PROGRESS NOTES
Progress Note - Sleep Center   [facility]: MRN: [id number]      Addendum:  It was documented on a prior history and physical that the patient smokes cigars  In fact, he does not smoke cigars or any other tobacco products  Reason for Visit:  59 y o male here for annual follow-up    Assessment:  Doing well on current therapy of CPAP 10 cm for severe SWAPNIL (AHI = 39 7)  Plan:  Continue same    The patient would like an increase in his humidifier  I communicated with [facility]  Follow up: One year    History of Present Illness:  History of SWAPNIL on PAP therapy  Fully compliant and deriving benefit      Review of Systems      Genitourinary none   Cardiology none   Gastrointestinal none   Neurology none   Constitutional weight change   Integumentary none   Psychiatry none   Musculoskeletal none   Pulmonary none   ENT throat clearing   Endocrine none   Hematological none         I have reviewed and updated the review of systems as necessary      Historical Information    Past Medical History:   Past Medical History:   Diagnosis Date    Asthma     Erectile dysfunction of non-organic origin     Resolved: 10/15/2014         Past Surgical History:   Past Surgical History:   Procedure Laterality Date    EYE SURGERY      TONSILLECTOMY AND ADENOIDECTOMY      UMBILICAL HERNIA REPAIR         Social History:   Social History     Socioeconomic History    Marital status: /Civil Union     Spouse name: None    Number of children: None    Years of education: None    Highest education level: None   Occupational History    None   Social Needs    Financial resource strain: None    Food insecurity     Worry: None     Inability: None    Transportation needs     Medical: None     Non-medical: None   Tobacco Use    Smoking status: Former Smoker     Packs/day: 1 00     Years: 13 00     Pack years: 13 00     Types: Cigarettes     Quit date:      Years since quittin 3    Smokeless tobacco: Never Used    Tobacco comment: He smoked a pack a day of cigarettes for 8 years  He quite at the age of 22  Occasional cigar use     Substance and Sexual Activity    Alcohol use: Yes     Comment: Social drinker    Drug use: No    Sexual activity: None   Lifestyle    Physical activity     Days per week: None     Minutes per session: None    Stress: None   Relationships    Social connections     Talks on phone: None     Gets together: None     Attends Zoroastrian service: None     Active member of club or organization: None     Attends meetings of clubs or organizations: None     Relationship status: None    Intimate partner violence     Fear of current or ex partner: None     Emotionally abused: None     Physically abused: None     Forced sexual activity: None   Other Topics Concern    None   Social History Narrative    Caffeine use    Work-related stress       Family History:   Family History   Problem Relation Age of Onset    Asthma Mother     Heart disease Mother     Rheum arthritis Mother     Diabetes Father     Heart disease Father     Asthma Sister     Diabetes Sister     Other Son         Thyroid disorder       Medications/Allergies:      Current Outpatient Medications:     albuterol (2 5 mg/3 mL) 0 083 % nebulizer solution, Inhale, Disp: , Rfl:     albuterol (ProAir HFA) 90 mcg/act inhaler, Inhale 2 puffs every 6 (six) hours as needed for wheezing Use 2 puffs prior to exercise , Disp: 18 g, Rfl: 5    Cholecalciferol (VITAMIN D3) 5000 units CAPS, Take 5,000 Units by mouth , Disp: , Rfl:     Cinnamon 500 MG capsule, Take by mouth, Disp: , Rfl:     cyanocobalamin ([employer] VITAMIN B-12) 1000 MCG tablet, Take by mouth, Disp: , Rfl:     levocetirizine (XYZAL) 5 MG tablet, TAKE 1 TABLET BY MOUTH EVERY DAY IN THE EVENING, Disp: 30 tablet, Rfl: 3    Omega-3 Fatty Acids (FISH OIL) 1,000 mg, Take 3 capsules by mouth daily, Disp: , Rfl:     fluticasone (FLONASE) 50 mcg/act nasal spray, Two sprays each nostril once daily as needed  (Patient not taking: Reported on 4/22/2021), Disp: 1 Bottle, Rfl: 11          Objective      Vital Signs:   Vitals:    04/22/21 1437   BP: 140/90     [place] Sleepiness Scale: Total score: 1        Physical Exam:    General: Alert, appropriate, cooperative, overweight    Head: NC/AT    Skin: Warm, dry    Neuro: No motor abnormalities, cranial nerves appear intact    Extremity: No clubbing, cyanosis      DME Provider: [facility]        Counseling / Coordination of Care   I have spent 10 minutes with the patient today in which greater than 50% of this time was spent in counseling/coordination of care regarding: equipment and compliance  Board Certified Sleep Specialist    Portions of the record may have been created with voice recognition software  Occasional wrong word or "sound a like" substitutions may have occurred due to the inherent limitations of voice recognition software  Read the chart carefully and recognize, using context, where substitutions have occurred

## 2021-04-23 ENCOUNTER — OFFICE VISIT (OUTPATIENT)
Dept: PHYSICAL THERAPY | Facility: CLINIC | Age: 61
End: 2021-04-23
Payer: COMMERCIAL

## 2021-04-23 DIAGNOSIS — M72.2 PLANTAR FASCIITIS: Primary | ICD-10-CM

## 2021-04-23 DIAGNOSIS — S93.492D SPRAIN OF ANTERIOR TALOFIBULAR LIGAMENT OF LEFT ANKLE, SUBSEQUENT ENCOUNTER: ICD-10-CM

## 2021-04-23 PROCEDURE — 97116 GAIT TRAINING THERAPY: CPT | Performed by: PHYSICAL THERAPIST

## 2021-04-23 PROCEDURE — 97110 THERAPEUTIC EXERCISES: CPT | Performed by: PHYSICAL THERAPIST

## 2021-04-23 PROCEDURE — 97140 MANUAL THERAPY 1/> REGIONS: CPT | Performed by: PHYSICAL THERAPIST

## 2021-04-23 NOTE — PROGRESS NOTES
Daily Note     Today's date: 2021  Patient name: Bob Childers  : 1960  MRN: [id number]  Referring provider: Buddy Davila DO  Dx:   Encounter Diagnosis     ICD-10-CM    1  Plantar fasciitis  M72 2    2  Sprain of anterior talofibular ligament of left ankle, subsequent encounter  E40 325D                   Subjective: Patient states decreased pain since IE  Objective: See treatment diary below      Assessment: Patient required verbal cueing for heel to toe sequencing during gait training in Alter; performed ambulation in AlterG without c/o pain; positive response to Laser  Plan: Continue per plan of care  Precautions: none  DX: B plantar fascitis, L ATFL sprain        Manuals            Laser B plantar fascia 2000J ea 2000J ea           Laser L ATFL 3000J 3000J           Graston B plantar fascia                          Neuro Re-Ed             Balance: SLS 1x20" ea                                                                                          Ther Ex             DL ecc heelraises 2x15 Step 2x15           Standing GA stretch 15"x2 ea 15"x2 ea           Seated towel scrunch 1x30 ea            Towel GA/PF stretch 15"x2 ea                                                                Ther Activity                                       Gait Training             Alter G   Heel- toe 60% BW 10 min 2 0- 3 4 mph                        Modalities

## 2021-04-26 ENCOUNTER — OFFICE VISIT (OUTPATIENT)
Dept: PHYSICAL THERAPY | Facility: CLINIC | Age: 61
End: 2021-04-26
Payer: COMMERCIAL

## 2021-04-26 DIAGNOSIS — S93.492D SPRAIN OF ANTERIOR TALOFIBULAR LIGAMENT OF LEFT ANKLE, SUBSEQUENT ENCOUNTER: ICD-10-CM

## 2021-04-26 DIAGNOSIS — M72.2 PLANTAR FASCIITIS: Primary | ICD-10-CM

## 2021-04-26 PROCEDURE — 97110 THERAPEUTIC EXERCISES: CPT | Performed by: PHYSICAL THERAPIST

## 2021-04-26 PROCEDURE — 97140 MANUAL THERAPY 1/> REGIONS: CPT | Performed by: PHYSICAL THERAPIST

## 2021-04-26 PROCEDURE — 97116 GAIT TRAINING THERAPY: CPT | Performed by: PHYSICAL THERAPIST

## 2021-04-26 NOTE — PROGRESS NOTES
Daily Note     Today's date: 2021  Patient name: Yefri Alonzo  : 1960  MRN: [id number]  Referring provider: Shanice Hester DO  Dx:   Encounter Diagnosis     ICD-10-CM    1  Plantar fasciitis  M72 2    2  Sprain of anterior talofibular ligament of left ankle, subsequent encounter  S90 518D                   Subjective: Patient reports no plantar foot pain, 3/10 L lateral ankle pain, moderate R calf soreness with TTP  Objective: See treatment diary below  Assessment: Decreased intensity and frequency of ecc heelraises during HEP until GA soreness resolves  Plan: Continue per plan of care  Precautions: none  DX: B plantar fascitis, L ATFL sprain        Manuals           Laser B plantar fascia 2000J ea 2000J ea 2000J          Laser L ATFL 3000J 3000J 3000J          Graston B plantar fascia             Laser R GA insertion   2000J          Neuro Re-Ed             Balance: SLS 1x20" ea  2x30" ea                                                                                        Ther Ex             DL ecc heelraises 2x15 Step 2x15 3x10          Standing GA stretch 15"x2 ea 15"x2 ea 15"x2 ea          Seated towel scrunch 1x30 ea  1x30 ea          Towel GA/PF stretch 15"x2 ea  15"x3 ea                                                              Ther Activity                                       Gait Training             Alter G   Heel- toe 60% BW 10 min 2 0- 3 4 mph Heel-toe 75% BW                        Modalities

## 2021-04-30 ENCOUNTER — OFFICE VISIT (OUTPATIENT)
Dept: PHYSICAL THERAPY | Facility: CLINIC | Age: 61
End: 2021-04-30
Payer: COMMERCIAL

## 2021-04-30 DIAGNOSIS — M72.2 PLANTAR FASCIITIS: Primary | ICD-10-CM

## 2021-04-30 DIAGNOSIS — S93.492D SPRAIN OF ANTERIOR TALOFIBULAR LIGAMENT OF LEFT ANKLE, SUBSEQUENT ENCOUNTER: ICD-10-CM

## 2021-04-30 PROCEDURE — 97110 THERAPEUTIC EXERCISES: CPT | Performed by: PHYSICAL THERAPIST

## 2021-04-30 PROCEDURE — 97116 GAIT TRAINING THERAPY: CPT | Performed by: PHYSICAL THERAPIST

## 2021-04-30 PROCEDURE — 97140 MANUAL THERAPY 1/> REGIONS: CPT | Performed by: PHYSICAL THERAPIST

## 2021-04-30 NOTE — PROGRESS NOTES
Daily Note     Today's date: 2021  Patient name: Berwyn Dakins  : 1960  MRN: [id number]  Referring provider: Nevaeh West DO  Dx:   Encounter Diagnosis     ICD-10-CM    1  Plantar fasciitis  M72 2    2  Sprain of anterior talofibular ligament of left ankle, subsequent encounter  S95 959D                   Subjective: Patient reports a resolution of B GA soreness, no foot pain  Objective: See treatment diary below  Assessment: Pt demonstrated improved tolerance to ecc heel raises from the floor, difficulty activating foot intrinsic muscles  Plan: Continue per plan of care  Precautions: none  DX: B plantar fascitis, L ATFL sprain        Manuals          Laser B plantar fascia 2000J ea 2000J ea 2000J 2000J ea         Laser L ATFL 3000J 3000J 3000J 4000J         Graston B plantar fascia    3 min ea         Laser R GA insertion   2000J          Neuro Re-Ed             Balance: SLS Biodex 1x20" ea  2x30" ea 1x60" ea                                                                                       Ther Ex             DL ecc heelraises 2x15 Step 2x15 3x10 3x10         Standing GA stretch 15"x2 ea 15"x2 ea 15"x2 ea 15"x3 ea         Seated towel scrunch 1x30 ea  1x30 ea 1x40 ea         Towel GA/PF stretch 15"x2 ea  15"x3 ea 15"x3         Short arch    5"x20                                                Ther Activity                                       Gait Training             Alter G   Heel- toe 60% BW 10 min 2 0- 3 4 mph                        Modalities

## 2021-05-03 ENCOUNTER — OFFICE VISIT (OUTPATIENT)
Dept: PHYSICAL THERAPY | Facility: CLINIC | Age: 61
End: 2021-05-03
Payer: COMMERCIAL

## 2021-05-03 DIAGNOSIS — M72.2 PLANTAR FASCIITIS: Primary | ICD-10-CM

## 2021-05-03 DIAGNOSIS — S93.492D SPRAIN OF ANTERIOR TALOFIBULAR LIGAMENT OF LEFT ANKLE, SUBSEQUENT ENCOUNTER: ICD-10-CM

## 2021-05-03 PROCEDURE — 97110 THERAPEUTIC EXERCISES: CPT | Performed by: PHYSICAL THERAPIST

## 2021-05-03 PROCEDURE — 97116 GAIT TRAINING THERAPY: CPT | Performed by: PHYSICAL THERAPIST

## 2021-05-03 PROCEDURE — 97140 MANUAL THERAPY 1/> REGIONS: CPT | Performed by: PHYSICAL THERAPIST

## 2021-05-03 NOTE — PROGRESS NOTES
Daily Note     Today's date: 5/3/2021  Patient name: Octavia Thorne  : 1960  MRN: [id number]  Referring provider: Miriam Hazel DO  Dx:   Encounter Diagnosis     ICD-10-CM    1  Plantar fasciitis  M72 2    2  Sprain of anterior talofibular ligament of left ankle, subsequent encounter  G99 005D                   Subjective: Patient reports very mild plantar foot pain with prolonged standing for several hours  Objective: See treatment diary below  Assessment: Pt demonstrated improved tolerance to functional stresses  Plan: Continue per plan of care  Precautions: none  DX: B plantar fascitis, L ATFL sprain        Manuals 4/19 4/23 4/26 4/30 5/3        Laser B plantar fascia 2000J ea 2000J ea 2000J 2000J ea 2000J ea        Laser L ATFL 3000J 3000J 3000J 4000J 4000J        Graston B plantar fascia    3 min ea 3 min ea        Laser R GA insertion   2000J          Neuro Re-Ed             Balance: SLS Biodex 1x20" ea  2x30" ea 1x60" ea 1x60" ea                                                                                      Ther Ex             DL ecc heelraises 2x15 Step 2x15 3x10 3x10 3x12        Standing GA stretch 15"x2 ea 15"x2 ea 15"x2 ea 15"x3 ea 15"x3 ea        Seated towel scrunch 1x30 ea  1x30 ea 1x40 ea 1x50 ea        Towel GA/PF stretch 15"x2 ea  15"x3 ea 15"x3 15"x3        Short arch    5"x20 5"x20                                               Ther Activity                                       Gait Training             Alter G   Heel- toe 60% BW 10 min 2 0- 3 4 mph           TM     3 mph x 8 min heel-toe        Modalities

## 2021-05-05 ENCOUNTER — OFFICE VISIT (OUTPATIENT)
Dept: PHYSICAL THERAPY | Facility: CLINIC | Age: 61
End: 2021-05-05
Payer: COMMERCIAL

## 2021-05-05 DIAGNOSIS — M72.2 PLANTAR FASCIITIS: Primary | ICD-10-CM

## 2021-05-05 DIAGNOSIS — S93.492D SPRAIN OF ANTERIOR TALOFIBULAR LIGAMENT OF LEFT ANKLE, SUBSEQUENT ENCOUNTER: ICD-10-CM

## 2021-05-05 PROCEDURE — 97116 GAIT TRAINING THERAPY: CPT | Performed by: PHYSICAL THERAPIST

## 2021-05-05 PROCEDURE — 97110 THERAPEUTIC EXERCISES: CPT | Performed by: PHYSICAL THERAPIST

## 2021-05-05 PROCEDURE — L3030 FOOT ARCH SUPPORT REMOV PREM: HCPCS | Performed by: PHYSICAL THERAPIST

## 2021-05-05 NOTE — PROGRESS NOTES
Daily Note     Today's date: 2021  Patient name: Hayes Schultz  : 1960  MRN: [id number]  Referring provider: Aidan Phillips DO  Dx:   Encounter Diagnosis     ICD-10-CM    1  Plantar fasciitis  M72 2    2  Sprain of anterior talofibular ligament of left ankle, subsequent encounter  Q04 783D                   Subjective: Pt reports R>L plantar foot pain only with prolonged standing for 3-4 hours  Objective: See treatment diary below  Orthotic fitting performed  Navicular drop: B: 0 5 cm  Rearfoot varus: NWB: R: 3 deg, L: 2 deg  Rearfoot valgus: FWB: R: 2 deg, L: 1 deg    Assessment: Pt demonstrated mild B calcaneal valgus in WB, improved gait speed and L>R hip ER  Plan: Continue per plan of care  Precautions: none  DX: B plantar fascitis, L ATFL sprain        Manuals 4/19 4/23 4/26 4/30 5/3 5       Laser B plantar fascia 2000J ea 2000J ea 2000J 2000J ea 2000J ea 2000J ea       Laser L ATFL 3000J 3000J 3000J 4000J 4000J 4000J       Graston B plantar fascia    3 min ea 3 min ea 3 min ea       Laser R GA insertion   2000J          Neuro Re-Ed             Balance: SLS Biodex 1x20" ea  2x30" ea 1x60" ea 1x60" ea L10  60"x1 ea                                                                                     Ther Ex             DL ecc heelraises 2x15 Step 2x15 3x10 3x10 3x12 3x12       Standing GA stretch 15"x2 ea 15"x2 ea 15"x2 ea 15"x3 ea 15"x3 ea 15"x3 ea       Seated towel scrunch 1x30 ea  1x30 ea 1x40 ea 1x50 ea 1x50 ea       Towel GA/PF stretch 15"x2 ea  15"x3 ea 15"x3 15"x3 15"x3       Short arch    5"x20 5"x20 5"x20                                              Ther Activity                                       Gait Training             Alter G   Heel- toe 60% BW 10 min 2 0- 3 4 mph           TM     3 mph x 8 min heel-toe 3 mph  11 min  Heel-toe       Modalities

## 2021-05-07 DIAGNOSIS — E78.5 HYPERLIPIDEMIA, UNSPECIFIED HYPERLIPIDEMIA TYPE: Primary | ICD-10-CM

## 2021-05-11 ENCOUNTER — OFFICE VISIT (OUTPATIENT)
Dept: PHYSICAL THERAPY | Facility: CLINIC | Age: 61
End: 2021-05-11
Payer: COMMERCIAL

## 2021-05-11 DIAGNOSIS — S93.492D SPRAIN OF ANTERIOR TALOFIBULAR LIGAMENT OF LEFT ANKLE, SUBSEQUENT ENCOUNTER: ICD-10-CM

## 2021-05-11 DIAGNOSIS — M72.2 PLANTAR FASCIITIS: Primary | ICD-10-CM

## 2021-05-11 PROCEDURE — 97110 THERAPEUTIC EXERCISES: CPT | Performed by: PHYSICAL THERAPIST

## 2021-05-11 PROCEDURE — 97116 GAIT TRAINING THERAPY: CPT | Performed by: PHYSICAL THERAPIST

## 2021-05-11 NOTE — PROGRESS NOTES
Daily Note     Today's date: 2021  Patient name: Eddy Romero  : 1960  MRN: [id number]  Referring provider: George Mcmanus DO  Dx:   Encounter Diagnosis     ICD-10-CM    1  Plantar fasciitis  M72 2    2  Sprain of anterior talofibular ligament of left ankle, subsequent encounter  S94 884D                   Subjective: Pt reports no plantar foot pain, pt reports increased L lateral ankle pain with laterally pushing off and with touch  Objective: See treatment diary below  Mod B knee varus  Assessment: Pt demonstrated increased L ankle swelling likely secondary to INV stresses with ambulation  Plan: Continue per plan of care  Precautions: none  DX: B plantar fascitis, L ATFL sprain        Manuals 4/19 4/23 4/26 4/30 5/3 5/5 5/11      Laser B plantar fascia 2000J ea 2000J ea 2000J 2000J ea 2000J ea 2000J ea 2000J ea      Laser L ATFL 3000J 3000J 3000J 4000J 4000J 4000J 4000J      Graston B plantar fascia    3 min ea 3 min ea 3 min ea 3 min ea      Laser R GA insertion   2000J          Neuro Re-Ed             Balance: SLS Biodex 1x20" ea  2x30" ea 1x60" ea 1x60" ea L10  60"x1 ea L10 60"x1 ea                                                                                    Ther Ex             DL ecc heelraises 2x15 Step 2x15 3x10 3x10 3x12 3x12 3x10      Standing GA stretch 15"x2 ea 15"x2 ea 15"x2 ea 15"x3 ea 15"x3 ea 15"x3 ea 15"x3 ea      Seated towel scrunch 1x30 ea  1x30 ea 1x40 ea 1x50 ea 1x50 ea 1x100 ea      Towel GA/PF stretch 15"x2 ea  15"x3 ea 15"x3 15"x3 15"x3 15"x3      Short arch    5"x20 5"x20 5"x20 5"x20      L TB INV/TAVON       G/ 3x10 ea                                Ther Activity                                       Gait Training             Alter G   Heel- toe 60% BW 10 min 2 0- 3 4 mph           TM     3 mph x 8 min heel-toe 3 mph  11 min  Heel-toe 3 mph 10 min heel-toe      Modalities

## 2021-05-12 ENCOUNTER — VBI (OUTPATIENT)
Dept: ADMINISTRATIVE | Facility: OTHER | Age: 61
End: 2021-05-12

## 2021-05-14 ENCOUNTER — OFFICE VISIT (OUTPATIENT)
Dept: PHYSICAL THERAPY | Facility: CLINIC | Age: 61
End: 2021-05-14
Payer: COMMERCIAL

## 2021-05-14 DIAGNOSIS — S93.492D SPRAIN OF ANTERIOR TALOFIBULAR LIGAMENT OF LEFT ANKLE, SUBSEQUENT ENCOUNTER: ICD-10-CM

## 2021-05-14 DIAGNOSIS — M72.2 PLANTAR FASCIITIS: Primary | ICD-10-CM

## 2021-05-14 PROCEDURE — 97140 MANUAL THERAPY 1/> REGIONS: CPT

## 2021-05-14 PROCEDURE — 97112 NEUROMUSCULAR REEDUCATION: CPT

## 2021-05-14 PROCEDURE — 97110 THERAPEUTIC EXERCISES: CPT

## 2021-05-14 NOTE — PROGRESS NOTES
Daily Note     Today's date: 2021  Patient name: Ciera Maza  : 1960  MRN: [id number]  Referring provider: Mauricio Long DO  Dx:   Encounter Diagnosis     ICD-10-CM    1  Plantar fasciitis  M72 2    2  Sprain of anterior talofibular ligament of left ankle, subsequent encounter  S93 492D               1 on 1 with PT JRS 9:20-10:15    Subjective: Pt reports no plantar foot pain  He states pain and swelling in left lateral ankle have improved tremendously since last visit  Objective: See treatment diary below  L ankle demonstrates edema over lateral malleolus and sinus tarsi today  Assessment: Pt tolerated treatment well  He benefited from addition of manual techniques to decrease tightness in plantar aspect of foot and to increase ankle DF ROM  Patient will continue to benefit from PT  Plan: Continue per plan of care  Progress WB proprioception as tolerated  Precautions: none  DX: B plantar fascitis, L ATFL sprain        Manuals 4/19 4/23 4/26 4/30 5/3 5/5 5/11 5/14/21   Laser B plantar fascia 2000J ea 2000J ea 2000J 2000J ea 2000J ea 2000J ea 2000J ea NP   Laser L ATFL 3000J 3000J 3000J 4000J 4000J 4000J 4000J 1500 W x 4 5 min   Graston B plantar fascia    3 min ea 3 min ea 3 min ea 3 min ea NP   Post Fibular MWM Left Ankle        JRS   Counterstrain  Left FHB        JRS   Posterior TCJ Mobs G4        JRS B                         Laser R GA insertion   2000J        Neuro Re-Ed        21   Balance: SLS Biodex 1x20" ea  2x30" ea 1x60" ea 1x60" ea L10  60"x1 ea L10 60"x1 ea    Biodex LOS        L10 x 3   Biodex Maze        L 10 x 3                                               Ther Ex        21   DL ecc heelraises 2x15 Step 2x15 3x10 3x10 3x12 3x12 3x10 3 x 10   Standing GA stretch 15"x2 ea 15"x2 ea 15"x2 ea 15"x3 ea 15"x3 ea 15"x3 ea 15"x3 ea 3 x 30"    Seated towel scrunch 1x30 ea  1x30 ea 1x40 ea 1x50 ea 1x50 ea 1x100 ea NP   Towel GA/PF stretch 15"x2 ea  15"x3 ea 15"x3 15"x3 15"x3 15"x3 3 x 30" GastrocB  3 x 30" PF B   Short arch    5"x20 5"x20 5"x20 5"x20 5" x 20   L TB INV/TAVON       G/ 3x10 ea GTB   3 x 10 each   Toe Yoga        NV   Post Tib Toe Raises with ball squeeze        3 x 10 small ball   Ther Activity                                 Gait Training        5/14/21   Alter G   Heel- toe 60% BW 10 min 2 0- 3 4 mph         TM     3 mph x 8 min heel-toe 3 mph  11 min  Heel-toe 3 mph 10 min heel-toe NV   Modalities

## 2021-05-18 ENCOUNTER — OFFICE VISIT (OUTPATIENT)
Dept: PHYSICAL THERAPY | Facility: CLINIC | Age: 61
End: 2021-05-18
Payer: COMMERCIAL

## 2021-05-18 DIAGNOSIS — S93.492D SPRAIN OF ANTERIOR TALOFIBULAR LIGAMENT OF LEFT ANKLE, SUBSEQUENT ENCOUNTER: ICD-10-CM

## 2021-05-18 DIAGNOSIS — M72.2 PLANTAR FASCIITIS: Primary | ICD-10-CM

## 2021-05-18 PROCEDURE — 97110 THERAPEUTIC EXERCISES: CPT | Performed by: PHYSICAL THERAPIST

## 2021-05-18 PROCEDURE — 97140 MANUAL THERAPY 1/> REGIONS: CPT | Performed by: PHYSICAL THERAPIST

## 2021-05-18 NOTE — PROGRESS NOTES
Daily Note     Today's date: 2021  Patient name: Avril Rod  : 1960  MRN: [id number]  Referring provider: Deandre Louis DO  Dx:   Encounter Diagnosis     ICD-10-CM    1  Plantar fasciitis  M72 2    2  Sprain of anterior talofibular ligament of left ankle, subsequent encounter  S93 314D                  Subjective: Pt reports no plantar foot pain, pt reports a resolution of achilles pain, but continues to have L lateral ankle pain  Objective: See treatment diary below  Assessment: Pt demonstrated 2+ edema in L lateral ankle, improved PF strength and DF ROM  Plan: Continue per plan of care  Precautions: none  DX: B plantar fascitis, L ATFL sprain        Manuals 21   Laser B plantar fascia        NP   Laser L ATFL 4600J       1500 W x 4 5 min   Graston B plantar fascia 3 min ea       NP   Post Fibular MWM Left Ankle        JRS           JRS           JRS B                         Laser R GA insertion           Neuro Re-Ed        21   Balance: SLS Biodex L7/ 1x60" ea          Biodex LOS        L10 x 3   Biodex Maze        L 10 x 3                                               Ther Ex        21   DL ecc heelraises 3x10       3 x 10   Standing GA stretch 15"x e3a       3 x 30"    Seated towel scrunch 1x100 ea       NP   Towel GA/PF stretch 15"x e3a       3 x 30" GastrocB  3 x 30" PF B   Short arch 5"x20       5" x 20   L TB INV/TAVON Jean Carlos/ 3x10 ea       GTB   3 x 10 each                         Ther Activity                                 Gait Training           Alter G   Heel- toe 60% BW 10 min 2 0- 3 4 mph         TM     3 mph x 8 min heel-toe 3 mph  11 min  Heel-toe 3 mph 10 min heel-toe NV   Modalities

## 2021-05-21 ENCOUNTER — VBI (OUTPATIENT)
Dept: ADMINISTRATIVE | Facility: OTHER | Age: 61
End: 2021-05-21

## 2021-05-25 ENCOUNTER — OFFICE VISIT (OUTPATIENT)
Dept: PHYSICAL THERAPY | Facility: CLINIC | Age: 61
End: 2021-05-25
Payer: COMMERCIAL

## 2021-05-25 DIAGNOSIS — M72.2 PLANTAR FASCIITIS: Primary | ICD-10-CM

## 2021-05-25 DIAGNOSIS — S93.492D SPRAIN OF ANTERIOR TALOFIBULAR LIGAMENT OF LEFT ANKLE, SUBSEQUENT ENCOUNTER: ICD-10-CM

## 2021-05-25 PROCEDURE — 97110 THERAPEUTIC EXERCISES: CPT

## 2021-05-25 PROCEDURE — 97140 MANUAL THERAPY 1/> REGIONS: CPT

## 2021-05-25 NOTE — PROGRESS NOTES
Daily Note     Today's date: 2021  Patient name: Allan Kinsey  : 1960  MRN: [id number]  Referring provider: John Serrato DO  Dx:   Encounter Diagnosis     ICD-10-CM    1  Plantar fasciitis  M72 2    2  Sprain of anterior talofibular ligament of left ankle, subsequent encounter  S96 659D                  Subjective: Pt reports L lateral ankle soreness, no plantar foot pain  Objective: See treatment diary below  Assessment: Pt demonstrated mild irritability with balance today  Pt able to complete TE with no increased pain  Continues to present with L ankle edema following WB this morning, still present at end of treatment  Pt declined AlterG today 2* discomfort from edema and not wanting ankle to swell any more  Plan: Continue per plan of care  Precautions: none  DX: B plantar fascitis, L ATFL sprain        Manuals 21   Laser B plantar fascia        NP   Laser L ATFL 4600J 4800J      1500 W x 4 5 min   Graston B plantar fascia 3 min ea 3 min ea      NP   Post Fibular MWM Left Ankle        JRS           JRS           JRS B                         Laser R GA insertion           Neuro Re-Ed        21   Balance: SLS Biodex L7/ 1x60" ea L7/  1x60" ea         Biodex LOS        L10 x 3   Biodex Maze        L 10 x 3                                               Ther Ex        21   DL ecc heelraises 3x10 3x10      3 x 10   Standing GA stretch 15"x e3a 20"x3       3 x 30"    Seated towel scrunch 1x100 ea 1x100 ea      NP   Towel GA/PF stretch 15"x e3a 15"x3 ea      3 x 30" GastrocB  3 x 30" PF B   Short arch 5"x20 5"x20      5" x 20   L TB INV/TAVON Jean Carlos/ 3x10 ea Jean Carlos/  3x10 ea      GTB   3 x 10 each                         Ther Activity                                 Gait Training           Alter G    Heel- toe 60% BW 10 min 2 0- 3 4 mph           TM     3 mph x 8 min heel-toe 3 mph  11 min  Heel-toe 3 mph 10 min heel-toe NV   Modalities

## 2021-05-28 ENCOUNTER — OFFICE VISIT (OUTPATIENT)
Dept: PHYSICAL THERAPY | Facility: CLINIC | Age: 61
End: 2021-05-28
Payer: COMMERCIAL

## 2021-05-28 DIAGNOSIS — S93.492D SPRAIN OF ANTERIOR TALOFIBULAR LIGAMENT OF LEFT ANKLE, SUBSEQUENT ENCOUNTER: ICD-10-CM

## 2021-05-28 DIAGNOSIS — M72.2 PLANTAR FASCIITIS: Primary | ICD-10-CM

## 2021-05-28 PROCEDURE — 97110 THERAPEUTIC EXERCISES: CPT | Performed by: PHYSICAL THERAPIST

## 2021-05-28 PROCEDURE — 97140 MANUAL THERAPY 1/> REGIONS: CPT | Performed by: PHYSICAL THERAPIST

## 2021-05-28 NOTE — PROGRESS NOTES
Daily Note     Today's date: 2021  Patient name: Bob Childers  : 1960  MRN: [id number]  Referring provider: Buddy Davila DO  Dx:   Encounter Diagnosis     ICD-10-CM    1  Plantar fasciitis  M72 2    2  Sprain of anterior talofibular ligament of left ankle, subsequent encounter  S93 900D                Subjective: Pain: 0/10  Pt reports L ankle swelling which gets progressively worse throughout the day  Pt reports plantar fascia is feeling good  Objective: See treatment diary below  Assessment: Attempted L INV MWM and taping and recommended compression sleeve for edema  Plan: Continue per plan of care  Precautions: none  DX: B plantar fascitis, L ATFL sprain        Manuals         Laser B plantar fascia           Laser L ATFL 4600J 4800J 5000J        Graston B plantar fascia 3 min ea 3 min ea 3 min ea        Post Fibular MWM Left Ankle   2x10        Post fib MWM taping   2 min                                         Laser R GA insertion           Neuro Re-Ed           Balance: SLS Biodex L7/ 1x60" ea L7/  1x60" ea L7/ 1x60" ea        Biodex LOS           Biodex Maze                                                       Ther Ex           DL ecc heelraises 3x10 3x10 3x12        Standing GA stretch 15"x e3a 20"x3  15"x3        Seated towel scrunch 1x100 ea 1x100 ea 1x100 ea        Towel GA/PF stretch 15"x e3a 15"x3 ea 15"x3 ea        Short arch 5"x20 5"x20 5"x20        L TB INV/TAVON Jean Carlos/ 3x10 ea Jean Carlos/  3x10 ea Jean Carlos/ 3x15 ea                              Ther Activity                                 Gait Training           Alter G            TM           Modalities

## 2021-06-01 ENCOUNTER — OFFICE VISIT (OUTPATIENT)
Dept: PHYSICAL THERAPY | Facility: CLINIC | Age: 61
End: 2021-06-01
Payer: COMMERCIAL

## 2021-06-01 DIAGNOSIS — M72.2 PLANTAR FASCIITIS: Primary | ICD-10-CM

## 2021-06-01 DIAGNOSIS — S93.492D SPRAIN OF ANTERIOR TALOFIBULAR LIGAMENT OF LEFT ANKLE, SUBSEQUENT ENCOUNTER: ICD-10-CM

## 2021-06-01 PROCEDURE — 97110 THERAPEUTIC EXERCISES: CPT | Performed by: PHYSICAL THERAPIST

## 2021-06-01 PROCEDURE — 97140 MANUAL THERAPY 1/> REGIONS: CPT | Performed by: PHYSICAL THERAPIST

## 2021-06-01 NOTE — PROGRESS NOTES
Daily Note     Today's date: 2021  Patient name: Darnelle Councilman  : 1960  MRN: [id number]  Referring provider: nAkur Franz DO  Dx:   Encounter Diagnosis     ICD-10-CM    1  Plantar fasciitis  M72 2    2  Sprain of anterior talofibular ligament of left ankle, subsequent encounter  S92 212D                Subjective:  Pt reports 2/10 lateral L ankle pain with heelraises and continues to demonstrate significant fluctuation in swelling in his L ankle that he is managing with a compression brace  Objective: See treatment diary below  Assessment: Pt demonstrated a complete resolution of L plantar fascia pain, a 90% reduction in R plantar fascia pain  Pt continues to have L ATFL pain and edema  Plan: Continue per plan of care  Precautions: none  DX: B plantar fascitis, L ATFL sprain        Manuals        Laser B plantar fascia           Laser L ATFL 4600J 4800J 5000J 5000J       Graston R plantar fascia 3 min ea 3 min ea 3 min ea 2 min ea       Post Fibular MWM Left Ankle   2x10 2x10       Post fib MWM taping   2 min                                         Laser R GA insertion           Neuro Re-Ed           Balance: SLS Biodex L7/ 1x60" ea L7/  1x60" ea L7/ 1x60" ea 3x30" ea       Biodex LOS           Biodex Maze                                                       Ther Ex           DL ecc heelraises 3x10 3x10 3x12 1x15       DL/SL con/ecc step heel raises    2x10       SL heel raises mid-range    2x10       Standing GA stretch 15"x e3a 20"x3  15"x3 15"x3       Seated towel scrunch 1x100 ea 1x100 ea 1x100 ea 1x100 ea       Towel GA/PF stretch 15"x e3a 15"x3 ea 15"x3 ea 15"x3       Short arch 5"x20 5"x20 5"x20 HEP       L TB INV/TAVON Jean Carlos/ 3x10 ea Jean Carlos/  3x10 ea Jean Carlos/ 3x15 ea Jean Carlos/ 3x15 ea                             Ther Activity                                 Gait Training           Alter G            TM           Modalities

## 2021-06-01 NOTE — PROGRESS NOTES
PT Re-Evaluation     Today's date: 2021  Patient name: Darnelle Councilman  : 1960  MRN: [id number]  Referring provider: Ankur Franz DO  Dx:   Encounter Diagnosis     ICD-10-CM    1  Plantar fasciitis  M72 2    2  Sprain of anterior talofibular ligament of left ankle, subsequent encounter  S93 492D        Start Time: 1030  Stop Time: 1115  Total time in clinic (min): 45 minutes    Assessment  Assessment details: Pt demonstrated improved ROM, strength and pain  Pt is appropriate to discharge his L plantar fascia pain to HEP and will continue to address impairments for R plantar fascitis and L ATFL sprain  Impairments: abnormal gait, abnormal or restricted ROM, activity intolerance, impaired physical strength, lacks appropriate home exercise program and pain with function  Prognosis details: ST-4 weeks  1   Pt will decrease pain > 50%  met  2  Pt will increase B DF PROM > 10 deg  LT-6 weeks  1   Pt will decrease pain > 75%  met  2  Pt will demonstrate a normalized gait pattern  3   Pt will resolve L ankle swelling  Plan  Planned modality interventions: low level laser therapy  Planned therapy interventions: manual therapy, neuromuscular re-education, patient education, body mechanics training, strengthening, stretching, therapeutic activities, therapeutic exercise, therapeutic training, functional ROM exercises, gait training, graded activity, graded exercise and home exercise program  Frequency: 2x week  Duration in weeks: 4        Subjective Evaluation    History of Present Illness  Mechanism of injury: Pt reports a complete resolution of L plantar fascia pain, a 90% resolution of R plantar fascia pain and a 75% improvement in L ankle pain    Pain  Current pain ratin  At best pain ratin  At worst pain rating: 3  Quality: dull ache  Relieving factors: rest      Diagnostic Tests  X-ray: abnormal (21 L ankle: no fracture, marked swelling )  Patient Goals  Patient goals for therapy: decreased edema, decreased pain, improved balance, increased motion, increased strength, independence with ADLs/IADLs, return to sport/leisure activities and return to work          Objective     Passive Range of Motion   Left Ankle/Foot    Dorsiflexion (ke): 8 degrees   Plantar flexion: 60 degrees   Inversion: 18 degrees   Eversion: 10 degrees   Great toe extension: 40 degrees     Right Ankle/Foot    Dorsiflexion (ke): 0 degrees   Plantar flexion: 65 degrees   Inversion: 40 degrees   Eversion: 20 degrees   Great toe extension: 45 degrees     Additional Passive Range of Motion Details  TTP: longitudional arch R, L ATFL  Gait: Min R hip ER, min L hip ER, moderately increased ANDREE, mod R lateral trunk lean during midstance  Balance: SLS: R: 25 sec, L: 20 sec  Edema: 3+ around L foot and ankle     Tests   Left Ankle/Foot   Positive for inversion talar tilt  Negative for syndesmosis squeeze and syndesmosis external rotation

## 2021-06-03 ENCOUNTER — OFFICE VISIT (OUTPATIENT)
Dept: PHYSICAL THERAPY | Facility: CLINIC | Age: 61
End: 2021-06-03
Payer: COMMERCIAL

## 2021-06-03 DIAGNOSIS — S93.492D SPRAIN OF ANTERIOR TALOFIBULAR LIGAMENT OF LEFT ANKLE, SUBSEQUENT ENCOUNTER: ICD-10-CM

## 2021-06-03 DIAGNOSIS — M72.2 PLANTAR FASCIITIS: Primary | ICD-10-CM

## 2021-06-03 PROCEDURE — 97140 MANUAL THERAPY 1/> REGIONS: CPT | Performed by: PHYSICAL THERAPIST

## 2021-06-03 PROCEDURE — 97110 THERAPEUTIC EXERCISES: CPT | Performed by: PHYSICAL THERAPIST

## 2021-06-03 NOTE — PROGRESS NOTES
Daily Note     Today's date: 6/3/2021  Patient name: Tanika Feliz  : 1960  MRN: [id number]  Referring provider: Kaylee Pérez DO  Dx:   Encounter Diagnosis     ICD-10-CM    1  Plantar fasciitis  M72 2    2  Sprain of anterior talofibular ligament of left ankle, subsequent encounter  M87 240V                   Subjective: Pt reports 0/10 pain on bilateral plantar fascia and 0/10 pain on L ankle  Pt reports that taping did not seem to help his ankle stiffness  Pt reports that the compression sock has been helping with L ankle edema  Objective: See treatment diary below    Assessment: Pt demonstrated improved L ankle edema since previous session likely secondary to compression stocking  Pt demonstrated good eccentric control with heel raise exercise  Plan: Continue per plan of care  Precautions: none  DX: B plantar fascitis, L ATFL sprain        Manuals 5/18 5/25 5/28 6/1 6/3      Laser B plantar fascia           Laser L ATFL 4600J 4800J 5000J 5000J 5000J      Graston R plantar fascia 3 min ea 3 min ea 3 min ea 2 min ea 2 min ea      Post Fibular MWM Left Ankle   2x10 2x10 2x10      Post fib MWM taping   2 min                                         Laser R GA insertion           Neuro Re-Ed           Balance: SLS Biodex L7/ 1x60" ea L7/  1x60" ea L7/ 1x60" ea 3x30" ea np      Biodex LOS           Biodex Maze                                                       Ther Ex           DL ecc heelraises 3x10 3x10 3x12 1x15 1x15      DL/SL con/ecc step heel raises    2x10 2x10      SL heel raises mid-range    2x10 2x10      Standing GA stretch 15"x e3a 20"x3  15"x3 15"x3 np      Seated towel scrunch 1x100 ea 1x100 ea 1x100 ea 1x100 ea 1x100 ea      Towel GA/PF stretch 15"x e3a 15"x3 ea 15"x3 ea 15"x3 15"x3      Short arch 5"x20 5"x20 5"x20 HEP HEP      L TB INV/TAVON Jean Carlos/ 3x10 ea Jean Carlos/  3x10 ea Jean Carlos/ 3x15 ea Jean Carlos/ 3x15 ea Jean Carlos/ 3x20 ea                            Ther Activity Gait Training           Alter G            TM           Modalities

## 2021-06-05 DIAGNOSIS — J30.2 SEASONAL ALLERGIES: ICD-10-CM

## 2021-06-06 DIAGNOSIS — J30.2 SEASONAL ALLERGIC RHINITIS, UNSPECIFIED TRIGGER: ICD-10-CM

## 2021-06-06 RX ORDER — LEVOCETIRIZINE DIHYDROCHLORIDE 5 MG/1
TABLET, FILM COATED ORAL
Qty: 30 TABLET | Refills: 3 | Status: SHIPPED | OUTPATIENT
Start: 2021-06-06 | End: 2021-10-04

## 2021-06-07 ENCOUNTER — OFFICE VISIT (OUTPATIENT)
Dept: PHYSICAL THERAPY | Facility: CLINIC | Age: 61
End: 2021-06-07
Payer: COMMERCIAL

## 2021-06-07 DIAGNOSIS — M72.2 PLANTAR FASCIITIS: Primary | ICD-10-CM

## 2021-06-07 DIAGNOSIS — S93.492D SPRAIN OF ANTERIOR TALOFIBULAR LIGAMENT OF LEFT ANKLE, SUBSEQUENT ENCOUNTER: ICD-10-CM

## 2021-06-07 PROCEDURE — 97110 THERAPEUTIC EXERCISES: CPT | Performed by: PHYSICAL THERAPIST

## 2021-06-07 PROCEDURE — 97140 MANUAL THERAPY 1/> REGIONS: CPT | Performed by: PHYSICAL THERAPIST

## 2021-06-07 RX ORDER — FLUTICASONE PROPIONATE 50 MCG
SPRAY, SUSPENSION (ML) NASAL
Qty: 16 ML | Refills: 11 | Status: SHIPPED | OUTPATIENT
Start: 2021-06-07 | End: 2022-07-28

## 2021-06-07 NOTE — PROGRESS NOTES
Daily Note     Today's date: 2021  Patient name: Tre Giles  : 1960  MRN: [id number]  Referring provider: Mey Cheatham DO  Dx:   Encounter Diagnosis     ICD-10-CM    1  Plantar fasciitis  M72 2    2  Sprain of anterior talofibular ligament of left ankle, subsequent encounter  S93 492D        Start Time: 1230  Stop Time: 1315  Total time in clinic (min): 45 minutes    Subjective: Pt reports 0/10 pain on bilateral plantar fascia and 0/10 pain on L ankle  Pt reports that the swelling has been decreasing in his L ankle  Objective: See treatment diary below    Assessment: Pt fitted and educated on orthotic use  Pt demonstrated good eccentric control with heel raise exercises  Pt had increased difficulty with SLS balance on L ankle  Pt noted to have increased L ankle edema following SLS balance, but decreased following laser on L ATFL  Plan: Continue POC, d/c to HEP nv  Precautions: none  DX: B plantar fascitis, L ATFL sprain        Manuals 5/18 5/25 5/28 6/1 6/3 6/7     Laser B plantar fascia           Laser L ATFL 4600J 4800J 5000J 5000J 5000J 5000J     Graston R plantar fascia 3 min ea 3 min ea 3 min ea 2 min ea 2 min ea 2 min ea     Post Fibular MWM Left Ankle   2x10 2x10 2x10 2x10     Post fib MWM taping   2 min                                         Laser R GA insertion           Neuro Re-Ed           Balance: SLS Biodex L7/ 1x60" ea L7/  1x60" ea L7/ 1x60" ea 3x30" ea np L7 1x60" ea     Biodex LOS           Biodex Maze                                                       Ther Ex           DL ecc heelraises 3x10 3x10 3x12 1x15 1x15 2x15     DL/SL con/ecc step heel raises    2x10 2x10 2x10     SL heel raises mid-range    2x10 2x10 2x10     Standing GA stretch 15"x e3a 20"x3  15"x3 15"x3 np HEP     Seated towel scrunch 1x100 ea 1x100 ea 1x100 ea 1x100 ea 1x100 ea 1x100     Towel GA/PF stretch 15"x e3a 15"x3 ea 15"x3 ea 15"x3 15"x3 15"x3     Short arch 5"x20 5"x20 5"x20 HEP HEP HEP     L TB INV/TAVON Jean Carlos/ 3x10 ea Jean Carlos/  3x10 ea Jean Carlos/ 3x15 ea Jean Carlos/ 3x15 ea Jean Carlos/ 3x20 ea Jean Carlos 3x20                            Ther Activity                                 Gait Training           Alter G            TM           Modalities

## 2021-06-10 ENCOUNTER — OFFICE VISIT (OUTPATIENT)
Dept: PHYSICAL THERAPY | Facility: CLINIC | Age: 61
End: 2021-06-10
Payer: COMMERCIAL

## 2021-06-10 DIAGNOSIS — M72.2 PLANTAR FASCIITIS: Primary | ICD-10-CM

## 2021-06-10 DIAGNOSIS — S93.492D SPRAIN OF ANTERIOR TALOFIBULAR LIGAMENT OF LEFT ANKLE, SUBSEQUENT ENCOUNTER: ICD-10-CM

## 2021-06-10 PROCEDURE — 97110 THERAPEUTIC EXERCISES: CPT | Performed by: PHYSICAL THERAPIST

## 2021-06-10 PROCEDURE — 97140 MANUAL THERAPY 1/> REGIONS: CPT | Performed by: PHYSICAL THERAPIST

## 2021-06-10 NOTE — PROGRESS NOTES
Daily Note     Today's date: 6/10/2021  Patient name: Hayes Schultz  : 1960  MRN: [id number]  Referring provider: Aidan Phillips DO  Dx:   Encounter Diagnosis     ICD-10-CM    1  Plantar fasciitis  M72 2    2  Sprain of anterior talofibular ligament of left ankle, subsequent encounter  S94 235D                   Subjective: Patient without c/o prior to treatment session; states he feels significant improvement  Objective: See treatment diary below    Assessment: Patient performed exercises as listed without c/o pain; positive response to manual intervention and laser; patient declined performing exercises as listed secondary to report of increased swelling after performing exercises during last visit  Plan: D/C to HEP  Precautions: none  DX: B plantar fascitis, L ATFL sprain  Manuals 5/18 5/25 5/28 6/1 6/3 6/7 6/10    Laser B plantar fascia           Laser L ATFL 4600J 4800J 5000J 5000J 5000J 5000J 5000J    Graston R plantar fascia 3 min ea 3 min ea 3 min ea 2 min ea 2 min ea 2 min ea 5 min ea  Post Fibular MWM Left Ankle   2x10 2x10 2x10 2x10     Post fib MWM taping   2 min                                         Laser R GA insertion           Neuro Re-Ed           Balance: SLS Biodex L7/ 1x60" ea L7/  1x60" ea L7/ 1x60" ea 3x30" ea np L7 1x60" ea decl  Biodex LOS           Biodex Maze                                                       Ther Ex           DL ecc heelraises 3x10 3x10 3x12 1x15 1x15 2x15 decl  DL/SL con/ecc step heel raises    2x10 2x10 2x10 decl      SL heel raises mid-range    2x10 2x10 2x10 2x10    Standing GA stretch 15"x e3a 20"x3  15"x3 15"x3 np HEP HEP    Seated towel scrunch 1x100 ea 1x100 ea 1x100 ea 1x100 ea 1x100 ea 1x100 1x100    Towel GA/PF stretch 15"x e3a 15"x3 ea 15"x3 ea 15"x3 15"x3 15"x3 15"x3    Short arch 5"x20 5"x20 5"x20 HEP HEP HEP HEP    L TB INV/TAVON Jean Carlos/ 3x10 ea Jean Carlos/  3x10 ea Jean Carlos/ 3x15 ea Jean Carlos/ 3x15 ea Jean Carlos/ 3x20 ea Jean Carlos 3x20  Jean Carlos 3x20                           Ther Activity                                 Gait Training           Alter G            TM           Modalities

## 2021-06-16 ENCOUNTER — APPOINTMENT (OUTPATIENT)
Dept: PHYSICAL THERAPY | Facility: CLINIC | Age: 61
End: 2021-06-16
Payer: COMMERCIAL

## 2021-06-17 ENCOUNTER — APPOINTMENT (OUTPATIENT)
Dept: PHYSICAL THERAPY | Facility: CLINIC | Age: 61
End: 2021-06-17
Payer: COMMERCIAL

## 2021-07-12 LAB
APO B SERPL-MCNC: 90 MG/DL
CHOLEST SERPL-MCNC: 169 MG/DL
CHOLEST/HDLC SERPL: 3.8 CALC
HDLC SERPL-MCNC: 45 MG/DL
HLD.LARGE SERPL-SCNC: 5452 NMOL/L
LDL SERPL QN: 213.3 ANGSTROM
LDL SERPL-SCNC: 1513 NMOL/L
LDL SMALL SERPL-SCNC: 424 NMOL/L
LDLC REAL SIZE PAT SERPL: ABNORMAL PATTERN
LDLC SERPL CALC-MCNC: 104 MG/DL (CALC)
LPA SERPL-SCNC: 17 NMOL/L
NONHDLC SERPL-MCNC: 124 MG/DL (CALC)
SL AMB LDL MEDIUM: 347 NMOL/L
TRIGL SERPL-MCNC: 102 MG/DL

## 2021-07-14 ENCOUNTER — OFFICE VISIT (OUTPATIENT)
Dept: CARDIOLOGY CLINIC | Facility: CLINIC | Age: 61
End: 2021-07-14
Payer: COMMERCIAL

## 2021-07-14 VITALS
OXYGEN SATURATION: 96 % | SYSTOLIC BLOOD PRESSURE: 126 MMHG | DIASTOLIC BLOOD PRESSURE: 66 MMHG | HEIGHT: 67 IN | BODY MASS INDEX: 37.93 KG/M2 | WEIGHT: 241.7 LBS | HEART RATE: 77 BPM

## 2021-07-14 DIAGNOSIS — E78.5 HYPERLIPIDEMIA, UNSPECIFIED HYPERLIPIDEMIA TYPE: Primary | ICD-10-CM

## 2021-07-14 PROCEDURE — 1036F TOBACCO NON-USER: CPT | Performed by: INTERNAL MEDICINE

## 2021-07-14 PROCEDURE — 3008F BODY MASS INDEX DOCD: CPT | Performed by: INTERNAL MEDICINE

## 2021-07-14 PROCEDURE — 99214 OFFICE O/P EST MOD 30 MIN: CPT | Performed by: INTERNAL MEDICINE

## 2021-07-14 NOTE — PATIENT INSTRUCTIONS
Therapeutic lifestyle changes were discussed with the patient- Specifically:  1  Decreasing saturated fat intake to less than 13 g per day  I showed the pt how to look at a food label  Watch intake of cheese, red meat, cream and butter containing foods  2  Increase soluble fiber in the diet-beans, pears, oatmeal  3  Weight loss of even 5-10 pounds will also help to lower cholesterol levels  Decreasing caloric intake by about 100 brandon a day-should lead to a weight loss of 1-2 pounds over one month  4  Increase aerobic physical activity - ultimate goal of 150 min per week  Start low and increase level and duration of activity slowly  5   Google 'TLC Cholesterol" = Your guide to lowering your cholesterol with TLC is probably the best online resource to lower your LDL cholesterol

## 2021-07-14 NOTE — PROGRESS NOTES
Cardiology Follow Up    Spencer Jonas  1960  [id number]  [facility] [place]  [address] [address] [phone]    No diagnosis found  There are no diagnoses linked to this encounter  I had the pleasure of seeing Spencer Jonas for a follow up visit  Interval History: none    History of the presenting illness, Discussion/Summary and My Plan are as follows:::    He is a pleasant now 75-year-old gentleman without a history of hypertension but with mild dyslipidemia with elevated LDL particle number and small LDL, which with lifestyle changes has improved  He did have prediabetes with his A1c was around 5 7 - most recently 5 4 (Feb 2018)      In 2017 lipid profile showed a total cholesterol 196, triglycerides 107, HDL 50,    in February 2017- underwent an advanced lipid profile that showed high LDL particle number-1500 (<1000), high small LDL-531 (50th percentile)  March 2018 -total cholesterol 166, triglycerides 78, average LDL size-214, density pattern -pattern B    May 2019: Total cholesterol 168, triglycerides 98, HDL 51, LDL 98, non , LDL particle 1278 (optimal< 1138, moderate 7404-4907), small  (optimal< 142), LDL density pattern B, LDL size to 15 (optimal>223), apolipoprotein B 86 (optimal<80), lipoprotein a:  13 (<75)     March 2020:   Total cholesterol 183, triglycerides 131, HDL 45,      FOR COMPARISON:    May 2019:   LDL particle 1278 (optimal< 1138, moderate 0421-9776), small  (optimal< 142)     August 2020: LDL: 98, LDL particle-910, small LDL-197, both have improved since the last 1 in May 2019    Cardio IQ-advanced lipid profile-July 2021    Total cholesterol 169, HDL 45, triglycerides 102, LDL direct 115, LDL particle 1513, small , Apo B 90, lipoprotein (a) 17     At baseline, he is physically active now doing weights ,  Cardio exercise has been limited by left ankle pain  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 - apparently he is a carrier for alpha-1 antitrypsin deficiency  And is due for another CT scan  Family history of coronary artery disease in his father in his 76s, possibly in his mother also in her 76s, no family history of premature vascular disease in first-degree relatives      He did have an exercise stress echo test in 2013-12 minutes-13 4 METs, no ischemia, remains physically active and is asymptomatic  Normal echo in 2017  Had done some weight due to a foot injury and now losing it     Plan:     Dyslipidemia:  Direct LDL, 'apo B both within normal limits, marginally low LDL size,   LDL particle number is elevated but improved by 2020 compared to 2017 but increased again a little -see results above   A1c has been stable  He remains on fish oil alone  Overall risk profile is still intermediate considering that overall his LDL level is normal  However he does have the typical pattern that is found in patients with insulin resistance/metabolic syndrome       Therapeutic lifestyle changes were reiterated with the patient- Specifically:  1  Decreasing saturated fat intake to less than 13 g per day  Watch intake of cheese  2  Increase soluble fiber in the diet-beans, pears, oatmeal  3  Weight loss of even 5-10 pounds will also help  Decrease caloric intake by about 100 brandon a day-should lead to a weight loss of 1-2 pounds over one month -   This is very important if we need to avoid any medications in the future  4  Increase aerobic physical activity -   Does mostly aerobic activity -doing weights  The above changes will decrease his LDL, change the pattern of his LDL subfractions (to lower small LDL levels and particle number) as well as decrease his risk helping diabetes  2017-Calcium score was 1- 29th percentile for age matched controls, this is reassuring  His personal preference is to stay of a statin    This is reasonable based on the last guideline statement from 2019  Last stress test was in 2013, he remains asymptomatic at good levels of physical exertion but considering dyslipidemia-advised to get another 1- would like to do it next year, this is entirely reasonable  Lifestyle changes are to continue -especially weight loss, over the last few years has steadily been gaining weight  His numbers were much better in August 2020, not warranting a statin and hence will continue to follow with lifestyle changes  Will order an advanced lipid profile  For next year  Follow-up in 1 year    Results for Krissy Quiroga (MRN [id number]) as of 7/9/2020 08:20   Ref   Range 1/25/2017 12:45 7/26/2017 09:58 3/8/2018 08:15 8/13/2018 08:12 2/22/2019 08:26 5/28/2019 08:48 9/3/2019 09:38 3/10/2020 07:33   Cholesterol Latest Ref Range: 50 - 200 mg/dL 196 172 166 168 176 168 176 183   Triglycerides Latest Ref Range: <=150 mg/dL  131 78 109 110 98 114 131   HDL Latest Ref Range: >=40 mg/dL  41 48 46 49 51 49 45   HDL-P(TOTAL) Latest Ref Range: >=30 5 umol/L 28 4 (L)          HDL CHOLESTEROL LIPOPROTEIN Latest Ref Range: >39 mg/dL 43          Non-HDL Cholesterol Latest Ref Range: <130 mg/dL (calc)  131 118   117     LDL CHOLESTEROL Latest Units: mg/dL  105         LDL Calculated Latest Ref Range: 0 - 100 mg/dL 131 (H)  101 (H) 100 105 (H) 98 104 (H) 112 (H)   LDL DENSITY PATTERN Latest Ref Range: A Pattern  B (A) B (A)   B (A)     Small LDL-P Latest Ref Range: <=527 nmol/L 531 (H)          LDL SIZE Latest Ref Range: > OR = 217 4 Angstrom 20 8 217 4 (L) 214 0 (L)   215 0 (L)     Chol HDLC Ratio Latest Ref Range: <5 0 calc  4 2 3 5   3 3     APOLIPOPROTEIN B Latest Ref Range: 52 - 109 mg/dL  83 84   86     LP-IR SCORE Latest Ref Range: <=45  52 (H)          LDL PARTICLE NUMBER Latest Ref Range: 1016 - 2185 nmol/L  1300         LIPOPROTEIN (A) Latest Ref Range: <75 nmol/L  24 16   13     Triglycerides Latest Ref Range: 0 - 149 mg/dL 111 Results for Sidra Rosario (MRN [id number]) as of 2021 08:13   Ref  Range 3/10/2020 07:33 2020 08:41 3/30/2021 08:01   Hemoglobin A1C  5 6 5 7 (H) 5 7 (H)             Patient Active Problem List   Diagnosis    Abnormal blood sugar    Enlarged prostate without lower urinary tract symptoms (luts)    Esophageal reflux    Obesity    Sleep apnea    Hyperlipidemia    Screening PSA (prostate specific antigen)    Seasonal allergic rhinitis    Vitamin D deficiency    Plantar fasciitis of right foot    Screening for diabetes mellitus    Need for vaccination    Class 2 obesity due to excess calories without serious comorbidity with body mass index (BMI) of 35 0 to 35 9 in adult    Wellness examination    Exercise-induced asthma    Gastroesophageal reflux disease without esophagitis    Prediabetes    Mild asthma without complication    Seasonal allergies    Carrier of alpha-1-antitrypsin deficiency    Multiple idiopathic cysts of lung    Dyspnea on exertion    COVID-19    Plantar fasciitis    Abnormal laboratory test    Vitamin B12 deficiency     Past Medical History:   Diagnosis Date    Asthma     Erectile dysfunction of non-organic origin     Resolved: 10/15/2014     Social History     Socioeconomic History    Marital status: /Civil Union     Spouse name: Not on file    Number of children: Not on file    Years of education: Not on file    Highest education level: Not on file   Occupational History    Not on file   Tobacco Use    Smoking status: Former Smoker     Packs/day: 1 00     Years: 13 00     Pack years: 13 00     Types: Cigarettes     Quit date:      Years since quittin 5    Smokeless tobacco: Never Used    Tobacco comment: He smoked a pack a day of cigarettes for 8 years  He quite at the age of 22  Occasional cigar use     Substance and Sexual Activity    Alcohol use: Yes     Comment: Social drinker    Drug use: No    Sexual activity: Not on file Other Topics Concern    Not on file   Social History Narrative    Caffeine use    Work-related stress     Social Determinants of Health     Financial Resource Strain:     Difficulty of Paying Living Expenses:    Food Insecurity:     Worried About Running Out of Food in the Last Year:     [address] in the Last Year:    Transportation Needs:     Lack of Transportation (Medical):      Lack of Transportation (Non-Medical):    Physical Activity:     Days of Exercise per Week:     Minutes of Exercise per Session:    Stress:     Feeling of Stress :    Social Connections:     Frequency of Communication with Friends and Family:     Frequency of Social Gatherings with Friends and Family:     Attends Confucianism Services:     Active Member of Clubs or Organizations:     Attends Club or Organization Meetings:     Marital Status:    Intimate Partner Violence:     Fear of Current or Ex-Partner:     Emotionally Abused:     Physically Abused:     Sexually Abused:       Family History   Problem Relation Age of Onset    Asthma Mother     Heart disease Mother     Rheum arthritis Mother     Diabetes Father     Heart disease Father     Asthma Sister     Diabetes Sister     Other Son         Thyroid disorder     Past Surgical History:   Procedure Laterality Date    EYE SURGERY      TONSILLECTOMY AND ADENOIDECTOMY      UMBILICAL HERNIA REPAIR         Current Outpatient Medications:     albuterol (2 5 mg/3 mL) 0 083 % nebulizer solution, Inhale, Disp: , Rfl:     albuterol (ProAir HFA) 90 mcg/act inhaler, Inhale 2 puffs every 6 (six) hours as needed for wheezing Use 2 puffs prior to exercise , Disp: 18 g, Rfl: 5    Cholecalciferol (VITAMIN D3) 5000 units CAPS, Take 5,000 Units by mouth , Disp: , Rfl:     Cinnamon 500 MG capsule, Take by mouth, Disp: , Rfl:     cyanocobalamin (CVS VITAMIN B-12) 1000 MCG tablet, Take by mouth, Disp: , Rfl:     fluticasone (FLONASE) 50 mcg/act nasal spray, TWO SPRAYS EACH NOSTRIL ONCE DAILY AS NEEDED , Disp: 16 mL, Rfl: 11    levocetirizine (XYZAL) 5 MG tablet, TAKE 1 TABLET BY MOUTH EVERY DAY IN THE EVENING, Disp: 30 tablet, Rfl: 3    Omega-3 Fatty Acids (FISH OIL) 1,000 mg, Take 3 capsules by mouth daily, Disp: , Rfl:   No Known Allergies    Imaging: No results found  Review of Systems:  Review of Systems   Constitutional: Negative  HENT: Negative  Eyes: Negative  Respiratory: Negative  Cardiovascular: Negative  Gastrointestinal: Negative  Endocrine: Negative  Musculoskeletal: Negative  Allergic/Immunologic: Negative  Hematological: Negative  Physical Exam:  /66 (BP Location: Left arm, Patient Position: Sitting, Cuff Size: Large)   Pulse 77   Ht 5' 7" (1 702 m)   Wt 110 kg (241 lb 11 2 oz)   SpO2 96%   BMI 37 86 kg/m²   Physical Exam  Constitutional:       General: He is not in acute distress  Appearance: He is well-developed  He is not diaphoretic  HENT:      Head: Normocephalic and atraumatic  Eyes:      General: No scleral icterus  Right eye: No discharge  Left eye: No discharge  Conjunctiva/sclera: Conjunctivae normal       Pupils: Pupils are equal, round, and reactive to light  Neck:      Thyroid: No thyromegaly  Vascular: No JVD  Trachea: No tracheal deviation  Cardiovascular:      Rate and Rhythm: Normal rate and regular rhythm  Heart sounds: No murmur heard  No friction rub  Pulmonary:      Effort: Pulmonary effort is normal  No respiratory distress  Breath sounds: Normal breath sounds  No stridor  Abdominal:      General: Bowel sounds are normal  There is no distension  Palpations: Abdomen is soft  Tenderness: There is no abdominal tenderness  There is no guarding  Musculoskeletal:         General: No deformity  Normal range of motion  Cervical back: Normal range of motion  Skin:     General: Skin is warm  Coloration: Skin is not pale  Findings: No erythema or rash

## 2021-07-15 ENCOUNTER — HOSPITAL ENCOUNTER (OUTPATIENT)
Dept: CT IMAGING | Facility: HOSPITAL | Age: 61
Discharge: HOME | End: 2021-07-15
Attending: INTERNAL MEDICINE
Payer: COMMERCIAL

## 2021-07-15 DIAGNOSIS — R91.1 PULMONARY NODULE: ICD-10-CM

## 2021-07-15 PROCEDURE — G1004 CDSM NDSC: HCPCS

## 2021-07-15 PROCEDURE — 71250 CT THORAX DX C-: CPT

## 2021-07-22 ENCOUNTER — TELEPHONE (OUTPATIENT)
Dept: PULMONOLOGY | Facility: CLINIC | Age: 61
End: 2021-07-22

## 2021-07-22 DIAGNOSIS — R91.8 LUNG NODULES: Primary | ICD-10-CM

## 2021-07-22 DIAGNOSIS — R05.9 COUGH: ICD-10-CM

## 2021-07-30 NOTE — TELEPHONE ENCOUNTER
Miley Bell  to Ryder Lorenzo MD        7/30/21 11:30 AM  Hi     I had a chest CT Scan done on July 15th,  and I wanted to know if DR Sekou John had a chance to review it  I would like his opinion on what the results mean and what my next course of action should be     Thank you      Emelia Johnston  [phone]

## 2021-07-30 NOTE — TELEPHONE ENCOUNTER
I called Bess Mendez and discussed results of CT chest  Lung nodule from January 2021 is stable in size; however, there are new nodules up to 9 mm  He reports that he has had worsening cough and mucous production daily  He got a new CPAP this spring and fairly instantly noticed an increase in cough/SOB  The CPAP subsequently was recalled and he reverted back to his old CPAP recently with almost complete resolution of his symptoms, though he remains with a cough/sputum production  At this time, I have ordered a sputum culture and he will go to the [address] office to  a specimen cup  I instructed him on use and method for submission at lab after specimen is collected  Will hold on any further testing/treatment at this time and send message to Dr Carolina Honre for next steps  I discussed with the patient he will likely need repeat imaging in 3-6 months pending results of sputum culture and possible next steps from Dr Carolina Horne  He is aware to call with any further questions or concerns  He will continue to follow with Dr Mayra He for CPAP/sleep-related concerns      UZIEL Bolaños

## 2021-08-09 ENCOUNTER — TELEPHONE (OUTPATIENT)
Dept: PULMONOLOGY | Facility: CLINIC | Age: 61
End: 2021-08-09

## 2021-08-09 ENCOUNTER — APPOINTMENT (OUTPATIENT)
Dept: LAB | Age: 61
End: 2021-08-09
Payer: COMMERCIAL

## 2021-08-09 DIAGNOSIS — R91.8 LUNG NODULES: ICD-10-CM

## 2021-08-09 DIAGNOSIS — R05.9 COUGH: ICD-10-CM

## 2021-08-09 LAB
BACTERIA SPT RESP CULT: ABNORMAL
GRAM STN SPEC: ABNORMAL

## 2021-08-09 PROCEDURE — 87205 SMEAR GRAM STAIN: CPT

## 2021-08-09 NOTE — TELEPHONE ENCOUNTER
The lab called and said that pt's sputum culture was unable to be completed and needs to be recollected  Pt did not have a deep enough cough

## 2021-08-31 ENCOUNTER — TELEPHONE (OUTPATIENT)
Dept: PULMONOLOGY | Facility: CLINIC | Age: 61
End: 2021-08-31

## 2021-08-31 NOTE — TELEPHONE ENCOUNTER
Called patient to get more info  He said he has been having labored breathing on exertion  He is coughing and has a lot of mucous  It can be yellow/green or clear and lumpy  He has some wheezing  Denies chest tightness, fevers, and chills  He is using his rescue inhaler    Scheduled him for a sick visit tomorrow with Dr Allison Zuluaga

## 2021-08-31 NOTE — TELEPHONE ENCOUNTER
----- Message from Jesicajose Yuri sent at 8/31/2021  3:00 PM EDT -----  Regarding: Non-Urgent Medical Question  Contact: [phone]  Dr Barbara Brenner    I was advised by Dr Candice Le to contact you since I have been experiencing labored breathing upon exertion, and at times, upon doing ordinary chores  I have been experiencing much more mucus in my lungs , since the findings of the newest nodules found in the last CT Scan you ordered  Is there anything I can do to alleviate either? Thank you        Robert Hoang

## 2021-09-01 ENCOUNTER — OFFICE VISIT (OUTPATIENT)
Dept: PULMONOLOGY | Facility: CLINIC | Age: 61
End: 2021-09-01
Payer: COMMERCIAL

## 2021-09-01 VITALS
DIASTOLIC BLOOD PRESSURE: 76 MMHG | BODY MASS INDEX: 37.83 KG/M2 | TEMPERATURE: 97.4 F | OXYGEN SATURATION: 99 % | WEIGHT: 241 LBS | SYSTOLIC BLOOD PRESSURE: 134 MMHG | HEART RATE: 76 BPM | HEIGHT: 67 IN

## 2021-09-01 DIAGNOSIS — R91.8 PULMONARY NODULES: Primary | ICD-10-CM

## 2021-09-01 DIAGNOSIS — J45.21 MILD INTERMITTENT ASTHMA WITH ACUTE EXACERBATION: ICD-10-CM

## 2021-09-01 DIAGNOSIS — J98.4 MULTIPLE IDIOPATHIC CYSTS OF LUNG: ICD-10-CM

## 2021-09-01 PROCEDURE — 99214 OFFICE O/P EST MOD 30 MIN: CPT | Performed by: INTERNAL MEDICINE

## 2021-09-01 PROCEDURE — 3008F BODY MASS INDEX DOCD: CPT | Performed by: INTERNAL MEDICINE

## 2021-09-01 RX ORDER — PREDNISONE 10 MG/1
TABLET ORAL
Qty: 21 TABLET | Refills: 0 | Status: SHIPPED | OUTPATIENT
Start: 2021-09-01 | End: 2021-09-15

## 2021-09-01 NOTE — LETTER
September 1, 2021     Magnus Seip, [address] [facility] [address]    Patient: Festus King   YOB: 1960   Date of Visit: 9/1/2021       Dear Dr Antonio Other: Thank you for referring Marty Nguyen to me for evaluation  Below are my notes for this consultation  If you have questions, please do not hesitate to call me  I look forward to following your patient along with you  Sincerely,        Migue Zepeda DO        CC: No Recipients  Migue Zepeda DO  9/1/2021  3:04 PM  Cosign Needed  Pulmonary Follow Up Note   Festus King 59 y o  male MRN: [id number]  9/1/2021      Assessment:  1  Mild intermittent asthma, now with acute exacerbation  - Previously categorized as exercise-induced asthma  Last PFTs 12/23/2020:  FEV1/FVC Ratio is 73 %  FEV1 is 2 26 L/71 % of predicted  FVC is 3 09 L/74 % of predicted  RV 1 66 L/78 % of predicted, TLC 5 17 L/80 % of predicted, RV/TLC ratio 32 %  DLCO: 73 % of predicted     1  Mild restrictive airflow limitation on spirometry with mildly decreased vital capacity  2  Normal lung volumes  3  Mildly impaired diffusion capacity  4  Probably normal flow volume loop    2  Multiple idiopathic pulmonary cysts  - Recent CT chest 7/15/21 showing:  IMPRESSION:  1   Multilobar thin-walled parenchymal cysts bilaterally with associated mild bronchiectasis, similar to prior exams       2  New right upper lobe and right middle lobe nodules, largest measuring 9 mm in the right upper lobe as described  These are favored to be related to infectious/inflammatory etiology; however, short-term follow-up chest CT in 3 months is recommended   to evaluate for resolution/interval change      3   Stable 8 mm lingular nodule and 3 mm left upper lobe nodule  3  Pulmonary nodules   - As above, follow up recommended in the months from 7/15/21  4  SWAPNIL  - Using old CPAP device since newer one was recalled     5  Seasonal allergies    Patient with history of SWAPNIL on CPAP and apparent exercise induced asthma presents for evaluation of worsening dyspnea with exertion and cough productive of yellow and clear sputum  Plan:  - Continue albuterol pre-exercise and PRN  - Add 2 week course of prednisone  Patient instructed to call or use Mychart to let us know if he improves with the prednisone  Will consider Adding LD ICS at that time   - Repeat CT chest in mid-October  - Xyzal, fluticisone nasal spray  - CPAP therapy under the care of Dr Heri Joseph    Return in about 3 months (around 12/1/2021)  History of Present Illness   HPI:  Luis Cortez is a 59 y o  male who presents as a sick visit for evaluation of increased mucous production  He notes that he has been having increased cough with yellow/clear sputum production over the last few months  He also relates that he had been using his new CPAP machine when these symptoms started  Unfortunately his CPAP machine is one of the ones that has been recalled  Due to this concern he has been using his older model CPAP  He also has noted more dyspnea with exertion over the last few months  He now becomes short of breath with walking up a flight of stairs, 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   He also states that he sprained his left ankle recently which may be contributing to some level of deconditioning  Review of Systems   Constitutional: Negative for appetite change, chills and fever  HENT: Positive for postnasal drip  Negative for ear pain, rhinorrhea, sneezing, sore throat and trouble swallowing  Eyes: Negative for pain and visual disturbance  Respiratory: Positive for cough and shortness of breath  Cardiovascular: Negative for chest pain and palpitations  Gastrointestinal: Negative for abdominal pain and vomiting  Genitourinary: Negative for dysuria and hematuria  Musculoskeletal: Negative for arthralgias, back pain and myalgias  Skin: Negative for color change and rash  Neurological: Negative for seizures, syncope and headaches  All other systems reviewed and are negative  Historical Information   Past Medical History:   Diagnosis Date    Asthma     Erectile dysfunction of non-organic origin     Resolved: 10/15/2014     Past Surgical History:   Procedure Laterality Date    EYE SURGERY      TONSILLECTOMY AND ADENOIDECTOMY      UMBILICAL HERNIA REPAIR       Family History   Problem Relation Age of Onset    Asthma Mother     Heart disease Mother     Rheum arthritis Mother     Diabetes Father     Heart disease Father     Asthma Sister     Diabetes Sister     Other Son         Thyroid disorder     Social History     Tobacco Use   Smoking Status Former Smoker    Packs/day:     Years:     Pack years:     Types: Cigarettes    Quit date: 5    Years since quittin 6   Smokeless Tobacco Never Used   Tobacco Comment    He smoked a pack a day of cigarettes for 8 years  He quite at the age of 22  Occasional cigar use         Meds/Allergies     Current Outpatient Medications:     albuterol (ProAir HFA) 90 mcg/act inhaler, Inhale 2 puffs every 6 (six) hours as needed for wheezing Use 2 puffs prior to exercise , Disp: 18 g, Rfl: 5    Cholecalciferol (VITAMIN D3) 5000 units CAPS, Take 5,000 Units by mouth , Disp: , Rfl:     cyanocobalamin (CVS VITAMIN B-12) 1000 MCG tablet, Take by mouth, Disp: , Rfl:     fluticasone (FLONASE) 50 mcg/act nasal spray, TWO SPRAYS EACH NOSTRIL ONCE DAILY AS NEEDED , Disp: 16 mL, Rfl: 11    levocetirizine (XYZAL) 5 MG tablet, TAKE 1 TABLET BY MOUTH EVERY DAY IN THE EVENING, Disp: 30 tablet, Rfl: 3    Omega-3 Fatty Acids (FISH OIL) 1,000 mg, Take 3 capsules by mouth daily, Disp: , Rfl:     albuterol (2 5 mg/3 mL) 0 083 % nebulizer solution, Inhale (Patient not taking: Reported on 2021), Disp: , Rfl:     Cinnamon 500 MG capsule, Take by mouth (Patient not taking: Reported on 2021), Disp: , Rfl:    predniSONE 10 mg tablet, Take 2 tablets (20 mg total) by mouth daily for 7 days, THEN 1 tablet (10 mg total) daily for 7 days  , Disp: 21 tablet, Rfl: 0  No Known Allergies    Vitals: Blood pressure 134/76, pulse 76, temperature (!) 97 4 °F (36 3 °C), height 5' 7" (1 702 m), weight 109 kg (241 lb), SpO2 99 %  Body mass index is 37 75 kg/m²  Oxygen Therapy  SpO2: 99 %  Oxygen Therapy: None (Room air)      Physical Exam  Physical Exam  Vitals and nursing note reviewed  Constitutional:       Appearance: He is well-developed  HENT:      Head: Normocephalic and atraumatic  Nose: No congestion or rhinorrhea  Mouth/Throat:      Pharynx: No oropharyngeal exudate  Comments: Post-nasal drip present  Eyes:      General: No scleral icterus  Conjunctiva/sclera: Conjunctivae normal    Cardiovascular:      Rate and Rhythm: Normal rate and regular rhythm  Heart sounds: No murmur heard  Pulmonary:      Breath sounds: No wheezing, rhonchi or rales  Comments: Breath sounds slightly diminished likely due to body habitus  Significant coughing present throughout the examination  Abdominal:      Palpations: Abdomen is soft  Tenderness: There is no abdominal tenderness  Musculoskeletal:      Cervical back: Neck supple  Right lower leg: Edema (trace) present  Left lower leg: Edema (trace) present  Skin:     General: Skin is warm and dry  Neurological:      Mental Status: He is alert  Labs: I have personally reviewed pertinent lab results    Lab Results   Component Value Date    WBC 5 84 01/25/2017    HGB 14 9 01/25/2017    HCT 42 9 01/25/2017    MCV 89 01/25/2017     01/25/2017     Lab Results   Component Value Date    GLUCOSE 98 07/26/2015    CALCIUM 9 3 03/30/2021     07/26/2015    K 5 0 03/30/2021    CO2 29 03/30/2021     03/30/2021    BUN 19 03/30/2021    CREATININE 1 19 03/30/2021     No results found for: IGE  Lab Results   Component Value Date ALT 32 03/30/2021    AST 15 03/30/2021    ALKPHOS 53 03/30/2021    BILITOT 0 58 07/26/2015       Preventive   Influenza vaccine: Plans to obtain when available  COVID-19 vaccination: Obtained  Lung Cancer screening: Active surveillance     Imaging and other studies: I have personally reviewed pertinent films in PACS   CT chest 7/15/21:  IMPRESSION:  1   Multilobar thin-walled parenchymal cysts bilaterally with associated mild bronchiectasis, similar to prior exams       2  New right upper lobe and right middle lobe nodules, largest measuring 9 mm in the right upper lobe as described  These are favored to be related to infectious/inflammatory etiology; however, short-term follow-up chest CT in 3 months is recommended   to evaluate for resolution/interval change      3   Stable 8 mm lingular nodule and 3 mm left upper lobe nodule  Pulmonary function testing:   PFTs 12/23/2020:  FEV1/FVC Ratio is 73 %  FEV1 is 2 26 L/71 % of predicted  FVC is 3 09 L/74 % of predicted  RV 1 66 L/78 % of predicted, TLC 5 17 L/80 % of predicted, RV/TLC ratio 32 %  DLCO: 73 % of predicted     1  Mild restrictive airflow limitation on spirometry with mildly decreased vital capacity  2  Normal lung volumes  3  Mildly impaired diffusion capacity  4  Probably normal flow volume loop      EKG, Pathology, and Other Studies: I have personally reviewed pertinent reports  TTE 8/18/21:  LEFT VENTRICLE:  Size was normal   Systolic function was normal  Ejection fraction was estimated to be 60 %  There were no regional wall motion abnormalities  Wall thickness was at the upper limits of normal      TRICUSPID VALVE:  There was trace regurgitation      PULMONIC VALVE:  There was trace regurgitation  Disclaimer: Portions of the record may have been created with voice recognition software  Occasional wrong word or "sound a like" substitutions may have occurred due to the inherent limitations of voice recognition software   Careful consideration should be taken to recognize, using context, where substitutions have occurred      Meño West DO   Pulmonary & Critical Care Medicine Fellow PGY-IV  [facility]

## 2021-09-01 NOTE — PATIENT INSTRUCTIONS
Please complete your two week course of prednisone as prescribed  Please obtain your CT chest in October

## 2021-09-01 NOTE — PROGRESS NOTES
Pulmonary Follow Up Note   Gloria Rascon 59 y o  male MRN: [id number]  9/1/2021      Assessment:  1  Mild intermittent asthma, now with acute exacerbation  - Previously categorized as exercise-induced asthma  Last PFTs 12/23/2020:  FEV1/FVC Ratio is 73 %  FEV1 is 2 26 L/71 % of predicted  FVC is 3 09 L/74 % of predicted  RV 1 66 L/78 % of predicted, TLC 5 17 L/80 % of predicted, RV/TLC ratio 32 %  DLCO: 73 % of predicted     1  Mild restrictive airflow limitation on spirometry with mildly decreased vital capacity  2  Normal lung volumes  3  Mildly impaired diffusion capacity  4  Probably normal flow volume loop    2  Multiple idiopathic pulmonary cysts  - Recent CT chest 7/15/21 showing:  IMPRESSION:  1   Multilobar thin-walled parenchymal cysts bilaterally with associated mild bronchiectasis, similar to prior exams       2  New right upper lobe and right middle lobe nodules, largest measuring 9 mm in the right upper lobe as described  These are favored to be related to infectious/inflammatory etiology; however, short-term follow-up chest CT in 3 months is recommended   to evaluate for resolution/interval change      3   Stable 8 mm lingular nodule and 3 mm left upper lobe nodule  3  Pulmonary nodules   - As above, follow up recommended in the months from 7/15/21  4  SWAPNIL  - Using old CPAP device since newer one was recalled  5  Seasonal allergies    Patient with history of SWAPNIL on CPAP and apparent exercise induced asthma presents for evaluation of worsening dyspnea with exertion and cough productive of yellow and clear sputum  Plan:  - Continue albuterol pre-exercise and PRN  - Add 2 week course of prednisone  Patient instructed to call or use Mychart to let us know if he improves with the prednisone   Will consider Adding LD ICS at that time   - Repeat CT chest in mid-October  - Xyzal, fluticisone nasal spray  - CPAP therapy under the care of Dr Trish Palumbo    Return in about 3 months (around 12/1/2021)  History of Present Illness   HPI:  Manda Greenfield is a 59 y o  male who presents as a sick visit for evaluation of increased mucous production  He notes that he has been having increased cough with yellow/clear sputum production over the last few months  He also relates that he had been using his new CPAP machine when these symptoms started  Unfortunately his CPAP machine is one of the ones that has been recalled  Due to this concern he has been using his older model CPAP  He also has noted more dyspnea with exertion over the last few months  He now becomes short of breath with walking up a flight of stairs, 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   He also states that he sprained his left ankle recently which may be contributing to some level of deconditioning  Review of Systems   Constitutional: Negative for appetite change, chills and fever  HENT: Positive for postnasal drip  Negative for ear pain, rhinorrhea, sneezing, sore throat and trouble swallowing  Eyes: Negative for pain and visual disturbance  Respiratory: Positive for cough and shortness of breath  Cardiovascular: Negative for chest pain and palpitations  Gastrointestinal: Negative for abdominal pain and vomiting  Genitourinary: Negative for dysuria and hematuria  Musculoskeletal: Negative for arthralgias, back pain and myalgias  Skin: Negative for color change and rash  Neurological: Negative for seizures, syncope and headaches  All other systems reviewed and are negative        Historical Information   Past Medical History:   Diagnosis Date    Asthma     Erectile dysfunction of non-organic origin     Resolved: 10/15/2014     Past Surgical History:   Procedure Laterality Date    EYE SURGERY      TONSILLECTOMY AND ADENOIDECTOMY      UMBILICAL HERNIA REPAIR       Family History   Problem Relation Age of Onset    Asthma Mother     Heart disease Mother     Rheum arthritis Mother     Diabetes Father     Heart disease Father     Asthma Sister     Diabetes Sister     Other Son         Thyroid disorder     Social History     Tobacco Use   Smoking Status Former Smoker    Packs/day: 1 00    Years: 13     Pack years: 13     Types: Cigarettes    Quit date: 5    Years since quittin 6   Smokeless Tobacco Never Used   Tobacco Comment    He smoked a pack a day of cigarettes for 8 years  He quite at the age of 22  Occasional cigar use  Meds/Allergies     Current Outpatient Medications:     albuterol (ProAir HFA) 90 mcg/act inhaler, Inhale 2 puffs every 6 (six) hours as needed for wheezing Use 2 puffs prior to exercise , Disp: 18 g, Rfl: 5    Cholecalciferol (VITAMIN D3) 5000 units CAPS, Take 5,000 Units by mouth , Disp: , Rfl:     cyanocobalamin ([employer] VITAMIN B-12) 1000 MCG tablet, Take by mouth, Disp: , Rfl:     fluticasone (FLONASE) 50 mcg/act nasal spray, TWO SPRAYS EACH NOSTRIL ONCE DAILY AS NEEDED , Disp: 16 mL, Rfl: 11    levocetirizine (XYZAL) 5 MG tablet, TAKE 1 TABLET BY MOUTH EVERY DAY IN THE EVENING, Disp: 30 tablet, Rfl: 3    Omega-3 Fatty Acids (FISH OIL) 1,000 mg, Take 3 capsules by mouth daily, Disp: , Rfl:     albuterol (2 5 mg/3 mL) 0 083 % nebulizer solution, Inhale (Patient not taking: Reported on 2021), Disp: , Rfl:     Cinnamon 500 MG capsule, Take by mouth (Patient not taking: Reported on 2021), Disp: , Rfl:     predniSONE 10 mg tablet, Take 2 tablets (20 mg total) by mouth daily for 7 days, THEN 1 tablet (10 mg total) daily for 7 days  , Disp: 21 tablet, Rfl: 0  No Known Allergies    Vitals: Blood pressure 134/76, pulse 76, temperature (!) 97 4 °F (36 3 °C), height 5' 7" (1 702 m), weight 109 kg (241 lb), SpO2 99 %  Body mass index is 37 75 kg/m²  Oxygen Therapy  SpO2: 99 %  Oxygen Therapy: None (Room air)      Physical Exam  Physical Exam  Vitals and nursing note reviewed  Constitutional:       Appearance: He is well-developed     HENT: Head: Normocephalic and atraumatic  Nose: No congestion or rhinorrhea  Mouth/Throat:      Pharynx: No oropharyngeal exudate  Comments: Post-nasal drip present  Eyes:      General: No scleral icterus  Conjunctiva/sclera: Conjunctivae normal    Cardiovascular:      Rate and Rhythm: Normal rate and regular rhythm  Heart sounds: No murmur heard  Pulmonary:      Breath sounds: No wheezing, rhonchi or rales  Comments: Breath sounds slightly diminished likely due to body habitus  Significant coughing present throughout the examination  Abdominal:      Palpations: Abdomen is soft  Tenderness: There is no abdominal tenderness  Musculoskeletal:      Cervical back: Neck supple  Right lower leg: Edema (trace) present  Left lower leg: Edema (trace) present  Skin:     General: Skin is warm and dry  Neurological:      Mental Status: He is alert  Labs: I have personally reviewed pertinent lab results  Lab Results   Component Value Date    WBC 5 84 01/25/2017    HGB 14 9 01/25/2017    HCT 42 9 01/25/2017    MCV 89 01/25/2017     01/25/2017     Lab Results   Component Value Date    GLUCOSE 98 07/26/2015    CALCIUM 9 3 03/30/2021     07/26/2015    K 5 0 03/30/2021    CO2 29 03/30/2021     03/30/2021    BUN 19 03/30/2021    CREATININE 1 19 03/30/2021     No results found for: IGE  Lab Results   Component Value Date    ALT 32 03/30/2021    AST 15 03/30/2021    ALKPHOS 53 03/30/2021    BILITOT 0 58 07/26/2015       Preventive   Influenza vaccine: Plans to obtain when available  COVID-19 vaccination: Obtained  Lung Cancer screening: Active surveillance     Imaging and other studies: I have personally reviewed pertinent films in PACS   CT chest 7/15/21:  IMPRESSION:  1   Multilobar thin-walled parenchymal cysts bilaterally with associated mild bronchiectasis, similar to prior exams       2    New right upper lobe and right middle lobe nodules, largest measuring 9 mm in the right upper lobe as described  These are favored to be related to infectious/inflammatory etiology; however, short-term follow-up chest CT in 3 months is recommended   to evaluate for resolution/interval change      3   Stable 8 mm lingular nodule and 3 mm left upper lobe nodule  Pulmonary function testing:   PFTs 12/23/2020:  FEV1/FVC Ratio is 73 %  FEV1 is 2 26 L/71 % of predicted  FVC is 3 09 L/74 % of predicted  RV 1 66 L/78 % of predicted, TLC 5 17 L/80 % of predicted, RV/TLC ratio 32 %  DLCO: 73 % of predicted     1  Mild restrictive airflow limitation on spirometry with mildly decreased vital capacity  2  Normal lung volumes  3  Mildly impaired diffusion capacity  4  Probably normal flow volume loop      EKG, Pathology, and Other Studies: I have personally reviewed pertinent reports  TTE 8/18/21:  LEFT VENTRICLE:  Size was normal   Systolic function was normal  Ejection fraction was estimated to be 60 %  There were no regional wall motion abnormalities  Wall thickness was at the upper limits of normal      TRICUSPID VALVE:  There was trace regurgitation      PULMONIC VALVE:  There was trace regurgitation  Disclaimer: Portions of the record may have been created with voice recognition software  Occasional wrong word or "sound a like" substitutions may have occurred due to the inherent limitations of voice recognition software  Careful consideration should be taken to recognize, using context, where substitutions have occurred      Haven Durbin DO   Pulmonary & Critical Care Medicine Fellow PGY-IV  [facility]

## 2021-10-04 DIAGNOSIS — J30.2 SEASONAL ALLERGIES: ICD-10-CM

## 2021-10-04 RX ORDER — LEVOCETIRIZINE DIHYDROCHLORIDE 5 MG/1
TABLET, FILM COATED ORAL
Qty: 30 TABLET | Refills: 3 | Status: SHIPPED | OUTPATIENT
Start: 2021-10-04 | End: 2022-02-23

## 2021-10-08 ENCOUNTER — APPOINTMENT (OUTPATIENT)
Dept: LAB | Age: 61
End: 2021-10-08
Payer: COMMERCIAL

## 2021-10-08 DIAGNOSIS — E55.9 VITAMIN D DEFICIENCY: ICD-10-CM

## 2021-10-08 DIAGNOSIS — Z12.5 SPECIAL SCREENING FOR MALIGNANT NEOPLASM OF PROSTATE: ICD-10-CM

## 2021-10-08 DIAGNOSIS — E78.5 HYPERLIPIDEMIA, UNSPECIFIED HYPERLIPIDEMIA TYPE: ICD-10-CM

## 2021-10-08 DIAGNOSIS — R73.03 PREDIABETES: ICD-10-CM

## 2021-10-08 DIAGNOSIS — E53.8 VITAMIN B12 DEFICIENCY: ICD-10-CM

## 2021-10-08 DIAGNOSIS — R89.9 ABNORMAL LABORATORY TEST: ICD-10-CM

## 2021-10-08 LAB
25(OH)D3 SERPL-MCNC: 40 NG/ML (ref 30–100)
ALBUMIN SERPL BCP-MCNC: 3.4 G/DL (ref 3.5–5)
ALP SERPL-CCNC: 48 U/L (ref 46–116)
ALT SERPL W P-5'-P-CCNC: 37 U/L (ref 12–78)
ANION GAP SERPL CALCULATED.3IONS-SCNC: 4 MMOL/L (ref 4–13)
AST SERPL W P-5'-P-CCNC: 24 U/L (ref 5–45)
BILIRUB SERPL-MCNC: 0.67 MG/DL (ref 0.2–1)
BUN SERPL-MCNC: 17 MG/DL (ref 5–25)
CALCIUM ALBUM COR SERPL-MCNC: 9.6 MG/DL (ref 8.3–10.1)
CALCIUM SERPL-MCNC: 9.1 MG/DL (ref 8.3–10.1)
CHLORIDE SERPL-SCNC: 107 MMOL/L (ref 100–108)
CHOLEST SERPL-MCNC: 184 MG/DL (ref 50–200)
CO2 SERPL-SCNC: 27 MMOL/L (ref 21–32)
CREAT SERPL-MCNC: 1.07 MG/DL (ref 0.6–1.3)
EST. AVERAGE GLUCOSE BLD GHB EST-MCNC: 123 MG/DL
GFR SERPL CREATININE-BSD FRML MDRD: 73 ML/MIN/1.73SQ M
GLUCOSE P FAST SERPL-MCNC: 102 MG/DL (ref 65–99)
HBA1C MFR BLD: 5.9 %
HDLC SERPL-MCNC: 46 MG/DL
LDLC SERPL CALC-MCNC: 108 MG/DL (ref 0–100)
POTASSIUM SERPL-SCNC: 4 MMOL/L (ref 3.5–5.3)
PROT SERPL-MCNC: 7.4 G/DL (ref 6.4–8.2)
PSA SERPL-MCNC: 0.2 NG/ML (ref 0–4)
SODIUM SERPL-SCNC: 138 MMOL/L (ref 136–145)
TRIGL SERPL-MCNC: 151 MG/DL
VIT B12 SERPL-MCNC: 1280 PG/ML (ref 100–900)

## 2021-10-08 PROCEDURE — 84165 PROTEIN E-PHORESIS SERUM: CPT | Performed by: PATHOLOGY

## 2021-10-08 PROCEDURE — 83036 HEMOGLOBIN GLYCOSYLATED A1C: CPT

## 2021-10-08 PROCEDURE — 82306 VITAMIN D 25 HYDROXY: CPT

## 2021-10-08 PROCEDURE — 84165 PROTEIN E-PHORESIS SERUM: CPT

## 2021-10-08 PROCEDURE — 80061 LIPID PANEL: CPT

## 2021-10-08 PROCEDURE — G0103 PSA SCREENING: HCPCS

## 2021-10-08 PROCEDURE — 80053 COMPREHEN METABOLIC PANEL: CPT

## 2021-10-08 PROCEDURE — 36415 COLL VENOUS BLD VENIPUNCTURE: CPT

## 2021-10-08 PROCEDURE — 82607 VITAMIN B-12: CPT

## 2021-10-12 ENCOUNTER — OFFICE VISIT (OUTPATIENT)
Dept: INTERNAL MEDICINE CLINIC | Facility: CLINIC | Age: 61
End: 2021-10-12
Payer: COMMERCIAL

## 2021-10-12 VITALS
HEIGHT: 67 IN | DIASTOLIC BLOOD PRESSURE: 68 MMHG | WEIGHT: 241 LBS | BODY MASS INDEX: 37.83 KG/M2 | OXYGEN SATURATION: 97 % | HEART RATE: 85 BPM | SYSTOLIC BLOOD PRESSURE: 118 MMHG

## 2021-10-12 DIAGNOSIS — R73.03 PREDIABETES: ICD-10-CM

## 2021-10-12 DIAGNOSIS — R89.9 ABNORMAL LABORATORY TEST: Primary | ICD-10-CM

## 2021-10-12 DIAGNOSIS — E66.09 CLASS 2 OBESITY DUE TO EXCESS CALORIES WITHOUT SERIOUS COMORBIDITY WITH BODY MASS INDEX (BMI) OF 35.0 TO 35.9 IN ADULT: ICD-10-CM

## 2021-10-12 DIAGNOSIS — E55.9 VITAMIN D DEFICIENCY: ICD-10-CM

## 2021-10-12 DIAGNOSIS — E78.5 HYPERLIPIDEMIA, UNSPECIFIED HYPERLIPIDEMIA TYPE: ICD-10-CM

## 2021-10-12 DIAGNOSIS — S93.402A SPRAIN OF LEFT ANKLE, UNSPECIFIED LIGAMENT, INITIAL ENCOUNTER: ICD-10-CM

## 2021-10-12 LAB
ALBUMIN SERPL ELPH-MCNC: 4.13 G/DL (ref 3.5–5)
ALBUMIN SERPL ELPH-MCNC: 58.2 % (ref 52–65)
ALPHA1 GLOB SERPL ELPH-MCNC: 0.23 G/DL (ref 0.1–0.4)
ALPHA1 GLOB SERPL ELPH-MCNC: 3.3 % (ref 2.5–5)
ALPHA2 GLOB SERPL ELPH-MCNC: 0.64 G/DL (ref 0.4–1.2)
ALPHA2 GLOB SERPL ELPH-MCNC: 9 % (ref 7–13)
BETA GLOB ABNORMAL SERPL ELPH-MCNC: 0.45 G/DL (ref 0.4–0.8)
BETA1 GLOB SERPL ELPH-MCNC: 6.4 % (ref 5–13)
BETA2 GLOB SERPL ELPH-MCNC: 6.4 % (ref 2–8)
BETA2+GAMMA GLOB SERPL ELPH-MCNC: 0.45 G/DL (ref 0.2–0.5)
GAMMA GLOB ABNORMAL SERPL ELPH-MCNC: 1.19 G/DL (ref 0.5–1.6)
GAMMA GLOB SERPL ELPH-MCNC: 16.7 % (ref 12–22)
IGG/ALB SER: 1.39 {RATIO} (ref 1.1–1.8)
PROT PATTERN SERPL ELPH-IMP: NORMAL
PROT SERPL-MCNC: 7.1 G/DL (ref 6.4–8.2)

## 2021-10-12 PROCEDURE — 99214 OFFICE O/P EST MOD 30 MIN: CPT | Performed by: INTERNAL MEDICINE

## 2021-10-15 ENCOUNTER — HOSPITAL ENCOUNTER (OUTPATIENT)
Dept: CT IMAGING | Facility: HOSPITAL | Age: 61
Discharge: HOME | End: 2021-10-15
Payer: COMMERCIAL

## 2021-10-15 DIAGNOSIS — R91.8 PULMONARY NODULES: ICD-10-CM

## 2021-10-15 PROCEDURE — 71250 CT THORAX DX C-: CPT

## 2021-10-20 ENCOUNTER — OFFICE VISIT (OUTPATIENT)
Dept: PULMONOLOGY | Facility: CLINIC | Age: 61
End: 2021-10-20
Payer: COMMERCIAL

## 2021-10-20 VITALS
DIASTOLIC BLOOD PRESSURE: 80 MMHG | BODY MASS INDEX: 38.33 KG/M2 | SYSTOLIC BLOOD PRESSURE: 130 MMHG | OXYGEN SATURATION: 97 % | WEIGHT: 244.2 LBS | HEIGHT: 67 IN | HEART RATE: 79 BPM | TEMPERATURE: 97.8 F

## 2021-10-20 DIAGNOSIS — R91.8 PULMONARY NODULES: ICD-10-CM

## 2021-10-20 DIAGNOSIS — J98.4 MULTIPLE IDIOPATHIC CYSTS OF LUNG: Primary | ICD-10-CM

## 2021-10-20 DIAGNOSIS — G47.33 OBSTRUCTIVE SLEEP APNEA SYNDROME: ICD-10-CM

## 2021-10-20 DIAGNOSIS — J45.20 MILD INTERMITTENT ASTHMA WITHOUT COMPLICATION: ICD-10-CM

## 2021-10-20 DIAGNOSIS — J45.990 EXERCISE-INDUCED ASTHMA: ICD-10-CM

## 2021-10-20 PROCEDURE — 3008F BODY MASS INDEX DOCD: CPT | Performed by: INTERNAL MEDICINE

## 2021-10-20 PROCEDURE — 1036F TOBACCO NON-USER: CPT | Performed by: INTERNAL MEDICINE

## 2021-10-20 PROCEDURE — 99214 OFFICE O/P EST MOD 30 MIN: CPT | Performed by: INTERNAL MEDICINE

## 2021-11-02 ENCOUNTER — IMMUNIZATIONS (OUTPATIENT)
Dept: FAMILY MEDICINE CLINIC | Facility: HOSPITAL | Age: 61
End: 2021-11-02

## 2021-11-02 DIAGNOSIS — Z23 ENCOUNTER FOR IMMUNIZATION: Primary | ICD-10-CM

## 2021-11-02 PROCEDURE — 0001A COVID-19 PFIZER VACC 0.3 ML: CPT

## 2021-11-02 PROCEDURE — 91300 COVID-19 PFIZER VACC 0.3 ML: CPT

## 2021-12-10 ENCOUNTER — CLINICAL SUPPORT (OUTPATIENT)
Dept: INTERNAL MEDICINE CLINIC | Facility: CLINIC | Age: 61
End: 2021-12-10
Payer: COMMERCIAL

## 2021-12-10 DIAGNOSIS — Z23 NEED FOR VACCINATION: Primary | ICD-10-CM

## 2021-12-10 PROCEDURE — 90682 RIV4 VACC RECOMBINANT DNA IM: CPT

## 2021-12-10 PROCEDURE — 90471 IMMUNIZATION ADMIN: CPT

## 2021-12-21 ENCOUNTER — TELEPHONE (OUTPATIENT)
Dept: SLEEP CENTER | Facility: CLINIC | Age: 61
End: 2021-12-21

## 2021-12-28 ENCOUNTER — TELEPHONE (OUTPATIENT)
Dept: SLEEP CENTER | Facility: CLINIC | Age: 61
End: 2021-12-28

## 2021-12-28 DIAGNOSIS — G47.33 OSA (OBSTRUCTIVE SLEEP APNEA): Primary | ICD-10-CM

## 2022-02-23 DIAGNOSIS — J30.2 SEASONAL ALLERGIES: ICD-10-CM

## 2022-02-23 RX ORDER — LEVOCETIRIZINE DIHYDROCHLORIDE 5 MG/1
TABLET, FILM COATED ORAL
Qty: 30 TABLET | Refills: 3 | Status: SHIPPED | OUTPATIENT
Start: 2022-02-23 | End: 2022-06-19

## 2022-04-13 ENCOUNTER — APPOINTMENT (OUTPATIENT)
Dept: LAB | Age: 62
End: 2022-04-13
Payer: COMMERCIAL

## 2022-04-13 DIAGNOSIS — E78.5 HYPERLIPIDEMIA, UNSPECIFIED HYPERLIPIDEMIA TYPE: ICD-10-CM

## 2022-04-13 DIAGNOSIS — R73.03 PREDIABETES: ICD-10-CM

## 2022-04-13 DIAGNOSIS — R89.9 ABNORMAL LABORATORY TEST: ICD-10-CM

## 2022-04-13 DIAGNOSIS — E55.9 VITAMIN D DEFICIENCY: ICD-10-CM

## 2022-04-13 LAB
25(OH)D3 SERPL-MCNC: 43.8 NG/ML (ref 30–100)
ALBUMIN SERPL BCP-MCNC: 3.5 G/DL (ref 3.5–5)
ALP SERPL-CCNC: 48 U/L (ref 46–116)
ALT SERPL W P-5'-P-CCNC: 44 U/L (ref 12–78)
ANION GAP SERPL CALCULATED.3IONS-SCNC: 3 MMOL/L (ref 4–13)
AST SERPL W P-5'-P-CCNC: 26 U/L (ref 5–45)
BILIRUB SERPL-MCNC: 0.58 MG/DL (ref 0.2–1)
BUN SERPL-MCNC: 19 MG/DL (ref 5–25)
CALCIUM SERPL-MCNC: 9.1 MG/DL (ref 8.3–10.1)
CHLORIDE SERPL-SCNC: 107 MMOL/L (ref 100–108)
CHOLEST SERPL-MCNC: 149 MG/DL
CO2 SERPL-SCNC: 28 MMOL/L (ref 21–32)
CREAT SERPL-MCNC: 1.16 MG/DL (ref 0.6–1.3)
EST. AVERAGE GLUCOSE BLD GHB EST-MCNC: 114 MG/DL
GFR SERPL CREATININE-BSD FRML MDRD: 66 ML/MIN/1.73SQ M
GLUCOSE P FAST SERPL-MCNC: 108 MG/DL (ref 65–99)
HBA1C MFR BLD: 5.6 %
HDLC SERPL-MCNC: 42 MG/DL
LDLC SERPL CALC-MCNC: 85 MG/DL (ref 0–100)
POTASSIUM SERPL-SCNC: 4.5 MMOL/L (ref 3.5–5.3)
PROT SERPL-MCNC: 7.4 G/DL (ref 6.4–8.2)
SODIUM SERPL-SCNC: 138 MMOL/L (ref 136–145)
TRIGL SERPL-MCNC: 109 MG/DL
VIT B12 SERPL-MCNC: 878 PG/ML (ref 100–900)

## 2022-04-13 PROCEDURE — 80053 COMPREHEN METABOLIC PANEL: CPT

## 2022-04-13 PROCEDURE — 83036 HEMOGLOBIN GLYCOSYLATED A1C: CPT

## 2022-04-13 PROCEDURE — 82607 VITAMIN B-12: CPT

## 2022-04-13 PROCEDURE — 82306 VITAMIN D 25 HYDROXY: CPT

## 2022-04-13 PROCEDURE — 36415 COLL VENOUS BLD VENIPUNCTURE: CPT

## 2022-04-13 PROCEDURE — 84166 PROTEIN E-PHORESIS/URINE/CSF: CPT | Performed by: PATHOLOGY

## 2022-04-13 PROCEDURE — 84166 PROTEIN E-PHORESIS/URINE/CSF: CPT | Performed by: INTERNAL MEDICINE

## 2022-04-13 PROCEDURE — 80061 LIPID PANEL: CPT

## 2022-04-14 LAB
ALBUMIN UR ELPH-MCNC: 100 %
ALPHA1 GLOB MFR UR ELPH: 0 %
ALPHA2 GLOB MFR UR ELPH: 0 %
B-GLOBULIN MFR UR ELPH: 0 %
GAMMA GLOB MFR UR ELPH: 0 %
PROT PATTERN UR ELPH-IMP: ABNORMAL
PROT UR-MCNC: 29 MG/DL

## 2022-04-15 ENCOUNTER — RA CDI HCC (OUTPATIENT)
Dept: OTHER | Facility: HOSPITAL | Age: 62
End: 2022-04-15

## 2022-04-15 NOTE — PROGRESS NOTES
[facility] [address]  coding opportunities          Chart Reviewed number of suggestions sent to Provider: 1   E66 01: Morbid (severe) obesity due to excess calories ([facility] [address] )    *Per CMS/ICD 10 coding guidelines, to be used when BMI > 35 & <40 with one or more comorbidity     If this is correct, please document and assess at your next visit,4/22/022     Patients Insurance        Commercial Insurance: [employer]

## 2022-04-22 ENCOUNTER — TELEPHONE (OUTPATIENT)
Dept: INTERNAL MEDICINE CLINIC | Facility: CLINIC | Age: 62
End: 2022-04-22

## 2022-04-22 ENCOUNTER — PATIENT MESSAGE (OUTPATIENT)
Dept: INTERNAL MEDICINE CLINIC | Facility: CLINIC | Age: 62
End: 2022-04-22

## 2022-04-22 ENCOUNTER — OFFICE VISIT (OUTPATIENT)
Dept: INTERNAL MEDICINE CLINIC | Facility: CLINIC | Age: 62
End: 2022-04-22
Payer: COMMERCIAL

## 2022-04-22 VITALS
WEIGHT: 239.4 LBS | HEART RATE: 76 BPM | BODY MASS INDEX: 37.57 KG/M2 | RESPIRATION RATE: 16 BRPM | DIASTOLIC BLOOD PRESSURE: 68 MMHG | OXYGEN SATURATION: 97 % | SYSTOLIC BLOOD PRESSURE: 110 MMHG | HEIGHT: 67 IN

## 2022-04-22 DIAGNOSIS — E55.9 VITAMIN D DEFICIENCY: ICD-10-CM

## 2022-04-22 DIAGNOSIS — M54.50 LOW BACK PAIN WITHOUT SCIATICA, UNSPECIFIED BACK PAIN LATERALITY, UNSPECIFIED CHRONICITY: ICD-10-CM

## 2022-04-22 DIAGNOSIS — E78.5 HYPERLIPIDEMIA, UNSPECIFIED HYPERLIPIDEMIA TYPE: ICD-10-CM

## 2022-04-22 DIAGNOSIS — E66.09 CLASS 2 OBESITY DUE TO EXCESS CALORIES WITHOUT SERIOUS COMORBIDITY WITH BODY MASS INDEX (BMI) OF 35.0 TO 35.9 IN ADULT: ICD-10-CM

## 2022-04-22 DIAGNOSIS — E53.8 VITAMIN B12 DEFICIENCY: ICD-10-CM

## 2022-04-22 DIAGNOSIS — Z00.00 ANNUAL PHYSICAL EXAM: Primary | ICD-10-CM

## 2022-04-22 DIAGNOSIS — N52.9 ERECTILE DYSFUNCTION, UNSPECIFIED ERECTILE DYSFUNCTION TYPE: ICD-10-CM

## 2022-04-22 DIAGNOSIS — J30.2 SEASONAL ALLERGIES: ICD-10-CM

## 2022-04-22 DIAGNOSIS — R73.03 PREDIABETES: ICD-10-CM

## 2022-04-22 PROCEDURE — 99396 PREV VISIT EST AGE 40-64: CPT | Performed by: INTERNAL MEDICINE

## 2022-04-22 PROCEDURE — 3725F SCREEN DEPRESSION PERFORMED: CPT | Performed by: INTERNAL MEDICINE

## 2022-04-22 RX ORDER — SILDENAFIL 50 MG/1
50 TABLET, FILM COATED ORAL DAILY PRN
Qty: 10 TABLET | Refills: 3 | Status: SHIPPED | OUTPATIENT
Start: 2022-04-22 | End: 2022-04-28 | Stop reason: SDUPTHER

## 2022-04-22 RX ORDER — MONTELUKAST SODIUM 10 MG/1
10 TABLET ORAL
Qty: 30 TABLET | Refills: 4 | Status: SHIPPED | OUTPATIENT
Start: 2022-04-22

## 2022-04-22 NOTE — PATIENT INSTRUCTIONS
Wellness Visit for Adults   AMBULATORY CARE:   A wellness visit  is when you see your healthcare provider to get screened for health problems  Your healthcare provider will also give you advice on how to stay healthy  Write down your questions so you remember to ask them  Ask your healthcare provider how often you should have a wellness visit  What happens at a wellness visit:  Your healthcare provider will ask about your health, and your family history of health problems  This includes high blood pressure, heart disease, and cancer  He or she will ask if you have symptoms that concern you, if you smoke, and about your mood  You may also be asked about your intake of medicines, supplements, food, and alcohol  Any of the following may be done:  · Your weight  will be checked  Your height may also be checked so your body mass index (BMI) can be calculated  Your BMI shows if you are at a healthy weight  · Your blood pressure  and heart rate will be checked  Your temperature may also be checked  · Blood and urine tests  may be done  Blood tests may be done to check your cholesterol levels  Abnormal cholesterol levels increase your risk for heart disease and stroke  You may also need a blood or urine test to check for diabetes if you are at increased risk  Urine tests may be done to look for signs of an infection or kidney disease  · A physical exam  includes checking your heartbeat and lungs with a stethoscope  Your healthcare provider may also check your skin to look for sun damage  · Screening tests  may be recommended  A screening test is done to check for diseases that may not cause symptoms  The screening tests you may need depend on your age, gender, family history, and lifestyle habits  For example, colorectal screening may be recommended if you are 48years old or older  Screening tests you need if you are a woman:   · A Pap smear  is used to screen for cervical cancer   Pap smears are usually done every 3 to 5 years depending on your age  You may need them more often if you have had abnormal Pap smear test results in the past  Ask your healthcare provider how often you should have a Pap smear  · A mammogram  is an x-ray of your breasts to screen for breast cancer  Experts recommend mammograms every 2 years starting at age 48 years  You may need a mammogram at age 52 years or younger if you have an increased risk for breast cancer  Talk to your healthcare provider about when you should start having mammograms and how often you need them  Vaccines you may need:   · Get an influenza vaccine  every year  The influenza vaccine protects you from the flu  Several types of viruses cause the flu  The viruses change over time, so new vaccines are made each year  · Get a tetanus-diphtheria (Td) booster vaccine  every 10 years  This vaccine protects you against tetanus and diphtheria  Tetanus is a severe infection that may cause painful muscle spasms and lockjaw  Diphtheria is a severe bacterial infection that causes a thick covering in the back of your mouth and throat  · Get a human papillomavirus (HPV) vaccine  if you are female and aged 23 to 32 or male 23 to 24 and never received it  This vaccine protects you from HPV infection  HPV is the most common infection spread by sexual contact  HPV may also cause vaginal, penile, and anal cancers  · Get a pneumococcal vaccine  if you are aged 72 years or older  The pneumococcal vaccine is an injection given to protect you from pneumococcal disease  Pneumococcal disease is an infection caused by pneumococcal bacteria  The infection may cause pneumonia, meningitis, or an ear infection  · Get a shingles vaccine  if you are 60 or older, even if you have had shingles before  The shingles vaccine is an injection to protect you from the varicella-zoster virus  This is the same virus that causes chickenpox   Shingles is a painful rash that develops in people who had chickenpox or have been exposed to the virus  How to eat healthy:  My Plate is a model for planning healthy meals  It shows the types and amounts of foods that should go on your plate  Fruits and vegetables make up about half of your plate, and grains and protein make up the other half  A serving of dairy is included on the side of your plate  The amount of calories and serving sizes you need depends on your age, gender, weight, and height  Examples of healthy foods are listed below:  · Eat a variety of vegetables  such as dark green, red, and orange vegetables  You can also include canned vegetables low in sodium (salt) and frozen vegetables without added butter or sauces  · Eat a variety of fresh fruits , canned fruit in 100% juice, frozen fruit, and dried fruit  · Include whole grains  At least half of the grains you eat should be whole grains  Examples include whole-wheat bread, wheat pasta, brown rice, and whole-grain cereals such as oatmeal     · Eat a variety of protein foods such as seafood (fish and shellfish), lean meat, and poultry without skin (turkey and chicken)  Examples of lean meats include pork leg, shoulder, or tenderloin, and beef round, sirloin, tenderloin, and extra lean ground beef  Other protein foods include eggs and egg substitutes, beans, peas, soy products, nuts, and seeds  · Choose low-fat dairy products such as skim or 1% milk or low-fat yogurt, cheese, and cottage cheese  · Limit unhealthy fats  such as butter, hard margarine, and shortening  Exercise:  Exercise at least 30 minutes per day on most days of the week  Some examples of exercise include walking, biking, dancing, and swimming  You can also fit in more physical activity by taking the stairs instead of the elevator or parking farther away from stores  Include muscle strengthening activities 2 days each week  Regular exercise provides many health benefits   It helps you manage your weight, and decreases your risk for type 2 diabetes, heart disease, stroke, and high blood pressure  Exercise can also help improve your mood  Ask your healthcare provider about the best exercise plan for you  General health and safety guidelines:   · Do not smoke  Nicotine and other chemicals in cigarettes and cigars can cause lung damage  Ask your healthcare provider for information if you currently smoke and need help to quit  E-cigarettes or smokeless tobacco still contain nicotine  Talk to your healthcare provider before you use these products  · Limit alcohol  A drink of alcohol is 12 ounces of beer, 5 ounces of wine, or 1½ ounces of liquor  · Lose weight, if needed  Being overweight increases your risk of certain health conditions  These include heart disease, high blood pressure, type 2 diabetes, and certain types of cancer  · Protect your skin  Do not sunbathe or use tanning beds  Use sunscreen with a SPF 15 or higher  Apply sunscreen at least 15 minutes before you go outside  Reapply sunscreen every 2 hours  Wear protective clothing, hats, and sunglasses when you are outside  · Drive safely  Always wear your seatbelt  Make sure everyone in your car wears a seatbelt  A seatbelt can save your life if you are in an accident  Do not use your cell phone when you are driving  This could distract you and cause an accident  Pull over if you need to make a call or send a text message  · Practice safe sex  Use latex condoms if are sexually active and have more than one partner  Your healthcare provider may recommend screening tests for sexually transmitted infections (STIs)  · Wear helmets, lifejackets, and protective gear  Always wear a helmet when you ride a bike or motorcycle, go skiing, or play sports that could cause a head injury  Wear protective equipment when you play sports  Wear a lifejacket when you are on a boat or doing water sports      © Copyright [employer] 2022 Information is for End User's use only and may not be sold, redistributed or otherwise used for commercial purposes  All illustrations and images included in CareNotes® are the copyrighted property of A D A M , Inc  or [employer]  The above information is an  only  It is not intended as medical advice for individual conditions or treatments  Talk to your doctor, nurse or pharmacist before following any medical regimen to see if it is safe and effective for you  Lower Back Exercises   WHAT YOU NEED TO KNOW:   Lower back exercises help heal and strengthen your back muscles to prevent another injury  Ask your healthcare provider if you need to see a physical therapist for more advanced exercises  DISCHARGE INSTRUCTIONS:   Return to the emergency department if:   · You have severe pain that prevents you from moving  Contact your healthcare provider if:   · Your pain becomes worse  · You have new pain  · You have questions or concerns about your condition or care  Do lower back exercises safely:   · Do the exercises on a mat or firm surface  (not on a bed) to support your spine and prevent low back pain  · Move slowly and smoothly  Avoid fast or jerky motions  · Breathe normally  Do not hold your breath  · Stop if you feel pain  It is normal to feel some discomfort at first  Regular exercise will help decrease your discomfort over time  Lower back exercises: Your healthcare provider may recommend that you do back exercises 10 to 30 minutes each day  He may also recommend that you do exercises 1 to 3 times each day  Ask your healthcare provider which exercises are best for you and how often to do them  · Ankle pumps:  Lie on your back  Move your foot up (with your toes pointing toward your head)  Then, move your foot down (with your toes pointing away from you)  Repeat this exercise 10 times on each side  · Heel slides:  Lie on your back   Slowly bend one leg and then straighten it  Next, bend the other leg and then straighten it  Repeat 10 times on each side  · Pelvic tilt:  Lie on your back with your knees bent and feet flat on the floor  Place your arms in a relaxed position beside your body  Tighten the muscles of your abdomen and flatten your back against the floor  Hold for 5 seconds  Repeat 5 times  · Back stretch:  Lie on your back with your hands behind your head  Bend your knees and turn the lower half of your body to one side  Hold this position for 10 seconds  Repeat 3 times on each side  · Straight leg raises:  Lie on your back with one leg straight  Bend the other knee  Tighten your abdomen and then slowly lift the straight leg up about 6 to 12 inches off the floor  Hold for 1 to 5 seconds  Lower your leg slowly  Repeat 10 times on each leg  · Knee-to-chest:  Lie on your back with your knees bent and feet flat on the floor  Pull one of your knees toward your chest and hold it there for 5 seconds  Return your leg to the starting position  Lift the other knee toward your chest and hold for 5 seconds  Do this 5 times on each side  · Cat and camel:  Place your hands and knees on the floor  Arch your back upward toward the ceiling and lower your head  Round out your spine as much as you can  Hold for 5 seconds  Lift your head upward and push your chest downward toward the floor  Hold for 5 seconds  Do 3 sets or as directed  · Wall squats:  Stand with your back against a wall  Tighten the muscles of your abdomen  Slowly lower your body until your knees are bent at a 45 degree angle  Hold this position for 5 seconds  Slowly move back up to a standing position  Repeat 10 times  · Curl up:  Lie on your back with your knees bent and feet flat on the floor  Place your hands, palms down, underneath the curve in your lower back  Next, with your elbows on the floor, lift your shoulders and chest 2 to 3 inches   Keep your head in line with your shoulders  Hold this position for 5 seconds  When you can do this exercise without pain for 10 to 15 seconds, you may add a rotation  While your shoulders and chest are lifted off the ground, turn slightly to the left and hold  Repeat on the other side  · Bird dog:  Place your hands and knees on the floor  Keep your wrists directly below your shoulders and your knees directly below your hips  Pull your belly button in toward your spine  Do not flatten or arch your back  Tighten your abdominal muscles  Raise one arm straight out so that it is aligned with your head  Next, raise the leg opposite your arm  Hold this position for 15 seconds  Lower your arm and leg slowly and change sides  Do 5 sets  © Copyright [employer] 2022 Information is for End User's use only and may not be sold, redistributed or otherwise used for commercial purposes  All illustrations and images included in CareNotes® are the copyrighted property of A D A M , Inc  or [facility] Brenda Hanna   The above information is an  only  It is not intended as medical advice for individual conditions or treatments  Talk to your doctor, nurse or pharmacist before following any medical regimen to see if it is safe and effective for you

## 2022-04-22 NOTE — PROGRESS NOTES
[facility]  Chris    NAME: Ethan Hamilton  AGE: 57 y o  SEX: male  : 1960     DATE: 2022     Assessment and Plan:     Problem List Items Addressed This Visit        Other    Hyperlipidemia     Low-cholesterol diet monitor lipid profile patient would like to hold off on statin         Relevant Orders    Lipid Panel with Direct LDL reflex    Vitamin D deficiency     Will check a vitamin-D level         Relevant Orders    Vitamin D 25 hydroxy    Class 2 obesity due to excess calories without serious comorbidity with body mass index (BMI) of 35 0 to 35 9 in adult     Obesity -I have counseled patient following healthy and balanced diet, I would like the patient to lose weight, I would like the patient exercise routinely; we will continue monitor the patient's progress  Prediabetes     Pre diabetes -I have counseled the patient to follow a healthy balanced diet, I have counseled patient reduce carbohydrates and sweets in the diet, I would like the patient exercise routinely  I will be checking hemoglobin A1c and comprehensive metabolic panel  Have counseled patient about the prevention of diabetes, and the risk of progression to type 2 diabetes           Relevant Orders    Comprehensive metabolic panel    Hemoglobin A1C    Seasonal allergies     Continue antihistamine/nasal [id number] mg once daily will check if okay with Pulmonary         Relevant Medications    montelukast (SINGULAIR) 10 mg tablet    Vitamin B12 deficiency    Relevant Orders    Vitamin B12      Other Visit Diagnoses     Annual physical exam    -  Primary    Erectile dysfunction, unspecified erectile dysfunction type        Relevant Medications    sildenafil (VIAGRA) 50 MG tablet    Other Relevant Orders    Testosterone, free, total    Low back pain without sciatica, unspecified back pain laterality, unspecified chronicity        Relevant Medications Diclofenac Sodium (VOLTAREN) 1 %        Allergies ,allergies to cats   Immunizations and preventive care screenings were discussed with patient today  Appropriate education was printed on patient's after visit summary  Mild intermittent back pain with movement  Counseling:  · Exercise: the importance of regular exercise/physical activity was discussed  Recommend exercise 3-5 times per week for at least 30 minutes  RTO in 6 months call if any problems    No follow-ups on file  Chief Complaint:     Chief Complaint   Patient presents with    Annual Exam      History of Present Illness:     Adult Annual Physical   Patient here for a comprehensive physical exam  The patient reports no problems  Diet and Physical Activity  · Diet/Nutrition: well balanced diet  · Exercise: moderate cardiovascular exercise  Depression Screening  PHQ-2/9 Depression Screening    Little interest or pleasure in doing things: 0 - not at all  Feeling down, depressed, or hopeless: 0 - not at all  PHQ-2 Score: 0  PHQ-2 Interpretation: Negative depression screen       General Health  · Sleep: gets 7-8 hours of sleep on average  · Hearing: normal - bilateral   · Vision: goes for regular eye exams  · Dental: regular dental visits and no dental visits for >1 year   Health  · Symptoms include: erectile dysfunction  For a couple yrs    Review of Systems:     Review of Systems   Constitutional: Negative for activity change, appetite change and unexpected weight change  HENT: Positive for congestion and postnasal drip  Eyes: Negative for visual disturbance  Respiratory: Negative for cough and shortness of breath  Cardiovascular: Negative for chest pain  Gastrointestinal: Negative for abdominal pain, diarrhea, nausea and vomiting  Neurological: Negative for dizziness, light-headedness and headaches  Hematological: Negative for adenopathy        Past Medical History:     Past Medical History:   Diagnosis Date  Asthma     Erectile dysfunction of non-organic origin     Resolved: 10/15/2014      Past Surgical History:     Past Surgical History:   Procedure Laterality Date    EYE SURGERY      TONSILLECTOMY AND ADENOIDECTOMY      UMBILICAL HERNIA REPAIR        Family History:     Family History   Problem Relation Age of Onset    Asthma Mother     Heart disease Mother     Rheum arthritis Mother     Diabetes Father     Heart disease Father     Lung disease Sister         Bronchiectasis  No clear history of cystic lung disease however    Diabetes Sister     Other Son         Thyroid disorder      Social History:     Social History     Socioeconomic History    Marital status: /Civil Union     Spouse name: None    Number of children: None    Years of education: None    Highest education level: None   Occupational History    None   Tobacco Use    Smoking status: Former Smoker     Packs/day: 1 50     Years: 8 00     Pack years: 12 00     Types: Cigarettes     Quit date:      Years since quittin 3    Smokeless tobacco: Never Used    Tobacco comment: He smoked a pack a day of cigarettes for 8 years  He quite at the age of 22  Occasional cigar use     Vaping Use    Vaping Use: Never used   Substance and Sexual Activity    Alcohol use: Yes     Comment: Social drinker    Drug use: No    Sexual activity: Yes   Other Topics Concern    None   Social History Narrative    Caffeine use    Work-related stress     Social Determinants of Health     Financial Resource Strain: Not on file   Food Insecurity: Not on file   Transportation Needs: Not on file   Physical Activity: Not on file   Stress: Not on file   Social Connections: Not on file   Intimate Partner Violence: Not on file   Housing Stability: Not on file      Current Medications:     Current Outpatient Medications   Medication Sig Dispense Refill    albuterol (2 5 mg/3 mL) 0 083 % nebulizer solution Inhale        albuterol (ProAir HFA) 90 mcg/act inhaler Inhale 2 puffs every 6 (six) hours as needed for wheezing Use 2 puffs prior to exercise  18 g 5    Cholecalciferol (VITAMIN D3) 5000 units CAPS Take 5,000 Units by mouth       Cinnamon 500 MG capsule Take by mouth       cyanocobalamin ([employer] VITAMIN B-12) 1000 MCG tablet Take by mouth      fluticasone (FLONASE) 50 mcg/act nasal spray TWO SPRAYS EACH NOSTRIL ONCE DAILY AS NEEDED  16 mL 11    levocetirizine (XYZAL) 5 MG tablet TAKE 1 TABLET BY MOUTH EVERY DAY IN THE EVENING 30 tablet 3    Omega-3 Fatty Acids (FISH OIL) 1,000 mg Take 3 capsules by mouth daily      Diclofenac Sodium (VOLTAREN) 1 % Apply 2 g topically 4 (four) times a day as needed (back pain1) 1 g 1    montelukast (SINGULAIR) 10 mg tablet Take 1 tablet (10 mg total) by mouth daily at bedtime 30 tablet 4    sildenafil (VIAGRA) 50 MG tablet Take 1 tablet (50 mg total) by mouth daily as needed for erectile dysfunction 10 tablet 3     No current facility-administered medications for this visit  Allergies:     No Known Allergies   Physical Exam:     /68   Pulse 76   Resp 16   Ht 5' 7" (1 702 m)   Wt 109 kg (239 lb 6 4 oz)   SpO2 97%   BMI 37 50 kg/m²     Physical Exam  Constitutional:       Appearance: He is well-developed  HENT:      Head: Normocephalic and atraumatic  Right Ear: External ear normal       Left Ear: External ear normal       Nose: Nose normal    Eyes:      Pupils: Pupils are equal, round, and reactive to light  Cardiovascular:      Rate and Rhythm: Normal rate and regular rhythm  Heart sounds: Normal heart sounds  No murmur heard  Pulmonary:      Effort: Pulmonary effort is normal       Breath sounds: Normal breath sounds  Abdominal:      General: There is no distension  Palpations: Abdomen is soft  Tenderness: There is no abdominal tenderness  There is no guarding            Katharina Yadi, DO  [facility]

## 2022-04-22 NOTE — TELEPHONE ENCOUNTER
----- Message from Sai George DO sent at 4/22/2022  1:09 PM EDT -----  Robel Stevenson please have patient start singular which was prescribed today let him know that pulmonary is okay  ----- Message -----  From: Lalita Pulliam MD  Sent: 4/22/2022  11:02 AM EDT  To: Sai George, DO    Yes that should be fine    No concern from my standpoint    Aster Ribera  ----- Message -----  From: Sai George DO  Sent: 4/22/2022   8:27 AM EDT  To: Lalita Pulliam MD    Hi Aster Ribera would it be ok to start Singulair for allergies, he is having break through symptoms with antihistamine and nasal steroid, just want to make sure because of underlying lung condition and possible side effect of singulair (geri haddad )  Thanks

## 2022-04-23 ENCOUNTER — APPOINTMENT (OUTPATIENT)
Dept: LAB | Age: 62
End: 2022-04-23
Payer: COMMERCIAL

## 2022-04-23 DIAGNOSIS — N52.9 ERECTILE DYSFUNCTION, UNSPECIFIED ERECTILE DYSFUNCTION TYPE: ICD-10-CM

## 2022-04-23 PROCEDURE — 36415 COLL VENOUS BLD VENIPUNCTURE: CPT

## 2022-04-23 PROCEDURE — 84402 ASSAY OF FREE TESTOSTERONE: CPT

## 2022-04-23 PROCEDURE — 84403 ASSAY OF TOTAL TESTOSTERONE: CPT

## 2022-04-24 NOTE — ASSESSMENT & PLAN NOTE
Assessment and plan 1  Health maintenance annual wellness examination overall the patient is clinically stable and doing well, we encouraged the patient to follow a healthy and balanced diet  We recommend that the patient exercise routinely approximately 30 minutes 5 times per week   We have reviewed the patient's vaccines and have made recommendations for updates if necessary        We will be ordering screening laboratories which are age appropriate  Return to the office in   6 month   call if any problems 
Continue antihistamine/nasal [id number] mg once daily will check if okay with Pulmonary
Low-cholesterol diet monitor lipid profile patient would like to hold off on statin
Obesity -I have counseled patient following healthy and balanced diet, I would like the patient to lose weight, I would like the patient exercise routinely; we will continue monitor the patient's progress 
Pre diabetes -I have counseled the patient to follow a healthy balanced diet, I have counseled patient reduce carbohydrates and sweets in the diet, I would like the patient exercise routinely  I will be checking hemoglobin A1c and comprehensive metabolic panel  Have counseled patient about the prevention of diabetes, and the risk of progression to type 2 diabetes 
Will check a vitamin-D level
detailed exam

## 2022-04-25 LAB
TESTOST FREE SERPL-MCNC: 6 PG/ML (ref 6.6–18.1)
TESTOST SERPL-MCNC: 340 NG/DL (ref 264–916)

## 2022-04-26 ENCOUNTER — OFFICE VISIT (OUTPATIENT)
Dept: SLEEP CENTER | Facility: CLINIC | Age: 62
End: 2022-04-26
Payer: COMMERCIAL

## 2022-04-26 VITALS
SYSTOLIC BLOOD PRESSURE: 110 MMHG | HEIGHT: 67 IN | WEIGHT: 243.8 LBS | HEART RATE: 74 BPM | BODY MASS INDEX: 38.27 KG/M2 | DIASTOLIC BLOOD PRESSURE: 78 MMHG | OXYGEN SATURATION: 94 %

## 2022-04-26 DIAGNOSIS — G47.33 OSA (OBSTRUCTIVE SLEEP APNEA): Primary | ICD-10-CM

## 2022-04-26 PROCEDURE — 1036F TOBACCO NON-USER: CPT | Performed by: INTERNAL MEDICINE

## 2022-04-26 PROCEDURE — 3008F BODY MASS INDEX DOCD: CPT | Performed by: INTERNAL MEDICINE

## 2022-04-26 PROCEDURE — 99213 OFFICE O/P EST LOW 20 MIN: CPT | Performed by: INTERNAL MEDICINE

## 2022-04-26 NOTE — PROGRESS NOTES
Progress Note - [facility]  VSN: MRN: [id number]      Reason for Visit:  57 y o male here for annual follow-up    Assessment:10  Doing well on current therapy of 10 cm for severe SWAPNIL  The patient developed a cough as well as pulmonary nodules after starting his new machine, which was recalled  His nodules have gotten smaller on follow up  Plan:  Continue to use previous CPAP device    Follow up: One year    History of Present Illness:  History of SWAPNIL on PAP therapy  Fully compliant and deriving benefit  Recalled machine caused cough  Using his previous machine  Review of Systems      Genitourinary none   Cardiology none   Gastrointestinal none   Neurology none   Constitutional none   Integumentary none   Psychiatry none   Musculoskeletal legs twitching/jerking   Pulmonary none   ENT none   Endocrine none   Hematological none         I have reviewed and updated the review of systems as necessary      Historical Information    Past Medical History:   Past Medical History:   Diagnosis Date    Asthma     Erectile dysfunction of non-organic origin     Resolved: 10/15/2014         Past Surgical History:   Past Surgical History:   Procedure Laterality Date    EYE SURGERY      TONSILLECTOMY AND ADENOIDECTOMY      UMBILICAL HERNIA REPAIR         Social History:   Social History     Socioeconomic History    Marital status: /Civil Union     Spouse name: Not on file    Number of children: Not on file    Years of education: Not on file    Highest education level: Not on file   Occupational History    Not on file   Tobacco Use    Smoking status: Former Smoker     Packs/day: 1 50     Years: 8 00     Pack years: 12 00     Types: Cigarettes     Quit date:      Years since quittin 3    Smokeless tobacco: Never Used    Tobacco comment: He smoked a pack a day of cigarettes for 8 years  He quite at the age of 22  Occasional cigar use     Vaping Use    Vaping Use: Never used   Substance and Sexual Activity    Alcohol use: Yes     Comment: Social drinker    Drug use: No    Sexual activity: Yes   Other Topics Concern    Not on file   Social History Narrative    Caffeine use    Work-related stress     Social Determinants of Health     Financial Resource Strain: Not on file   Food Insecurity: Not on file   Transportation Needs: Not on file   Physical Activity: Not on file   Stress: Not on file   Social Connections: Not on file   Intimate Partner Violence: Not on file   Housing Stability: Not on file       Family History:   Family History   Problem Relation Age of Onset    Asthma Mother     Heart disease Mother     Rheum arthritis Mother     Diabetes Father     Heart disease Father     Lung disease Sister         Bronchiectasis    No clear history of cystic lung disease however    Diabetes Sister     Other Son         Thyroid disorder       Medications/Allergies:      Current Outpatient Medications:     albuterol (2 5 mg/3 mL) 0 083 % nebulizer solution, Inhale  , Disp: , Rfl:     albuterol (ProAir HFA) 90 mcg/act inhaler, Inhale 2 puffs every 6 (six) hours as needed for wheezing Use 2 puffs prior to exercise , Disp: 18 g, Rfl: 5    Cholecalciferol (VITAMIN D3) 5000 units CAPS, Take 5,000 Units by mouth , Disp: , Rfl:     Cinnamon 500 MG capsule, Take by mouth , Disp: , Rfl:     cyanocobalamin (CVS VITAMIN B-12) 1000 MCG tablet, Take by mouth, Disp: , Rfl:     Diclofenac Sodium (VOLTAREN) 1 %, Apply 2 g topically 4 (four) times a day as needed (back pain1), Disp: 1 g, Rfl: 1    fluticasone (FLONASE) 50 mcg/act nasal spray, TWO SPRAYS EACH NOSTRIL ONCE DAILY AS NEEDED , Disp: 16 mL, Rfl: 11    levocetirizine (XYZAL) 5 MG tablet, TAKE 1 TABLET BY MOUTH EVERY DAY IN THE EVENING, Disp: 30 tablet, Rfl: 3    montelukast (SINGULAIR) 10 mg tablet, Take 1 tablet (10 mg total) by mouth daily at bedtime, Disp: 30 tablet, Rfl: 4    Omega-3 Fatty Acids (FISH OIL) 1,000 mg, Take 3 capsules by mouth daily, Disp: , Rfl:     sildenafil (VIAGRA) 50 MG tablet, Take 1 tablet (50 mg total) by mouth daily as needed for erectile dysfunction, Disp: 10 tablet, Rfl: 3          Objective      Vital Signs:   Vitals:    04/26/22 0847   BP: 110/78   Pulse: 74   SpO2: 94%     [place]on Sleepiness Scale: Total score: 0        Physical Exam:    General: Alert, appropriate, cooperative, overweight    Head: NC/AT    Skin: Warm, dry    Neuro: No motor abnormalities, cranial nerves appear intact    Extremity: No clubbing, cyanosis      DME Provider: [employer]        Counseling / Coordination of Care   I have spent 10 minutes with the patient today in which greater than 50% of this time was spent in counseling/coordination of care regarding: equipment and compliance  Board Certified Sleep Specialist    Portions of the record may have been created with voice recognition software  Occasional wrong word or "sound a like" substitutions may have occurred due to the inherent limitations of voice recognition software  Read the chart carefully and recognize, using context, where substitutions have occurred

## 2022-04-27 ENCOUNTER — TELEPHONE (OUTPATIENT)
Dept: OTHER | Facility: OTHER | Age: 62
End: 2022-04-27

## 2022-04-27 ENCOUNTER — TELEPHONE (OUTPATIENT)
Dept: SLEEP CENTER | Facility: CLINIC | Age: 62
End: 2022-04-27

## 2022-04-27 NOTE — TELEPHONE ENCOUNTER
Rx for PAP resupply sent to [facility] via [facility]

## 2022-04-27 NOTE — TELEPHONE ENCOUNTER
[facility] called in to check status of refill for (Sildenafil ) did advised it was filled and sent to [facility]

## 2022-04-28 DIAGNOSIS — N52.9 ERECTILE DYSFUNCTION, UNSPECIFIED ERECTILE DYSFUNCTION TYPE: ICD-10-CM

## 2022-04-28 RX ORDER — SILDENAFIL 50 MG/1
50 TABLET, FILM COATED ORAL DAILY PRN
Qty: 10 TABLET | Refills: 3 | Status: SHIPPED | OUTPATIENT
Start: 2022-04-28 | End: 2022-05-26 | Stop reason: DRUGHIGH

## 2022-05-26 DIAGNOSIS — N52.9 ERECTILE DYSFUNCTION, UNSPECIFIED ERECTILE DYSFUNCTION TYPE: ICD-10-CM

## 2022-05-26 DIAGNOSIS — N52.9 ERECTILE DYSFUNCTION, UNSPECIFIED ERECTILE DYSFUNCTION TYPE: Primary | ICD-10-CM

## 2022-05-26 RX ORDER — SILDENAFIL 100 MG/1
100 TABLET, FILM COATED ORAL DAILY PRN
Qty: 10 TABLET | Refills: 0 | Status: SHIPPED | OUTPATIENT
Start: 2022-05-26 | End: 2022-05-26 | Stop reason: SDUPTHER

## 2022-05-26 RX ORDER — SILDENAFIL 100 MG/1
100 TABLET, FILM COATED ORAL DAILY PRN
Qty: 10 TABLET | Refills: 3 | Status: SHIPPED | OUTPATIENT
Start: 2022-05-26

## 2022-06-19 DIAGNOSIS — J30.2 SEASONAL ALLERGIES: ICD-10-CM

## 2022-06-19 RX ORDER — LEVOCETIRIZINE DIHYDROCHLORIDE 5 MG/1
TABLET, FILM COATED ORAL
Qty: 30 TABLET | Refills: 3 | Status: SHIPPED | OUTPATIENT
Start: 2022-06-19

## 2022-07-05 ENCOUNTER — TELEPHONE (OUTPATIENT)
Dept: CARDIOLOGY CLINIC | Facility: CLINIC | Age: 62
End: 2022-07-05

## 2022-07-05 DIAGNOSIS — E78.5 HYPERLIPIDEMIA, UNSPECIFIED HYPERLIPIDEMIA TYPE: Primary | ICD-10-CM

## 2022-07-21 ENCOUNTER — TELEMEDICINE (OUTPATIENT)
Dept: INTERNAL MEDICINE CLINIC | Facility: CLINIC | Age: 62
End: 2022-07-21
Payer: COMMERCIAL

## 2022-07-21 DIAGNOSIS — Z20.822 EXPOSURE TO COVID-19 VIRUS: Primary | ICD-10-CM

## 2022-07-21 DIAGNOSIS — R09.89 RUNNY NOSE: ICD-10-CM

## 2022-07-21 LAB — SARS-COV-2 RNA RESP QL NAA+PROBE: NEGATIVE

## 2022-07-21 PROCEDURE — U0003 INFECTIOUS AGENT DETECTION BY NUCLEIC ACID (DNA OR RNA); SEVERE ACUTE RESPIRATORY SYNDROME CORONAVIRUS 2 (SARS-COV-2) (CORONAVIRUS DISEASE [COVID-19]), AMPLIFIED PROBE TECHNIQUE, MAKING USE OF HIGH THROUGHPUT TECHNOLOGIES AS DESCRIBED BY CMS-2020-01-R: HCPCS | Performed by: PHYSICIAN ASSISTANT

## 2022-07-21 PROCEDURE — 99213 OFFICE O/P EST LOW 20 MIN: CPT | Performed by: PHYSICIAN ASSISTANT

## 2022-07-21 PROCEDURE — U0005 INFEC AGEN DETEC AMPLI PROBE: HCPCS | Performed by: PHYSICIAN ASSISTANT

## 2022-07-21 NOTE — PATIENT INSTRUCTIONS
Please take Vitamin C 1000 mg twice a day, Vitamin  D 2000 U daily and zinc 200 mg once a day  For the cough, please use saline nasal spray 3 times a day, Mucinex twice a day and albuterol inhaler every 4 hours as needed for shortness of breath  Covid test results are still pending  If positive we will call in [employer] to your pharmacy and will inform you

## 2022-07-28 DIAGNOSIS — J30.2 SEASONAL ALLERGIC RHINITIS, UNSPECIFIED TRIGGER: ICD-10-CM

## 2022-07-28 RX ORDER — FLUTICASONE PROPIONATE 50 MCG
SPRAY, SUSPENSION (ML) NASAL
Qty: 16 ML | Refills: 11 | Status: SHIPPED | OUTPATIENT
Start: 2022-07-28

## 2022-08-22 ENCOUNTER — APPOINTMENT (OUTPATIENT)
Dept: RADIOLOGY | Age: 62
End: 2022-08-22
Payer: COMMERCIAL

## 2022-08-22 ENCOUNTER — OFFICE VISIT (OUTPATIENT)
Dept: INTERNAL MEDICINE CLINIC | Facility: CLINIC | Age: 62
End: 2022-08-22
Payer: COMMERCIAL

## 2022-08-22 VITALS
WEIGHT: 242.2 LBS | BODY MASS INDEX: 38.01 KG/M2 | HEIGHT: 67 IN | DIASTOLIC BLOOD PRESSURE: 80 MMHG | SYSTOLIC BLOOD PRESSURE: 112 MMHG | RESPIRATION RATE: 16 BRPM | HEART RATE: 82 BPM | OXYGEN SATURATION: 95 %

## 2022-08-22 DIAGNOSIS — M54.6 ACUTE BILATERAL THORACIC BACK PAIN: Primary | ICD-10-CM

## 2022-08-22 DIAGNOSIS — M54.6 ACUTE BILATERAL THORACIC BACK PAIN: ICD-10-CM

## 2022-08-22 PROCEDURE — 72072 X-RAY EXAM THORAC SPINE 3VWS: CPT

## 2022-08-22 PROCEDURE — 99213 OFFICE O/P EST LOW 20 MIN: CPT | Performed by: INTERNAL MEDICINE

## 2022-08-22 RX ORDER — MELOXICAM 7.5 MG/1
15 TABLET ORAL DAILY
Qty: 14 TABLET | Refills: 0 | Status: SHIPPED | OUTPATIENT
Start: 2022-08-22 | End: 2022-08-22

## 2022-08-22 RX ORDER — MELOXICAM 15 MG/1
15 TABLET ORAL DAILY
Qty: 14 TABLET | Refills: 0 | Status: SHIPPED | OUTPATIENT
Start: 2022-08-22 | End: 2022-09-09 | Stop reason: ALTCHOICE

## 2022-08-22 RX ORDER — CYCLOBENZAPRINE HCL 5 MG
5 TABLET ORAL
Qty: 14 TABLET | Refills: 0 | Status: SHIPPED | OUTPATIENT
Start: 2022-08-22 | End: 2022-09-09 | Stop reason: ALTCHOICE

## 2022-08-23 PROBLEM — M54.6 THORACIC BACK PAIN: Status: ACTIVE | Noted: 2022-08-23

## 2022-08-23 NOTE — ASSESSMENT & PLAN NOTE
Back pain T4 level and down(not lumbar), paravertebral  Paraspinal tenderness on palpation, non radiating, persistent in the past 2 weeks, worse at the end of the day  Frequent heavy lifting  Will check thoracic XR  Meloxicam 15 mg daily for 2 weeks, Flexeril at bedtime for 2 weeks, lidocaine patch  PT referral     F/u in 2 weeks   No heavy lifting

## 2022-08-23 NOTE — PROGRESS NOTES
Assessment/Plan:    Thoracic back pain  Back pain T10 level and down, paravertebral  Paraspinal tenderness on palpation, non radiating, persistent in the past 2 weeks, worse at the end of the day  Frequent heavy lifting  Will check thoracic XR  Meloxicam 15 mg daily for 2 weeks, Flexeril at bedtime for 2 weeks, lidocaine patch  PT referral     F/u in 2 weeks  No heavy lifting        Diagnoses and all orders for this visit:    Acute bilateral thoracic back pain  -     Ambulatory Referral to Physical Therapy; Future  -     cyclobenzaprine (FLEXERIL) 5 mg tablet; Take 1 tablet (5 mg total) by mouth daily at bedtime  -     Discontinue: meloxicam (Mobic) 7 5 mg tablet; Take 2 tablets (15 mg total) by mouth daily  -     XR spine thoracic 3 vw; Future  -     meloxicam (Mobic) 15 mg tablet; Take 1 tablet (15 mg total) by mouth daily          Subjective:      Patient ID: Jimi Lowery is a 57 y o  male, who came in the office for back pain eval  Patient reported he was having back pain intermittent for a while, but the pain is persistent for 2 weeks, not much improvement with Ibuprofen  Said that the pain is in his upper/mid back, paraspinal bilateral, non-radiating, moderate in intensity, that is getting worse at the end of the day  Said that the pain got better once he stopped lifting heavy weights but came back once he started lifting  No numbness or paraesthesias  Plan as above     HPI    The following portions of the patient's history were reviewed and updated as appropriate: allergies, current medications, past family history, past medical history, past social history, past surgical history and problem list     Review of Systems   Respiratory: Negative for chest tightness and shortness of breath  Cardiovascular: Negative for chest pain  Gastrointestinal: Negative  Genitourinary: Negative  Musculoskeletal: Positive for back pain  Neurological: Negative for weakness and numbness     All other systems reviewed and are negative  Objective:      /80   Pulse 82   Resp 16   Ht 5' 7" (1 702 m)   Wt 110 kg (242 lb 3 2 oz)   SpO2 95%   BMI 37 93 kg/m²            Physical Exam  Vitals reviewed  Constitutional:       General: He is not in acute distress  Appearance: He is not diaphoretic  Cardiovascular:      Rate and Rhythm: Normal rate and regular rhythm  Pulmonary:      Effort: No respiratory distress  Breath sounds: Normal breath sounds  No wheezing, rhonchi or rales  Musculoskeletal:         General: Tenderness (paraspinal bilateral, thoracic, mild  got better with repeated palpation ) present  Normal range of motion  Cervical back: Normal range of motion  No rigidity  Thoracic back: Tenderness present  No swelling or spasms  Neurological:      Mental Status: He is alert     Psychiatric:         Mood and Affect: Mood normal          Behavior: Behavior normal

## 2022-08-28 LAB
CHOLEST SERPL-MCNC: 163 MG/DL
CHOLEST/HDLC SERPL: 4.5 CALC
HDLC SERPL-MCNC: 36 MG/DL
LDLC SERPL CALC-MCNC: 102 MG/DL (CALC)
LPA SERPL-SCNC: 12 NMOL/L
NONHDLC SERPL-MCNC: 127 MG/DL (CALC)
TRIGL SERPL-MCNC: 156 MG/DL

## 2022-08-29 LAB
APO B SERPL-MCNC: 87 MG/DL
CHOLEST SERPL-MCNC: 163 MG/DL
CHOLEST/HDLC SERPL: 4.5 CALC
HDLC SERPL-MCNC: 36 MG/DL
HLD.LARGE SERPL-SCNC: 6763 NMOL/L
LDL SERPL QN: 212.7 ANGSTROM
LDL SERPL-SCNC: 1684 NMOL/L
LDL SMALL SERPL-SCNC: 398 NMOL/L
LDLC REAL SIZE PAT SERPL: ABNORMAL PATTERN
LDLC SERPL CALC-MCNC: 102 MG/DL (CALC)
LPA SERPL-SCNC: 12 NMOL/L
NONHDLC SERPL-MCNC: 127 MG/DL (CALC)
SL AMB LDL MEDIUM: 328 NMOL/L
TRIGL SERPL-MCNC: 156 MG/DL

## 2022-08-30 ENCOUNTER — EVALUATION (OUTPATIENT)
Dept: PHYSICAL THERAPY | Facility: CLINIC | Age: 62
End: 2022-08-30
Payer: COMMERCIAL

## 2022-08-30 DIAGNOSIS — M54.6 ACUTE BILATERAL THORACIC BACK PAIN: Primary | ICD-10-CM

## 2022-08-30 PROCEDURE — 97112 NEUROMUSCULAR REEDUCATION: CPT | Performed by: PHYSICAL THERAPIST

## 2022-08-30 PROCEDURE — 97161 PT EVAL LOW COMPLEX 20 MIN: CPT | Performed by: PHYSICAL THERAPIST

## 2022-08-30 NOTE — PROGRESS NOTES
PT Evaluation     Today's date: 2022  Patient name: Jimi Lowery  : 1960  MRN: [id number]  Referring provider: Reyna Jo MD  Dx:   Encounter Diagnosis     ICD-10-CM    1  Acute bilateral thoracic back pain  M54 6 Ambulatory Referral to Physical Therapy                  Assessment  Assessment details: Pt presents with s/s consistent with a thoracic stabilization classification  Pt will benefit from PT to address impairments and restore function  Impairments: activity intolerance, impaired physical strength, lacks appropriate home exercise program, pain with function, poor posture  and poor body mechanics    Goals  ST-2 weeks  1   Pt will decrease pain > 50%  2   Pt will improve posture > 50%  LT-4 weeks  1   Pt will decrease pain > 75%  2   Pt will resume weight lifting without pain or restrictions  Plan  Planned therapy interventions: joint mobilization, abdominal trunk stabilization, manual therapy, neuromuscular re-education, patient education, postural training, self care, strengthening, stretching, therapeutic activities, body mechanics training, functional ROM exercises, flexibility, therapeutic exercise, therapeutic training and home exercise program  Frequency: 2x week  Duration in weeks: 4        Subjective Evaluation    History of Present Illness  Mechanism of injury: Pt is a 57 yom c/o chronic midback pain that began approximately 10 years ago and has worsened over the past 2 months    Pain  Current pain rating: 3  At best pain ratin  At worst pain ratin  Quality: dull ache, sharp and burning  Relieving factors: rest, heat and medications  Aggravating factors: lifting and overhead activity  Progression: worsening      Diagnostic Tests  X-ray: abnormal (22 mild levoscoliosis, DJD)  Patient Goals  Patient goals for therapy: decreased pain, independence with ADLs/IADLs, increased strength, return to sport/leisure activities and increased motion          Objective     Postural Observations  Seated posture: poor  Standing posture: poor  Correction of posture: makes symptoms better        Active Range of Motion   Cervical/Thoracic Spine       Cervical  Subcranial protraction:  WFL   Subcranial retraction:   Restriction level: minimal  Flexion:  WFL  Extension:  WFL  Left lateral flexion:  WFL  Right lateral flexion:  WFL  Left rotation:  WFL  Right rotation:  [facility]    Thoracic    Flexion:  WFL  Extension:  WFL  Left lateral flexion:  Restriction level: minimal  Right lateral flexion:  Restriction level: minimal  Left rotation:  WFL  Right rotation:  [facility]    Joint Play     Hypomobile: T4, T5, T6, T7, T8 and T9     Pain: T5, T6 and T7     Strength/Myotome Testing     Left Shoulder     Isolated Muscles   Lower trapezius: 3+   Middle trapezius: 4-   Upper trapezius: 5     Right Shoulder     Isolated Muscles   Lower trapezius: 3+   Middle trapezius: 4-   Upper trapezius: 5              Precautions:    Dx: thoracic stability classification      Manuals 8/30            T4-8 Gr IV P-A mobs 1x40 ea                                                   Neuro Re-Ed             Posture education/correction 5 min            Prone T 3x10            Prone Y 3x10            Prone ER 3x10            prone Thoracic ext 3x10                                      Ther Ex             Standing doorway pec stretch 10"x2                                                                                                       Ther Activity                                       Gait Training                                       Modalities

## 2022-09-01 ENCOUNTER — OFFICE VISIT (OUTPATIENT)
Dept: CARDIOLOGY CLINIC | Facility: CLINIC | Age: 62
End: 2022-09-01
Payer: COMMERCIAL

## 2022-09-01 VITALS
SYSTOLIC BLOOD PRESSURE: 120 MMHG | HEIGHT: 67 IN | OXYGEN SATURATION: 94 % | HEART RATE: 73 BPM | WEIGHT: 242.2 LBS | BODY MASS INDEX: 38.01 KG/M2 | DIASTOLIC BLOOD PRESSURE: 64 MMHG

## 2022-09-01 DIAGNOSIS — Z00.00 HEALTHCARE MAINTENANCE: ICD-10-CM

## 2022-09-01 DIAGNOSIS — E78.5 HYPERLIPIDEMIA, UNSPECIFIED HYPERLIPIDEMIA TYPE: Primary | ICD-10-CM

## 2022-09-01 PROCEDURE — 99214 OFFICE O/P EST MOD 30 MIN: CPT | Performed by: INTERNAL MEDICINE

## 2022-09-01 PROCEDURE — 93000 ELECTROCARDIOGRAM COMPLETE: CPT | Performed by: INTERNAL MEDICINE

## 2022-09-01 NOTE — PROGRESS NOTES
Cardiology Follow Up    Isabela Morris  1960  [id number]  [facility] [place]  [address] [facility] [address]  [address]  [phone]    1  Hyperlipidemia, unspecified hyperlipidemia type  POCT ECG    Cardio IQ(R) Advanced Lipid Panel   2  Healthcare maintenance  POCT ECG       Diagnoses and all orders for this visit:    Hyperlipidemia, unspecified hyperlipidemia type  -     POCT ECG  -     Cardio IQ(R) Advanced Lipid Panel; Future    Healthcare maintenance  -     POCT ECG      I had the pleasure of seeing Isabela Morris for a follow up visit  Interval History: none    History of the presenting illness, Discussion/Summary and My Plan are as follows:::    He is a pleasant now 49-year-old gentleman without a history of hypertension but with mild dyslipidemia with elevated LDL particle number and small LDL, which with lifestyle changes has improved  He did have prediabetes with his A1c was around 5 7 - most recently 5 6 (April 2022)  Also has idiopathic cystic disease of the lung and SWAPNIL       In 2017 lipid profile showed a total cholesterol 196, triglycerides 107, HDL 50,    in February 2017- underwent an advanced lipid profile that showed high LDL particle number-1500 (<1000), high small LDL-531 (50th percentile)  March 2018 -total cholesterol 166, triglycerides 78, average LDL size-214, density pattern -pattern B    May 2019: Total cholesterol 168, triglycerides 98, HDL 51, LDL 98, non , LDL particle 1278 (optimal< 1138, moderate 7320-6643), small  (optimal< 142), LDL density pattern B, LDL size to 15 (optimal>223), apolipoprotein B 86 (optimal<80), lipoprotein a:  13 (<75)     March 2020:   Total cholesterol 183, triglycerides 131, HDL 45,      FOR COMPARISON:    May 2019:   LDL particle 1278 (optimal< 1138, moderate 7413-5041), small  (optimal< 142)     August 2020: LDL: 98, LDL particle-910, small LDL-197, both have improved since the last 1 in May 2019    Cardio IQ-advanced lipid profile-July 2021    Total cholesterol 169, HDL 45, triglycerides 102, LDL direct 115, LDL particle 1513, small , Apo B 90, lipoprotein (a) 17    April 2022: Total cholesterol 149, triglycerides 151, HDL 46,     Advanced lipid panel-   August 2022 Total cholesterol 163, HDL 36, triglycerides 156,  calculated , , small , Apo B  87, lipoprotein (a)  12    At baseline, he is physically active now doing weights ,  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 - apparently he is a carrier for alpha-1 antitrypsin deficiency  And is due for another CT scan  Family history of coronary artery disease in his father in his 76s, possibly in his mother also in her 76s, no family history of premature vascular disease in first-degree relatives      He did have an exercise stress echo test in 2013-12 minutes-13 4 METs, no ischemia, remains physically active and is asymptomatic  Normal echo in 2017  Continue to be active doing cardio and weights although more limited by back pain but remains asymptomatic      Plan:     Dyslipidemia:  Direct LDL, 'apo B both within normal limits, marginally low LDL size,   LDL particle number is elevated but improved by 2020 compared to 2017 but increased again By 2021 and has remained stable since then   A1c has been stable  He remains on fish oil alone  Overall risk profile is still intermediate considering that overall his LDL level is normal  However he does have the typical pattern that is found in patients with insulin resistance/metabolic syndrome  HDL IS LOWER IN 2022 POSSIBLY FROM WEIGHT GAIN,  LIPID PROFILE WAS BETTER IN April 2022 WHEN HE WAS MORE ACTIVE AND HAD LOST SOME WEIGHT ALSO  Advanced panel is similar to 2021      Therapeutic lifestyle changes were reiterated with the patient- Specifically:  1   Decreasing saturated fat intake to less than 13 g per day  Watch intake of cheese  2  Increase soluble fiber in the diet-beans, pears, oatmeal  3  Weight loss of even 5-10 pounds will also help  Decrease caloric intake by about 100 brandon a day-should lead to a weight loss of 1-2 pounds over one month -   This is very important if we need to avoid any medications in the future  4  Increase aerobic physical activity -   Does mostly aerobic activity -doing weights  The above changes will decrease his LDL, change the pattern of his LDL subfractions (to lower small LDL levels and particle number) as well as decrease his risk helping diabetes  2017-Calcium score was 1- 29th percentile for age matched controls, this is reassuring  His personal preference is to stay off a statin  This is reasonable based on the last guideline statement from 2019  Last stress test was in 2013, he remains asymptomatic at good levels of physical exertion but considering dyslipidemia, remains asymptomatic    Lifestyle changes are to continue -especially weight gain, over the last few years has steadily been gaining weight - about 2 lb per year  His numbers were much better in   April 2022,  and hence will continue to follow with lifestyle changes  Routine stress test next year    Will order an advanced lipid profile for next year  Follow-up in 1 year    Results for Donna Lind (MRN [id number]) as of 7/9/2020 08:20   Ref   Range 1/25/2017 12:45 7/26/2017 09:58 3/8/2018 08:15 8/13/2018 08:12 2/22/2019 08:26 5/28/2019 08:48 9/3/2019 09:38 3/10/2020 07:33   Cholesterol Latest Ref Range: 50 - 200 mg/dL 196 172 166 168 176 168 176 183   Triglycerides Latest Ref Range: <=150 mg/dL  131 78 109 110 98 114 131   HDL Latest Ref Range: >=40 mg/dL  41 48 46 49 51 49 45   HDL-P(TOTAL) Latest Ref Range: >=30 5 umol/L 28 4 (L)          HDL CHOLESTEROL LIPOPROTEIN Latest Ref Range: >39 mg/dL 43          Non-HDL Cholesterol Latest Ref Range: <130 mg/dL (calc)  131 118   117     LDL CHOLESTEROL Latest Units: mg/dL  105         LDL Calculated Latest Ref Range: 0 - 100 mg/dL 131 (H)  101 (H) 100 105 (H) 98 104 (H) 112 (H)   LDL DENSITY PATTERN Latest Ref Range: A Pattern  B (A) B (A)   B (A)     Small LDL-P Latest Ref Range: <=527 nmol/L 531 (H)          LDL SIZE Latest Ref Range: > OR = 217 4 Angstrom 20 8 217 4 (L) 214 0 (L)   215 0 (L)     Chol HDLC Ratio Latest Ref Range: <5 0 calc  4 2 3 5   3 3     APOLIPOPROTEIN B Latest Ref Range: 52 - 109 mg/dL  83 84   86     LP-IR SCORE Latest Ref Range: <=45  52 (H)          LDL PARTICLE NUMBER Latest Ref Range: 1016 - 2185 nmol/L  1300         LIPOPROTEIN (A) Latest Ref Range: <75 nmol/L  24 16   13     Triglycerides Latest Ref Range: 0 - 149 mg/dL 111            Results for Donna Lind (MRN [id number]) as of 7/14/2021 08:13   Ref   Range 3/10/2020 07:33 8/8/2020 08:41 3/30/2021 08:01   Hemoglobin A1C  5 6 5 7 (H) 5 7 (H)             Patient Active Problem List   Diagnosis    Abnormal blood sugar    Enlarged prostate without lower urinary tract symptoms (luts)    Esophageal reflux    Obesity    Sleep apnea    Hyperlipidemia    Screening PSA (prostate specific antigen)    Seasonal allergic rhinitis    Vitamin D deficiency    Plantar fasciitis of right foot    Screening for diabetes mellitus    Need for vaccination    Class 2 obesity due to excess calories without serious comorbidity with body mass index (BMI) of 35 0 to 35 9 in adult    Healthcare maintenance    Exercise-induced asthma    Gastroesophageal reflux disease without esophagitis    Prediabetes    Mild intermittent asthma without complication    Seasonal allergies    Carrier of alpha-1-antitrypsin deficiency    Multiple idiopathic cysts of lung    Dyspnea on exertion    COVID-19    Plantar fasciitis    Abnormal laboratory test    Vitamin B12 deficiency    Pulmonary nodules    Left ankle sprain    Thoracic back pain     Past Medical History:   Diagnosis Date    Asthma  Erectile dysfunction of non-organic origin     Resolved: 10/15/2014     Social History     Socioeconomic History    Marital status: /Civil Union     Spouse name: Not on file    Number of children: Not on file    Years of education: Not on file    Highest education level: Not on file   Occupational History    Not on file   Tobacco Use    Smoking status: Former Smoker     Packs/day: 1 50     Years: 8 00     Pack years: 12 00     Types: Cigarettes     Quit date:      Years since quittin 6    Smokeless tobacco: Never Used    Tobacco comment: He smoked a pack a day of cigarettes for 8 years  He quite at the age of 22  Occasional cigar use  Vaping Use    Vaping Use: Never used   Substance and Sexual Activity    Alcohol use: Yes     Comment: Social drinker    Drug use: No    Sexual activity: Yes   Other Topics Concern    Not on file   Social History Narrative    Caffeine use    Work-related stress     Social Determinants of Health     Financial Resource Strain: Not on file   Food Insecurity: Not on file   Transportation Needs: Not on file   Physical Activity: Not on file   Stress: Not on file   Social Connections: Not on file   Intimate Partner Violence: Not on file   Housing Stability: Not on file      Family History   Problem Relation Age of Onset    Asthma Mother     Heart disease Mother     Rheum arthritis Mother     Diabetes Father     Heart disease Father     Lung disease Sister         Bronchiectasis    No clear history of cystic lung disease however    Diabetes Sister     Other Son         Thyroid disorder     Past Surgical History:   Procedure Laterality Date    EYE SURGERY      TONSILLECTOMY AND ADENOIDECTOMY      UMBILICAL HERNIA REPAIR         Current Outpatient Medications:     Cholecalciferol (VITAMIN D3) 5000 units CAPS, Take 5,000 Units by mouth , Disp: , Rfl:     Cinnamon 500 MG capsule, Take 500 mg by mouth daily, Disp: , Rfl:     cyanocobalamin (VITAMIN B-12) 1000 MCG tablet, Take 1,000 mcg by mouth daily, Disp: , Rfl:     cyclobenzaprine (FLEXERIL) 5 mg tablet, Take 1 tablet (5 mg total) by mouth daily at bedtime, Disp: 14 tablet, Rfl: 0    Diclofenac Sodium (VOLTAREN) 1 %, Apply 2 g topically 4 (four) times a day as needed (back pain1), Disp: 1 g, Rfl: 1    fluticasone (FLONASE) 50 mcg/act nasal spray, TWO SPRAYS EACH NOSTRIL ONCE DAILY AS NEEDED , Disp: 16 mL, Rfl: 11    levocetirizine (XYZAL) 5 MG tablet, TAKE 1 TABLET BY MOUTH EVERY DAY IN THE EVENING, Disp: 30 tablet, Rfl: 3    meloxicam (Mobic) 15 mg tablet, Take 1 tablet (15 mg total) by mouth daily, Disp: 14 tablet, Rfl: 0    montelukast (SINGULAIR) 10 mg tablet, Take 1 tablet (10 mg total) by mouth daily at bedtime, Disp: 30 tablet, Rfl: 4    Omega-3 Fatty Acids (FISH OIL) 1,000 mg, Take 3 capsules by mouth daily, Disp: , Rfl:     sildenafil (VIAGRA) 100 mg tablet, Take 1 tablet (100 mg total) by mouth daily as needed for erectile dysfunction, Disp: 10 tablet, Rfl: 3    albuterol (2 5 mg/3 mL) 0 083 % nebulizer solution, Inhale   (Patient not taking: Reported on 9/1/2022), Disp: , Rfl:     albuterol (ProAir HFA) 90 mcg/act inhaler, Inhale 2 puffs every 6 (six) hours as needed for wheezing Use 2 puffs prior to exercise  (Patient not taking: Reported on 9/1/2022), Disp: 18 g, Rfl: 5  No Known Allergies    Imaging: No results found  Review of Systems:  Review of Systems   Constitutional: Negative  HENT: Negative  Eyes: Negative  Respiratory: Negative  Cardiovascular: Negative  Gastrointestinal: Negative  Endocrine: Negative  Musculoskeletal: Negative  Allergic/Immunologic: Negative  Hematological: Negative          Physical Exam:  /64 (BP Location: Right arm, Patient Position: Sitting, Cuff Size: Standard)   Pulse 73   Ht 5' 7" (1 702 m)   Wt 110 kg (242 lb 3 2 oz)   SpO2 94%   BMI 37 93 kg/m²   Physical Exam  Constitutional:       General: He is not in acute distress  Appearance: He is well-developed  He is not diaphoretic  HENT:      Head: Normocephalic and atraumatic  Eyes:      General: No scleral icterus  Right eye: No discharge  Left eye: No discharge  Conjunctiva/sclera: Conjunctivae normal       Pupils: Pupils are equal, round, and reactive to light  Neck:      Thyroid: No thyromegaly  Vascular: No JVD  Trachea: No tracheal deviation  Cardiovascular:      Rate and Rhythm: Normal rate and regular rhythm  Heart sounds: No murmur heard  No friction rub  Pulmonary:      Effort: Pulmonary effort is normal  No respiratory distress  Breath sounds: Normal breath sounds  No stridor  Abdominal:      General: Bowel sounds are normal  There is no distension  Palpations: Abdomen is soft  Tenderness: There is no abdominal tenderness  There is no guarding  Musculoskeletal:         General: No deformity  Normal range of motion  Cervical back: Normal range of motion  Right lower leg: Edema (1 plus) present  Left lower leg: Edema (1 plus) present  Skin:     General: Skin is warm  Coloration: Skin is not pale  Findings: No erythema or rash

## 2022-09-07 ENCOUNTER — OFFICE VISIT (OUTPATIENT)
Dept: PHYSICAL THERAPY | Facility: CLINIC | Age: 62
End: 2022-09-07
Payer: COMMERCIAL

## 2022-09-07 DIAGNOSIS — M54.6 ACUTE BILATERAL THORACIC BACK PAIN: Primary | ICD-10-CM

## 2022-09-07 PROCEDURE — 97110 THERAPEUTIC EXERCISES: CPT | Performed by: PHYSICAL THERAPIST

## 2022-09-07 PROCEDURE — 97112 NEUROMUSCULAR REEDUCATION: CPT | Performed by: PHYSICAL THERAPIST

## 2022-09-07 NOTE — PROGRESS NOTES
Daily Note     Today's date: 2022  Patient name: Bonnie Shukla  : 1960  MRN: [id number]  Referring provider: Anais Black MD  Dx:   Encounter Diagnosis     ICD-10-CM    1  Acute bilateral thoracic back pain  M54 6                   Subjective: Pt reports no thoracic pain currently  Pt reports mod pain after exercising 2 days in a row and then golfing  Objective: See treatment diary below    Assessment: Corrected HEP technique, pt was engaging UT too much with TE and maintaining c/s ext  Pt reports no pain with corrected form  Plan: Cont POC  Review TB options for HEP and golf swing mechanics  Precautions:    Dx: thoracic stability classification      Manuals            T4-8 Gr IV P-A mobs 1x40 ea 1x40 ea                                                  Neuro Re-Ed             Posture education/correction 5 min 2 min           Prone T 3x10 pball 2x15 TB          Prone Y 3x10 pball 2x15 TB          Prone ER 3x10 pball 2x15 TB          prone Thoracic ext 3x10 Table 3x15 table                                    Ther Ex             Standing doorway pec stretch 10"x2 10"x3           Back Extension machine   80#/ 3x10                                                                                         Ther Activity             Golf swing  nv           Resisted thoracic rotation  nv           Gait Training                                       Modalities

## 2022-09-09 ENCOUNTER — OFFICE VISIT (OUTPATIENT)
Dept: ENDOCRINOLOGY | Facility: CLINIC | Age: 62
End: 2022-09-09
Payer: COMMERCIAL

## 2022-09-09 VITALS
SYSTOLIC BLOOD PRESSURE: 112 MMHG | HEART RATE: 73 BPM | BODY MASS INDEX: 37.42 KG/M2 | HEIGHT: 67 IN | WEIGHT: 238.4 LBS | DIASTOLIC BLOOD PRESSURE: 76 MMHG

## 2022-09-09 DIAGNOSIS — R79.89 LOW TESTOSTERONE: Primary | ICD-10-CM

## 2022-09-09 DIAGNOSIS — E61.1 IRON DEFICIENCY: ICD-10-CM

## 2022-09-09 DIAGNOSIS — R53.83 OTHER FATIGUE: ICD-10-CM

## 2022-09-09 PROCEDURE — 99204 OFFICE O/P NEW MOD 45 MIN: CPT | Performed by: INTERNAL MEDICINE

## 2022-09-09 NOTE — PROGRESS NOTES
New Patient Progress Note    CC: testosterone evaluation    History of Present Illness:   61 yo M who is presenting for evaluation of low free testosterone at the referral of Dr Aayush Rothman  Pt reports he was feeling fatigue when labs were checked  He noticed fatigue in the past but it seemed to get better, it has worsened over the last 1-1 5years and has noticed some daytime sleepiness  He does report some erectile dysfunction, still able to have ejaculation, having spontaneous morning erections  Reports testosterone was checked about 10 years ago and was low normal then       Past medical history also includes cysts in lungs, allergies, muscle spasms from lifting weights, sleep apnea with CPAP (last saw sleep medicine in 2021)    Family history DM in mother in old age, father T2DM, sister asthma, hyperthyroidism in son treated with radioactive iodine (unclear if Graves vs toxic nodule)    Patient Active Problem List   Diagnosis    Abnormal blood sugar    Enlarged prostate without lower urinary tract symptoms (luts)    Esophageal reflux    Obesity    Sleep apnea    Hyperlipidemia    Screening PSA (prostate specific antigen)    Seasonal allergic rhinitis    Vitamin D deficiency    Plantar fasciitis of right foot    Screening for diabetes mellitus    Need for vaccination    Class 2 obesity due to excess calories without serious comorbidity with body mass index (BMI) of 35 0 to 35 9 in adult    Healthcare maintenance    Exercise-induced asthma    Gastroesophageal reflux disease without esophagitis    Prediabetes    Mild intermittent asthma without complication    Seasonal allergies    Carrier of alpha-1-antitrypsin deficiency    Multiple idiopathic cysts of lung    Dyspnea on exertion    COVID-19    Plantar fasciitis    Abnormal laboratory test    Vitamin B12 deficiency    Pulmonary nodules    Left ankle sprain    Thoracic back pain     Past Medical History:   Diagnosis Date    Asthma     Erectile dysfunction of non-organic origin     Resolved: 10/15/2014      Past Surgical History:   Procedure Laterality Date    EYE SURGERY      TONSILLECTOMY AND ADENOIDECTOMY      UMBILICAL HERNIA REPAIR        Family History   Problem Relation Age of Onset    Asthma Mother     Heart disease Mother     Rheum arthritis Mother     Osteoporosis Mother     Diabetes Father     Heart disease Father     Diabetes type II Father     Lung disease Sister         Bronchiectasis  No clear history of cystic lung disease however    Diabetes Sister     Other Son         Thyroid disorder     Social History     Tobacco Use    Smoking status: Former Smoker     Packs/day: 1 00     Years: 8 00     Pack years: 8 00     Types: Cigarettes     Start date: 1971     Quit date: 1979     Years since quittin 5    Smokeless tobacco: Never Used    Tobacco comment: He smoked a pack a day of cigarettes for 8 years  He quite at the age of 22  Occasional cigar use  Substance Use Topics    Alcohol use: Yes     Comment: Social     No Known Allergies      Review of Systems   Constitutional: Positive for fatigue and unexpected weight change (fluctuates)  HENT: Negative for trouble swallowing and voice change  Cardiovascular: Negative for palpitations  Gastrointestinal: Negative for constipation and diarrhea  Endocrine: Negative for cold intolerance, heat intolerance, polydipsia and polyuria  Skin:        Denies hair or skin changes   All other systems reviewed and are negative          Current Outpatient Medications:     albuterol (2 5 mg/3 mL) 0 083 % nebulizer solution, Inhale 2 5 mg every 6 (six) hours as needed for wheezing or shortness of breath, Disp: , Rfl:     albuterol (ProAir HFA) 90 mcg/act inhaler, Inhale 2 puffs every 6 (six) hours as needed for wheezing Use 2 puffs prior to exercise , Disp: 18 g, Rfl: 5    Cholecalciferol (VITAMIN D3) 5000 units CAPS, Take 5,000 Units by mouth daily, Disp: , Rfl:     Cinnamon 500 MG capsule, Take 500 mg by mouth daily, Disp: , Rfl:     cyanocobalamin (VITAMIN B-12) 1000 MCG tablet, Take 1,000 mcg by mouth daily, Disp: , Rfl:     fluticasone (FLONASE) 50 mcg/act nasal spray, TWO SPRAYS EACH NOSTRIL ONCE DAILY AS NEEDED , Disp: 16 mL, Rfl: 11    levocetirizine (XYZAL) 5 MG tablet, TAKE 1 TABLET BY MOUTH EVERY DAY IN THE EVENING, Disp: 30 tablet, Rfl: 3    montelukast (SINGULAIR) 10 mg tablet, Take 1 tablet (10 mg total) by mouth daily at bedtime, Disp: 30 tablet, Rfl: 4    Omega-3 Fatty Acids (FISH OIL) 1,000 mg, Take 3 capsules by mouth daily, Disp: , Rfl:     sildenafil (VIAGRA) 100 mg tablet, Take 1 tablet (100 mg total) by mouth daily as needed for erectile dysfunction, Disp: 10 tablet, Rfl: 3    Physical Exam:  Body mass index is 37 34 kg/m²  /76 (BP Location: Left arm, Patient Position: Sitting, Cuff Size: Large)   Pulse 73   Ht 5' 7" (1 702 m)   Wt 108 kg (238 lb 6 4 oz)   BMI 37 34 kg/m²        Physical Exam  Constitutional:       Appearance: He is well-developed  HENT:      Head: Normocephalic and atraumatic  Eyes:      General:         Right eye: No discharge  Left eye: No discharge  Cardiovascular:      Rate and Rhythm: Normal rate and regular rhythm  Heart sounds: Normal heart sounds  No murmur heard  No friction rub  No gallop  Pulmonary:      Effort: Pulmonary effort is normal  No respiratory distress  Breath sounds: Normal breath sounds  No wheezing or rales  Chest:      Chest wall: No tenderness  Abdominal:      General: Bowel sounds are normal  There is no distension  Palpations: Abdomen is soft  There is no mass  Tenderness: There is no abdominal tenderness  Genitourinary:     Comments: Testicular exam performed by attending, between 12-15mL bilaterally  Musculoskeletal:         General: Normal range of motion  Cervical back: Normal range of motion and neck supple  Skin:     General: Skin is warm and dry  Neurological:      Mental Status: He is alert and oriented to person, place, and time  Psychiatric:         Behavior: Behavior normal        Labs:     Lab Results   Component Value Date    [id number] 2 114 12/24/2013       Lab Results   Component Value Date    CREATININE 1 16 04/13/2022    CREATININE 1 07 10/08/2021    CREATININE 1 19 03/30/2021    BUN 19 04/13/2022     07/26/2015    K 4 5 04/13/2022     04/13/2022    CO2 28 04/13/2022     eGFR   Date Value Ref Range Status   04/13/2022 66 ml/min/1 73sq m Final       Lab Results   Component Value Date    ALT 44 04/13/2022    AST 26 04/13/2022    ALKPHOS 48 04/13/2022    BILITOT 0 58 07/26/2015       Lab Results   Component Value Date    CHOLESTEROL 163 08/25/2022    CHOLESTEROL 149 04/13/2022    CHOLESTEROL 184 10/08/2021     Lab Results   Component Value Date    HDL 36 (L) 08/25/2022    HDL 42 04/13/2022    HDL 46 10/08/2021     Lab Results   Component Value Date    TRIG 156 (H) 08/25/2022    TRIG 109 04/13/2022    TRIG 151 (H) 10/08/2021     Lab Results   Component Value Date    [place] 131 07/26/2017    [place] 151 12/03/2012 June 2013  TESTOSTERONE, TOTAL,$LC/MS/ ng/dL   250-1100   For more information on this test, go to  [email or web address]/  [email or web address]   FREE TESTOSTERONE 47 6 pg/mL   35 0-155 0     4/2022 Free testosterone 6 0 (reference range 6 6-18 1)  4/23/22 Total testosterone 340 (Reference range 264-194)    Impression:  1  Low testosterone    2  Other fatigue    3  Iron deficiency       Plan:    Diagnoses and all orders for this visit:    Low testosterone  -     Sex Hormone Binding Globulin- Lab Collect; Future  -     Testosterone, free, total- Lab Collect; Future  Suspected  Recheck with SHBG and free/total testosterone   If SHBG is high as is expected with age then testosterone levels are likely normal, if SHBG is normal or low, pt may have low testosterone  If testosterone and thyroid labs are normal, would not need endocrine followup  Other fatigue  -     TSH, 3rd generation Lab Collect; Future  -     T4, free- Lab Collect; Future  Evaluate for thyroid dysfunction given fatigue and daytime sleepiness increased from previously  Iron deficiency  -     Iron Panel (Includes Ferritin, Iron Sat%, Iron, and TIBC); Future  Suspected with goal ferritin >75  If deficient, could continue followup with primary care  I have spent 35 minutes with patient today in which greater than 50% of this time was spent in counseling/coordination of care  All questioned fully answered  He will call me if any problems arise  Educated/ Counseled patient on diagnostic test results, prognosis, risk vs benefit of treatment options, importance of treatment compliance, healthy life and lifestyle choices

## 2022-09-14 ENCOUNTER — OFFICE VISIT (OUTPATIENT)
Dept: PHYSICAL THERAPY | Facility: CLINIC | Age: 62
End: 2022-09-14
Payer: COMMERCIAL

## 2022-09-14 DIAGNOSIS — M54.6 ACUTE BILATERAL THORACIC BACK PAIN: Primary | ICD-10-CM

## 2022-09-14 PROCEDURE — 97112 NEUROMUSCULAR REEDUCATION: CPT | Performed by: PHYSICAL THERAPIST

## 2022-09-14 PROCEDURE — 97140 MANUAL THERAPY 1/> REGIONS: CPT | Performed by: PHYSICAL THERAPIST

## 2022-09-14 NOTE — PROGRESS NOTES
Daily Note     Today's date: 2022  Patient name: Star Ramos  : 1960  MRN: [id number]  Referring provider: Ashley Venegas MD  Dx:   Encounter Diagnosis     ICD-10-CM    1  Acute bilateral thoracic back pain  M54 6                   Subjective: Pt reports R sided LBP and thoracic pain that prevented restful sleep last night  Pt denies excessive or adnormal activity  Objective: See treatment diary below    Assessment: P-A mobs resolved pain discussed sleeping positional options  Plan: Cont POC  If painful, assess L/S nv      Precautions:    Dx: thoracic stability classification      Manuals           T4-8 Gr IV P-A mobs 1x40 ea 1x40 ea 1x40 ea          R T8/9, R L3-5 Gr IV P-A mobs   1x50 ea                                    Neuro Re-Ed             Posture education/correction 5 min 2 min           Prone T 3x10 pball 2x15 G TB/ 3x15          Prone Y 3x10 pball 2x15  Y TB/ 3x15          Prone ER 3x10 pball 2x15 Y TB/ 3x15          TB I   Jean Carlos/ 3x15          prone Thoracic ext 3x10 Table 3x15 Table 3x15          Cable Y   2 5#/ 1x15          Cable H   2 5#/ 1x15          Cable T   7 5#/ 1x15          Cable I   7 5#/ 1x15                       Ther Ex             Standing doorway pec stretch 10"x2 10"x3 10"x3          Back Extension machine   80#/ 3x10                                                                                         Ther Activity             Golf swing  nv           Resisted thoracic rotation  nv           Gait Training                                       Modalities

## 2022-09-16 ENCOUNTER — APPOINTMENT (OUTPATIENT)
Dept: LAB | Age: 62
End: 2022-09-16
Payer: COMMERCIAL

## 2022-09-16 DIAGNOSIS — J30.2 SEASONAL ALLERGIES: ICD-10-CM

## 2022-09-16 DIAGNOSIS — E61.1 IRON DEFICIENCY: ICD-10-CM

## 2022-09-16 DIAGNOSIS — R53.83 OTHER FATIGUE: ICD-10-CM

## 2022-09-16 DIAGNOSIS — R79.89 LOW TESTOSTERONE: ICD-10-CM

## 2022-09-16 LAB
FERRITIN SERPL-MCNC: 205 NG/ML (ref 8–388)
IRON SATN MFR SERPL: 30 % (ref 20–50)
IRON SERPL-MCNC: 91 UG/DL (ref 65–175)
T4 FREE SERPL-MCNC: 0.93 NG/DL (ref 0.76–1.46)
TIBC SERPL-MCNC: 301 UG/DL (ref 250–450)
TSH SERPL DL<=0.05 MIU/L-ACNC: 2.72 UIU/ML (ref 0.45–4.5)

## 2022-09-16 PROCEDURE — 84270 ASSAY OF SEX HORMONE GLOBUL: CPT

## 2022-09-16 PROCEDURE — 84439 ASSAY OF FREE THYROXINE: CPT

## 2022-09-16 PROCEDURE — 84443 ASSAY THYROID STIM HORMONE: CPT

## 2022-09-16 PROCEDURE — 83540 ASSAY OF IRON: CPT

## 2022-09-16 PROCEDURE — 82728 ASSAY OF FERRITIN: CPT

## 2022-09-16 PROCEDURE — 84402 ASSAY OF FREE TESTOSTERONE: CPT

## 2022-09-16 PROCEDURE — 36415 COLL VENOUS BLD VENIPUNCTURE: CPT

## 2022-09-16 PROCEDURE — 84403 ASSAY OF TOTAL TESTOSTERONE: CPT

## 2022-09-16 PROCEDURE — 83550 IRON BINDING TEST: CPT

## 2022-09-16 RX ORDER — MONTELUKAST SODIUM 10 MG/1
TABLET ORAL
Qty: 30 TABLET | Refills: 4 | Status: SHIPPED | OUTPATIENT
Start: 2022-09-16

## 2022-09-17 LAB
SHBG SERPL-SCNC: 39.8 NMOL/L (ref 19.3–76.4)
TESTOST FREE SERPL-MCNC: 5.6 PG/ML (ref 6.6–18.1)
TESTOST SERPL-MCNC: 305 NG/DL (ref 264–916)

## 2022-09-21 ENCOUNTER — OFFICE VISIT (OUTPATIENT)
Dept: PHYSICAL THERAPY | Facility: CLINIC | Age: 62
End: 2022-09-21
Payer: COMMERCIAL

## 2022-09-21 DIAGNOSIS — R79.89 LOW TESTOSTERONE: Primary | ICD-10-CM

## 2022-09-21 DIAGNOSIS — M54.6 ACUTE BILATERAL THORACIC BACK PAIN: Primary | ICD-10-CM

## 2022-09-21 PROCEDURE — 97112 NEUROMUSCULAR REEDUCATION: CPT | Performed by: PHYSICAL THERAPIST

## 2022-09-21 PROCEDURE — 97140 MANUAL THERAPY 1/> REGIONS: CPT | Performed by: PHYSICAL THERAPIST

## 2022-09-21 NOTE — PROGRESS NOTES
Daily Note     Today's date: 2022  Patient name: Davonte Leo  : 1960  MRN: [id number]  Referring provider: Zee Segura MD  Dx:   Encounter Diagnosis     ICD-10-CM    1  Acute bilateral thoracic back pain  M54 6                   Subjective: Pt reports occasional stiffness in his thoracic spine, but no episodes of high pain like when he first started  Pt reports 50% improvement in L/S pain  Pt reports a resolution of pain after joint mobs  Objective: See treatment diary below  L/S: 2/6 and painful R L4-S1     Assessment: Pt demonstrated improved scapular and RTC stability, mod thoracic hypomobility  Plan: Cont POC  Attempt self spine mobilizations at home  Precautions:    Dx: thoracic stability classification, DA 22: L4-S1 hypomobility classification      Manuals          T4-8 Gr IV P-A mobs 1x40 ea 1x40 ea 1x40 ea 1x50 ea         R T8/9, R L3-5 Gr IV P-A mobs   1x50 ea 1x50 ea                                   Neuro Re-Ed             Posture education/correction 5 min 2 min            T 3x10 pball 2x15 G TB/ 3x15 G TB/ 3x15          Y 3x10 pball 2x15  Y TB/ 3x15 Y TB/ 3x15          ER 3x10 pball 2x15 Y TB/ 3x15 Y TB/ 3x15         TB I   Jean Carlos/ 3x15 Jean Carlos/ 3x15         prone Thoracic ext 3x10 Table 3x15 Table 3x15 Table 3x20         Cable Y   2 5#/ 1x15          Cable H   2 5#/ 1x15          Cable T   7 5#/ 1x15          Cable I   7 5#/ 1x15                       Ther Ex             Standing doorway pec stretch 10"x2 10"x3 10"x3 10"x3         Back Extension machine   80#/ 3x10           Seated thoracic ext self mobs over chair    3"x10                                                                          Ther Activity             Golf swing  nv           Resisted thoracic rotation  nv           Gait Training                                       Modalities

## 2022-09-28 ENCOUNTER — OFFICE VISIT (OUTPATIENT)
Dept: PHYSICAL THERAPY | Facility: CLINIC | Age: 62
End: 2022-09-28
Payer: COMMERCIAL

## 2022-09-28 DIAGNOSIS — M54.6 ACUTE BILATERAL THORACIC BACK PAIN: Primary | ICD-10-CM

## 2022-09-28 PROCEDURE — 97140 MANUAL THERAPY 1/> REGIONS: CPT | Performed by: PHYSICAL THERAPIST

## 2022-09-28 PROCEDURE — 97110 THERAPEUTIC EXERCISES: CPT | Performed by: PHYSICAL THERAPIST

## 2022-09-28 PROCEDURE — 97112 NEUROMUSCULAR REEDUCATION: CPT | Performed by: PHYSICAL THERAPIST

## 2022-09-28 NOTE — PROGRESS NOTES
Daily Note     Today's date: 2022  Patient name: Rosmery Garrido  : 1960  MRN: [id number]  Referring provider: Valentin Murillo MD  Dx:   Encounter Diagnosis     ICD-10-CM    1  Acute bilateral thoracic back pain  M54 6                   Subjective: Pt reports mild thoracic soreness, mod B LBP after golf  Objective: See treatment diary below  Assessment: LBP with FIS resolved after HA stretching  Plan: Cont POC  Precautions:    Dx: thoracic stability classification, DA 22: L4-S1 hypomobility classification      Manuals         T4-8 Gr IV P-A mobs 1x40 ea 1x40 ea 1x40 ea 1x50 ea 1x50 ea        R T8/9, R L3-5 Gr IV P-A mobs   1x50 ea 1x50 ea 1x50 ea        Laser L4-S1     4000J                     Neuro Re-Ed             Posture education/correction 5 min 2 min            T 3x10 pball 2x15 G TB/ 3x15 G TB/ 3x15          Y 3x10 pball 2x15  Y TB/ 3x15 Y TB/ 3x15 Y TB/ 3x15         ER 3x10 pball 2x15 Y TB/ 3x15 Y TB/ 3x15 Y TB/ 3x15        TB I   Jean Carlos/ 3x15 Jean Carlos/ 3x15         prone Thoracic ext 3x10 Table 3x15 Table 3x15 Table 3x20         Cable Y   2 5#/ 1x15          Cable H   2 5#/ 1x15          Cable T   7 5#/ 1x15          Cable I   7 5#/ 1x15          bridges     1x15, 6 6#/ 2x15        Ther Ex             Standing doorway pec stretch 10"x2 10"x3 10"x3 10"x3         Back Extension machine   80#/ 3x10           Seated thoracic ext self mobs over chair    3"x10         Standing HA stretch     15"x3 ea        Supine HA stretch     15"x3 ea        LTR with OP     10"x2        Self t/s mobs with dumbbell press     3"x5                     Ther Activity             Golf swing  nv           Resisted thoracic rotation  nv           Gait Training                                       Modalities

## 2022-10-03 ENCOUNTER — APPOINTMENT (OUTPATIENT)
Dept: LAB | Age: 62
End: 2022-10-03
Payer: COMMERCIAL

## 2022-10-03 DIAGNOSIS — R79.89 LOW TESTOSTERONE: ICD-10-CM

## 2022-10-03 LAB
FSH SERPL-ACNC: 16.7 MIU/ML (ref 0.7–10.8)
LH SERPL-ACNC: 9.8 MIU/ML (ref 1.2–10.6)

## 2022-10-03 PROCEDURE — 84403 ASSAY OF TOTAL TESTOSTERONE: CPT

## 2022-10-03 PROCEDURE — 83002 ASSAY OF GONADOTROPIN (LH): CPT

## 2022-10-03 PROCEDURE — 84402 ASSAY OF FREE TESTOSTERONE: CPT

## 2022-10-03 PROCEDURE — 36415 COLL VENOUS BLD VENIPUNCTURE: CPT

## 2022-10-03 PROCEDURE — 83001 ASSAY OF GONADOTROPIN (FSH): CPT

## 2022-10-04 LAB
TESTOST FREE SERPL-MCNC: 7.8 PG/ML (ref 6.6–18.1)
TESTOST SERPL-MCNC: 312 NG/DL (ref 264–916)

## 2022-10-05 ENCOUNTER — OFFICE VISIT (OUTPATIENT)
Dept: PHYSICAL THERAPY | Facility: CLINIC | Age: 62
End: 2022-10-05
Payer: COMMERCIAL

## 2022-10-05 DIAGNOSIS — M54.16 LUMBAR RADICULOPATHY: Primary | ICD-10-CM

## 2022-10-05 DIAGNOSIS — M54.6 ACUTE BILATERAL THORACIC BACK PAIN: ICD-10-CM

## 2022-10-05 PROCEDURE — 97164 PT RE-EVAL EST PLAN CARE: CPT | Performed by: PHYSICAL THERAPIST

## 2022-10-05 PROCEDURE — 97112 NEUROMUSCULAR REEDUCATION: CPT | Performed by: PHYSICAL THERAPIST

## 2022-10-05 PROCEDURE — 97140 MANUAL THERAPY 1/> REGIONS: CPT | Performed by: PHYSICAL THERAPIST

## 2022-10-05 NOTE — PROGRESS NOTES
Daily Note     Today's date: 10/5/2022  Patient name: Rene Lynch  : 1960  MRN: [id number]  Referring provider: Juan Luis Zaman MD  Dx:   Encounter Diagnosis     ICD-10-CM    1  Acute bilateral thoracic back pain  M54 6    2  Lumbar radiculopathy  M54 16                   Subjective: Pt reports a resolution of his 5 year Hx of thoracic pain with HEP and MT  Pt reports R sided LBP with radiating pain into his R lateral hip with golf and after weight lifting  Objective: See treatment diary below  Assessment: Pt demonstrated with s/s consistent with a L4/5 derangement with an ext DP  Plan:  D/c T/s to HEP, continue tx for L/S  Precautions:    Dx: thoracic stability classification, DA 22: L4-S1 hypomobility classification      Manuals 8/30 9/7 9/14 9/21 9/28 10/5       T4-8 Gr IV P-A mobs 1x40 ea 1x40 ea 1x40 ea 1x50 ea 1x50 ea 1x50 ea       Gr IV P-A L3-S1 mobs   1x50 ea 1x50 ea 1x50 ea 1x50 ea       Laser L4-S1     4000J [id number]       Prone press-ups MWM       3x10       Neuro Re-Ed             Posture education/correction 5 min 2 min    2 min        T 3x10 pball 2x15 G TB/ 3x15 G TB/ 3x15          Y 3x10 pball 2x15  Y TB/ 3x15 Y TB/ 3x15 Y TB/ 3x15         ER 3x10 pball 2x15 Y TB/ 3x15 Y TB/ 3x15 Y TB/ 3x15        TB I   Jean Carlos/ 3x15 Jean Carlos/ 3x15         prone Thoracic ext 3x10 Table 3x15 Table 3x15 Table 3x20         Cable Y   2 5#/ 1x15          Cable H   2 5#/ 1x15          Cable T   7 5#/ 1x15          Cable I   7 5#/ 1x15          bridges     1x15, 6 6#/ 2x15        Standing lumbar extensions      2x10       Prone press-ups      3x10                    Ther Ex             Standing doorway pec stretch 10"x2 10"x3 10"x3 10"x3         Back Extension machine   80#/ 3x10           Seated thoracic ext self mobs over chair    3"x10         Standing HA stretch     15"x3 ea        Supine HA stretch     15"x3 ea        LTR with OP     10"x2        Self t/s mobs with dumbbell press     3"x5 Ther Activity             Golf swing  nv           Squatting biomechanics retraining      5 min       Resisted thoracic rotation  nv           Gait Training                                       Modalities

## 2022-10-05 NOTE — PROGRESS NOTES
PT Re-Evaluation     Today's date: 10/5/2022  Patient name: Tre Giles  : 1960  MRN: [id number]  Referring provider: Sierra Crouch MD  Dx:   Encounter Diagnosis     ICD-10-CM    1  Acute bilateral thoracic back pain  M54 6    2  Lumbar radiculopathy  M54 16        Start Time: 0830  Stop Time: 0910  Total time in clinic (min): 40 minutes    Assessment  Assessment details: Pt's thoracic spine will be d/c'd to HEP  Pt presents with s/s consistent with a posterior L4/5 derangement with an extension directional preference  Pt will benefit from PT to address impairments and restore function  Impairments: activity intolerance, impaired physical strength, lacks appropriate home exercise program, pain with function, poor posture  and poor body mechanics    Goals  ST-2 weeks  1   Pt will decrease pain > 50%  met  2  Pt will improve posture > 50%  3   Pt will centralize radicular sx  LT-4 weeks  1   Pt will decrease pain > 75%  met  2  Pt will resume weight lifting without pain or restrictions  Plan  Planned therapy interventions: joint mobilization, abdominal trunk stabilization, manual therapy, neuromuscular re-education, patient education, postural training, self care, strengthening, stretching, therapeutic activities, body mechanics training, functional ROM exercises, flexibility, therapeutic exercise, therapeutic training and home exercise program  Frequency: 1x week  Duration in weeks: 6        Subjective Evaluation    History of Present Illness  Mechanism of injury: Pt is a 57 yom c/o chronic LBP with radiating pain into his lateral R hip that began 10 years ago and has been getting progressively worse  Pt reports a resolution of his thoracic pain that he has been getting treated for 5 weeks    Pain  Current pain rating: 3  At best pain ratin  At worst pain ratin  Quality: dull ache, sharp and radiating  Relieving factors: rest, heat and medications  Aggravating factors: lifting  Progression: worsening      Diagnostic Tests  X-ray: abnormal (8/22/22 mild levoscoliosis, DJD)  Patient Goals  Patient goals for therapy: decreased pain, independence with ADLs/IADLs, increased strength, return to sport/leisure activities and increased motion          Objective     Postural Observations  Seated posture: poor  Standing posture: poor  Correction of posture: makes symptoms better        Active Range of Motion   Cervical/Thoracic Spine       Cervical  Subcranial protraction:  WFL   Subcranial retraction:   Restriction level: minimal  Flexion:  WFL  Extension:  WFL  Left lateral flexion:  WFL  Right lateral flexion:  WFL  Left rotation:  WFL  Right rotation:  [facility]    Thoracic    Flexion:  WFL  Extension:  WFL  Left lateral flexion:  WFL  Right lateral flexion:  WFL  Left rotation:  WFL  Right rotation:  WFL    Lumbar   Flexion:  Restriction level: minimal  Extension:  Restriction level: moderate    Joint Play     Hypomobile: T4, T5, T6, T7, T8 and T9   Mechanical Assessment    Cervical      Thoracic    Lying extension: repeated movements  Pain location: centralized    Lumbar    Standing flexion: repeated movements   Pain location:peripheralized  Standing extension: repeated movements  Pain location: centralized  Pain intensity: better    Strength/Myotome Testing     Left Shoulder     Isolated Muscles   Lower trapezius: 4-   Middle trapezius: 4-   Upper trapezius: 5     Right Shoulder     Isolated Muscles   Lower trapezius: 4-   Middle trapezius: 4-   Upper trapezius: 5

## 2022-10-07 ENCOUNTER — TELEPHONE (OUTPATIENT)
Dept: ENDOCRINOLOGY | Facility: CLINIC | Age: 62
End: 2022-10-07

## 2022-10-07 NOTE — TELEPHONE ENCOUNTER
----- Message from Sylvia Silveira DO sent at 10/6/2022 12:07 PM EDT -----  Please call patient with results  Testosterone levels were reviewed and on this check are normalized  Free Testosterone and total testosterone are now normal, which is good news  FSH is mildly elevated on the lab, but this may be because it is secreted in a pulsatile way and so this mild elevation is not concerning  With these labs, we would not recommend treatment with testosterone  However, as obesity can contribute to low testosterone, weight loss can potentially improve testosterone levels  If pt would like to schedule an appointment to discuss weight loss medications, we would be happy to help      Sent via [employer]

## 2022-10-12 ENCOUNTER — OFFICE VISIT (OUTPATIENT)
Dept: PHYSICAL THERAPY | Facility: CLINIC | Age: 62
End: 2022-10-12
Payer: COMMERCIAL

## 2022-10-12 DIAGNOSIS — M54.16 LUMBAR RADICULOPATHY: Primary | ICD-10-CM

## 2022-10-12 PROCEDURE — 97140 MANUAL THERAPY 1/> REGIONS: CPT | Performed by: PHYSICAL THERAPIST

## 2022-10-12 PROCEDURE — 97112 NEUROMUSCULAR REEDUCATION: CPT | Performed by: PHYSICAL THERAPIST

## 2022-10-12 NOTE — PROGRESS NOTES
Daily Note     Today's date: 10/12/2022  Patient name: Eddy Romero  : 1960  MRN: [id number]  Referring provider: Deejay العراقي MD  Dx:   Encounter Diagnosis     ICD-10-CM    1  Lumbar radiculopathy  M54 16                   Subjective: Pt reports a 50% improvement in LBP and a resolution of glute pain since last visit  Objective: See treatment diary below  Assessment: Pt responded well with ext POC  Modified bridge technique  Pt was hyperextending L/S  Gave instructions to progress endurance, control and strength with bridges  Pt demonstrated the greatest need for improvement with control  Plan:  Cont ext POC  Precautions:    Dx: DA 22: L4-S1 extension DP      Manuals 8/30 9/7 9/14 9/21 9/28 10/5 10/12      T4-8 Gr IV P-A mobs 1x40 ea 1x40 ea 1x40 ea 1x50 ea 1x50 ea 1x50 ea 1x50 ea      Gr IV P-A L3-S1 mobs   1x50 ea 1x50 ea 1x50 ea 1x50 ea 1x50 ea      Laser L4-S1     4000J [id number]      Prone press-ups MWM       3x10       Neuro Re-Ed             Posture education/correction 5 min 2 min    2 min        T 3x10 pball 2x15 G TB/ 3x15 G TB/ 3x15          Y 3x10 pball 2x15  Y TB/ 3x15 Y TB/ 3x15 Y TB/ 3x15         ER 3x10 pball 2x15 Y TB/ 3x15 Y TB/ 3x15 Y TB/ 3x15        TB I   Jean Carlos/ 3x15 Jean Carlos/ 3x15         prone Thoracic ext 3x10 Table 3x15 Table 3x15 Table 3x20         Letta Severs Y   2 5#/ 1x15          Cable H   2 5#/ 1x15          Cable T   7 5#/ 1x15          Cable I   7 5#/ 1x15          bridges       1x10      Bridges with resistance     1x15, 6 6#/ 2x15  20#/ 3x15      Bridges with a kick       1x10 ea      Standing lumbar extensions      2x10 3x10      Prone press-ups      3x10 3x10                   Ther Ex             Standing doorway pec stretch 10"x2 10"x3 10"x3 10"x3         Back Extension machine   80#/ 3x10           Seated thoracic ext self mobs over chair    3"x10         Standing HA stretch     15"x3 ea        Supine HA stretch     15"x3 ea        LTR with OP 10"x2        Self t/s mobs with dumbbell press     3"x5                     Ther Activity             Golf swing  nv           Squatting biomechanics retraining      5 min       Resisted thoracic rotation  nv           Gait Training                                       Modalities

## 2022-10-15 DIAGNOSIS — J30.2 SEASONAL ALLERGIES: ICD-10-CM

## 2022-10-15 RX ORDER — LEVOCETIRIZINE DIHYDROCHLORIDE 5 MG/1
TABLET, FILM COATED ORAL
Qty: 30 TABLET | Refills: 3 | Status: SHIPPED | OUTPATIENT
Start: 2022-10-15

## 2022-10-17 NOTE — PROGRESS NOTES
ANNUAL PHYSICAL:  Jeffrey Fernandez is a 59 year old male who presents for an annual physical.    Pt was in [facility] ED 9/2/2022: developed double vision--Sent by ophthalmology.  CT head: negative  EKG: No acute abnormalities, RBBB, bifascicular block noted  Diagnosed as VI nerve palsy.  Of note, 2009: negative Stress ECHO  Notes moderate improvement in symptoms and symptoms overall resolving.    -Reports occasional foamy urine  --> Denies dysuria, hematuria, increased frequency    -Reports Planter fasciitis  --> Currently following with podiatry and performing at home exercises    PMH:   Patient Active Problem List   Diagnosis   • Allergic rhinitis   • LUNA (generalized anxiety disorder)   • Arthropathy   • Asymptomatic varicose veins of unspecified lower extremity   • BPH with obstruction/lower urinary tract symptoms   • GERD (gastroesophageal reflux disease)   • Cupping of optic disc   • Major depressive disorder, single episode, unspecified   • Dermatitis   • Enlarged prostate without lower urinary tract symptoms (luts)   • Enlarged prostate   • External hemorrhoid   • Hypertriglyceridemia   • Hypercholesterolemia   • Joint pain   • Lipoma   • Low back pain   • Lumbar radiculopathy   • Multiple nevi   • Trigger finger, acquired   • Varicose veins of lower extremity with pain   • Essential hypertension, benign   • Routine health maintenance   • Abnormal resting ECG findings   • Abducens nerve palsy   • Chronic GERD   • Risk factors for obstructive sleep apnea   • Obesity (BMI 30-39.9)   • Foamy urine   • Plantar fasciitis, bilateral   • Primary osteoarthritis of right hip     Past Medical History:   Diagnosis Date   • Anxiety     Meds: Sertraline 200 mg   • Arthritis    • Chronic low back pain    • Essential (primary) hypertension     Meds: Losartan 100 mg   • High cholesterol     Meds: Atorvastatin 20 mg   • Rupture of Achilles tendon    • Varicose veins of both lower extremities        FHx:   Family History   Problem  COVID-19 Outpatient Progress Note    Assessment/Plan:    Problem List Items Addressed This Visit    None     Visit Diagnoses     Exposure to COVID-19 virus    -  Primary    Relevant Orders    COVID only    Runny nose        Relevant Orders    COVID only         Disposition:     Patient is fully vaccinated and has received their booster shot  According to CDC guidelines, quarantine is not required after close contact exposure and they were advised to wear a mask for 10 days after the exposure  If patient were to develop symptoms, they should immediately self isolate and call our office for further guidance  Discussed symptom directed medication options with patient  Discussed vitamin D, vitamin C, and/or zinc supplementation with patient  Patient meets criteria for PAXLOVID and they have been counseled appropriately according to EUA documentation released by the FDA  After discussion, patient agrees to treatment  Cheatham Gerold is an investigational medicine used to treat mild-to-moderate COVID-19 in adults and children (15years of age and older weighing at least 80 pounds (40 kg)) with positive results of direct SARS-CoV-2 viral testing, and who are at high risk for progression to severe COVID-19, including hospitalization or death  PAXLOVID is investigational because it is still being studied  There is limited information about the safety and effectiveness of using PAXLOVID to treat people with mild-to-moderate COVID-19  The FDA has authorized the emergency use of PAXLOVID for the treatment of mild-tomoderate COVID-19 in adults and children (15years of age and older weighing at least 80 pounds (40 kg)) with a positive test for the virus that causes COVID-19, and who are at high risk for progression to severe COVID-19, including hospitalization or death, under an EUA  What should I tell my healthcare provider before I take PAXLOVID?     Tell your healthcare provider if you:  - Have any allergies  - Have liver or kidney disease  - Are pregnant or plan to become pregnant  - Are breastfeeding a child  - Have any serious illnesses    Tell your healthcare provider about all the medicines you take, including prescription and over-the-counter medicines, vitamins, and herbal supplements  Some medicines may interact with PAXLOVID and may cause serious side effects  Keep a list of your medicines to show your healthcare provider and pharmacist when you get a new medicine  You can ask your healthcare provider or pharmacist for a list of medicines that interact with PAXLOVID  Do not start taking a new medicine without telling your healthcare provider  Your healthcare provider can tell you if it is safe to take PAXLOVID with other medicines  Tell your healthcare provider if you are taking combined hormonal contraceptive  PAXLOVID may affect how your birth control pills work  Females who are able to become pregnant should use another effective alternative form of contraception or an additional barrier method of contraception  Talk to your healthcare provider if you have any questions about contraceptive methods that might be right for you  How do I take PAXLOVID? PAXLOVID consists of 2 medicines: nirmatrelvir and ritonavir  - Take 2 pink tablets of nirmatrelvir with 1 white tablet of ritonavir by mouth 2 times each day (in the morning and in the evening) for 5 days  For each dose, take all 3 tablets at the same time  - If you have kidney disease, talk to your healthcare provider  You may need a different dose  - Swallow the tablets whole  Do not chew, break, or crush the tablets  - Take PAXLOVID with or without food  - Do not stop taking PAXLOVID without talking to your healthcare provider, even if you feel better  - If you miss a dose of PAXLOVID within 8 hours of the time it is usually taken, take it as soon as you remember   If you miss a dose by more than 8 hours, skip the missed dose and take the next Relation Age of Onset   • Heart disease Father    • Hyperlipidemia Father    • Cancer, Lung Father        SOHx:   Social History     Tobacco Use   • Smoking status: Former Smoker     Packs/day: 0.25     Years: 2.00     Pack years: 0.50     Types: Cigarettes     Quit date:      Years since quittin.8   • Smokeless tobacco: Never Used   Vaping Use   • Vaping Use: never used   Substance Use Topics   • Alcohol use: Not Currently   • Drug use: Never     Social History     Substance and Sexual Activity   Sexual Activity Yes   • Partners: Female     SuHx:   Past Surgical History:   Procedure Laterality Date   • Appendectomy     • Prostate surgery      TURP   • Tonsillectomy         Meds:   Current Outpatient Medications   Medication Sig Dispense Refill   • losartan (COZAAR) 100 MG tablet Take 1 tablet by mouth daily. 90 tablet 3   • sertraline (ZOLOFT) 100 MG tablet Take 2 tablets by mouth daily. 180 tablet 0   • atorvastatin (LIPITOR) 20 MG tablet Take 1 tablet by mouth daily. 90 tablet 3   • pantoprazole (PROTONIX) 40 MG tablet Take 1 tablet by mouth daily. 90 tablet 3   • Multiple Vitamin (MULTI VITAMIN) Tab        No current facility-administered medications for this visit.       Allergies:   ALLERGIES:  No Known Allergies    Immunizations:  Up to date on Tdap.  Up to date on Covid 19. Due for booster.  Up to date on Shingex.  Up to date on Influenza.    Health maintenance:   -Colon Cancer screenin2017: Redundant colon, internal hemorrhoids-recommended repeat 2022-- 5 years--DUE  -Born between [birth year], Neg Hep C 2020  -Smoking Hx: does not qualify for low dose CT  -Prostate cancer screening: nml PSA 2020    Review of Systems   Constitutional: Negative for chills, fatigue, fever and unexpected weight change.   HENT: Negative for congestion, ear pain, rhinorrhea and sore throat.    Eyes: Negative for pain and visual disturbance.   Respiratory: Negative for cough, shortness of breath and wheezing.     Cardiovascular: Negative for chest pain and palpitations.   Gastrointestinal: Negative for abdominal pain, blood in stool, constipation, diarrhea, nausea and vomiting.   Endocrine: Negative for cold intolerance and heat intolerance.   Genitourinary: Negative for dysuria, frequency, hematuria and urgency.        + Foamy urine   Musculoskeletal: Positive for arthralgias. Negative for back pain and myalgias.   Skin: Negative for rash and wound.   Neurological: Negative for dizziness, weakness, light-headedness, numbness and headaches.   Psychiatric/Behavioral: Negative for behavioral problems and sleep disturbance.        Visit Vitals  /87 (BP Location: LUE - Left upper extremity, Patient Position: Sitting, Cuff Size: Large Adult)   Pulse 65   Temp 98 °F (36.7 °C) (Tympanic)   Resp 14   Ht 6' 3\" (1.905 m)   Wt 125.5 kg (276 lb 10.8 oz)   SpO2 98%   BMI 34.58 kg/m²       Physical Exam  Vitals reviewed.   Constitutional:       General: He is not in acute distress.     Appearance: He is well-developed. He is obese.   HENT:      Head: Normocephalic and atraumatic.      Right Ear: Tympanic membrane and external ear normal.      Left Ear: Tympanic membrane and external ear normal.      Nose: Nose normal.      Mouth/Throat:      Mouth: Mucous membranes are moist.      Pharynx: Oropharynx is clear. No oropharyngeal exudate.      Neck: Full passive range of motion without pain, normal range of motion and neck supple.   Eyes:      General: No scleral icterus.        Right eye: No discharge.         Left eye: No discharge.      Conjunctiva/sclera: Conjunctivae normal.      Pupils: Pupils are equal, round, and reactive to light.      Comments: Almost full B/L extraocular motion intact bilaterally however right eye slightly restricted for full abduction   Neck:      Thyroid: No thyroid mass or thyromegaly.      Trachea: No tracheal tenderness.   Cardiovascular:      Rate and Rhythm: Normal rate and regular rhythm.       dose at your regular time  Do not take 2 doses of PAXLOVID at the same time  - If you take too much PAXLOVID, call your healthcare provider or go to the nearest hospital emergency room right away  - If you are taking a ritonavir- or cobicistat-containing medicine to treat hepatitis C or Human Immunodeficiency Virus (HIV), you should continue to take your medicine as prescribed by your healthcare provider   - Talk to your healthcare provider if you do not feel better or if you feel worse after 5 days  Who should generally not take PAXLOVID? Do not take PAXLOVID if:  You are allergic to nirmatrelvir, ritonavir, or any of the ingredients in PAXLOVID  You are taking any of the following medicines:  - Alfuzosin  - Pethidine, piroxicam, propoxyphene  - Ranolazine  - Amiodarone, dronedarone, flecainide, propafenone, quinidine  - Colchicine  - Lurasidone, pimozide, clozapine  - Dihydroergotamine, ergotamine, methylergonovine  - Lovastatin, simvastatin  - Sildenafil (Revatio®) for pulmonary arterial hypertension (PAH)  - Triazolam, oral midazolam  - Apalutamide  - Carbamazepine, phenobarbital, phenytoin  - Rifampin  - St  Rodrigos Wort (hypericum perforatum)    What are the important possible side effects of PAXLOVID? Possible side effects of PAXLOVID are:  - Liver Problems  Tell your healthcare provider right away if you have any of these signs and symptoms of liver problems: loss of appetite, yellowing of your skin and the whites of eyes (jaundice), dark-colored urine, pale colored stools and itchy skin, stomach area (abdominal) pain  - Resistance to HIV Medicines  If you have untreated HIV infection, PAXLOVID may lead to some HIV medicines not working as well in the future  - Other possible side effects include: altered sense of taste, diarrhea, high blood pressure, or muscle aches    These are not all the possible side effects of PAXLOVID  Not many people have taken PAXLOVID   Serious and unexpected side Pulses: Normal pulses.      Heart sounds: Normal heart sounds, S1 normal and S2 normal. No murmur heard.  Pulmonary:      Effort: Pulmonary effort is normal. No respiratory distress.      Breath sounds: Normal breath sounds. No decreased breath sounds, wheezing or rales.   Chest:      Chest wall: No tenderness.   Abdominal:      General: Bowel sounds are normal. There is no distension.      Palpations: Abdomen is soft. There is no mass.      Tenderness: There is no abdominal tenderness. There is no guarding or rebound.   Musculoskeletal:         General: Tenderness present. No deformity. Normal range of motion.      Comments: Right hip: Limited flexion and extension, limited internal and external rotation given pain   Lymphadenopathy:      Cervical: No cervical adenopathy.   Skin:     General: Skin is warm and dry.      Coloration: Skin is not pale.      Findings: No erythema or rash.   Neurological:      Mental Status: He is alert and oriented to person, place, and time.      Sensory: No sensory deficit.      Deep Tendon Reflexes: Reflexes normal.      Comments: Right eye: Unable to fully abduct however per patient, moderately improved   Psychiatric:         Behavior: Behavior normal.         Thought Content: Thought content normal.         Judgment: Judgment normal.         No results found for this visit on 10/18/22.    Recent Review Flowsheet Data     Date 10/18/2022    Adult PHQ 2 Score 0    Adult PHQ 2 Interpretation No further screening needed    Little interest or pleasure in activity? Not at all    Feeling down, depressed or hopeless? Not at all          ASSESSMENT & PLAN:    Routine health maintenance  -Immunizations:  Up-to-date on Tdap, Covid-19, Shingrix, influenza.  Due for Covid-19 booster, administered today.  Discussed risks and benefits.  -Colon Cancer screenin2017: Redundant colon, internal hemorrhoids-recommended repeat 2022-- 5 years.  To schedule with GI.  Plans to see Dr. Combs  effects may happen  [address] is still being studied, so it is possible that all of the risks are not known at this time  What other treatment choices are there? Like Sylvia Martino may allow for the emergency use of other medicines to treat people with COVID-19  Go to Finddebby alexander for information on the emergency use of other medicines that are authorized by FDA to treat people with COVID-19  Your healthcare provider may talk with you about clinical trials for which you may be eligible  It is your choice to be treated or not to be treated with PAXLOVID  Should you decide not to receive it or for your child not to receive it, it will not change your standard medical care  What if I am pregnant or breastfeeding? There is no experience treating pregnant women or breastfeeding mothers with PAXLOVID  For a mother and unborn baby, the benefit of taking PAXLOVID may be greater than the risk from the treatment  If you are pregnant, discuss your options and specific situation with your healthcare provider  It is recommended that you use effective barrier contraception or do not have sexual activity while taking PAXLOVID  If you are breastfeeding, discuss your options and specific situation with your healthcare provider  How do I report side effects with PAXLOVID? Contact your healthcare provider if you have any side effects that bother you or do not go away  Report side effects to FDA MedWatch at www fda gov/medwatch or call 7-433-QTZ1649 or you can report side effects to [facility]  at the contact information provided below  Website Fax number Telephone number   [employer] [phone]     How should I store [address]? Store PAXLOVID tablets at room temperature between 68°F to 77°F (20°C to 25°C)      Full fact sheet for patients, parents, and Jose.  -Born between [birth year], Neg Hep C 1/2020  -Smoking Hx: does not qualify for low dose CT  -Prostate cancer screening: nml PSA 11/2020-repeat today given history of BPH s/p TURP  -Routine labs as below-missed routine labs for 2021.  Reviewed recent CBC and normal in ED.    Essential hypertension, benign  -Improved  -Discussed BP goals  -Continue low-salt/DASH diet, limit carbohydrates, limit fatty/fried foods  -Recommend routine moderate intensity exercise--currently limited by hip pain  -Continue losartan 100 mg daily--given borderline, may be candidate for second agent  -Continue home monitoring  -Would recommend proceeding with sleep study given elevated STOP-BANG score: 5    Hypercholesterolemia  -Reevaluate lipid panel  -to continue dietary modifications  -Continue atorvastatin 20 mg daily    Enlarged prostate without lower urinary tract symptoms (luts)  -s/p TURP 2016-previously following with Dr. Tripp  -Recheck PSA  -Currently asymptomatic    Foamy urine  - Check UA as below    Plantar fasciitis, bilateral  -Following with podiatry  -Continue at home exercises  -Avoid NSAIDs given hypertension, can take Tylenol as needed  -Recommend rolling the heel with ice or frozen water bottle    Obesity (BMI 30-39.9)  -BMI: 34.5  -Discussed diet at length today    Primary osteoarthritis of right hip  -given pain, physical exam, underlying osteoarthritis  -Recommend evaluation with orthopedic surgery    Chronic GERD  -Recommend cutting back on spicy foods, acidic foods, late-night eating  -Plans to wean off of PPI    Abducens nerve palsy  -Reviewed [facility] ED visit  -Seen by ophthalmology who diagnosed patient  -Reviewed labs, imaging--CT head: No acute process  -Discussed various options today including monitoring vs MRI brain/orbit.  Pt has claustrophobia so would not be able to obtain a closed MRI.  Pt also notes his symptoms are almost completely resolved and he is back to baseline.  Suspect possible  caregivers can be found at: [facility]    I have spent 30 minutes directly with the patient  Greater than 50% of this time was spent in counseling/coordination of care regarding: diagnostic results, prognosis, risks and benefits of treatment options, instructions for management, patient and family education, importance of treatment compliance and risk factor reductions  Patient agreed to Paxlovid if his test is positive and will take OTC vitamins and cough medication prn  Encounter provider Karen Reyes MD    Provider located at [address] [place] [id number]    Recent Visits  No visits were found meeting these conditions  Showing recent visits within past 7 days and meeting all other requirements  Today's Visits  Date Type Provider Dept   07/21/22 Telemedicine Karen Reyes MD [address] [facility] today's visits and meeting all other requirements  Future Appointments  No visits were found meeting these conditions  Showing future appointments within next 150 days and meeting all other requirements     This virtual check-in was done via telephone and he agrees to proceed  Patient agrees to participate in a virtual check in via telephone or video visit instead of presenting to the office to address urgent/immediate medical needs  Patient is aware this is a billable service  After connecting through Telephone, the patient was identified by name and date of birth  Berwyn Dakins was informed that this was a telemedicine visit and that the exam was being conducted confidentially over secure lines  My office door was closed  No one else was in the room  Berwyn Dakins acknowledged consent and understanding of privacy and security of the telemedicine visit   I informed the patient that I have reviewed his record in Epic and presented the opportunity for him to ask any questions regarding the visit microvascular ischemic process resulting in ED visit/ nerve palsy given hx of non-compliance with medications. Discussed possible underlying coronary artery disease, cerebrovascular disease--management remains aspirin and statin, likely candidate for initiating aspirin & increased statin dosing.  He desires to discuss with cardiology and await lab tests.  -Given ED precautions if new symptoms or recurrence    Abnormal resting ECG findings  -Reviewed EKG with patient from hospitalization/ED visit  -Reassured patient that no ischemic changes identified on EKG  -Reviewed stress echo from 2009 in Care Everywhere: No ischemia identified, normal LV function  -Recommend evaluation with cardiology given new EKG findings--> has upcoming appointment  -He is currently asymptomatic, given ED precautions    Lumbar radiculopathy  -hx of sports medicine, neurosurgery, pain med evaluation in the past  -MRI 2017: \"...degeneration of L4-L5 and L5-S1 discs. Posterior bulging disc at these 2 levels without significant central canal stenosis. Mild to moderate bilateral foraminal stenosis at these 2 levels.  Focal 5 mm right central posterior protrusion of vacuum disc at L5-S1 with minimal impingement onright S1 nerve root.\"  -Without urinary retention/incontinence, without bowel retention or incontinence, without saddle anesthesia  -Understands ED precautions  -Pain is currently stable.  Recommend weight loss at this time & focusing on right hip as above.  If pain severe and recurs, recommend physical therapy.    LUNA (generalized anxiety disorder)  -Stable  -Along with depression, OCD  -History of following with psychiatry  -Current meds:  Sertraline 200 mg daily.  Currently off of clonazepam.    Follow-up closely in 1 to 2 months.    Orders Placed This Encounter   • COVID-19 12Y+ PFIZER BIVALENT BOOSTER VACCINE   • Comprehensive Metabolic Panel   • Lipid Panel With Reflex   • PSA   • Urinalysis & Reflex Microscopy With Culture If  today  The patient agreed to participate  It was my intent to perform this visit via video technology but the patient was not able to do a video connection so the visit was completed via audio telephone only  Verification of patient location:  Patient is located in the following state in which I hold an active license: [place]    Subjective:   Jorge Luis Guerrero is a 57 y o  male who is concerned about COVID-19  Patient's symptoms include fatigue, nasal congestion, rhinorrhea, sore throat, cough, myalgias and headache  Patient denies fever, chills, anosmia, loss of taste, shortness of breath, chest tightness, abdominal pain, nausea, vomiting and diarrhea  - Date of symptom onset: 7/20/2022  - Date of exposure: 7/17/2022      COVID-19 vaccination status: Fully vaccinated with booster    Exposed to family member that tested positive for covid 2 days ago  Was in contact with them on 7/17/22  Lab Results   Component Value Date    SARSCOV2 Negative 02/08/2021     Past Medical History:   Diagnosis Date    Asthma     Erectile dysfunction of non-organic origin     Resolved: 10/15/2014     Past Surgical History:   Procedure Laterality Date    EYE SURGERY      TONSILLECTOMY AND ADENOIDECTOMY      UMBILICAL HERNIA REPAIR       Current Outpatient Medications   Medication Sig Dispense Refill    albuterol (2 5 mg/3 mL) 0 083 % nebulizer solution Inhale        albuterol (ProAir HFA) 90 mcg/act inhaler Inhale 2 puffs every 6 (six) hours as needed for wheezing Use 2 puffs prior to exercise  18 g 5    Cholecalciferol (VITAMIN D3) 5000 units CAPS Take 5,000 Units by mouth       Cinnamon 500 MG capsule Take by mouth       cyanocobalamin ([employer] VITAMIN B-12) 1000 MCG tablet Take by mouth      Diclofenac Sodium (VOLTAREN) 1 % Apply 2 g topically 4 (four) times a day as needed (back pain1) 1 g 1    fluticasone (FLONASE) 50 mcg/act nasal spray TWO SPRAYS EACH NOSTRIL ONCE DAILY AS NEEDED   16 mL 11    levocetirizine (XYZAL) 5 MG tablet TAKE 1 TABLET BY MOUTH EVERY DAY IN THE EVENING 30 tablet 3    montelukast (SINGULAIR) 10 mg tablet Take 1 tablet (10 mg total) by mouth daily at bedtime 30 tablet 4    Omega-3 Fatty Acids (FISH OIL) 1,000 mg Take 3 capsules by mouth daily      sildenafil (VIAGRA) 100 mg tablet Take 1 tablet (100 mg total) by mouth daily as needed for erectile dysfunction 10 tablet 3     No current facility-administered medications for this visit  No Known Allergies    Review of Systems   Constitutional: Positive for fatigue  Negative for chills and fever  HENT: Positive for congestion, rhinorrhea and sore throat  Respiratory: Positive for cough  Negative for chest tightness and shortness of breath  Gastrointestinal: Negative for abdominal pain, diarrhea, nausea and vomiting  Musculoskeletal: Positive for myalgias  Neurological: Positive for headaches  Objective: There were no vitals filed for this visit  Physical Exam  Pulmonary:      Effort: Pulmonary effort is normal       Comments: No conversational dsypnea  Neurological:      Mental Status: He is oriented to person, place, and time  Psychiatric:         Mood and Affect: Mood normal          Behavior: Behavior normal          Thought Content: Thought content normal          Judgment: Judgment normal          VIRTUAL VISIT DISCLAIMER    Adilson Girard verbally agrees to participate in [employer]  Pt is aware that [employer] could be limited without vital signs or the ability to perform a full hands-on physical exam  Isaak Ramesh understands he or the provider may request at any time to terminate the video visit and request the patient to seek care or treatment in person  Indicated   • SERVICE TO ORTHOPEDICS         Discussed side effects along with risks/benefits of medications prescribed and immunizations administered.  Given ED precautions.      The patient indicated understanding of the diagnosis and agreed with the plan of care. All questions answered.       Jackie Jordan DO

## 2022-10-19 ENCOUNTER — OFFICE VISIT (OUTPATIENT)
Dept: PHYSICAL THERAPY | Facility: CLINIC | Age: 62
End: 2022-10-19
Payer: COMMERCIAL

## 2022-10-19 DIAGNOSIS — M54.16 LUMBAR RADICULOPATHY: Primary | ICD-10-CM

## 2022-10-19 DIAGNOSIS — M54.6 ACUTE BILATERAL THORACIC BACK PAIN: ICD-10-CM

## 2022-10-19 PROCEDURE — 97140 MANUAL THERAPY 1/> REGIONS: CPT | Performed by: PHYSICAL THERAPIST

## 2022-10-19 PROCEDURE — 97112 NEUROMUSCULAR REEDUCATION: CPT | Performed by: PHYSICAL THERAPIST

## 2022-10-19 NOTE — PROGRESS NOTES
Daily Note     Today's date: 10/19/2022  Patient name: Victorine Goodpasture  : 1960  MRN: [id number]  Referring provider: Winston Zamudio MD  Dx:   Encounter Diagnosis     ICD-10-CM    1  Lumbar radiculopathy  M54 16    2  Acute bilateral thoracic back pain  M54 6                   Subjective: Pt reports his back hasn't felt this good in 25 years! Pt reports no radicular sx for 6 days since last visit and reports only mild and occasional central LBP  Objective: See treatment diary below  Assessment: Pt demonstrated improved stability with bridges with a kick  Introduced return to function with crunches and standing HA stretch  Plan:  Cont ext POC, assess return to function  Precautions:    Dx: DA 22: L4-S1 extension DP      Manuals 8/30 9/7 9/14 9/21 9/28 10/5 10/12 10/19     T4-8 Gr IV P-A mobs 1x40 ea 1x40 ea 1x40 ea 1x50 ea 1x50 ea 1x50 ea 1x50 ea 1x50 ea     Gr IV P-A L3-S1 mobs   1x50 ea 1x50 ea 1x50 ea 1x50 ea 1x50 ea 1x50 ea     Laser L4-S1     4000J 5800J 5800J 5800J     Prone press-ups MWM       3x10       Neuro Re-Ed             Posture education/correction 5 min 2 min    2 min        T 3x10 pball 2x15 G TB/ 3x15 G TB/ 3x15          Y 3x10 pball 2x15  Y TB/ 3x15 Y TB/ 3x15 Y TB/ 3x15         ER 3x10 pball 2x15 Y TB/ 3x15 Y TB/ 3x15 Y TB/ 3x15        TB I   Jean Carlos/ 3x15 Jean Carlos/ 3x15         prone Thoracic ext 3x10 Table 3x15 Table 3x15 Table 3x20         Cable Y   2 5#/ 1x15          Cable H   2 5#/ 1x15          Cable T   7 5#/ 1x15          Cable I   7 5#/ 1x15          bridges       1x10 1x15     Bridges with resistance     1x15, 6 6#/ 2x15  20#/ 3x15      Bridges with a kick       1x10 ea 3x10 ea     Standing lumbar extensions      2x10 3x10 3x10     Prone press-ups      3x10 3x10 4x10     PPT crunches        3x10     Ther Ex             Standing doorway pec stretch 10"x2 10"x3 10"x3 10"x3         Back Extension machine   80#/ 3x10           Seated thoracic ext self mobs over chair    3"x10         Standing HA stretch     15"x3 ea   15"x3 ea     Supine HA stretch     15"x3 ea        LTR with OP     10"x2        Self t/s mobs with dumbbell press     3"x5                     Ther Activity             Golf swing  nv           Squatting biomechanics retraining      5 min       Resisted thoracic rotation  nv           Gait Training                                       Modalities

## 2022-10-26 ENCOUNTER — OFFICE VISIT (OUTPATIENT)
Dept: PHYSICAL THERAPY | Facility: CLINIC | Age: 62
End: 2022-10-26
Payer: COMMERCIAL

## 2022-10-26 DIAGNOSIS — M54.16 LUMBAR RADICULOPATHY: Primary | ICD-10-CM

## 2022-10-26 DIAGNOSIS — M54.6 ACUTE BILATERAL THORACIC BACK PAIN: ICD-10-CM

## 2022-10-26 PROCEDURE — 97112 NEUROMUSCULAR REEDUCATION: CPT

## 2022-10-26 NOTE — PROGRESS NOTES
Daily Note     Today's date: 10/26/2022  Patient name: Radha Alcantara  : 1960  MRN: [id number]  Referring provider: Luis Alberto Hernández MD  Dx:   Encounter Diagnosis     ICD-10-CM    1  Lumbar radiculopathy  M54 16    2  Acute bilateral thoracic back pain  M54 6        Start Time: 0830  Stop Time: 0910  Total time in clinic (min): 40 minutes    Subjective: Patient reports AM lumbar stiffness that dissipates after he wakes up and takes a shower  Denies radicular symptoms  Objective: See treatment diary below  Assessment: Patient able to complete bridges and bridges with kicks with good stability and no increased lumbar lordosis  Sheldon Mass has responded really well to the current PT POC  Plan: F/U in 2 weeks to re-eval and potentially DC  Precautions:    Dx: DA 22: L4-S1 extension DP      Manuals 8/30 9/7 9/14 9/21 9/28 10/5 10/12 10/19 10/26    T4-8 Gr IV P-A mobs 1x40 ea 1x40 ea 1x40 ea 1x50 ea 1x50 ea 1x50 ea 1x50 ea 1x50 ea EK    Gr IV P-A L3-S1 mobs   1x50 ea 1x50 ea 1x50 ea 1x50 ea 1x50 ea 1x50 ea EK    Laser L4-S1     4000J 5800J 5800J 5800J 5800J    Prone press-ups MWM       3x10       Neuro Re-Ed             Posture education/correction 5 min 2 min    2 min        T 3x10 pball 2x15 G TB/ 3x15 G TB/ 3x15          Y 3x10 pball 2x15  Y TB/ 3x15 Y TB/ 3x15 Y TB/ 3x15         ER 3x10 pball 2x15 Y TB/ 3x15 Y TB/ 3x15 Y TB/ 3x15        TB I   Jean Carlos/ 3x15 Jean Carlos/ 3x15         prone Thoracic ext 3x10 Table 3x15 Table 3x15 Table 3x20         Cable Y   2 5#/ 1x15          Cable H   2 5#/ 1x15          Cable T   7 5#/ 1x15          Cable I   7 5#/ 1x15          bridges       1x10 1x15 1x20    Bridges with resistance     1x15, 6 6#/ 2x15  20#/ 3x15      Bridges with a kick       1x10 ea 3x10 ea 3x10 ea    Standing lumbar extensions      2x10 3x10 3x10 3x10    Prone press-ups      3x10 3x10 4x10 4x10    PPT crunches        3x10 3x10    Ther Ex             Standing doorway pec stretch 10"x2 10"x3 10"x3 10"x3         Back Extension machine   80#/ 3x10           Seated thoracic ext self mobs over chair    3"x10         Standing HA stretch     15"x3 ea   15"x3 ea 15"x3 ea    Supine HA stretch     15"x3 ea        LTR with OP     10"x2        Self t/s mobs with dumbbell press     3"x5                     Ther Activity             Golf swing  nv           Squatting biomechanics retraining      5 min       Resisted thoracic rotation  nv           Gait Training                                       Modalities

## 2022-10-28 ENCOUNTER — RA CDI HCC (OUTPATIENT)
Dept: OTHER | Facility: HOSPITAL | Age: 62
End: 2022-10-28

## 2022-10-28 NOTE — PROGRESS NOTES
[facility]  coding opportunities          Chart Reviewed number of suggestions sent to Provider: 2  J45 20  &  E66 01- Severe obesity with BMI over 35 and comorbid condition(HCC)  *BMI of 37 34 with asthma, gerd, sleep apnea    If this is correct, please document and assess at your next visit   11/4/22     Patients Insurance      Commercial Insurance: [employer]

## 2022-10-31 ENCOUNTER — APPOINTMENT (OUTPATIENT)
Dept: LAB | Age: 62
End: 2022-10-31

## 2022-10-31 DIAGNOSIS — E53.8 VITAMIN B12 DEFICIENCY: ICD-10-CM

## 2022-10-31 DIAGNOSIS — E78.5 HYPERLIPIDEMIA, UNSPECIFIED HYPERLIPIDEMIA TYPE: ICD-10-CM

## 2022-10-31 DIAGNOSIS — E55.9 VITAMIN D DEFICIENCY: ICD-10-CM

## 2022-10-31 DIAGNOSIS — R73.03 PREDIABETES: ICD-10-CM

## 2022-10-31 PROBLEM — Z00.00 HEALTHCARE MAINTENANCE: Status: RESOLVED | Noted: 2020-03-16 | Resolved: 2022-10-31

## 2022-10-31 LAB
25(OH)D3 SERPL-MCNC: 41.5 NG/ML (ref 30–100)
ALBUMIN SERPL BCP-MCNC: 3.6 G/DL (ref 3.5–5)
ALP SERPL-CCNC: 65 U/L (ref 46–116)
ALT SERPL W P-5'-P-CCNC: 45 U/L (ref 12–78)
ANION GAP SERPL CALCULATED.3IONS-SCNC: 7 MMOL/L (ref 4–13)
AST SERPL W P-5'-P-CCNC: 24 U/L (ref 5–45)
BILIRUB SERPL-MCNC: 0.56 MG/DL (ref 0.2–1)
BUN SERPL-MCNC: 20 MG/DL (ref 5–25)
CALCIUM SERPL-MCNC: 9.6 MG/DL (ref 8.3–10.1)
CHLORIDE SERPL-SCNC: 107 MMOL/L (ref 96–108)
CHOLEST SERPL-MCNC: 171 MG/DL
CO2 SERPL-SCNC: 28 MMOL/L (ref 21–32)
CREAT SERPL-MCNC: 1.08 MG/DL (ref 0.6–1.3)
EST. AVERAGE GLUCOSE BLD GHB EST-MCNC: 120 MG/DL
GFR SERPL CREATININE-BSD FRML MDRD: 72 ML/MIN/1.73SQ M
GLUCOSE P FAST SERPL-MCNC: 116 MG/DL (ref 65–99)
HBA1C MFR BLD: 5.8 %
HDLC SERPL-MCNC: 42 MG/DL
LDLC SERPL CALC-MCNC: 106 MG/DL (ref 0–100)
POTASSIUM SERPL-SCNC: 4.5 MMOL/L (ref 3.5–5.3)
PROT SERPL-MCNC: 7.5 G/DL (ref 6.4–8.4)
SODIUM SERPL-SCNC: 142 MMOL/L (ref 135–147)
TRIGL SERPL-MCNC: 116 MG/DL
VIT B12 SERPL-MCNC: 894 PG/ML (ref 100–900)

## 2022-11-02 ENCOUNTER — APPOINTMENT (OUTPATIENT)
Dept: PHYSICAL THERAPY | Facility: CLINIC | Age: 62
End: 2022-11-02

## 2022-11-04 ENCOUNTER — OFFICE VISIT (OUTPATIENT)
Dept: INTERNAL MEDICINE CLINIC | Facility: CLINIC | Age: 62
End: 2022-11-04

## 2022-11-04 VITALS
SYSTOLIC BLOOD PRESSURE: 128 MMHG | HEART RATE: 60 BPM | RESPIRATION RATE: 16 BRPM | DIASTOLIC BLOOD PRESSURE: 80 MMHG | HEIGHT: 67 IN | OXYGEN SATURATION: 98 % | BODY MASS INDEX: 37.64 KG/M2 | WEIGHT: 239.8 LBS

## 2022-11-04 DIAGNOSIS — N52.9 ERECTILE DYSFUNCTION, UNSPECIFIED ERECTILE DYSFUNCTION TYPE: ICD-10-CM

## 2022-11-04 DIAGNOSIS — M54.41 CHRONIC RIGHT-SIDED LOW BACK PAIN WITH RIGHT-SIDED SCIATICA: Primary | ICD-10-CM

## 2022-11-04 DIAGNOSIS — G89.29 CHRONIC RIGHT-SIDED LOW BACK PAIN WITH RIGHT-SIDED SCIATICA: Primary | ICD-10-CM

## 2022-11-04 DIAGNOSIS — E66.09 CLASS 2 OBESITY DUE TO EXCESS CALORIES WITHOUT SERIOUS COMORBIDITY WITH BODY MASS INDEX (BMI) OF 35.0 TO 35.9 IN ADULT: ICD-10-CM

## 2022-11-04 DIAGNOSIS — E78.5 HYPERLIPIDEMIA, UNSPECIFIED HYPERLIPIDEMIA TYPE: ICD-10-CM

## 2022-11-04 DIAGNOSIS — J98.4 MULTIPLE IDIOPATHIC CYSTS OF LUNG: ICD-10-CM

## 2022-11-04 NOTE — PROGRESS NOTES
Assessment/Plan:    Multiple idiopathic cysts of lung  Clinically stable doing well being monitored through pulmonary lungs are clear on auscultation today    Hyperlipidemia  Hyperlipidemia controlled continue with current medical regiment recommend a low-cholesterol diet and recommend routine exercise we will continue to monitor the progress  LDL goal under 100 will check lipid and CRP next visit low threshold for starting a statin    Class 2 obesity due to excess calories without serious comorbidity with body mass index (BMI) of 35 0 to 35 9 in adult  Obesity -I have counseled patient following healthy and balanced diet, I would like the patient to lose weight, I would like the patient exercise routinely; we will continue monitor the patient's progress      Erectile dysfunction  Reports me Viagra not effective currently on 100 milligram dosing will have him work with pharmacist Mora to find a comparable medication that is within his formulary     Chronic right-sided low back pain with right-sided sciatica  Suspect degenerative disc disease versus arthritis will have him go for x-ray lumbar spine he is working with physical therapy, if he does not see improvement with therapy he is notify me for consideration of MRI and epidural steroid I would like him to work with therapy for the next 1-2 months to see if we make progress on the symptoms         Problem List Items Addressed This Visit        Respiratory    Multiple idiopathic cysts of lung     Clinically stable doing well being monitored through pulmonary lungs are clear on auscultation today            Nervous and Auditory    Chronic right-sided low back pain with right-sided sciatica - Primary     Suspect degenerative disc disease versus arthritis will have him go for x-ray lumbar spine he is working with physical therapy, if he does not see improvement with therapy he is notify me for consideration of MRI and epidural steroid I would like him to work with therapy for the next 1-2 months to see if we make progress on the symptoms         Relevant Orders    XR spine lumbar minimum 4 views non injury       Other    Hyperlipidemia     Hyperlipidemia controlled continue with current medical regiment recommend a low-cholesterol diet and recommend routine exercise we will continue to monitor the progress  LDL goal under 100 will check lipid and CRP next visit low threshold for starting a statin         Relevant Orders    Lipid Panel with Direct LDL reflex    Comprehensive metabolic panel    High sensitivity CRP    Class 2 obesity due to excess calories without serious comorbidity with body mass index (BMI) of 35 0 to 35 9 in adult     Obesity -I have counseled patient following healthy and balanced diet, I would like the patient to lose weight, I would like the patient exercise routinely; we will continue monitor the patient's progress  Erectile dysfunction     Reports me Viagra not effective currently on 100 milligram dosing will have him work with pharmacist Mora to find a comparable medication that is within his formulary          Relevant Orders    Ambulatory referral to clinical pharmacy          Return to office 6  months  call if any problems  Subjective:      Patient ID: Latricia Anton is a 57 y o  male  HPI 60-year old male coming in for a follow up visit regarding chronic right lower back pain with sciatica, hyperlipidemia, erectile dysfunction, idiopathic cystic lung disease, obesity; The patient reports me compliant taking medications without untoward side effects the  The patient is here to review his medical condition, update me on the medical condition and the patient reports me no hospitalizations and no ER visits    Here to review laboratories no injuries no illnesses no falls he is working with physical therapy and has had improvement in the musculoskeletal component of his lower back pain he now notices floor back pain right side that radiates into his have and right lower extremity no weakness  The pt reports pain right lower back that goes in right hip region worse with blowing the leaves  The following portions of the patient's history were reviewed and updated as appropriate: allergies, current medications, past family history, past medical history, past social history, past surgical history and problem list     Review of Systems   Constitutional: Negative for activity change, appetite change and unexpected weight change  HENT: Negative for congestion and postnasal drip  Eyes: Negative for visual disturbance  Respiratory: Negative for cough and shortness of breath  Cardiovascular: Negative for chest pain  Gastrointestinal: Negative for abdominal pain, diarrhea, nausea and vomiting  Musculoskeletal: Positive for back pain (Radicular)  Neurological: Negative for dizziness, light-headedness and headaches  Hematological: Negative for adenopathy  Objective:    Return in about 6 months (around 5/4/2023)  No results found  No Known Allergies    Past Medical History:   Diagnosis Date   • Asthma    • Erectile dysfunction of non-organic origin     Resolved: 10/15/2014     Past Surgical History:   Procedure Laterality Date   • EYE SURGERY     • TONSILLECTOMY AND ADENOIDECTOMY     • UMBILICAL HERNIA REPAIR       Current Outpatient Medications on File Prior to Visit   Medication Sig Dispense Refill   • albuterol (2 5 mg/3 mL) 0 083 % nebulizer solution Inhale 2 5 mg every 6 (six) hours as needed for wheezing or shortness of breath     • albuterol (ProAir HFA) 90 mcg/act inhaler Inhale 2 puffs every 6 (six) hours as needed for wheezing Use 2 puffs prior to exercise   18 g 5   • Cholecalciferol (VITAMIN D3) 5000 units CAPS Take 5,000 Units by mouth daily     • Cinnamon 500 MG capsule Take 500 mg by mouth daily     • cyanocobalamin (VITAMIN B-12) 1000 MCG tablet Take 1,000 mcg by mouth daily     • fluticasone (FLONASE) 50 mcg/act nasal spray TWO SPRAYS EACH NOSTRIL ONCE DAILY AS NEEDED  16 mL 11   • levocetirizine (XYZAL) 5 MG tablet TAKE 1 TABLET BY MOUTH EVERY DAY IN THE EVENING 30 tablet 3   • montelukast (SINGULAIR) 10 mg tablet TAKE 1 TABLET BY MOUTH DAILY AT BEDTIME 30 tablet 4   • Omega-3 Fatty Acids (FISH OIL) 1,000 mg Take 3 capsules by mouth daily       No current facility-administered medications on file prior to visit  Family History   Problem Relation Age of Onset   • Asthma Mother    • Heart disease Mother    • Rheum arthritis Mother    • Osteoporosis Mother    • Diabetes Father    • Heart disease Father    • Diabetes type II Father    • Lung disease Sister         Bronchiectasis  No clear history of cystic lung disease however   • Diabetes Sister    • Other Son         Thyroid disorder     Social History     Socioeconomic History   • Marital status: /Civil Union     Spouse name: Not on file   • Number of children: Not on file   • Years of education: Not on file   • Highest education level: Not on file   Occupational History   • Not on file   Tobacco Use   • Smoking status: Former Smoker     Packs/day: 1 00     Years: 8 00     Pack years: 8 00     Types: Cigarettes     Start date: 1971     Quit date: 1979     Years since quittin 7   • Smokeless tobacco: Never Used   • Tobacco comment: He smoked a pack a day of cigarettes for 8 years  He quite at the age of 22  Occasional cigar use     Vaping Use   • Vaping Use: Never used   Substance and Sexual Activity   • Alcohol use: Yes     Comment: Social   • Drug use: Never   • Sexual activity: Yes   Other Topics Concern   • Not on file   Social History Narrative    Caffeine use    Work-related stress     Social Determinants of Health     Financial Resource Strain: Not on file   Food Insecurity: Not on file   Transportation Needs: Not on file   Physical Activity: Not on file   Stress: Not on file   Social Connections: Not on file   Intimate Partner Violence: Not on file   Housing Stability: Not on file     Vitals:    11/04/22 0916   BP: 128/80   Pulse: 60   Resp: 16   SpO2: 98%   Weight: 109 kg (239 lb 12 8 oz)   Height: 5' 7" (1 702 m)     Results for orders placed or performed in visit on 10/31/22   Comprehensive metabolic panel   Result Value Ref Range    Sodium 142 135 - 147 mmol/L    Potassium 4 5 3 5 - 5 3 mmol/L    Chloride 107 96 - 108 mmol/L    CO2 28 21 - 32 mmol/L    ANION GAP 7 4 - 13 mmol/L    BUN 20 5 - 25 mg/dL    Creatinine 1 08 0 60 - 1 30 mg/dL    Glucose, Fasting 116 (H) 65 - 99 mg/dL    Calcium 9 6 8 3 - 10 1 mg/dL    AST 24 5 - 45 U/L    ALT 45 12 - 78 U/L    Alkaline Phosphatase 65 46 - 116 U/L    Total Protein 7 5 6 4 - 8 4 g/dL    Albumin 3 6 3 5 - 5 0 g/dL    Total Bilirubin 0 56 0 20 - 1 00 mg/dL    eGFR 72 ml/min/1 73sq m   Hemoglobin A1C   Result Value Ref Range    Hemoglobin A1C 5 8 (H) Normal 3 8-5 6%; PreDiabetic 5 7-6 4%; Diabetic >=6 5%; Glycemic control for adults with diabetes <7 0% %     mg/dl   Lipid Panel with Direct LDL reflex   Result Value Ref Range    Cholesterol 171 See Comment mg/dL    Triglycerides 116 See Comment mg/dL    HDL, Direct 42 >=40 mg/dL    LDL Calculated 106 (H) 0 - 100 mg/dL   Vitamin D 25 hydroxy   Result Value Ref Range    Vit D, 25-Hydroxy 41 5 30 0 - 100 0 ng/mL   Vitamin B12   Result Value Ref Range    Vitamin B-12 894 100 - 900 pg/mL     Weight (last 2 days)     Date/Time Weight    11/04/22 0916 109 (239 8)        Body mass index is 37 56 kg/m²  BP      Temp      Pulse     Resp      SpO2        Vitals:    11/04/22 0916   Weight: 109 kg (239 lb 12 8 oz)     Vitals:    11/04/22 0916   Weight: 109 kg (239 lb 12 8 oz)       /80   Pulse 60   Resp 16   Ht 5' 7" (1 702 m)   Wt 109 kg (239 lb 12 8 oz)   SpO2 98%   BMI 37 56 kg/m²          Physical Exam  Vitals and nursing note reviewed  Constitutional:       General: He is not in acute distress  Appearance: Normal appearance   He is well-developed  He is not ill-appearing, toxic-appearing or diaphoretic  HENT:      Head: Normocephalic and atraumatic  Right Ear: External ear normal       Left Ear: External ear normal    Eyes:      General: No scleral icterus  Right eye: No discharge  Left eye: No discharge  Conjunctiva/sclera: Conjunctivae normal       Pupils: Pupils are equal, round, and reactive to light  Cardiovascular:      Rate and Rhythm: Normal rate and regular rhythm  Heart sounds: Normal heart sounds  No murmur heard  No friction rub  No gallop  Pulmonary:      Effort: No respiratory distress  Breath sounds: No wheezing or rales  Abdominal:      General: Bowel sounds are normal  There is no distension  Palpations: Abdomen is soft  There is no mass  Tenderness: There is no abdominal tenderness  There is no guarding or rebound  Musculoskeletal:         General: No deformity  Cervical back: Neck supple  Lymphadenopathy:      Cervical: No cervical adenopathy  Neurological:      Mental Status: He is alert

## 2022-11-05 ENCOUNTER — APPOINTMENT (OUTPATIENT)
Dept: RADIOLOGY | Age: 62
End: 2022-11-05

## 2022-11-05 DIAGNOSIS — G89.29 CHRONIC RIGHT-SIDED LOW BACK PAIN WITH RIGHT-SIDED SCIATICA: ICD-10-CM

## 2022-11-05 DIAGNOSIS — M54.41 CHRONIC RIGHT-SIDED LOW BACK PAIN WITH RIGHT-SIDED SCIATICA: ICD-10-CM

## 2022-11-06 PROBLEM — N52.9 ERECTILE DYSFUNCTION: Status: ACTIVE | Noted: 2022-11-06

## 2022-11-06 PROBLEM — M54.41 CHRONIC RIGHT-SIDED LOW BACK PAIN WITH RIGHT-SIDED SCIATICA: Status: ACTIVE | Noted: 2022-11-06

## 2022-11-06 PROBLEM — G89.29 CHRONIC RIGHT-SIDED LOW BACK PAIN WITH RIGHT-SIDED SCIATICA: Status: ACTIVE | Noted: 2022-11-06

## 2022-11-06 NOTE — ASSESSMENT & PLAN NOTE
Reports me Viagra not effective currently on 100 milligram dosing will have him work with pharmacist Mora to find a comparable medication that is within his formulary

## 2022-11-06 NOTE — ASSESSMENT & PLAN NOTE
Hyperlipidemia controlled continue with current medical regiment recommend a low-cholesterol diet and recommend routine exercise we will continue to monitor the progress    LDL goal under 100 will check lipid and CRP next visit low threshold for starting a statin

## 2022-11-06 NOTE — ASSESSMENT & PLAN NOTE
Suspect degenerative disc disease versus arthritis will have him go for x-ray lumbar spine he is working with physical therapy, if he does not see improvement with therapy he is notify me for consideration of MRI and epidural steroid I would like him to work with therapy for the next 1-2 months to see if we make progress on the symptoms

## 2022-11-06 NOTE — ASSESSMENT & PLAN NOTE
Clinically stable doing well being monitored through pulmonary lungs are clear on auscultation today

## 2022-11-09 ENCOUNTER — EVALUATION (OUTPATIENT)
Dept: PHYSICAL THERAPY | Facility: CLINIC | Age: 62
End: 2022-11-09

## 2022-11-09 DIAGNOSIS — M54.6 ACUTE BILATERAL THORACIC BACK PAIN: ICD-10-CM

## 2022-11-09 DIAGNOSIS — M54.16 LUMBAR RADICULOPATHY: Primary | ICD-10-CM

## 2022-11-09 NOTE — PROGRESS NOTES
Daily Note     Today's date: 2022  Patient name: Alvin Adan  : 1960  MRN: [id number]  Referring provider: Dayton Voss MD  Dx:   Encounter Diagnosis     ICD-10-CM    1  Lumbar radiculopathy  M54 16    2  Acute bilateral thoracic back pain  M54 6                   Subjective: Patient reports mild to moderate AM lumbar stiffness that dissipates after he wakes up and takes a shower  Denies radicular symptoms  Pt reports good compliance with HEP daily and reports performing EIS just prior to bed  Objective: See treatment diary below  Assessment: Reviewed HEP, decreased UT compensation t/o  Patient demonstrated increased stiffness and pain with stretching appointment frequency to once every 2 weeks  Increased back up to 1x/week  Plan: Cont 1x/week, assess x-ray results nv  Precautions:    Dx: DA 22: L4-S1 extension DP      Manuals 8/30 9/7 9/14 9/21 9/28 10/5 10/12 10/19 10/26 11/9   T4-8 Gr IV P-A mobs 1x40 ea 1x40 ea 1x40 ea 1x50 ea 1x50 ea 1x50 ea 1x50 ea 1x50 ea EK 1x50 ea   Gr IV P-A L3-S1 mobs   1x50 ea 1x50 ea 1x50 ea 1x50 ea 1x50 ea 1x50 ea EK 3x50 ea   Laser L4-S1     4000J 5800J 5800J 5800J 5800J 5800J   Prone press-ups MWM       3x10       Neuro Re-Ed             Posture education/correction 5 min 2 min    2 min        T 3x10 pball 2x15 G TB/ 3x15 G TB/ 3x15      B/ 3x15    Y 3x10 pball 2x15  Y TB/ 3x15 Y TB/ 3x15 Y TB/ 3x15     B/ 3x15    ER 3x10 pball 2x15 Y TB/ 3x15 Y TB/ 3x15 Y TB/ 3x15     B/ 3x15   TB I   Jean Carlos/ 3x15 Jean Carlos/ 3x15      B/ 3x15   prone Thoracic ext 3x10 Table 3x15 Table 3x15 Table 3x20         Cable Y   2 5#/ 1x15          Cable H   2 5#/ 1x15          Cable T   7 5#/ 1x15          Cable I   7 5#/ 1x15          bridges       1x10 1x15 1x20 1x20   Bridges with resistance     1x15, 6 6#/ 2x15  20#/ 3x15   20#/ 1x15   Bridges with a kick       1x10 ea 3x10 ea 3x10 ea 2x10 ea   Standing lumbar extensions      2x10 3x10 3x10 3x10 1x10   Prone press-ups      3x10 3x10 4x10 4x10 3x10   PPT crunches        3x10 3x10 3x12   Ther Ex             Standing doorway pec stretch 10"x2 10"x3 10"x3 10"x3         Back Extension machine   80#/ 3x10           Seated thoracic ext self mobs over chair    3"x10         Standing HA stretch     15"x3 ea   15"x3 ea 15"x3 ea    Supine HA stretch     15"x3 ea        LTR with OP     10"x2        Self t/s mobs with dumbbell press     3"x5                     Ther Activity             Golf swing  nv           Squatting biomechanics retraining      5 min       Resisted thoracic rotation  nv           Gait Training                                       Modalities

## 2022-11-15 ENCOUNTER — APPOINTMENT (OUTPATIENT)
Dept: PHYSICAL THERAPY | Facility: CLINIC | Age: 62
End: 2022-11-15

## 2022-11-15 DIAGNOSIS — N52.9 ERECTILE DYSFUNCTION, UNSPECIFIED ERECTILE DYSFUNCTION TYPE: Primary | ICD-10-CM

## 2022-11-15 RX ORDER — TADALAFIL 20 MG/1
20 TABLET ORAL DAILY PRN
Qty: 10 TABLET | Refills: 0 | Status: SHIPPED | OUTPATIENT
Start: 2022-11-15 | End: 2022-11-16 | Stop reason: SDUPTHER

## 2022-11-15 NOTE — TELEPHONE ENCOUNTER
[address] [place] [address], Pharmacist    PCP: tadalafil order pended    ____________________________________________________________________    Communication with patient: No, only completed chart review    Reason for documentation: Reviewed for erectile dysfunction medication options    Findings: Patient has trialed with sildenafil 100 mg (maximum dose) with failure  It is recommended to trial with a different PDE5 inhibitor before moving to alternative therapy  Recommendations: would recommend switching to tadalafil 20 mg (equivalent to sildenafil 100 mg)   Formulary preferred generic; if cost is higher than with sildenafil, could consider [employer]        Pharmacist Tracking Tool  Reason For Outreach: Embedded Pharmacist    Demographics:  Intervention Method: Chart Review  Type of Intervention: New  Topics Addressed: Men's Health  Pharmacologic Interventions: Medication Conversion  Non-Pharmacologic Interventions: N/A  Time:  Direct Patient Care: 0 mins   Care Coordination: 20 mins  Recommendation Recipient: Provider  Outcome: Pending/ Follow-up Required

## 2022-11-16 ENCOUNTER — APPOINTMENT (OUTPATIENT)
Dept: PHYSICAL THERAPY | Facility: CLINIC | Age: 62
End: 2022-11-16

## 2022-11-16 DIAGNOSIS — N52.9 ERECTILE DYSFUNCTION, UNSPECIFIED ERECTILE DYSFUNCTION TYPE: ICD-10-CM

## 2022-11-16 RX ORDER — TADALAFIL 20 MG/1
20 TABLET ORAL DAILY PRN
Qty: 10 TABLET | Refills: 3 | Status: SHIPPED | OUTPATIENT
Start: 2022-11-16 | End: 2022-11-17 | Stop reason: SDUPTHER

## 2022-11-17 DIAGNOSIS — N52.9 ERECTILE DYSFUNCTION, UNSPECIFIED ERECTILE DYSFUNCTION TYPE: ICD-10-CM

## 2022-11-17 RX ORDER — TADALAFIL 20 MG/1
20 TABLET ORAL DAILY PRN
Qty: 30 TABLET | Refills: 3 | Status: SHIPPED | OUTPATIENT
Start: 2022-11-17

## 2022-11-18 ENCOUNTER — EVALUATION (OUTPATIENT)
Dept: PHYSICAL THERAPY | Facility: CLINIC | Age: 62
End: 2022-11-18

## 2022-11-18 DIAGNOSIS — M54.6 ACUTE BILATERAL THORACIC BACK PAIN: ICD-10-CM

## 2022-11-18 DIAGNOSIS — M54.16 LUMBAR RADICULOPATHY: Primary | ICD-10-CM

## 2022-11-18 NOTE — PROGRESS NOTES
PT Re-Evaluation     Today's date: 2022  Patient name: Anita Cervantes  : 1960  MRN: [id number]  Referring provider: Rosalie Ramirez MD  Dx:   Encounter Diagnosis     ICD-10-CM    1  Lumbar radiculopathy  M54 16       2  Acute bilateral thoracic back pain  M54 6                      Assessment  Assessment details: Pt presents with s/s consistent with a re-aggravation of his prior lumbar posterior derangement with an extension DP  He will continue to benefit from skilled PT services to address his impairments in order to decrease pain and restore function  Impairments: activity intolerance, impaired physical strength, lacks appropriate home exercise program, pain with function, poor posture  and poor body mechanics  Understanding of Dx/Px/POC: good   Prognosis: good    Goals  ST-2 weeks  1   Pt will decrease pain > 50%  (partially met - t/s only)  2  Pt will improve posture > 50% (met)  LT-4 weeks  1   Pt will decrease pain > 75%  (not met)  2  Pt will resume weight lifting without pain or restrictions  (not met)    Plan  Planned modality interventions: low level laser therapy  Planned therapy interventions: joint mobilization, abdominal trunk stabilization, manual therapy, neuromuscular re-education, patient education, postural training, self care, strengthening, stretching, therapeutic activities, body mechanics training, functional ROM exercises, flexibility, therapeutic exercise, therapeutic training and home exercise program  Frequency: 1x week  Duration in weeks: 8  Treatment plan discussed with: patient        Subjective Evaluation    History of Present Illness  Mechanism of injury: Pt reports a resolution of thoracic pain and until yesterday a resolution of RLE radicular symptoms and significantly improved LBP with a frequency of 1 time/week vs 1 time/every other week   He reports increased LBP into the proximal R glute and paraesthesias into his proximal R thigh pain last night and into this morning after doing a lot of yardwork yesterday  He denies red flags    Pain  Current pain ratin  At best pain ratin  At worst pain ratin  Quality: dull ache, sharp, burning and radiating  Relieving factors: heat and medications  Aggravating factors: lifting, sitting and standing  Progression: worsening      Diagnostic Tests  X-ray: abnormal (22 mild levoscoliosis, DJD)  Treatments  Previous treatment: physical therapy  Current treatment: physical therapy  Patient Goals  Patient goals for therapy: decreased pain, independence with ADLs/IADLs, increased strength, return to sport/leisure activities and increased motion          Objective     Postural Observations  Seated posture: poor  Standing posture: poor  Correction of posture: makes symptoms better        Active Range of Motion   Cervical/Thoracic Spine       Cervical  Subcranial protraction:  WFL   Subcranial retraction:   Restriction level: minimal  Flexion:  WFL  Extension:  WFL  Left lateral flexion:  WFL  Right lateral flexion:  WFL  Left rotation:  WFL  Right rotation:  [facility]    Thoracic    Flexion:  WFL  Extension:  WFL  Left lateral flexion:  Restriction level: minimal  Right lateral flexion:  Restriction level: minimal  Left rotation:  WFL  Right rotation:  [facility]    Lumbar   Flexion:  with pain Restriction level: maximal  Extension:  with pain Restriction level: maximal  Left lateral flexion:  Restriction level: moderate  Right lateral flexion:  WFL and with pain  Left rotation:  with pain Restriction level: moderate  Right rotation:  Restriction level: minimal    Joint Play     Hypomobile: T4, T5, T6, T7, T8, T9, T10, T11, T12, L1, L2, L3, L4, L5 and S1     Pain: T5, T6, T7, L3, L4 and L5   Mechanical Assessment    Cervical      Thoracic      Lumbar    Standing flexion: repeated movements   Pain location:peripheralized  Pain intensity: worse  Pain level: increased  Standing extension: repeated movements  Pain location: centralized  Pain level: decreased  Lying extension: repeated movements  Pain location: centralized  Pain level: decreased    Strength/Myotome Testing     Left Shoulder     Isolated Muscles   Lower trapezius: 3+   Middle trapezius: 4-   Upper trapezius: 5     Right Shoulder     Isolated Muscles   Lower trapezius: 3+   Middle trapezius: 4-   Upper trapezius: 5               Precautions: none   Dx: DA 9/21/22: L4-S1 extension DP      Manuals 11/18 11/9   T4-8 Gr IV P-A mobs 1x50 ea         1x50 ea   Gr IV P-A L3-S1 mobs 3x50 ea         3x50 ea   Laser L4-S1 6000J         5800J   Prone press-ups MWM  L4/5  3x10             Neuro Re-Ed              Posture education/correction               T nv         B/ 3x15    Y nv         B/ 3x15    ER nv         B/ 3x15   TB I nv         B/ 3x15   prone Thoracic ext              Cable Y              Cable H              Cable T              Cable I              bridges nv         1x20   Bridges with resistance          20#/ 0P01   Bridges with a kick          2x10 ea   Standing lumbar extensions 3x10         1x10   Prone press-ups 3x10         3x10   PPT crunches          3x12   Ther Ex              Standing doorway pec stretch              Back Extension machine               Seated thoracic ext self mobs over chair               Standing HA stretch               Supine HA stretch               LTR with OP               Self t/s mobs with dumbbell press                               Ther Activity               Golf swing               Squatting biomechanics retraining               Resisted thoracic rotation               Gait Training                                                                       Modalities

## 2022-11-21 ENCOUNTER — CLINICAL SUPPORT (OUTPATIENT)
Dept: INTERNAL MEDICINE CLINIC | Facility: CLINIC | Age: 62
End: 2022-11-21

## 2022-11-21 ENCOUNTER — OFFICE VISIT (OUTPATIENT)
Dept: PHYSICAL THERAPY | Facility: CLINIC | Age: 62
End: 2022-11-21

## 2022-11-21 DIAGNOSIS — M54.16 LUMBAR RADICULOPATHY: Primary | ICD-10-CM

## 2022-11-21 DIAGNOSIS — M54.6 ACUTE BILATERAL THORACIC BACK PAIN: ICD-10-CM

## 2022-11-21 DIAGNOSIS — Z23 NEED FOR VACCINATION: Primary | ICD-10-CM

## 2022-11-21 NOTE — PROGRESS NOTES
Daily Note     Today's date: 2022  Patient name: Paris Bowie  : 1960  MRN: [id number]  Referring provider: Roula Tuttle MD  Dx:   Encounter Diagnosis     ICD-10-CM    1  Lumbar radiculopathy  M54 16       2  Acute bilateral thoracic back pain  M54 6                      Subjective: Pt reports 3/10 central low back and R posterior hip pain pre tx  Objective: See treatment diary below      Assessment: Pt demonstrated decreased pain levels following manuals and TE program, good ability to progress core stabilization  Plan: Continue poc as per PT       Precautions: none   Dx: DA 22: L4-S1 extension DP      Manuals    T4-8 Gr IV P-A mobs 1x50 ea 1x50 ea EK PT        1x50 ea   Gr IV P-A L3-S1 mobs 3x50 ea 3x50 ea EK PT        3x50 ea   Laser L4-S1 6000J 6000J        5800J   Prone press-ups MWM  L4/5  3x10 np            Neuro Re-Ed              Posture education/correction               T nv         B/ 3x15    Y nv         B/ 3x15    ER nv         B/ 3x15   TB I nv         B/ 3x15   prone Thoracic ext              Cable Y              Cable H              Cable T              Cable I              bridges nv 2x10        1x20   Bridges with resistance          20#/ 1x15   Bridges with a kick          2x10 ea   Standing lumbar extensions 3x10 3x10        1x10   Prone press-ups 3x10 3x10        3x10   Prone swimmers  3x15           PPT crunches  3x12        3x12   Ther Ex              Standing doorway pec stretch              Back Extension machine               Seated thoracic ext self mobs over chair               Standing HA stretch               Supine HA stretch               LTR with OP               Self t/s mobs with dumbbell press                               Ther Activity               Golf swing               Squatting biomechanics retraining               Resisted thoracic rotation               Gait Training                                                                       Modalities

## 2022-11-22 DIAGNOSIS — G89.29 CHRONIC THORACIC BACK PAIN, UNSPECIFIED BACK PAIN LATERALITY: Primary | ICD-10-CM

## 2022-11-22 DIAGNOSIS — M54.6 CHRONIC THORACIC BACK PAIN, UNSPECIFIED BACK PAIN LATERALITY: Primary | ICD-10-CM

## 2022-11-30 ENCOUNTER — OFFICE VISIT (OUTPATIENT)
Dept: PHYSICAL THERAPY | Facility: CLINIC | Age: 62
End: 2022-11-30

## 2022-11-30 DIAGNOSIS — M54.6 ACUTE BILATERAL THORACIC BACK PAIN: ICD-10-CM

## 2022-11-30 DIAGNOSIS — M54.16 LUMBAR RADICULOPATHY: Primary | ICD-10-CM

## 2022-11-30 NOTE — PROGRESS NOTES
Daily Note     Today's date: 2022  Patient name: Cee Oliveira  : 1960  MRN: [id number]  Referring provider: Jono Mora MD  Dx:   Encounter Diagnosis     ICD-10-CM    1  Lumbar radiculopathy  M54 16       2  Acute bilateral thoracic back pain  M54 6                      Subjective: Pt reports improved LBP and LE symptoms since last visit  He reports midline LBP radiating into the upper R glute with occasional paraesthesias into the R>L posterior thigh  He had stopped going to the gym x 1 wk while taking ibuprofen for pain last week  He reports prior to taking ibupgrofen his HEP worsened his LBP but cannot recall if his LE symptoms worsened as well  Objective: See treatment diary below  Instructed pt to monitor response to returning to the gym, especially lifting shoulders; perform lumbar extension    Assessment: Pt able to improve lumbar ROM following PA mobs, centralize following prone press-ups  Plan: Assess response nv       Precautions: none   Dx: DA 22: L4-S1 extension DP      Manuals           T4-8 Gr IV P-A mobs 1x50 ea 1x50 ea EK PT 1x50 ea          Gr IV P-A L3-S1 mobs 3x50 ea 3x50 ea EK PT C/  3x50 ea  R/  L4/5  1x40 ea          Laser L4-S1 6000J 6000J 6000J          Prone press-ups MWM  L4/5  3x10 np           Neuro Re-Ed             Posture education/correction              T nv             Y nv             ER nv            TB I nv            prone Thoracic ext              Cable Y              Cable H              Cable T              Cable I              bridges nv 2x10 3x15          Bridges with [employer] with a kick             Standing lumbar extensions 3x10 3x10 3x10          Prone press-ups 3x10 3x10 3x10          Prone swimmers  3x15 3x15          PPT crunches  3x12 3x12          Ther Ex              Standing doorway pec stretch              Back Extension machine               Seated thoracic ext self mobs over chair       Standing HA stretch               Supine HA stretch               LTR with OP               Self t/s mobs with dumbbell press                               Ther Activity               Golf swing               Squatting biomechanics retraining               Resisted thoracic rotation               Gait Training                                                                       Modalities

## 2022-12-07 ENCOUNTER — OFFICE VISIT (OUTPATIENT)
Dept: PHYSICAL THERAPY | Facility: CLINIC | Age: 62
End: 2022-12-07

## 2022-12-07 DIAGNOSIS — M54.6 ACUTE BILATERAL THORACIC BACK PAIN: ICD-10-CM

## 2022-12-07 DIAGNOSIS — M54.16 LUMBAR RADICULOPATHY: Primary | ICD-10-CM

## 2022-12-07 NOTE — PROGRESS NOTES
Daily Note     Today's date: 2022  Patient name: Isabela Morris  : 1960  MRN: [id number]  Referring provider: Debra White MD  Dx:   Encounter Diagnosis     ICD-10-CM    1  Lumbar radiculopathy  M54 16       2  Acute bilateral thoracic back pain  M54 6           Start Time: 0830  Stop Time: 0910  Total time in clinic (min): 40 minutes    Subjective: Patient reports that he is really sore today  Especially with forward flexion  Objective: See treatment diary below  Trialed functional seated lumbar FF AROM which did decrease in stiffness  However, once he stood he had a pain in his low back from loading the disc  Patient not ready to introduce FF at this time  Continue PT POC    Assessment: Patient still responding well to the current PT POC  No radicular symptoms t/o therapy  Re-introduce FF at a later time  Plan: Assess response nv       Precautions: none   Dx: DA 22: L4-S1 extension DP      Manuals  12         T4-8 Gr IV P-A mobs 1x50 ea 1x50 ea EK PT 1x50 ea 1x50 ea  EK         Gr IV P-A L3-S1 mobs 3x50 ea 3x50 ea EK PT C/  3x50 ea  R/  L4/5  1x40 ea C/  3x50 ea  R/  L4/5  1x40 ea  EK         Laser L4-S1 6000J 6000J 6000J 6000J         Prone press-ups MWM  L4/5  3x10 np           Neuro Re-Ed             Posture education/correction              T nv             Y nv             ER nv            TB I nv            prone Thoracic ext              Cable Y              Cable H              Cable T              Cable I              bridges nv 2x10 3x15 3x15         Bridges with [employer] with a kick             Standing lumbar extensions 3x10 3x10 3x10 3x10         Prone press-ups 3x10 3x10 3x10 3x10         Prone swimmers  3x15 3x15 3x15         PPT crunches  3x12 3x12 3x12         Ther Ex              Standing doorway pec stretch              Back Extension machine               Seated thoracic ext self mobs over chair               Standing HA stretch               Supine HA stretch               LTR with OP               Self t/s mobs with dumbbell press                               Ther Activity               Golf swing               Squatting biomechanics retraining               Resisted thoracic rotation               Gait Training                                                                       Modalities

## 2022-12-14 ENCOUNTER — OFFICE VISIT (OUTPATIENT)
Dept: PHYSICAL THERAPY | Facility: CLINIC | Age: 62
End: 2022-12-14

## 2022-12-14 DIAGNOSIS — M54.6 ACUTE BILATERAL THORACIC BACK PAIN: ICD-10-CM

## 2022-12-14 DIAGNOSIS — M54.16 LUMBAR RADICULOPATHY: Primary | ICD-10-CM

## 2022-12-14 NOTE — PROGRESS NOTES
Daily Note     Today's date: 2022  Patient name: Bonnie Shukla  : 1960  MRN: [id number]  Referring provider: Anais Black MD  Dx:   Encounter Diagnosis     ICD-10-CM    1  Lumbar radiculopathy  M54 16       2  Acute bilateral thoracic back pain  M54 6                      Subjective: Patient reports 1-2/10 low back pain with no radicular symptoms  Objective: See treatment diary below    Assessment: Patient demonstrated decreased irritability and improved centralization of symptoms with extension poc  Consider re-introducing FF as pain levels decrease  Plan: Assess response nv        Precautions: none   Dx: DA 22: L4-S1 extension DP      Manuals         T4-8 Gr IV P-A mobs 1x50 ea 1x50 ea EK PT 1x50 ea 1x50 ea  EK 1x50 ea  EK        Gr IV P-A L3-S1 mobs 3x50 ea 3x50 ea EK PT C/  3x50 ea  R/  L4/5  1x40 ea C/  3x50 ea  R/  L4/5  1x40 ea  EK C/  3x50 ea  R/  L4/5  1x40 ea  EK        Laser L4-S1 6000J 6000J 6000J 6000J 6000J EK PT        Prone press-ups MWM  L4/5  3x10 np           Neuro Re-Ed             Posture education/correction              T nv             Y nv             ER nv            TB I nv            prone Thoracic ext              Cable Y              Cable H              Cable T              Cable I              bridges nv 2x10 3x15 3x15 3x15        Bridges with resistance             Bridges with a kick             Standing lumbar extensions 3x10 3x10 3x10 3x10 3x10        Prone press-ups 3x10 3x10 3x10 3x10 3x10        Prone swimmers  3x15 3x15 3x15 3x15        PPT crunches  3x12 3x12 3x12 3x15        Ther Ex              Standing doorway pec stretch              Back Extension machine               Seated thoracic ext self mobs over chair               Standing HA stretch               Supine HA stretch               LTR with OP               Self t/s mobs with dumbbell press                               Ther Activity               Golf swing               Squatting biomechanics retraining               Resisted thoracic rotation               Gait Training                                                                       Modalities

## 2022-12-21 ENCOUNTER — OFFICE VISIT (OUTPATIENT)
Dept: PHYSICAL THERAPY | Facility: CLINIC | Age: 62
End: 2022-12-21

## 2022-12-21 DIAGNOSIS — M54.16 LUMBAR RADICULOPATHY: Primary | ICD-10-CM

## 2022-12-21 DIAGNOSIS — M54.6 ACUTE BILATERAL THORACIC BACK PAIN: ICD-10-CM

## 2022-12-21 NOTE — PROGRESS NOTES
Daily Note     Today's date: 2022  Patient name: Bhavik Watson  : 1960  MRN: [id number]  Referring provider: Bj Pryor MD  Dx:   Encounter Diagnosis     ICD-10-CM    1  Lumbar radiculopathy  M54 16       2  Acute bilateral thoracic back pain  M54 6                      Subjective: Patient reports decreased intensity and frequency of LE radicular symptoms  Objective: See treatment diary below    Assessment: Patient demonstrated good response to HEP and slowly improving irritability  Pt has not demonstrated centralization of symptoms with all ADLs  Plan: Assess response nv        Precautions: none   Dx: DA 22: L4-S1 extension DP      Manuals        T4-8 Gr IV P-A mobs 1x50 ea 1x50 ea EK PT 1x50 ea 1x50 ea  EK 1x50 ea  EK 1x50 ea  EK       Gr IV P-A L3-S1 mobs 3x50 ea 3x50 ea EK PT C/  3x50 ea  R/  L4/5  1x40 ea C/  3x50 ea  R/  L4/5  1x40 ea  EK C/  3x50 ea  R/  L4/5  1x40 ea  EK C/  3x50 ea  R/  L4/5  1x40 ea  EK       Laser L4-S1 6000J 6000J 6000J 6000J 6000J EK PT 6000J       Prone press-ups MWM  L4/5  3x10 np           Neuro Re-Ed             Posture education/correction              T nv             Y nv             ER nv            TB I nv            prone Thoracic ext              Cable Y              Cable H              Cable T              Cable I              bridges nv 2x10 3x15 3x15 3x15 3x20       Bridges with resistance             Bridges with a kick             Standing lumbar extensions 3x10 3x10 3x10 3x10 3x10 3x10       Prone press-ups 3x10 3x10 3x10 3x10 3x10 3x10       Prone swimmers  3x15 3x15 3x15 3x15 3x15       PPT crunches  3x12 3x12 3x12 3x15 3x15       Ther Ex              Standing doorway pec stretch              Back Extension machine               Seated thoracic ext self mobs over chair               Standing HA stretch               Supine HA stretch               LTR with OP               Self t/s mobs with dumbbell press                               Ther Activity               Golf swing               Squatting biomechanics retraining               Resisted thoracic rotation               Gait Training                                                                       Modalities

## 2022-12-28 ENCOUNTER — OFFICE VISIT (OUTPATIENT)
Dept: PHYSICAL THERAPY | Facility: CLINIC | Age: 62
End: 2022-12-28

## 2022-12-28 DIAGNOSIS — M54.6 ACUTE BILATERAL THORACIC BACK PAIN: ICD-10-CM

## 2022-12-28 DIAGNOSIS — M54.16 LUMBAR RADICULOPATHY: Primary | ICD-10-CM

## 2022-12-28 NOTE — PROGRESS NOTES
Daily Note     Today's date: 2022  Patient name: Janny Uriostegui  : 1960  MRN: [id number]  Referring provider: Evan Matthew MD  Dx:   Encounter Diagnosis     ICD-10-CM    1  Lumbar radiculopathy  M54 16       2  Acute bilateral thoracic back pain  M54 6                      Subjective: Patient reports 2 days of soreness that began 2 hours after resistance training for his arms  Objective: See treatment diary below    Assessment: Pt demonstrated approx 30 deg of PSLR on the R, no radicular sx produced  Added standing HA stretch followed by EIS to HEP  Plan: Cont POC, assess response to HA stretching nv       Precautions: none   Dx: DA 22: L4-S1 extension DP      Manuals       T4-8 Gr IV P-A mobs 1x50 ea 1x50 ea EK PT 1x50 ea 1x50 ea  EK 1x50 ea  EK 1x50 ea  EK 1x50 ea      Gr IV P-A L3-S1 mobs 3x50 ea 3x50 ea EK PT C/  3x50 ea  R/  L4/5  1x40 ea C/  3x50 ea  R/  L4/5  1x40 ea  EK C/  3x50 ea  R/  L4/5  1x40 ea  EK C/  3x50 ea  R/  L4/5  1x40 ea  EK C/ 1x50 ea, R/ L4/5 1x50 ea      Laser L4-S1 6000J 6000J 6000J 6000J 6000J EK PT 6000J 6000J      Prone press-ups MWM  L4/5  3x10 np           Neuro Re-Ed             Posture education/correction              T nv             Y nv             ER nv            TB I nv            prone Thoracic ext       3x15       Cable Y              Cable H              Cable T              Cable I              bridges nv 2x10 3x15 3x15 3x15 3x20 3x20      Bridges with resistance             Bridges with a kick             Standing lumbar extensions 3x10 3x10 3x10 3x10 3x10 3x10 3x10      Prone press-ups 3x10 3x10 3x10 3x10 3x10 3x10 3x10      Prone swimmers  3x15 3x15 3x15 3x15 3x15 3x15      PPT crunches  3x12 3x12 3x12 3x15 3x15       Ther Ex              Standing doorway pec stretch              Back Extension machine               Seated thoracic ext self mobs over chair               Standing HA stretch   10"x3 ea       Supine HA stretch               LTR with OP               Self t/s mobs with dumbbell press                               Ther Activity               Golf swing               Squatting biomechanics retraining               Resisted thoracic rotation               Gait Training                                                                       Modalities

## 2023-01-04 ENCOUNTER — EVALUATION (OUTPATIENT)
Dept: PHYSICAL THERAPY | Facility: CLINIC | Age: 63
End: 2023-01-04

## 2023-01-04 DIAGNOSIS — M54.16 LUMBAR RADICULOPATHY: Primary | ICD-10-CM

## 2023-01-04 NOTE — PROGRESS NOTES
Daily Note     Today's date: 2023  Patient name: Trace So  : 1960  MRN: [id number]  Referring provider: Akash Ogden MD  Dx:   Encounter Diagnosis     ICD-10-CM    1  Lumbar radiculopathy  M54 16                      Subjective: Patient reports overall feeling good, 2 days of moderate LBP with R radicular sx into the glutes  Objective: See treatment diary below    Assessment: HA tightness appears to be challenging ability to maintain neutral spine  Plan: Cont POC, see RE       Precautions: none   Dx: DA 22: L4-S1 extension DP      Manuals      T4-8 Gr IV P-A mobs 1x50 ea 1x50 ea EK PT 1x50 ea 1x50 ea  EK 1x50 ea  EK 1x50 ea  EK 1x50 ea      Gr IV P-A L3-S1 mobs 3x50 ea 3x50 ea EK PT C/  3x50 ea  R/  L4/5  1x40 ea C/  3x50 ea  R/  L4/5  1x40 ea  EK C/  3x50 ea  R/  L4/5  1x40 ea  EK C/  3x50 ea  R/  L4/5  1x40 ea  EK C/ 1x50 ea, R/ L4/5 1x50 ea      Laser L4-S1 6000J 6000J 6000J 6000J 6000J EK PT 6000J 6000J 6000J     Prone press-ups MWM  L4/5  3x10 np      3x10     Neuro Re-Ed             Posture education/correction              T nv             Y nv             ER nv            TB I nv            prone Thoracic ext       3x15 3x15      Cable Y              Cable H              Cable T              Cable I              bridges nv 2x10 3x15 3x15 3x15 3x20 3x20 6 6#/ 3x10     Bridges with resistance             Bridges with a kick             Standing lumbar extensions 3x10 3x10 3x10 3x10 3x10 3x10 3x10 3x10     Prone press-ups 3x10 3x10 3x10 3x10 3x10 3x10 3x10 3x10     Prone swimmers  3x15 3x15 3x15 3x15 3x15 3x15 3x15     PPT crunches  3x12 3x12 3x12 3x15 3x15  3x10     Ther Ex              Standing doorway pec stretch              Back Extension machine               Seated thoracic ext self mobs over chair               Standing HA stretch        10"x3 ea 10"x3 ea      Supine HA stretch         10"x3 ea      LTR with OP             Self t/s mobs with dumbbell press                               Ther Activity               Golf swing               Squatting biomechanics retraining               Resisted thoracic rotation               Gait Training                                                                       Modalities

## 2023-01-04 NOTE — PROGRESS NOTES
PT Re-Evaluation     Today's date: 2023  Patient name: Yarely Wolf  : 1960  MRN: [id number]  Referring provider: Alexus Mercado MD  Dx:   Encounter Diagnosis     ICD-10-CM    1  Lumbar radiculopathy  M54 16                      Assessment  Assessment details: Pt demonstrated improved lumbar ROM, core stability and neural tension  Pt continues to have moderate episodes of pain and tight hamstrings  Pt will benefit from continued PT to address impairments and restore function  Impairments: activity intolerance, impaired physical strength, lacks appropriate home exercise program, pain with function, poor posture  and poor body mechanics  Understanding of Dx/Px/POC: good   Prognosis: good    Goals  ST-2 weeks  1   Pt will decrease pain > 50%  Met  2  Pt will improve posture > 50% met    LT-4 weeks  1   Pt will decrease pain > 75%  (not met)  2  Pt will resume weight lifting without pain or restrictions  (not met)    Plan  Planned modality interventions: low level laser therapy  Planned therapy interventions: joint mobilization, abdominal trunk stabilization, manual therapy, neuromuscular re-education, patient education, postural training, self care, strengthening, stretching, therapeutic activities, body mechanics training, functional ROM exercises, flexibility, therapeutic exercise, therapeutic training and home exercise program  Frequency: 1x week  Duration in weeks: 4  Treatment plan discussed with: patient        Subjective Evaluation    History of Present Illness  Mechanism of injury: Pt reports a 75% resolution of radicular sx, now only occasionally going into his R glute, 50% resolution of LBP     Pain  Current pain ratin  At best pain ratin  At worst pain ratin  Quality: dull ache, sharp, burning and radiating  Relieving factors: heat and medications  Aggravating factors: lifting, sitting and standing  Progression: improved      Diagnostic Tests  X-ray: abnormal (8/22/22 mild levoscoliosis, DJD)  Treatments  Previous treatment: physical therapy  Current treatment: physical therapy  Patient Goals  Patient goals for therapy: decreased pain, independence with ADLs/IADLs, increased strength, return to sport/leisure activities and increased motion          Objective     Postural Observations  Seated posture: fair  Standing posture: good  Correction of posture: makes symptoms better        Active Range of Motion   Cervical/Thoracic Spine       Cervical  Subcranial protraction:  WFL   Subcranial retraction:   Restriction level: minimal  Flexion:  WFL  Extension:  WFL  Left lateral flexion:  WFL  Right lateral flexion:  WFL  Left rotation:  WFL  Right rotation:  [facility]    Thoracic    Flexion:  WFL  Extension:  WFL  Left lateral flexion:  WFL  Right lateral flexion:  WFL  Left rotation:  WFL  Right rotation:  WFL    Lumbar   Flexion:  with pain Restriction level: moderate  Extension:  WFL  Left lateral flexion:  Restriction level: minimal  Right lateral flexion:  Restriction level: minimal  Left rotation:  Restriction level: minimal  Right rotation:  Restriction level: minimal    Joint Play     Hypomobile: T4, T5, T6, T7, T8, T9, T10, T11, T12, L1, L2, L3, L4, L5 and S1     Pain: T5, T6, T7, L4 and L5   Mechanical Assessment    Cervical      Thoracic      Lumbar    Standing flexion: repeated movements   Pain location:peripheralized  Pain intensity: worse  Pain level: increased  Standing extension: repeated movements  Pain location: centralized  Pain level: decreased  Lying extension: repeated movements  Pain location: centralized  Pain level: decreased    Strength/Myotome Testing     Left Shoulder     Isolated Muscles   Lower trapezius: 3+   Middle trapezius: 4-   Upper trapezius: 5     Right Shoulder     Isolated Muscles   Lower trapezius: 3+   Middle trapezius: 4-   Upper trapezius: 5     Tests     Additional Tests Details  PSLR: (-) R 40 deg, (-) L 45 deg                Precautions: none   Dx: DA 9/21/22: L4-S1 extension DP      Manuals 11/18 11/9   T4-8 Gr IV P-A mobs 1x50 ea         1x50 ea   Gr IV P-A L3-S1 mobs 3x50 ea         3x50 ea   Laser L4-S1 6000J         5800J   Prone press-ups MWM  L4/5  3x10             Neuro Re-Ed              Posture education/correction               T nv         B/ 3x15    Y nv         B/ 3x15    ER nv         B/ 3x15   TB I nv         B/ 3x15   prone Thoracic ext              Cable Y              Cable H              Cable T              Cable I              bridges nv         1x20   Bridges with resistance          20#/ 7H33   Bridges with a kick          2x10 ea   Standing lumbar extensions 3x10         1x10   Prone press-ups 3x10         3x10   PPT crunches          3x12   Ther Ex              Standing doorway pec stretch              Back Extension machine               Seated thoracic ext self mobs over chair               Standing HA stretch               Supine HA stretch               LTR with OP               Self t/s mobs with dumbbell press                               Ther Activity               Golf swing               Squatting biomechanics retraining               Resisted thoracic rotation               Gait Training                                                                       Modalities

## 2023-01-09 ENCOUNTER — CONSULT (OUTPATIENT)
Dept: PAIN MEDICINE | Facility: CLINIC | Age: 63
End: 2023-01-09

## 2023-01-09 VITALS — BODY MASS INDEX: 37.9 KG/M2 | WEIGHT: 242 LBS

## 2023-01-09 DIAGNOSIS — M79.18 MYOFASCIAL PAIN: ICD-10-CM

## 2023-01-09 DIAGNOSIS — M54.9 MID BACK PAIN: ICD-10-CM

## 2023-01-09 DIAGNOSIS — M54.40 LOW BACK PAIN WITH SCIATICA, SCIATICA LATERALITY UNSPECIFIED, UNSPECIFIED BACK PAIN LATERALITY, UNSPECIFIED CHRONICITY: ICD-10-CM

## 2023-01-09 DIAGNOSIS — M54.16 LUMBAR RADICULOPATHY: Primary | ICD-10-CM

## 2023-01-09 RX ORDER — IBUPROFEN 200 MG
200 TABLET ORAL EVERY 6 HOURS PRN
COMMUNITY

## 2023-01-09 RX ORDER — CYCLOBENZAPRINE HCL 10 MG
10 TABLET ORAL DAILY PRN
Qty: 30 TABLET | Refills: 1 | Status: SHIPPED | OUTPATIENT
Start: 2023-01-09

## 2023-01-09 NOTE — PROGRESS NOTES
Assessment  1  Lumbar radiculopathy    2  Low back pain with sciatica, sciatica laterality unspecified, unspecified back pain laterality, unspecified chronicity    3  Mid back pain    4  Myofascial pain        Plan  20-year-old male referred by Dr Tanna Crouch, presenting for initial consultation regarding a longstanding history of thoracic and lumbar back pain without any specific trauma or inciting event  Patient's thoracic complaints have significantly improved since physical therapy  The patient's major complaint today is low back pain radiating into the posterior lateral aspect of bilateral lower extremities occasionally to the calf and worse on the right than left  He does occasionally get some paresthesias  X-ray of the thoracic and lumbar spine show mild levoscoliosis with mild degenerative changes in the thoracic and lumbar spine as well as bilateral SI joints  Patient has completed 2 courses of physical therapy since August 30, 2022  Physical therapy significantly reduced patient's thoracic complaints, however although he does find some relief of his low back and leg complaints with physical therapy the results are transient  He does take ibuprofen as needed and has tried cyclobenzaprine in the past with some relief  Patient's back pain seems to be multifactorial including myofascial and possibly facet mediated components  No evidence of sacroiliitis  Lower extremity symptoms likely radicular in nature  1   I will order an MRI of the lumbar spine without contrast  2  I will prescribe cyclobenzaprine 10 mg daily as needed for his myofascial pain  3  Patient may continue with ibuprofen up to 800 mg every 8 hours as needed  4  Patient will continue with his home exercise program  5  I will follow-up with the patient in 6 weeks      My impressions and treatment recommendations were discussed in detail with the patient who verbalized understanding and had no further questions    Discharge instructions were provided  I personally saw and examined the patient and I agree with the above discussed plan of care  No orders of the defined types were placed in this encounter  New Medications Ordered This Visit   Medications   • ibuprofen (MOTRIN) 200 mg tablet     Sig: Take 200 mg by mouth every 6 (six) hours as needed for mild pain       History of Present Illness    Eddy Romero is a 57 y o  male referred by Dr Yasmani Jordan, presenting for initial consultation regarding a longstanding history of thoracic and lumbar back pain without any specific trauma or inciting event  Patient's thoracic complaints have significantly improved since physical therapy  The patient's major complaint today is low back pain radiating into the posterior lateral aspect of bilateral lower extremities occasionally to the calf and worse on the right than left  He does occasionally get some paresthesias  He denies any weakness of the lower extremities, bladder or bowel incontinence, or saddle anesthesia  X-ray of the thoracic and lumbar spine show mild levoscoliosis with mild degenerative changes in the thoracic and lumbar spine as well as bilateral SI joints  Patient has completed 2 courses of physical therapy since August 30, 2022  Physical therapy significantly reduced patient's thoracic complaints, however although he does find some relief of his low back and leg complaints with physical therapy the results are transient  He does take ibuprofen as needed and has tried cyclobenzaprine in the past with some relief  The patient rates his pain a 3-5 out of 10 and the pain is nearly constant  The pain does not follow any particular pattern throughout the day  The pain is described as shooting, sharp, dull, aching, and tingling  The pain is increased with lying down, standing, bending, sitting, walking, exercise, and coughing  He does find some relief with ibuprofen, cyclobenzaprine, and PT      Other than as stated above, the patient denies any interval changes in medications, medical condition, mental condition, symptoms, or allergies since the last office visit  I have personally reviewed and/or updated the patient's past medical history, past surgical history, family history, social history, current medications, allergies, and vital signs today  Review of Systems   Constitutional: Negative for fever and unexpected weight change  HENT: Negative for trouble swallowing  Eyes: Negative for visual disturbance  Respiratory: Negative for shortness of breath and wheezing  Cardiovascular: Negative for chest pain and palpitations  Gastrointestinal: Negative for constipation, diarrhea, nausea and vomiting  Endocrine: Negative for cold intolerance, heat intolerance and polydipsia  Genitourinary: Negative for difficulty urinating and frequency  Musculoskeletal: Negative for arthralgias, gait problem, joint swelling and myalgias  Skin: Negative for rash  Neurological: Negative for dizziness, seizures, syncope, weakness and headaches  Hematological: Does not bruise/bleed easily  Psychiatric/Behavioral: Negative for dysphoric mood  All other systems reviewed and are negative        Patient Active Problem List   Diagnosis   • Abnormal blood sugar   • Enlarged prostate without lower urinary tract symptoms (luts)   • Esophageal reflux   • Obesity   • Sleep apnea   • Hyperlipidemia   • Screening PSA (prostate specific antigen)   • Seasonal allergic rhinitis   • Vitamin D deficiency   • Plantar fasciitis of right foot   • Screening for diabetes mellitus   • Need for vaccination   • Class 2 obesity due to excess calories without serious comorbidity with body mass index (BMI) of 35 0 to 35 9 in adult   • Exercise-induced asthma   • Gastroesophageal reflux disease without esophagitis   • Prediabetes   • Mild intermittent asthma without complication   • Seasonal allergies   • Carrier of alpha-1-antitrypsin deficiency   • Multiple idiopathic cysts of lung   • Dyspnea on exertion   • COVID-19   • Plantar fasciitis   • Abnormal laboratory test   • Vitamin B12 deficiency   • Pulmonary nodules   • Left ankle sprain   • Thoracic back pain   • Erectile dysfunction   • Chronic right-sided low back pain with right-sided sciatica       Past Medical History:   Diagnosis Date   • Asthma    • Erectile dysfunction of non-organic origin     Resolved: 10/15/2014       Past Surgical History:   Procedure Laterality Date   • EYE SURGERY     • TONSILLECTOMY AND ADENOIDECTOMY     • UMBILICAL HERNIA REPAIR         Family History   Problem Relation Age of Onset   • Asthma Mother    • Heart disease Mother    • Rheum arthritis Mother    • Osteoporosis Mother    • Diabetes Father    • Heart disease Father    • Diabetes type II Father    • Lung disease Sister         Bronchiectasis  No clear history of cystic lung disease however   • Diabetes Sister    • Other Son         Thyroid disorder       Social History     Occupational History   • Not on file   Tobacco Use   • Smoking status: Former     Packs/day: 1      Years: 8      Pack years: 8      Types: Cigarettes     Start date: 1971     Quit date: 1979     Years since quittin 9   • Smokeless tobacco: Never   • Tobacco comments:     He smoked a pack a day of cigarettes for 8 years  He quite at the age of 22  Occasional cigar use  Vaping Use   • Vaping Use: Never used   Substance and Sexual Activity   • Alcohol use: Yes     Comment: Social   • Drug use: Never   • Sexual activity: Yes       Current Outpatient Medications on File Prior to Visit   Medication Sig   • albuterol (2 5 mg/3 mL) 0 083 % nebulizer solution Inhale 2 5 mg every 6 (six) hours as needed for wheezing or shortness of breath   • albuterol (ProAir HFA) 90 mcg/act inhaler Inhale 2 puffs every 6 (six) hours as needed for wheezing Use 2 puffs prior to exercise     • Cholecalciferol (VITAMIN D3) 5000 units CAPS Take 5,000 Units by mouth daily   • Cinnamon 500 MG capsule Take 500 mg by mouth daily   • cyanocobalamin (VITAMIN B-12) 1000 MCG tablet Take 1,000 mcg by mouth daily   • fluticasone (FLONASE) 50 mcg/act nasal spray TWO SPRAYS EACH NOSTRIL ONCE DAILY AS NEEDED  • ibuprofen (MOTRIN) 200 mg tablet Take 200 mg by mouth every 6 (six) hours as needed for mild pain   • levocetirizine (XYZAL) 5 MG tablet TAKE 1 TABLET BY MOUTH EVERY DAY IN THE EVENING   • montelukast (SINGULAIR) 10 mg tablet TAKE 1 TABLET BY MOUTH DAILY AT BEDTIME   • Omega-3 Fatty Acids (FISH OIL) 1,000 mg Take 3 capsules by mouth daily   • tadalafil (CIALIS) 20 MG tablet Take 1 tablet (20 mg total) by mouth daily as needed for erectile dysfunction     No current facility-administered medications on file prior to visit  No Known Allergies    Physical Exam    Wt 110 kg (242 lb)   BMI 37 90 kg/m²     Constitutional: normal, well developed, well nourished, alert, in no distress and non-toxic and no overt pain behavior  Eyes: anicteric  HEENT: grossly intact  Neck: supple, symmetric, trachea midline and no masses   Pulmonary:even and unlabored  Cardiovascular:No edema or pitting edema present  Skin:Normal without rashes or lesions and well hydrated  Psychiatric:Mood and affect appropriate  Neurologic:Cranial Nerves II-XII grossly intact  Musculoskeletal:normal gait  Bilateral thoracic and lumbar paraspinal musculature mildly tender to palpation and ropey in texture diffusely  Bilateral SI joints nontender to palpation  Bilateral trochanteric flares nontender to palpation  Bilateral patellar and Achilles reflexes were 2/4 and symmetrical   No clonus was noted bilaterally  Bilateral lower extremity strength was 5/5 in all muscle groups  Sensation intact to light touch in L3 thru S1 dermatomes bilaterally  Negative straight leg raise bilaterally  Negative Jamal's and Gaenslen's test bilaterally      Imaging    XR spine lumbar minimum 4 views non injury  Status: Final result     PACS Images     Show images for XR spine lumbar minimum 4 views non injury    XR spine lumbar minimum 4 views non injury: Result Notes   Roxane Card   11/10/2022  2:49 PM EST       Reviewed the results with the patient  Andrea Ayers is in P T  for one month he is going 1x per week    The patient is going to speak with his therapist to see if he should increase his frequency of P T  The patient will call if he is not improving  Sridevi Reza DO   11/10/2022 12:22 PM EST       Notify the patient x-ray lumbar spine does show arthritic changes also arthritis of the sacroiliac joint, next step physical therapy if symptoms persist past physical therapy please have patient get back in touch with me to consider possible MRI and further treatment modalities please have the pt follow up with me to discuss as scheduled            Study Result    Narrative & Impression   LUMBAR SPINE     INDICATION:   M54 41: Lumbago with sciatica, right side  G89 29: Other chronic pain      COMPARISON:  None     VIEWS:  XR SPINE LUMBAR MINIMUM 4 VIEWS NON INJURY  Images: 4     FINDINGS:     There are 5 non rib bearing lumbar vertebral bodies       There is no evidence of acute fracture or destructive osseous lesion      Alignment is unremarkable       Age-appropriate lumbar degenerative changes are seen    Degenerative changes of the sacroiliac joints are also identified      The pedicles appear intact      Soft tissues are unremarkable      IMPRESSION:     No acute osseous abnormality        Degenerative changes as described         Workstation performed: [id number]        Imaging    XR spine lumbar minimum 4 views non injury (Order: [id number]) - 11/5/2022    Result History    XR spine lumbar minimum 4 views non injury (Order #[id number]) on 11/10/2022 - Order Result History Report    Order Report     Order Details    Result Information    Status Priority Source   Final result (11/10/2022 12:05 PM) Routine

## 2023-01-11 ENCOUNTER — OFFICE VISIT (OUTPATIENT)
Dept: PHYSICAL THERAPY | Facility: CLINIC | Age: 63
End: 2023-01-11

## 2023-01-11 DIAGNOSIS — M54.6 ACUTE BILATERAL THORACIC BACK PAIN: ICD-10-CM

## 2023-01-11 DIAGNOSIS — M54.16 LUMBAR RADICULOPATHY: Primary | ICD-10-CM

## 2023-01-11 NOTE — PROGRESS NOTES
Daily Note     Today's date: 2023  Patient name: Tre Giles  : 1960  MRN: [id number]  Referring provider: Sierra Crouch MD  Dx:   Encounter Diagnosis     ICD-10-CM    1  Lumbar radiculopathy  M54 16       2  Acute bilateral thoracic back pain  M54 6                      Subjective: Pt reports he will be receiving an MRI of lumbar spine on 23  Pt reports decreased frequency of glut pain with most of his pain remaining in his low back, 3-4/10 at the most       Objective: See treatment diary below      Assessment: Pt demonstrated improved centralization of symptoms, min difficulty with prone swimmers 2* a temporary increase in low back pain during exercise         Plan:      Precautions: none   Dx: DA 22: L4-S1 extension DP         Manuals        T4-8 Gr IV P-A mobs 1x50 ea 1x50 ea EK PT 1x50 ea 1x50 ea  EK 1x50 ea  EK 1x50 ea  EK 1x50 ea  np       Gr IV P-A L3-S1 mobs 3x50 ea 3x50 ea EK PT C/  3x50 ea  R/  L4/5  1x40 ea C/  3x50 ea  R/  L4/5  1x40 ea  EK C/  3x50 ea  R/  L4/5  1x40 ea  EK C/  3x50 ea  R/  L4/5  1x40 ea  EK C/ 1x50 ea, R/ L4/5 1x50 ea  np       Laser L4-S1 6000J 6000J 6000J 6000J 6000J EK PT 6000J 6000J  6000J       Prone press-ups MWM  L4/5  3x10 np                   Neuro Re-Ed                       Posture education/correction                        T nv                      Y nv                      ER nv                     TB I nv                     prone Thoracic ext             3x15  3x15       Cable Y                       Cable H                       Cable T                       Cable I                       bridges nv 2x10 3x15 3x15 3x15 3x20 3x20  3x20       Bridges with resistance                       Bridges with a kick                       Standing lumbar extensions 3x10 3x10 3x10 3x10 3x10 3x10 3x10  3x10       Prone press-ups 3x10 3x10 3x10 3x10 3x10 3x10 3x10  3x10       Prone swimmers   3x15 3x15 3x15 3x15 3x15 3x15  3x15       PPT crunches   3x12 3x12 3x12 3x15 3x15    3x15       Ther Ex                       Standing doorway pec stretch                       Back Extension machine                        Seated thoracic ext self mobs over chair                       Standing HA stretch             10"x3 ea 10"x3 ea       Supine HA stretch                       LTR with OP                       Self t/s mobs with dumbbell press                                               Ther Activity                       Golf swing                       Squatting biomechanics retraining                       Resisted thoracic rotation                       Gait Training                                                                       Modalities

## 2023-01-17 ENCOUNTER — TELEMEDICINE (OUTPATIENT)
Dept: INTERNAL MEDICINE CLINIC | Facility: CLINIC | Age: 63
End: 2023-01-17

## 2023-01-17 DIAGNOSIS — J40 BRONCHITIS: Primary | ICD-10-CM

## 2023-01-17 DIAGNOSIS — J45.20 MILD INTERMITTENT ASTHMA WITHOUT COMPLICATION: ICD-10-CM

## 2023-01-17 RX ORDER — ALBUTEROL SULFATE 90 UG/1
2 AEROSOL, METERED RESPIRATORY (INHALATION) EVERY 6 HOURS PRN
Qty: 1 G | Refills: 0 | Status: SHIPPED | OUTPATIENT
Start: 2023-01-17 | End: 2023-02-07

## 2023-01-17 RX ORDER — FLUTICASONE PROPIONATE 50 MCG
2 SPRAY, SUSPENSION (ML) NASAL DAILY
Qty: 1 G | Refills: 0 | Status: SHIPPED | OUTPATIENT
Start: 2023-01-17

## 2023-01-17 RX ORDER — AZITHROMYCIN 250 MG/1
TABLET, FILM COATED ORAL
Qty: 6 TABLET | Refills: 0 | Status: SHIPPED | OUTPATIENT
Start: 2023-01-17 | End: 2023-01-22

## 2023-01-17 NOTE — PROGRESS NOTES
Virtual Regular Visit    Verification of patient location:    Patient is located in the following state in which I hold an active license [place]      Assessment/Plan:    Problem List Items Addressed This Visit        Respiratory    Mild intermittent asthma without complication    Relevant Medications    albuterol (PROVENTIL HFA,VENTOLIN HFA) 90 mcg/act inhaler    Bronchitis - Primary     Plenty of fluids, rest recommend Mucinex as directed, Rx for Z-Maximo No  1 as directed, albuterol 2 puffs every 4-6 hours as needed for wheezing, Flonase 2 sprays each nostril once a day x10 days if any short of breath or increasing symptoms please go immediately to ER call if any change, worse or symptoms not jhoan in the next several days         Relevant Medications    azithromycin (ZITHROMAX) 250 mg tablet    albuterol (PROVENTIL HFA,VENTOLIN HFA) 90 mcg/act inhaler    fluticasone (FLONASE) 50 mcg/act nasal spray    Other Relevant Orders    Covid/Flu- Office Collect (Completed)            Reason for visit is   Chief Complaint   Patient presents with   • Cough     Symptoms started Sunday but worse today  Patient c/o productive cough with yellow sputum, runny nose and wheezing  Home Covid test negative  • Nasal Congestion   • Virtual Regular Visit        Encounter provider Reilly Swann DO    Provider located at [address] [address] [address]      Recent Visits  Date Type Provider Dept   01/17/23 [id number] Cait Patiño 25 recent visits within past 7 days and meeting all other requirements  Future Appointments  No visits were found meeting these conditions  Showing future appointments within next 150 days and meeting all other requirements       The patient was identified by name and date of birth   June Sam was informed that this is a telemedicine visit and that the visit is being conducted through the [address] [place] [address] Rd Embedded platform  He agrees to proceed     My office door was closed  No one else was in the room  He acknowledged consent and understanding of privacy and security of the video platform  The patient has agreed to participate and understands they can discontinue the visit at any time  Patient is aware this is a billable service  Subjective  Jose Alejandro Garcia is a 57 y o  male cold symptoms   HPI 57-year-old male coming in with a chief complaint of developing rhinorrhea, cough, sinusitis since Saturday/Sunday, he does report to me intermittent wheezing home COVID test negative no fever no chills  He reports me that his granddaughter had cold symptoms prior to him developing his symptoms  no shortness of breath    Past Medical History:   Diagnosis Date   • Asthma    • Erectile dysfunction of non-organic origin     Resolved: 10/15/2014       Past Surgical History:   Procedure Laterality Date   • EYE SURGERY     • TONSILLECTOMY AND ADENOIDECTOMY     • UMBILICAL HERNIA REPAIR         Current Outpatient Medications   Medication Sig Dispense Refill   • albuterol (2 5 mg/3 mL) 0 083 % nebulizer solution Inhale 2 5 mg every 6 (six) hours as needed for wheezing or shortness of breath     • albuterol (ProAir HFA) 90 mcg/act inhaler Inhale 2 puffs every 6 (six) hours as needed for wheezing Use 2 puffs prior to exercise   18 g 5   • albuterol (PROVENTIL HFA,VENTOLIN HFA) 90 mcg/act inhaler Inhale 2 puffs every 6 (six) hours as needed for wheezing 1 g 0   • azithromycin (ZITHROMAX) 250 mg tablet 2 tablets on day 1 and then 1 tablet daily for 4 more days 6 tablet 0   • Cholecalciferol (VITAMIN D3) 5000 units CAPS Take 5,000 Units by mouth daily     • Cinnamon 500 MG capsule Take 500 mg by mouth daily     • cyanocobalamin (VITAMIN B-12) 1000 MCG tablet Take 1,000 mcg by mouth daily     • cyclobenzaprine (FLEXERIL) 10 mg tablet Take 1 tablet (10 mg total) by mouth daily as needed for muscle spasms 30 tablet 1   • fluticasone (FLONASE) 50 mcg/act nasal spray TWO SPRAYS EACH NOSTRIL ONCE DAILY AS NEEDED  16 mL 11   • fluticasone (FLONASE) 50 mcg/act nasal spray 2 sprays into each nostril daily 1 g 0   • ibuprofen (MOTRIN) 200 mg tablet Take 200 mg by mouth every 6 (six) hours as needed for mild pain     • levocetirizine (XYZAL) 5 MG tablet TAKE 1 TABLET BY MOUTH EVERY DAY IN THE EVENING 30 tablet 3   • montelukast (SINGULAIR) 10 mg tablet TAKE 1 TABLET BY MOUTH DAILY AT BEDTIME 30 tablet 4   • Omega-3 Fatty Acids (FISH OIL) 1,000 mg Take 3 capsules by mouth daily     • tadalafil (CIALIS) 20 MG tablet Take 1 tablet (20 mg total) by mouth daily as needed for erectile dysfunction 30 tablet 3     No current facility-administered medications for this visit  No Known Allergies    Review of Systems   Constitutional: Negative for appetite change, chills and fever  HENT: Positive for congestion and rhinorrhea  Eyes: Negative for itching  Respiratory: Positive for cough and wheezing  Negative for shortness of breath  Gastrointestinal: Negative for diarrhea, nausea and vomiting  Neurological: Negative for headaches  Video Exam    There were no vitals filed for this visit      Physical Exam no respiratory distress    I spent 20 minutes directly with the patient during this visit

## 2023-01-18 ENCOUNTER — APPOINTMENT (OUTPATIENT)
Dept: PHYSICAL THERAPY | Facility: CLINIC | Age: 63
End: 2023-01-18

## 2023-01-18 LAB
FLUAV RNA RESP QL NAA+PROBE: NEGATIVE
FLUBV RNA RESP QL NAA+PROBE: NEGATIVE
SARS-COV-2 RNA RESP QL NAA+PROBE: NEGATIVE

## 2023-01-22 PROBLEM — J40 BRONCHITIS: Status: ACTIVE | Noted: 2023-01-22

## 2023-01-22 NOTE — ASSESSMENT & PLAN NOTE
Plenty of fluids, rest recommend Mucinex as directed, Rx for Z-Maximo No  1 as directed, albuterol 2 puffs every 4-6 hours as needed for wheezing, Flonase 2 sprays each nostril once a day x10 days if any short of breath or increasing symptoms please go immediately to ER call if any change, worse or symptoms not jhoan in the next several days

## 2023-01-26 DIAGNOSIS — J30.2 SEASONAL ALLERGIES: ICD-10-CM

## 2023-01-26 RX ORDER — MONTELUKAST SODIUM 10 MG/1
TABLET ORAL
Qty: 30 TABLET | Refills: 4 | Status: SHIPPED | OUTPATIENT
Start: 2023-01-26

## 2023-01-31 ENCOUNTER — HOSPITAL ENCOUNTER (OUTPATIENT)
Dept: RADIOLOGY | Age: 63
Discharge: HOME | End: 2023-01-31

## 2023-01-31 DIAGNOSIS — M54.16 LUMBAR RADICULOPATHY: ICD-10-CM

## 2023-02-03 ENCOUNTER — TELEPHONE (OUTPATIENT)
Dept: PAIN MEDICINE | Facility: CLINIC | Age: 63
End: 2023-02-03

## 2023-02-03 DIAGNOSIS — M54.16 LUMBAR RADICULOPATHY: Primary | ICD-10-CM

## 2023-02-03 NOTE — TELEPHONE ENCOUNTER
S/W pt and advised of the same, pt would like to go fwd with injection, denies blood thinners or glucose monitor  JW- please order injection

## 2023-02-03 NOTE — TELEPHONE ENCOUNTER
Please notify the patient the MRI of his lumbar spine demonstrates multilevel degenerative disc disease and arthritis  Small left foraminal disc herniation at L3-4 contacting the left L3 root and mild central stenosis (narrowing where nerves exist) at this level  Mild bilateral foraminal stenosis at L4-5 contacting bilateral L4 roots with mild central stenosis at this level as well    I can offer the patient bilateral L4 TFESI

## 2023-02-07 DIAGNOSIS — J40 BRONCHITIS: ICD-10-CM

## 2023-02-07 RX ORDER — ALBUTEROL SULFATE 90 UG/1
AEROSOL, METERED RESPIRATORY (INHALATION)
Qty: 1 G | Refills: 1 | Status: SHIPPED | OUTPATIENT
Start: 2023-02-07

## 2023-02-07 NOTE — TELEPHONE ENCOUNTER
Dyan Velázquez    Doctor: Dr Brunilda Barnhart     Reason for call: Patient returning  call    Call back#: [phone]

## 2023-02-08 NOTE — TELEPHONE ENCOUNTER
Caller: Jaquelin Ramon (PT)    Doctor: Dr Radha Patel     Reason for call: Schedule a procedure     Call back#: [phone]

## 2023-02-09 NOTE — TELEPHONE ENCOUNTER
Patient was contacted and scheduled for Bilateral L4 TFESI  All pre procedure instructions were given to patient   Nothing to eat or drink for 1 hour prior  Loose fitting clothing   Denies Anti Coag's   NSAIDS okay, ASA okay  Denies Antibx   Needs    Patient instructed to continue all routine medications   Patient instructed to contact our office if becomes sick   Refrain from any vaccines 2 weeks before & 2 weeks after  Insurance auth received but is not a guarantee of payment per your insurance company's authorization disclaimer and it is your responsibility to verify your benefits   COVID -19 screening complete

## 2023-02-15 ENCOUNTER — HOSPITAL ENCOUNTER (OUTPATIENT)
Dept: RADIOLOGY | Facility: CLINIC | Age: 63
Discharge: HOME | End: 2023-02-15
Admitting: ANESTHESIOLOGY

## 2023-02-15 VITALS
TEMPERATURE: 97.8 F | DIASTOLIC BLOOD PRESSURE: 85 MMHG | HEART RATE: 82 BPM | SYSTOLIC BLOOD PRESSURE: 167 MMHG | OXYGEN SATURATION: 97 % | RESPIRATION RATE: 20 BRPM

## 2023-02-15 DIAGNOSIS — M54.16 LUMBAR RADICULOPATHY: ICD-10-CM

## 2023-02-15 RX ORDER — PAPAVERINE HCL 150 MG
20 CAPSULE, EXTENDED RELEASE ORAL ONCE
Status: COMPLETED | OUTPATIENT
Start: 2023-02-15 | End: 2023-02-15

## 2023-02-15 RX ORDER — 0.9 % SODIUM CHLORIDE 0.9 %
1.5 VIAL (ML) INJECTION ONCE
Status: COMPLETED | OUTPATIENT
Start: 2023-02-15 | End: 2023-02-15

## 2023-02-15 RX ADMIN — IOHEXOL 2 ML: 300 INJECTION, SOLUTION INTRAVENOUS at 15:28

## 2023-02-15 RX ADMIN — SODIUM CHLORIDE 1.5 ML: 9 INJECTION, SOLUTION INTRAMUSCULAR; INTRAVENOUS; SUBCUTANEOUS at 15:28

## 2023-02-15 RX ADMIN — DEXAMETHASONE SODIUM PHOSPHATE 15 MG: 10 INJECTION, SOLUTION INTRAMUSCULAR; INTRAVENOUS at 15:28

## 2023-02-15 RX ADMIN — LIDOCAINE HYDROCHLORIDE 2 ML: 20 INJECTION, SOLUTION EPIDURAL; INFILTRATION; INTRACAUDAL; PERINEURAL at 15:28

## 2023-02-15 NOTE — H&P
History of Present Illness: The patient is a 57 y o  male who presents with complaints of low back and leg pain      Past Medical History:   Diagnosis Date   • Asthma    • Erectile dysfunction of non-organic origin     Resolved: 10/15/2014       Past Surgical History:   Procedure Laterality Date   • EYE SURGERY     • TONSILLECTOMY AND ADENOIDECTOMY     • UMBILICAL HERNIA REPAIR           Current Outpatient Medications:   •  albuterol (2 5 mg/3 mL) 0 083 % nebulizer solution, Inhale 2 5 mg every 6 (six) hours as needed for wheezing or shortness of breath, Disp: , Rfl:   •  albuterol (ProAir HFA) 90 mcg/act inhaler, Inhale 2 puffs every 6 (six) hours as needed for wheezing Use 2 puffs prior to exercise , Disp: 18 g, Rfl: 5  •  albuterol (PROVENTIL HFA,VENTOLIN HFA) 90 mcg/act inhaler, INHALE 2 PUFFS EVERY 6 HOURS AS NEEDED FOR WHEEZING, Disp: 1 g, Rfl: 1  •  Cholecalciferol (VITAMIN D3) 5000 units CAPS, Take 5,000 Units by mouth daily, Disp: , Rfl:   •  Cinnamon 500 MG capsule, Take 500 mg by mouth daily, Disp: , Rfl:   •  cyanocobalamin (VITAMIN B-12) 1000 MCG tablet, Take 1,000 mcg by mouth daily, Disp: , Rfl:   •  cyclobenzaprine (FLEXERIL) 10 mg tablet, Take 1 tablet (10 mg total) by mouth daily as needed for muscle spasms, Disp: 30 tablet, Rfl: 1  •  fluticasone (FLONASE) 50 mcg/act nasal spray, TWO SPRAYS EACH NOSTRIL ONCE DAILY AS NEEDED , Disp: 16 mL, Rfl: 11  •  fluticasone (FLONASE) 50 mcg/act nasal spray, 2 sprays into each nostril daily, Disp: 1 g, Rfl: 0  •  ibuprofen (MOTRIN) 200 mg tablet, Take 200 mg by mouth every 6 (six) hours as needed for mild pain, Disp: , Rfl:   •  levocetirizine (XYZAL) 5 MG tablet, TAKE 1 TABLET BY MOUTH EVERY DAY IN THE EVENING, Disp: 30 tablet, Rfl: 3  •  montelukast (SINGULAIR) 10 mg tablet, TAKE 1 TABLET BY MOUTH EVERYDAY AT BEDTIME, Disp: 30 tablet, Rfl: 4  •  Omega-3 Fatty Acids (FISH OIL) 1,000 mg, Take 3 capsules by mouth daily, Disp: , Rfl:   •  tadalafil (CIALIS) 20 MG tablet, Take 1 tablet (20 mg total) by mouth daily as needed for erectile dysfunction, Disp: 30 tablet, Rfl: 3    Current Facility-Administered Medications:   •  dexamethasone (PF) (DECADRON) injection 20 mg, 20 mg, Epidural, Once, Barney Gaitan DO  •  iohexol (OMNIPAQUE) 300 mg/mL injection 50 mL, 50 mL, Epidural, Once, Isai Heaton DO  •  lidocaine (PF) (XYLOCAINE-MPF) 2 % injection 2 mL, 2 mL, Epidural, Once, Isai Heaton DO  •  sodium chloride (PF) 0 9 % injection 1 5 mL, 1 5 mL, Epidural, Once, Barney Gaitan,     No Known Allergies    Physical Exam:   Vitals:    02/15/23 1458   BP: 148/76   Pulse: 85   Resp: 20   Temp: 97 8 °F (36 6 °C)   SpO2: 96%     General: Awake, Alert, Oriented x 3, Mood and affect appropriate  Respiratory: Respirations even and unlabored  Cardiovascular: Peripheral pulses intact; no edema  Musculoskeletal Exam: Bilateral lumbar paraspinals tender to palpation    ASA Score: 2    Patient/Chart Verification  Patient ID Verified: Verbal  ID Band Applied: No  Consents Confirmed: Procedural  H&P( within 30 days) Verified: To be obtained in the Pre-Procedure area  Interval H&P(within 24 hr) Complete (required for Outpatients and Surgery Admit only): To be obtained in the Pre-Procedure area  Allergies Reviewed: Yes  Anticoag/NSAID held?: No  Currently on antibiotics?: No  Pre-op Lab/Test Results Available: N/A    Assessment:   1   Lumbar radiculopathy        Plan: Bilateral L4 TFESI

## 2023-02-15 NOTE — DISCHARGE INSTRUCTIONS
Epidural Steroid Injection   WHAT YOU NEED TO KNOW:   An epidural steroid injection (JUANA) is a procedure to inject steroid medicine into the epidural space  The epidural space is between your spinal cord and vertebrae  Steroids reduce inflammation and fluid buildup in your spine that may be causing pain  You may be given pain medicine along with the steroids  ACTIVITY  Do not drive or operate machinery today  No strenuous activity today - bending, lifting, etc   You may resume normal activites starting tomorrow - start slowly and as tolerated  You may shower today, but no tub baths or hot tubs  You may have numbness for several hours from the local anesthetic  Please use caution and common sense, especially with weight-bearing activities  CARE OF THE INJECTION SITE  If you have soreness or pain, apply ice to the area today (20 minutes on/20 minutes off)  Starting tomorrow, you may use warm, moist heat or ice if needed  You may have an increase or change in your discomfort for 36-48 hours after your treatment  Apply ice and continue with any pain medication you have been prescribed  Notify the [facility] if you have any of the following: redness, drainage, swelling, headache, stiff neck or fever above 100°F     SPECIAL INSTRUCTIONS  Our office will contact you in approximately 7 days for a progress report  MEDICATIONS  Continue to take all routine medications  Our office may have instructed you to hold some medications  As no general anesthesia was used in today's procedure, you should not experience any side effects related to anesthesia  If you are diabetic, the steroids used in today's injection may temporarily increase your blood sugar levels after the first few days after your injection  Please keep a close eye on your sugars and alert the doctor who manages your diabetes if your sugars are significantly high from your baseline or you are symptomatic       If you have a problem specifically related to your procedure, please call our office at [phone]  Problems not related to your procedure should be directed to your primary care physician

## 2023-02-22 ENCOUNTER — TELEPHONE (OUTPATIENT)
Dept: PAIN MEDICINE | Facility: CLINIC | Age: 63
End: 2023-02-22

## 2023-02-22 NOTE — TELEPHONE ENCOUNTER
Patient reports: 5% improvement post injection    Pain Level: 2/10  Patient states he has some pain radiating down his leg when sitting,

## 2023-03-24 NOTE — PROGRESS NOTES
Assessment:  1  Chronic right-sided low back pain with right-sided sciatica    2  Lumbar radiculopathy    3  Myofascial pain        Plan:  1  We can repeat bilateral L4 TFESI as needed  I discussed with the patient that since there has been moderate to significant improvement in the pain symptoms, we will hold off on any repeat injections at this point in time  However, I reviewed with the patient that if their symptoms should return or worsen,  they should call our office to schedule to discuss repeating the injection  2   Patient was encouraged to trial cyclobenzaprine at night since he is waking up in the morning with a lot of stiffness  He does not require refills today  3  Patient may continue ibuprofen as prescribed  4  Continue with home exercise program  5  Follow-up on a as needed basis at this time    History of Present Illness: The patient is a 70 y o  male last seen on 01/09/2023 who presents for a follow up office visit in regards to chronic thoracic and lumbar back pain that was radiating into the posterior lateral aspect of the bilateral lower extremities, occasionally to the calf right greater than left  He is status post bilateral L4 TFESI on February 15, 2023 with an ongoing 75% improvement of his pain from this procedure  He does have some mid axial and lateral mid back pain worse in the morning but does dissipate throughout the day  He has not used cyclobenzaprine recently and uses ibuprofen sparingly  He rates his pain a 2 out of 10 on the numeric pain rating scale  He occasionally has dull aching in the morning    I have personally reviewed and/or updated the patient's past medical history, past surgical history, family history, social history, current medications, allergies, and vital signs today  Review of Systems:    Review of Systems   Respiratory: Negative for shortness of breath  Cardiovascular: Negative for chest pain     Gastrointestinal: Negative for constipation, diarrhea, nausea and vomiting  Musculoskeletal: Negative for arthralgias, gait problem, joint swelling and myalgias  Skin: Negative for rash  Neurological: Negative for dizziness, seizures and weakness  All other systems reviewed and are negative  Past Medical History:   Diagnosis Date   • Asthma    • Erectile dysfunction of non-organic origin     Resolved: 10/15/2014       Past Surgical History:   Procedure Laterality Date   • EYE SURGERY     • TONSILLECTOMY AND ADENOIDECTOMY     • UMBILICAL HERNIA REPAIR         Family History   Problem Relation Age of Onset   • Asthma Mother    • Heart disease Mother    • Rheum arthritis Mother    • Osteoporosis Mother    • Diabetes Father    • Heart disease Father    • Diabetes type II Father    • Lung disease Sister         Bronchiectasis  No clear history of cystic lung disease however   • Diabetes Sister    • Other Son         Thyroid disorder       Social History     Occupational History   • Not on file   Tobacco Use   • Smoking status: Former     Packs/day: 1      Years: 8      Pack years: 8      Types: Cigarettes     Start date: 1971     Quit date: 1979     Years since quittin 1   • Smokeless tobacco: Never   • Tobacco comments:     He smoked a pack a day of cigarettes for 8 years  He quite at the age of 22  Occasional cigar use     Vaping Use   • Vaping Use: Never used   Substance and Sexual Activity   • Alcohol use: Yes     Comment: Social   • Drug use: Never   • Sexual activity: Yes         Current Outpatient Medications:   •  albuterol (PROVENTIL HFA,VENTOLIN HFA) 90 mcg/act inhaler, INHALE 2 PUFFS EVERY 6 HOURS AS NEEDED FOR WHEEZING, Disp: 1 g, Rfl: 1  •  Cholecalciferol (VITAMIN D3) 5000 units CAPS, Take 5,000 Units by mouth daily, Disp: , Rfl:   •  Cinnamon 500 MG capsule, Take 500 mg by mouth daily, Disp: , Rfl:   •  cyanocobalamin (VITAMIN B-12) 1000 MCG tablet, Take 1,000 mcg by mouth daily, Disp: , Rfl:   • "Omega-3 Fatty Acids (FISH OIL) 1,000 mg, Take 3 capsules by mouth daily, Disp: , Rfl:   •  albuterol (2 5 mg/3 mL) 0 083 % nebulizer solution, Inhale 2 5 mg every 6 (six) hours as needed for wheezing or shortness of breath (Patient not taking: Reported on 3/27/2023), Disp: , Rfl:   •  albuterol (ProAir HFA) 90 mcg/act inhaler, Inhale 2 puffs every 6 (six) hours as needed for wheezing Use 2 puffs prior to exercise  (Patient not taking: Reported on 3/27/2023), Disp: 18 g, Rfl: 5  •  cyclobenzaprine (FLEXERIL) 10 mg tablet, Take 1 tablet (10 mg total) by mouth daily as needed for muscle spasms (Patient not taking: Reported on 3/27/2023), Disp: 30 tablet, Rfl: 1  •  fluticasone (FLONASE) 50 mcg/act nasal spray, TWO SPRAYS EACH NOSTRIL ONCE DAILY AS NEEDED , Disp: 16 mL, Rfl: 11  •  fluticasone (FLONASE) 50 mcg/act nasal spray, 2 sprays into each nostril daily, Disp: 1 g, Rfl: 0  •  ibuprofen (MOTRIN) 200 mg tablet, Take 200 mg by mouth every 6 (six) hours as needed for mild pain, Disp: , Rfl:   •  levocetirizine (XYZAL) 5 MG tablet, TAKE 1 TABLET BY MOUTH EVERY DAY IN THE EVENING, Disp: 30 tablet, Rfl: 3  •  montelukast (SINGULAIR) 10 mg tablet, TAKE 1 TABLET BY MOUTH EVERYDAY AT BEDTIME, Disp: 30 tablet, Rfl: 4  •  tadalafil (CIALIS) 20 MG tablet, Take 1 tablet (20 mg total) by mouth daily as needed for erectile dysfunction, Disp: 30 tablet, Rfl: 3    No Known Allergies    Physical Exam:    Ht 5' 7\" (1 702 m)   Wt 107 kg (236 lb)   BMI 36 96 kg/m²     Constitutional:normal, well developed, well nourished, alert, in no distress and non-toxic and no overt pain behavior    Eyes:anicteric  HEENT:grossly intact  Neck:supple, symmetric, trachea midline and no masses   Pulmonary:even and unlabored  Cardiovascular:No edema or pitting edema present  Skin:Normal without rashes or lesions and well hydrated  Psychiatric:Mood and affect appropriate  Neurologic:Cranial Nerves II-XII grossly intact  Musculoskeletal:normal " gait      Imaging  No orders to display         No orders of the defined types were placed in this encounter

## 2023-03-27 ENCOUNTER — OFFICE VISIT (OUTPATIENT)
Dept: PAIN MEDICINE | Facility: CLINIC | Age: 63
End: 2023-03-27

## 2023-03-27 VITALS — BODY MASS INDEX: 37.04 KG/M2 | HEIGHT: 67 IN | WEIGHT: 236 LBS

## 2023-03-27 DIAGNOSIS — M79.18 MYOFASCIAL PAIN: ICD-10-CM

## 2023-03-27 DIAGNOSIS — M54.41 CHRONIC RIGHT-SIDED LOW BACK PAIN WITH RIGHT-SIDED SCIATICA: Primary | ICD-10-CM

## 2023-03-27 DIAGNOSIS — G89.29 CHRONIC RIGHT-SIDED LOW BACK PAIN WITH RIGHT-SIDED SCIATICA: Primary | ICD-10-CM

## 2023-03-27 DIAGNOSIS — M54.16 LUMBAR RADICULOPATHY: ICD-10-CM

## 2023-03-27 DIAGNOSIS — J30.2 SEASONAL ALLERGIES: ICD-10-CM

## 2023-03-27 RX ORDER — LEVOCETIRIZINE DIHYDROCHLORIDE 5 MG/1
TABLET, FILM COATED ORAL
Qty: 30 TABLET | Refills: 3 | Status: SHIPPED | OUTPATIENT
Start: 2023-03-27

## 2023-03-31 DIAGNOSIS — N52.9 ERECTILE DYSFUNCTION, UNSPECIFIED ERECTILE DYSFUNCTION TYPE: ICD-10-CM

## 2023-04-01 RX ORDER — TADALAFIL 20 MG/1
TABLET ORAL
Qty: 30 TABLET | Refills: 0 | Status: SHIPPED | OUTPATIENT
Start: 2023-04-01

## 2023-04-26 ENCOUNTER — OFFICE VISIT (OUTPATIENT)
Dept: SLEEP CENTER | Facility: CLINIC | Age: 63
End: 2023-04-26

## 2023-04-26 VITALS
HEIGHT: 67 IN | WEIGHT: 237 LBS | DIASTOLIC BLOOD PRESSURE: 80 MMHG | BODY MASS INDEX: 37.2 KG/M2 | SYSTOLIC BLOOD PRESSURE: 138 MMHG

## 2023-04-26 DIAGNOSIS — N52.9 ERECTILE DYSFUNCTION, UNSPECIFIED ERECTILE DYSFUNCTION TYPE: ICD-10-CM

## 2023-04-26 DIAGNOSIS — G47.33 OSA (OBSTRUCTIVE SLEEP APNEA): Primary | ICD-10-CM

## 2023-04-26 RX ORDER — TADALAFIL 20 MG/1
TABLET ORAL
Qty: 30 TABLET | Refills: 0 | Status: SHIPPED | OUTPATIENT
Start: 2023-04-26

## 2023-04-26 NOTE — PROGRESS NOTES
Progress Note - Sleep Center   Sabrina Cantrell KZY: MRN: [id number]      Reason for Visit:  70 y  o male here for annual follow-up    Assessment:  Doing well on current therapy of CPAP 10 cm for severe SWAPNIL (AHI = 39 7)  The patient is complaining of dry mouth while using his nasal mask  I suspect that he will do well with a ResMed F30  Plan:  Continue same    Follow up: One year    History of Present Illness:  History of SWAPNIL on PAP therapy  Fully compliant and deriving benefit  Historical Information    Past Medical History:   Past Medical History:   Diagnosis Date   • Asthma    • Erectile dysfunction of non-organic origin     Resolved: 10/15/2014         Past Surgical History:   Past Surgical History:   Procedure Laterality Date   • EYE SURGERY     • TONSILLECTOMY AND ADENOIDECTOMY     • UMBILICAL HERNIA REPAIR         Social History:   Social History     Socioeconomic History   • Marital status: /Civil Union     Spouse name: Not on file   • Number of children: Not on file   • Years of education: Not on file   • Highest education level: Not on file   Occupational History   • Not on file   Tobacco Use   • Smoking status: Former     Packs/day: 1 00     Years: 8 00     Pack years: 8 00     Types: Cigarettes     Start date: 1971     Quit date: 1979     Years since quittin 2   • Smokeless tobacco: Never   • Tobacco comments:     He smoked a pack a day of cigarettes for 8 years  He quite at the age of 22  Occasional cigar use     Vaping Use   • Vaping Use: Never used   Substance and Sexual Activity   • Alcohol use: Yes     Comment: Social   • Drug use: Never   • Sexual activity: Yes   Other Topics Concern   • Not on file   Social History Narrative    Caffeine use    Work-related stress     Social Determinants of Health     Financial Resource Strain: Not on file   Food Insecurity: Not on file   Transportation Needs: Not on file   Physical Activity: Not on file   Stress: Not on file Social Connections: Not on file   Intimate Partner Violence: Not on file   Housing Stability: Not on file       Family History:   Family History   Problem Relation Age of Onset   • Asthma Mother    • Heart disease Mother    • Rheum arthritis Mother    • Osteoporosis Mother    • Diabetes Father    • Heart disease Father    • Diabetes type II Father    • Lung disease Sister         Bronchiectasis    No clear history of cystic lung disease however   • Diabetes Sister    • Other Son         Thyroid disorder       Medications/Allergies:      Current Outpatient Medications:   •  albuterol (PROVENTIL HFA,VENTOLIN HFA) 90 mcg/act inhaler, INHALE 2 PUFFS EVERY 6 HOURS AS NEEDED FOR WHEEZING, Disp: 1 g, Rfl: 1  •  Cholecalciferol (VITAMIN D3) 5000 units CAPS, Take 5,000 Units by mouth daily, Disp: , Rfl:   •  Cinnamon 500 MG capsule, Take 500 mg by mouth daily, Disp: , Rfl:   •  cyanocobalamin (VITAMIN B-12) 1000 MCG tablet, Take 1,000 mcg by mouth daily, Disp: , Rfl:   •  fluticasone (FLONASE) 50 mcg/act nasal spray, TWO SPRAYS EACH NOSTRIL ONCE DAILY AS NEEDED , Disp: 16 mL, Rfl: 11  •  fluticasone (FLONASE) 50 mcg/act nasal spray, 2 sprays into each nostril daily, Disp: 1 g, Rfl: 0  •  ibuprofen (MOTRIN) 200 mg tablet, Take 200 mg by mouth every 6 (six) hours as needed for mild pain, Disp: , Rfl:   •  levocetirizine (XYZAL) 5 MG tablet, TAKE 1 TABLET BY MOUTH EVERY DAY IN THE EVENING, Disp: 30 tablet, Rfl: 3  •  montelukast (SINGULAIR) 10 mg tablet, TAKE 1 TABLET BY MOUTH EVERYDAY AT BEDTIME, Disp: 30 tablet, Rfl: 4  •  Omega-3 Fatty Acids (FISH OIL) 1,000 mg, Take 3 capsules by mouth daily, Disp: , Rfl:   •  tadalafil (CIALIS) 20 MG tablet, TAKE ONE TABLET BY MOUTH DAILY AS NEEDED for erectile dysfunction, Disp: 30 tablet, Rfl: 0  •  albuterol (2 5 mg/3 mL) 0 083 % nebulizer solution, Inhale 2 5 mg every 6 (six) hours as needed for wheezing or shortness of breath (Patient not taking: Reported on 3/27/2023), Disp: , Rfl: "  •  albuterol (ProAir HFA) 90 mcg/act inhaler, Inhale 2 puffs every 6 (six) hours as needed for wheezing Use 2 puffs prior to exercise  (Patient not taking: Reported on 3/27/2023), Disp: 18 g, Rfl: 5  •  cyclobenzaprine (FLEXERIL) 10 mg tablet, Take 1 tablet (10 mg total) by mouth daily as needed for muscle spasms (Patient not taking: Reported on 3/27/2023), Disp: 30 tablet, Rfl: 1          Objective      Vital Signs:   Vitals:    04/26/23 1015   BP: 138/80     [place] Sleepiness Scale: Total score: 2        Physical Exam:    General: Alert, appropriate, cooperative, overweight    Head: NC/AT    Skin: Warm, dry    Neuro: No motor abnormalities, cranial nerves appear intact    Extremity: No clubbing, cyanosis      DME Provider: [employer]        Counseling / Coordination of Care   I have spent 15 minutes with the patient today in which greater than 50% of this time was spent in counseling/coordination of care regarding: equipment and compliance  Board Certified Sleep Specialist    Portions of the record may have been created with voice recognition software  Occasional wrong word or \"sound a like\" substitutions may have occurred due to the inherent limitations of voice recognition software  Read the chart carefully and recognize, using context, where substitutions have occurred    "

## 2023-04-27 ENCOUNTER — TELEPHONE (OUTPATIENT)
Dept: SLEEP CENTER | Facility: CLINIC | Age: 63
End: 2023-04-27

## 2023-04-28 LAB
DME PARACHUTE DELIVERY DATE ACTUAL: NORMAL
DME PARACHUTE DELIVERY DATE REQUESTED: NORMAL
DME PARACHUTE ITEM DESCRIPTION: NORMAL
DME PARACHUTE ORDER STATUS: NORMAL
DME PARACHUTE SUPPLIER NAME: NORMAL
DME PARACHUTE SUPPLIER PHONE: NORMAL

## 2023-05-10 DIAGNOSIS — R73.03 PREDIABETES: ICD-10-CM

## 2023-05-10 DIAGNOSIS — E55.9 VITAMIN D DEFICIENCY: Primary | ICD-10-CM

## 2023-05-11 ENCOUNTER — APPOINTMENT (OUTPATIENT)
Dept: LAB | Age: 63
End: 2023-05-11

## 2023-05-11 DIAGNOSIS — E78.5 HYPERLIPIDEMIA, UNSPECIFIED HYPERLIPIDEMIA TYPE: ICD-10-CM

## 2023-05-11 DIAGNOSIS — R73.03 PREDIABETES: ICD-10-CM

## 2023-05-11 DIAGNOSIS — E55.9 VITAMIN D DEFICIENCY: ICD-10-CM

## 2023-05-11 LAB
25(OH)D3 SERPL-MCNC: 44.1 NG/ML (ref 30–100)
ALBUMIN SERPL BCP-MCNC: 3.7 G/DL (ref 3.5–5)
ALP SERPL-CCNC: 54 U/L (ref 46–116)
ALT SERPL W P-5'-P-CCNC: 29 U/L (ref 12–78)
ANION GAP SERPL CALCULATED.3IONS-SCNC: 3 MMOL/L (ref 4–13)
AST SERPL W P-5'-P-CCNC: 19 U/L (ref 5–45)
BILIRUB SERPL-MCNC: 0.51 MG/DL (ref 0.2–1)
BUN SERPL-MCNC: 18 MG/DL (ref 5–25)
CALCIUM SERPL-MCNC: 9.5 MG/DL (ref 8.3–10.1)
CHLORIDE SERPL-SCNC: 108 MMOL/L (ref 96–108)
CHOLEST SERPL-MCNC: 164 MG/DL
CO2 SERPL-SCNC: 26 MMOL/L (ref 21–32)
CREAT SERPL-MCNC: 0.97 MG/DL (ref 0.6–1.3)
CRP SERPL HS-MCNC: 1.06 MG/L
EST. AVERAGE GLUCOSE BLD GHB EST-MCNC: 114 MG/DL
GFR SERPL CREATININE-BSD FRML MDRD: 81 ML/MIN/1.73SQ M
GLUCOSE P FAST SERPL-MCNC: 97 MG/DL (ref 65–99)
HBA1C MFR BLD: 5.6 %
HDLC SERPL-MCNC: 42 MG/DL
LDLC SERPL CALC-MCNC: 98 MG/DL (ref 0–100)
POTASSIUM SERPL-SCNC: 4 MMOL/L (ref 3.5–5.3)
PROT SERPL-MCNC: 7.5 G/DL (ref 6.4–8.4)
SODIUM SERPL-SCNC: 137 MMOL/L (ref 135–147)
TRIGL SERPL-MCNC: 118 MG/DL

## 2023-05-15 ENCOUNTER — OFFICE VISIT (OUTPATIENT)
Dept: INTERNAL MEDICINE CLINIC | Facility: CLINIC | Age: 63
End: 2023-05-15

## 2023-05-15 VITALS
OXYGEN SATURATION: 96 % | SYSTOLIC BLOOD PRESSURE: 124 MMHG | WEIGHT: 238 LBS | HEIGHT: 67 IN | DIASTOLIC BLOOD PRESSURE: 72 MMHG | BODY MASS INDEX: 37.35 KG/M2 | RESPIRATION RATE: 16 BRPM | HEART RATE: 66 BPM

## 2023-05-15 DIAGNOSIS — R53.83 OTHER FATIGUE: ICD-10-CM

## 2023-05-15 DIAGNOSIS — E78.5 HYPERLIPIDEMIA, UNSPECIFIED HYPERLIPIDEMIA TYPE: ICD-10-CM

## 2023-05-15 DIAGNOSIS — H57.04 FIXED DILATED PUPILS OF BOTH EYES: ICD-10-CM

## 2023-05-15 DIAGNOSIS — Z00.00 WELLNESS EXAMINATION: ICD-10-CM

## 2023-05-15 DIAGNOSIS — R73.03 PREDIABETES: ICD-10-CM

## 2023-05-15 DIAGNOSIS — Z23 ENCOUNTER FOR IMMUNIZATION: Primary | ICD-10-CM

## 2023-05-15 DIAGNOSIS — E66.09 CLASS 2 OBESITY DUE TO EXCESS CALORIES WITHOUT SERIOUS COMORBIDITY WITH BODY MASS INDEX (BMI) OF 35.0 TO 35.9 IN ADULT: ICD-10-CM

## 2023-05-15 DIAGNOSIS — R25.1 TREMOR: ICD-10-CM

## 2023-05-15 DIAGNOSIS — J98.4 MULTIPLE IDIOPATHIC CYSTS OF LUNG: ICD-10-CM

## 2023-05-15 NOTE — PROGRESS NOTES
[facility] [place]    NAME: Yvonne Avelar  AGE: 70 y o  SEX: male  : 1960     DATE: 5/15/2023     Assessment and Plan:     Problem List Items Addressed This Visit        Respiratory    Multiple idiopathic cysts of lung     Currently patient is stable no pulmonary symptoms he has not had follow-up with pulmonary in 2 years he will plan to do so after his work-up with neurology for tremor            Other    Hyperlipidemia      hyperlipidemia low-cholesterol diet improvements in LDL cholesterol patient like to hold off on statin at this point recheck lipid profile in 6 months          Class 2 obesity due to excess calories without serious comorbidity with body mass index (BMI) of 35 0 to 35 9 in adult     Obesity -I have counseled patient following healthy and balanced diet, I would like the patient to lose weight, I would like the patient exercise routinely; we will continue monitor the patient's progress  Prediabetes     Improvement in hemoglobin A1c continue to reduce carbohydrates and sweets continue routine exercise we will continue monitor A1c/fasting blood sugar         Wellness examination     Assessment and plan 1  Health maintenance annual wellness examination overall the patient is clinically stable and doing well, we encouraged the patient to follow a healthy and balanced diet  We recommend that the patient exercise routinely approximately 30 minutes 5 times per week   We have reviewed the patient's vaccines and have made recommendations for updates if necessary   pneumonia shot/PCV 20 today     We will be ordering screening laboratories which are age appropriate  Return to the office in   6 months   call if any problems           Other fatigue     2 episodes of fatigue no chest pain no shortness of breath related to going outside, sweating suspect mild dehydration we will check laboratories symptoms improved with fluid hydration and cool environment symptoms have resolved patient does report to me excessive sweating which has been chronic         Relevant Orders    Basic metabolic panel    Magnesium    CBC (Includes Diff/Plt) (Refl)    TSH, 3rd generation    Vitamin B12    Fixed dilated pupils of both eyes     Dilated pupils/tremor we will check MRI of the brain         Relevant Orders    Ambulatory Referral to Neurology    MRI brain wo contrast    Tremor     Tremor/cogwheeling rule out Parkinson's versus essential tremor we will have patient see neurology Dr Vincenzo Velez Referral to Neurology    MRI brain wo contrast   Other Visit Diagnoses     Encounter for immunization    -  Primary    Relevant Orders    Pneumococcal Conjugate Vaccine 20-valent (PCV20) (Completed)        Return to office 6  months  call if any problems  Immunizations and preventive care screenings were discussed with patient today  Appropriate education was printed on patient's after visit summary  Counseling:  Exercise: the importance of regular exercise/physical activity was discussed  Recommend exercise 3-5 times per week for at least 30 minutes  BMI Counseling: Body mass index is 37 28 kg/m²  The BMI is above normal  Nutrition recommendations include decreasing portion sizes and moderation in carbohydrate intake  Exercise recommendations include exercising 3-5 times per week  Rationale for BMI follow-up plan is due to patient being overweight or obese  Depression Screening and Follow-up Plan: Patient was screened for depression during today's encounter  They screened negative with a PHQ-2 score of 0  Return in about 6 months (around 11/15/2023)       Chief Complaint:     Chief Complaint   Patient presents with   • Annual Exam      History of Present Illness:     Adult Annual Physical  /62year old male coming in for a follow up visit regarding tremor, fatigue, dilated pupils, obesity, "hyperlipidemia, prediabetes; the patient reports me compliant taking medications without untoward side effects the  The patient is here to review his medical condition, update me on the medical condition and the patient reports me no hospitalizations and no ER visits  Over the last several yrs , shaking gun shaking , imbalance slight shaking 2018, occasionally noticed food getting stuck none lately chewing more finely, nothing makes it worse sister who is physician,  Patient here for a comprehensive physical exam  The patient reports   Reports after golfing dark rings around the eyes after the air conditioner  Better \" like some one sucked the life out of me \" within 20min goes in airconditioner and better a lot of sweating no chest pain no shortness of breath improvement with hydration and cool atmosphere ; the pt reports the injections helped with the radiculopathy, using the belt and no more pain  Diet and Physical Activity  Diet/Nutrition: well balanced diet  Exercise: moderate cardiovascular exercise  Depression Screening  PHQ-2/9 Depression Screening    Little interest or pleasure in doing things: 0 - not at all  Feeling down, depressed, or hopeless: 0 - not at all  PHQ-2 Score: 0  PHQ-2 Interpretation: Negative depression screen       General Health  Sleep: sleeps well  7-8 hours  Hearing: normal - bilateral   Vision: goes for regular eye exams  Dental: regular dental visits   Health  Symptoms include: mild slow stream      Review of Systems:     Review of Systems   Constitutional: Negative for activity change, appetite change and unexpected weight change  HENT: Negative for congestion and postnasal drip  Eyes: Negative for visual disturbance  Respiratory: Negative for cough and shortness of breath  Cardiovascular: Negative for chest pain  Gastrointestinal: Negative for abdominal pain, diarrhea, nausea and vomiting     Neurological: Negative for dizziness, light-headedness and " headaches  Past Medical History:     Past Medical History:   Diagnosis Date   • Asthma    • Erectile dysfunction of non-organic origin     Resolved: 10/15/2014      Past Surgical History:     Past Surgical History:   Procedure Laterality Date   • EYE SURGERY     • TONSILLECTOMY AND ADENOIDECTOMY     • UMBILICAL HERNIA REPAIR        Family History:     Family History   Problem Relation Age of Onset   • Asthma Mother    • Heart disease Mother    • Rheum arthritis Mother    • Osteoporosis Mother    • Diabetes Father    • Heart disease Father    • Diabetes type II Father    • Lung disease Sister         Bronchiectasis  No clear history of cystic lung disease however   • Diabetes Sister    • Other Son         Thyroid disorder      Social History:     Social History     Socioeconomic History   • Marital status: /Civil Union     Spouse name: None   • Number of children: None   • Years of education: None   • Highest education level: None   Occupational History   • None   Tobacco Use   • Smoking status: Former     Packs/day: 1 00     Years: 8 00     Pack years: 8 00     Types: Cigarettes     Start date: 1971     Quit date: 1979     Years since quittin 2   • Smokeless tobacco: Never   • Tobacco comments:     He smoked a pack a day of cigarettes for 8 years  He quite at the age of 22  Occasional cigar use     Vaping Use   • Vaping Use: Never used   Substance and Sexual Activity   • Alcohol use: Yes     Comment: Social   • Drug use: Never   • Sexual activity: Yes   Other Topics Concern   • None   Social History Narrative    Caffeine use    Work-related stress     Social Determinants of Health     Financial Resource Strain: Not on file   Food Insecurity: Not on file   Transportation Needs: Not on file   Physical Activity: Not on file   Stress: Not on file   Social Connections: Not on file   Intimate Partner Violence: Not on file   Housing Stability: Not on file      Current Medications:     Current "Outpatient Medications   Medication Sig Dispense Refill   • Cholecalciferol (VITAMIN D3) 5000 units CAPS Take 5,000 Units by mouth daily     • Cinnamon 500 MG capsule Take 500 mg by mouth daily     • cyanocobalamin (VITAMIN B-12) 1000 MCG tablet Take 1,000 mcg by mouth daily     • fluticasone (FLONASE) 50 mcg/act nasal spray TWO SPRAYS EACH NOSTRIL ONCE DAILY AS NEEDED  16 mL 11   • fluticasone (FLONASE) 50 mcg/act nasal spray 2 sprays into each nostril daily 1 g 0   • ibuprofen (MOTRIN) 200 mg tablet Take 200 mg by mouth every 6 (six) hours as needed for mild pain     • levocetirizine (XYZAL) 5 MG tablet TAKE 1 TABLET BY MOUTH EVERY DAY IN THE EVENING 30 tablet 3   • montelukast (SINGULAIR) 10 mg tablet TAKE 1 TABLET BY MOUTH EVERYDAY AT BEDTIME 30 tablet 4   • Omega-3 Fatty Acids (FISH OIL) 1,000 mg Take 3 capsules by mouth daily     • tadalafil (CIALIS) 20 MG tablet Take 1 tablet by mouth daily as needed for erectile dysfunction 30 tablet 0   • albuterol (2 5 mg/3 mL) 0 083 % nebulizer solution Inhale 2 5 mg every 6 (six) hours as needed for wheezing or shortness of breath (Patient not taking: Reported on 3/27/2023)     • albuterol (ProAir HFA) 90 mcg/act inhaler Inhale 2 puffs every 6 (six) hours as needed for wheezing Use 2 puffs prior to exercise  (Patient not taking: Reported on 3/27/2023) 18 g 5   • albuterol (PROVENTIL HFA,VENTOLIN HFA) 90 mcg/act inhaler INHALE 2 PUFFS EVERY 6 HOURS AS NEEDED FOR WHEEZING (Patient not taking: Reported on 5/15/2023) 1 g 1     No current facility-administered medications for this visit  Allergies:     No Known Allergies   Physical Exam:     /72   Pulse 66   Resp 16   Ht 5' 7\" (1 702 m)   Wt 108 kg (238 lb)   SpO2 96%   BMI 37 28 kg/m²     Physical Exam  Vitals and nursing note reviewed  Constitutional:       General: He is not in acute distress  Appearance: Normal appearance  He is well-developed  He is obese   He is not ill-appearing, toxic-appearing " or diaphoretic  HENT:      Head: Normocephalic and atraumatic  Right Ear: External ear normal       Left Ear: External ear normal       Nose: Nose normal    Eyes:      Pupils: Pupils are equal, round, and reactive to light  Cardiovascular:      Rate and Rhythm: Normal rate and regular rhythm  Heart sounds: Normal heart sounds  No murmur heard  Pulmonary:      Effort: Pulmonary effort is normal       Breath sounds: Normal breath sounds  Abdominal:      General: There is no distension  Palpations: Abdomen is soft  Tenderness: There is no abdominal tenderness  There is no guarding  Neurological:      Mental Status: He is alert       Mild resting tremor, mild intention tremor positive cogwheeling mild bilateral gait intact heel-to-toe intact no imbalance    Richard Rashid DO  [facility]

## 2023-05-15 NOTE — ASSESSMENT & PLAN NOTE
hyperlipidemia low-cholesterol diet improvements in LDL cholesterol patient like to hold off on statin at this point recheck lipid profile in 6 months

## 2023-05-15 NOTE — ASSESSMENT & PLAN NOTE
Tremor/cogwheeling rule out Parkinson's versus essential tremor we will have patient see neurology Dr Lit Neil

## 2023-05-15 NOTE — ASSESSMENT & PLAN NOTE
2 episodes of fatigue no chest pain no shortness of breath related to going outside, sweating suspect mild dehydration we will check laboratories symptoms improved with fluid hydration and cool environment symptoms have resolved patient does report to me excessive sweating which has been chronic

## 2023-05-15 NOTE — ASSESSMENT & PLAN NOTE
Assessment and plan 1  Health maintenance annual wellness examination overall the patient is clinically stable and doing well, we encouraged the patient to follow a healthy and balanced diet  We recommend that the patient exercise routinely approximately 30 minutes 5 times per week   We have reviewed the patient's vaccines and have made recommendations for updates if necessary   pneumonia shot/PCV 20 today     We will be ordering screening laboratories which are age appropriate  Return to the office in   6 months   call if any problems

## 2023-05-15 NOTE — ASSESSMENT & PLAN NOTE
Improvement in hemoglobin A1c continue to reduce carbohydrates and sweets continue routine exercise we will continue monitor A1c/fasting blood sugar

## 2023-05-15 NOTE — ASSESSMENT & PLAN NOTE
Currently patient is stable no pulmonary symptoms he has not had follow-up with pulmonary in 2 years he will plan to do so after his work-up with neurology for tremor

## 2023-05-23 ENCOUNTER — APPOINTMENT (OUTPATIENT)
Dept: LAB | Age: 63
End: 2023-05-23
Payer: COMMERCIAL

## 2023-05-23 ENCOUNTER — HOSPITAL ENCOUNTER (OUTPATIENT)
Dept: RADIOLOGY | Age: 63
Discharge: HOME | End: 2023-05-23

## 2023-05-23 DIAGNOSIS — H57.04 FIXED DILATED PUPILS OF BOTH EYES: ICD-10-CM

## 2023-05-23 DIAGNOSIS — R25.1 TREMOR: ICD-10-CM

## 2023-05-23 DIAGNOSIS — R53.83 OTHER FATIGUE: ICD-10-CM

## 2023-05-23 DIAGNOSIS — R89.9 ABNORMAL LABORATORY TEST: Primary | ICD-10-CM

## 2023-05-23 LAB
ANION GAP SERPL CALCULATED.3IONS-SCNC: -1 MMOL/L (ref 4–13)
BASOPHILS # BLD AUTO: 0.02 THOUSANDS/ÂΜL (ref 0–0.1)
BASOPHILS NFR BLD AUTO: 0 % (ref 0–1)
BUN SERPL-MCNC: 17 MG/DL (ref 5–25)
CALCIUM SERPL-MCNC: 9 MG/DL (ref 8.3–10.1)
CHLORIDE SERPL-SCNC: 109 MMOL/L (ref 96–108)
CO2 SERPL-SCNC: 27 MMOL/L (ref 21–32)
CREAT SERPL-MCNC: 0.98 MG/DL (ref 0.6–1.3)
EOSINOPHIL # BLD AUTO: 0.17 THOUSAND/ÂΜL (ref 0–0.61)
EOSINOPHIL NFR BLD AUTO: 3 % (ref 0–6)
ERYTHROCYTE [DISTWIDTH] IN BLOOD BY AUTOMATED COUNT: 12.1 % (ref 11.6–15.1)
GFR SERPL CREATININE-BSD FRML MDRD: 80 ML/MIN/1.73SQ M
GLUCOSE P FAST SERPL-MCNC: 101 MG/DL (ref 65–99)
HCT VFR BLD AUTO: 40.1 % (ref 36.5–49.3)
HGB BLD-MCNC: 13.7 G/DL (ref 12–17)
IMM GRANULOCYTES # BLD AUTO: 0.02 THOUSAND/UL (ref 0–0.2)
IMM GRANULOCYTES NFR BLD AUTO: 0 % (ref 0–2)
LYMPHOCYTES # BLD AUTO: 1.41 THOUSANDS/ÂΜL (ref 0.6–4.47)
LYMPHOCYTES NFR BLD AUTO: 24 % (ref 14–44)
MAGNESIUM SERPL-MCNC: 2.3 MG/DL (ref 1.6–2.6)
MCH RBC QN AUTO: 30.7 PG (ref 26.8–34.3)
MCHC RBC AUTO-ENTMCNC: 34.2 G/DL (ref 31.4–37.4)
MCV RBC AUTO: 90 FL (ref 82–98)
MONOCYTES # BLD AUTO: 0.49 THOUSAND/ÂΜL (ref 0.17–1.22)
MONOCYTES NFR BLD AUTO: 9 % (ref 4–12)
NEUTROPHILS # BLD AUTO: 3.66 THOUSANDS/ÂΜL (ref 1.85–7.62)
NEUTS SEG NFR BLD AUTO: 64 % (ref 43–75)
NRBC BLD AUTO-RTO: 0 /100 WBCS
PLATELET # BLD AUTO: 153 THOUSANDS/UL (ref 149–390)
PMV BLD AUTO: 11 FL (ref 8.9–12.7)
POTASSIUM SERPL-SCNC: 4 MMOL/L (ref 3.5–5.3)
RBC # BLD AUTO: 4.46 MILLION/UL (ref 3.88–5.62)
SODIUM SERPL-SCNC: 135 MMOL/L (ref 135–147)
TSH SERPL DL<=0.05 MIU/L-ACNC: 4.27 UIU/ML (ref 0.45–4.5)
VIT B12 SERPL-MCNC: 443 PG/ML (ref 180–914)
WBC # BLD AUTO: 5.77 THOUSAND/UL (ref 4.31–10.16)

## 2023-05-23 PROCEDURE — 82607 VITAMIN B-12: CPT

## 2023-05-23 PROCEDURE — 83735 ASSAY OF MAGNESIUM: CPT

## 2023-05-23 PROCEDURE — 85025 COMPLETE CBC W/AUTO DIFF WBC: CPT

## 2023-05-23 PROCEDURE — 36415 COLL VENOUS BLD VENIPUNCTURE: CPT

## 2023-05-23 PROCEDURE — 84443 ASSAY THYROID STIM HORMONE: CPT

## 2023-05-23 PROCEDURE — 80048 BASIC METABOLIC PNL TOTAL CA: CPT

## 2023-05-25 DIAGNOSIS — Z13.29 SCREENING FOR THYROID DISORDER: Primary | ICD-10-CM

## 2023-05-27 DIAGNOSIS — N52.9 ERECTILE DYSFUNCTION, UNSPECIFIED ERECTILE DYSFUNCTION TYPE: ICD-10-CM

## 2023-05-27 RX ORDER — TADALAFIL 20 MG/1
TABLET ORAL
Qty: 30 TABLET | Refills: 0 | Status: SHIPPED | OUTPATIENT
Start: 2023-05-27

## 2023-06-02 ENCOUNTER — APPOINTMENT (OUTPATIENT)
Dept: LAB | Age: 63
End: 2023-06-02
Payer: COMMERCIAL

## 2023-06-02 DIAGNOSIS — Z13.29 SCREENING FOR THYROID DISORDER: ICD-10-CM

## 2023-06-02 DIAGNOSIS — R89.9 ABNORMAL LABORATORY TEST: ICD-10-CM

## 2023-06-02 LAB
ANION GAP SERPL CALCULATED.3IONS-SCNC: 2 MMOL/L (ref 4–13)
BUN SERPL-MCNC: 19 MG/DL (ref 5–25)
CALCIUM SERPL-MCNC: 9.2 MG/DL (ref 8.3–10.1)
CHLORIDE SERPL-SCNC: 108 MMOL/L (ref 96–108)
CO2 SERPL-SCNC: 27 MMOL/L (ref 21–32)
CREAT SERPL-MCNC: 1 MG/DL (ref 0.6–1.3)
GFR SERPL CREATININE-BSD FRML MDRD: 78 ML/MIN/1.73SQ M
GLUCOSE P FAST SERPL-MCNC: 100 MG/DL (ref 65–99)
POTASSIUM SERPL-SCNC: 4.3 MMOL/L (ref 3.5–5.3)
SODIUM SERPL-SCNC: 137 MMOL/L (ref 135–147)
TSH SERPL DL<=0.05 MIU/L-ACNC: 2.98 UIU/ML (ref 0.45–4.5)

## 2023-06-02 PROCEDURE — 84166 PROTEIN E-PHORESIS/URINE/CSF: CPT

## 2023-06-02 PROCEDURE — 84443 ASSAY THYROID STIM HORMONE: CPT

## 2023-06-02 PROCEDURE — 36415 COLL VENOUS BLD VENIPUNCTURE: CPT

## 2023-06-02 PROCEDURE — 84165 PROTEIN E-PHORESIS SERUM: CPT

## 2023-06-02 PROCEDURE — 80048 BASIC METABOLIC PNL TOTAL CA: CPT

## 2023-06-02 PROCEDURE — 86376 MICROSOMAL ANTIBODY EACH: CPT

## 2023-06-03 LAB — THYROPEROXIDASE AB SERPL-ACNC: <9 IU/ML (ref 0–34)

## 2023-06-05 LAB
ALBUMIN SERPL ELPH-MCNC: 4.16 G/DL (ref 3.5–5)
ALBUMIN SERPL ELPH-MCNC: 54.7 % (ref 52–65)
ALBUMIN UR ELPH-MCNC: 100 %
ALPHA1 GLOB MFR UR ELPH: 0 %
ALPHA1 GLOB SERPL ELPH-MCNC: 0.25 G/DL (ref 0.1–0.4)
ALPHA1 GLOB SERPL ELPH-MCNC: 3.3 % (ref 2.5–5)
ALPHA2 GLOB MFR UR ELPH: 0 %
ALPHA2 GLOB SERPL ELPH-MCNC: 0.78 G/DL (ref 0.4–1.2)
ALPHA2 GLOB SERPL ELPH-MCNC: 10.2 % (ref 7–13)
B-GLOBULIN MFR UR ELPH: 0 %
BETA GLOB ABNORMAL SERPL ELPH-MCNC: 0.49 G/DL (ref 0.4–0.8)
BETA1 GLOB SERPL ELPH-MCNC: 6.5 % (ref 5–13)
BETA2 GLOB SERPL ELPH-MCNC: 6.9 % (ref 2–8)
BETA2+GAMMA GLOB SERPL ELPH-MCNC: 0.52 G/DL (ref 0.2–0.5)
GAMMA GLOB ABNORMAL SERPL ELPH-MCNC: 1.4 G/DL (ref 0.5–1.6)
GAMMA GLOB MFR UR ELPH: 0 %
GAMMA GLOB SERPL ELPH-MCNC: 18.4 % (ref 12–22)
IGG/ALB SER: 1.21 {RATIO} (ref 1.1–1.8)
PROT PATTERN SERPL ELPH-IMP: ABNORMAL
PROT PATTERN UR ELPH-IMP: ABNORMAL
PROT SERPL-MCNC: 7.6 G/DL (ref 6.4–8.2)
PROT UR-MCNC: 32 MG/DL

## 2023-06-05 PROCEDURE — 84166 PROTEIN E-PHORESIS/URINE/CSF: CPT | Performed by: PATHOLOGY

## 2023-06-05 PROCEDURE — 84165 PROTEIN E-PHORESIS SERUM: CPT | Performed by: PATHOLOGY

## 2023-06-07 DIAGNOSIS — R80.9 PROTEINURIA, UNSPECIFIED TYPE: Primary | ICD-10-CM

## 2023-06-09 ENCOUNTER — APPOINTMENT (OUTPATIENT)
Dept: LAB | Age: 63
End: 2023-06-09

## 2023-06-14 ENCOUNTER — LAB (OUTPATIENT)
Dept: LAB | Age: 63
End: 2023-06-14
Payer: COMMERCIAL

## 2023-06-14 ENCOUNTER — TELEPHONE (OUTPATIENT)
Dept: INTERNAL MEDICINE CLINIC | Facility: CLINIC | Age: 63
End: 2023-06-14

## 2023-06-14 DIAGNOSIS — R80.9 PROTEINURIA, UNSPECIFIED TYPE: Primary | ICD-10-CM

## 2023-06-14 LAB
CREAT 24H UR-MRATE: 2.6 G/24HR (ref 0.8–1.8)
PROT 24H UR-MCNC: 253 MG/24 HRS (ref 40–150)
SPECIMEN VOL UR: 2300 ML
SPECIMEN VOL UR: 2300 ML

## 2023-06-14 PROCEDURE — 82570 ASSAY OF URINE CREATININE: CPT

## 2023-06-14 PROCEDURE — 84156 ASSAY OF PROTEIN URINE: CPT

## 2023-06-14 NOTE — TELEPHONE ENCOUNTER
Patient was in to see you on 05/15/23  According to charge it should have been a wellness only  Could you please change the billing on this visit to a wellness only  Please let me know when complete and I will call billing    Thank You

## 2023-06-15 ENCOUNTER — TELEPHONE (OUTPATIENT)
Dept: NEPHROLOGY | Facility: CLINIC | Age: 63
End: 2023-06-15

## 2023-06-15 NOTE — TELEPHONE ENCOUNTER
New Patient Intake Form   Patient Details   Allison Keating     1960     [id number]     Insurance Information   Name of Dayday & Isabelle     Does the patient need an insurance referral? no   If patient has Pitestefania Morse, please ask if they will be using their [facility]  Appointment Information   Who is calling to schedule? If not patient, what is callers name? Patient    Referring Provider Self    Reason for Appt (Diagnosis) High levels of protein in urine    Does Patient have labs/urine done at [place] [id number]? If not, where do they go? List the date of last lab / urine  *Please try to get labs 2 years back if not at  yes   Has patient been hospitalized recently? If yes, list name and location of hospital they were In no   Has patient been seen by a Nephrologist before? If yes, list name, location and phone number no   Has the patient had renal imaging done? If so, list the most recent date and type of imagineg no   Does patient have a history of Kidney Stones?  no   Appointment Details   Is there a referral on file? no   Appointment Date 10/11/23   Location  [place]   Miscellaneous  Pt is requesting Dr Brianne Rey

## 2023-06-19 NOTE — RESULT ENCOUNTER NOTE
Spoke with pt and advised , pt will see Dr Cely Strickland and he would like to repeat this test again after his evaluation

## 2023-06-19 NOTE — TELEPHONE ENCOUNTER
I spoke with the patients wife  She will call her insurance company and advise them of this  A $15 00 co pay should not be charged for the wellness    She will get back to me on their response

## 2023-06-21 DIAGNOSIS — N52.9 ERECTILE DYSFUNCTION, UNSPECIFIED ERECTILE DYSFUNCTION TYPE: ICD-10-CM

## 2023-06-21 RX ORDER — TADALAFIL 20 MG/1
TABLET ORAL
Qty: 30 TABLET | Refills: 0 | Status: SHIPPED | OUTPATIENT
Start: 2023-06-21

## 2023-07-16 DIAGNOSIS — N52.9 ERECTILE DYSFUNCTION, UNSPECIFIED ERECTILE DYSFUNCTION TYPE: ICD-10-CM

## 2023-07-16 RX ORDER — TADALAFIL 20 MG/1
TABLET ORAL
Qty: 30 TABLET | Refills: 0 | Status: SHIPPED | OUTPATIENT
Start: 2023-07-16

## 2023-08-06 DIAGNOSIS — J30.2 SEASONAL ALLERGIES: ICD-10-CM

## 2023-08-06 RX ORDER — LEVOCETIRIZINE DIHYDROCHLORIDE 5 MG/1
TABLET, FILM COATED ORAL
Qty: 30 TABLET | Refills: 3 | Status: SHIPPED | OUTPATIENT
Start: 2023-08-06

## 2023-08-09 DIAGNOSIS — J30.2 SEASONAL ALLERGIES: ICD-10-CM

## 2023-08-09 RX ORDER — MONTELUKAST SODIUM 10 MG/1
TABLET ORAL
Qty: 30 TABLET | Refills: 4 | Status: SHIPPED | OUTPATIENT
Start: 2023-08-09

## 2023-08-11 DIAGNOSIS — N52.9 ERECTILE DYSFUNCTION, UNSPECIFIED ERECTILE DYSFUNCTION TYPE: ICD-10-CM

## 2023-08-11 RX ORDER — TADALAFIL 20 MG/1
TABLET ORAL
Qty: 30 TABLET | Refills: 0 | Status: SHIPPED | OUTPATIENT
Start: 2023-08-11

## 2023-08-15 ENCOUNTER — TELEPHONE (OUTPATIENT)
Age: 63
End: 2023-08-15

## 2023-08-15 NOTE — TELEPHONE ENCOUNTER
.Caller: Pt    Doctor: Barby Montgomery    Reason for call: Pt is calling in stating that his last injection was done about 6 months ago. He is starting to have back pain again. He would like another injection, pain level is a 3-4/10 as of now. He is having sharp pains going up the left side, he is also having spasms.      Call back#: [phone]

## 2023-08-21 DIAGNOSIS — M54.16 LUMBAR RADICULOPATHY: Primary | ICD-10-CM

## 2023-08-21 NOTE — TELEPHONE ENCOUNTER
Caller: patient    Doctor: Cherelle Hansen    Reason for call: calling to schedule procedure, transfer to     Call back#:

## 2023-08-22 ENCOUNTER — HOSPITAL ENCOUNTER (OUTPATIENT)
Dept: RADIOLOGY | Facility: CLINIC | Age: 63
Discharge: HOME | End: 2023-08-22
Admitting: ANESTHESIOLOGY
Payer: COMMERCIAL

## 2023-08-22 VITALS
OXYGEN SATURATION: 96 % | HEART RATE: 83 BPM | RESPIRATION RATE: 18 BRPM | TEMPERATURE: 98 F | DIASTOLIC BLOOD PRESSURE: 77 MMHG | SYSTOLIC BLOOD PRESSURE: 154 MMHG

## 2023-08-22 DIAGNOSIS — M54.16 LUMBAR RADICULOPATHY: ICD-10-CM

## 2023-08-22 PROCEDURE — 64483 NJX AA&/STRD TFRM EPI L/S 1: CPT | Performed by: ANESTHESIOLOGY

## 2023-08-22 RX ORDER — PAPAVERINE HCL 150 MG
15 CAPSULE, EXTENDED RELEASE ORAL ONCE
Status: COMPLETED | OUTPATIENT
Start: 2023-08-22 | End: 2023-08-22

## 2023-08-22 RX ADMIN — DEXAMETHASONE SODIUM PHOSPHATE 15 MG: 10 INJECTION, SOLUTION INTRAMUSCULAR; INTRAVENOUS at 13:32

## 2023-08-22 RX ADMIN — LIDOCAINE HYDROCHLORIDE 2 ML: 20 INJECTION, SOLUTION EPIDURAL; INFILTRATION; INTRACAUDAL; PERINEURAL at 13:33

## 2023-08-22 RX ADMIN — IOHEXOL 2 ML: 300 INJECTION, SOLUTION INTRAVENOUS at 13:32

## 2023-08-22 NOTE — H&P
History of Present Illness: The patient is a 70 y.o. male who presents with complaints of back and leg pain. Past Medical History:   Diagnosis Date   • Asthma    • Erectile dysfunction of non-organic origin     Resolved: 10/15/2014       Past Surgical History:   Procedure Laterality Date   • EYE SURGERY     • TONSILLECTOMY AND ADENOIDECTOMY     • UMBILICAL HERNIA REPAIR           Current Outpatient Medications:   •  albuterol (2.5 mg/3 mL) 0.083 % nebulizer solution, Inhale 2.5 mg every 6 (six) hours as needed for wheezing or shortness of breath (Patient not taking: Reported on 3/27/2023), Disp: , Rfl:   •  albuterol (ProAir HFA) 90 mcg/act inhaler, Inhale 2 puffs every 6 (six) hours as needed for wheezing Use 2 puffs prior to exercise.  (Patient not taking: Reported on 3/27/2023), Disp: 18 g, Rfl: 5  •  albuterol (PROVENTIL HFA,VENTOLIN HFA) 90 mcg/act inhaler, INHALE 2 PUFFS EVERY 6 HOURS AS NEEDED FOR WHEEZING (Patient not taking: Reported on 5/15/2023), Disp: 1 g, Rfl: 1  •  Cholecalciferol (VITAMIN D3) 5000 units CAPS, Take 5,000 Units by mouth daily, Disp: , Rfl:   •  Cinnamon 500 MG capsule, Take 500 mg by mouth daily, Disp: , Rfl:   •  cyanocobalamin (VITAMIN B-12) 1000 MCG tablet, Take 1,000 mcg by mouth daily, Disp: , Rfl:   •  fluticasone (FLONASE) 50 mcg/act nasal spray, TWO SPRAYS EACH NOSTRIL ONCE DAILY AS NEEDED., Disp: 16 mL, Rfl: 11  •  fluticasone (FLONASE) 50 mcg/act nasal spray, 2 sprays into each nostril daily, Disp: 1 g, Rfl: 0  •  ibuprofen (MOTRIN) 200 mg tablet, Take 200 mg by mouth every 6 (six) hours as needed for mild pain, Disp: , Rfl:   •  levocetirizine (XYZAL) 5 MG tablet, TAKE 1 TABLET BY MOUTH EVERY DAY IN THE EVENING, Disp: 30 tablet, Rfl: 3  •  montelukast (SINGULAIR) 10 mg tablet, TAKE 1 TABLET BY MOUTH EVERYDAY AT BEDTIME, Disp: 30 tablet, Rfl: 4  •  Omega-3 Fatty Acids (FISH OIL) 1,000 mg, Take 3 capsules by mouth daily, Disp: , Rfl:   •  tadalafil (CIALIS) 20 MG tablet, TAKE ONE TABLET BY MOUTH DAILY AS NEEDED for erectile dysfunction, Disp: 30 tablet, Rfl: 0    No Known Allergies    Physical Exam:   Vitals:    08/22/23 1306   BP: 154/77   Pulse: 94   Resp: 18   Temp: 98 °F (36.7 °C)   SpO2: 95%     General: Awake, Alert, Oriented x 3, Mood and affect appropriate  Respiratory: Respirations even and unlabored  Cardiovascular: Peripheral pulses intact; no edema  Musculoskeletal Exam: Bilateral lumbar paraspinals tender to palpation    ASA Score: 2    Patient/Chart Verification  Patient ID Verified: Verbal  ID Band Applied: No  Consents Confirmed: Procedural, To be obtained in the Pre-Procedure area  Interval H&P(within 24 hr) Complete (required for Outpatients and Surgery Admit only): To be obtained in the Pre-Procedure area  Allergies Reviewed: Yes  Anticoag/NSAID held?: NA  Currently on antibiotics?: No    Assessment:   1.  Lumbar radiculopathy        Plan: bilateral L4 TFESI

## 2023-08-22 NOTE — DISCHARGE INSTRUCTIONS
Epidural Steroid Injection   WHAT YOU NEED TO KNOW:   An epidural steroid injection (JUANA) is a procedure to inject steroid medicine into the epidural space. The epidural space is between your spinal cord and vertebrae. Steroids reduce inflammation and fluid buildup in your spine that may be causing pain. You may be given pain medicine along with the steroids. ACTIVITY  Do not drive or operate machinery today. No strenuous activity today - bending, lifting, etc.  You may resume normal activites starting tomorrow - start slowly and as tolerated. You may shower today, but no tub baths or hot tubs. You may have numbness for several hours from the local anesthetic. Please use caution and common sense, especially with weight-bearing activities. CARE OF THE INJECTION SITE  If you have soreness or pain, apply ice to the area today (20 minutes on/20 minutes off). Starting tomorrow, you may use warm, moist heat or ice if needed. You may have an increase or change in your discomfort for 36-48 hours after your treatment. Apply ice and continue with any pain medication you have been prescribed. Notify the [facility] if you have any of the following: redness, drainage, swelling, headache, stiff neck or fever above 100°F.    SPECIAL INSTRUCTIONS  Our office will contact you in approximately 7 days for a progress report. MEDICATIONS  Continue to take all routine medications. Our office may have instructed you to hold some medications. As no general anesthesia was used in today's procedure, you should not experience any side effects related to anesthesia. If you are diabetic, the steroids used in today's injection may temporarily increase your blood sugar levels after the first few days after your injection. Please keep a close eye on your sugars and alert the doctor who manages your diabetes if your sugars are significantly high from your baseline or you are symptomatic.      If you have a problem specifically related to your procedure, please call our office at [phone]. Problems not related to your procedure should be directed to your primary care physician.

## 2023-08-23 ENCOUNTER — OFFICE VISIT (OUTPATIENT)
Dept: INTERNAL MEDICINE CLINIC | Facility: CLINIC | Age: 63
End: 2023-08-23
Payer: COMMERCIAL

## 2023-08-23 VITALS
HEIGHT: 67 IN | HEART RATE: 75 BPM | BODY MASS INDEX: 38.11 KG/M2 | DIASTOLIC BLOOD PRESSURE: 64 MMHG | SYSTOLIC BLOOD PRESSURE: 128 MMHG | RESPIRATION RATE: 16 BRPM | WEIGHT: 242.8 LBS | OXYGEN SATURATION: 96 %

## 2023-08-23 DIAGNOSIS — R91.8 PULMONARY NODULES: Primary | ICD-10-CM

## 2023-08-23 DIAGNOSIS — R53.83 OTHER FATIGUE: ICD-10-CM

## 2023-08-23 DIAGNOSIS — G89.29 CHRONIC LOW BACK PAIN WITHOUT SCIATICA, UNSPECIFIED BACK PAIN LATERALITY: ICD-10-CM

## 2023-08-23 DIAGNOSIS — E66.09 CLASS 2 OBESITY DUE TO EXCESS CALORIES WITHOUT SERIOUS COMORBIDITY WITH BODY MASS INDEX (BMI) OF 35.0 TO 35.9 IN ADULT: ICD-10-CM

## 2023-08-23 DIAGNOSIS — E78.5 HYPERLIPIDEMIA, UNSPECIFIED HYPERLIPIDEMIA TYPE: ICD-10-CM

## 2023-08-23 DIAGNOSIS — M54.50 CHRONIC LOW BACK PAIN WITHOUT SCIATICA, UNSPECIFIED BACK PAIN LATERALITY: ICD-10-CM

## 2023-08-23 PROCEDURE — 99214 OFFICE O/P EST MOD 30 MIN: CPT | Performed by: INTERNAL MEDICINE

## 2023-08-23 NOTE — PROGRESS NOTES
Assessment/Plan:    Pulmonary nodules  We will have patient follow-up with his pulmonologist Dr. Scot Brown    Other fatigue  We will check third-generation TSH and free T4 also antimicrosomal antibody    Hyperlipidemia  Low-cholesterol diet continue monitor lipid profile    Chronic low back pain without sciatica  Back exercises, core muscle strengthening, lumbar support during exercise he has worked pain management which has been helpful    Class 2 obesity due to excess calories without serious comorbidity with body mass index (BMI) of 35.0 to 35.9 in adult  Obesity -I have counseled patient following healthy and balanced diet, I would like the patient to lose weight, I would like the patient exercise routinely; we will continue monitor the patient's progress. Other proteinuria   reviewed 24-hour urine collection positive for mild proteinuria patient does have appointment with nephrology is concerned hypothyroidism might be contributory we will be checking thyroid function testing this visit         Problem List Items Addressed This Visit        Respiratory    Pulmonary nodules - Primary     We will have patient follow-up with his pulmonologist Dr. Sary Ga Referral to Pulmonology    CT chest wo contrast       Other    Hyperlipidemia     Low-cholesterol diet continue monitor lipid profile         Class 2 obesity due to excess calories without serious comorbidity with body mass index (BMI) of 35.0 to 35.9 in adult     Obesity -I have counseled patient following healthy and balanced diet, I would like the patient to lose weight, I would like the patient exercise routinely; we will continue monitor the patient's progress.          Other fatigue     We will check third-generation TSH and free T4 also antimicrosomal antibody         Relevant Orders    Anti-microsomal antibody    TSH, 3rd generation    T4, free    Chronic low back pain without sciatica     Back exercises, core muscle strengthening, lumbar support during exercise he has worked pain management which has been helpful         Relevant Orders    Lumbar Back Brace       RTO in 4 to 6 months call if any problems  Subjective:      Patient ID: Blanca Andujar is a 70 y.o. male. HPI 76-year old male coming in for a follow up visit regarding fatigue, lower back pain, hyperlipidemia, pulmonary nodules/cystic lung disease, obesity; the patient reports me compliant taking medications without untoward side effects the. The patient is here to review his medical condition, update me on the medical condition and the patient reports me no hospitalizations and no ER visits. The patient is concerned because of the level of fatigue  Mid day that he may be developing hypothyroidism he has a strong family family history of hypothyroidism. He would be very interested in starting a low-dose of levothyroxine if indicated. Also reports me lower back pain which is relatively stable he has been to pain management with some symptom improvement. Also history of cystic lung disease has not seen pulmonary in some time. Also has an appointment with nephrology regarding proteinuria he is concerned also that the proteinuria might be related to hypothyroidism. The following portions of the patient's history were reviewed and updated as appropriate: allergies, current medications, past family history, past medical history, past social history, past surgical history and problem list.    Review of Systems   Constitutional: Positive for fatigue. Negative for activity change, appetite change and unexpected weight change. HENT: Negative for congestion and postnasal drip. Eyes: Negative for visual disturbance. Respiratory: Negative for cough and shortness of breath. Cardiovascular: Negative for chest pain. Gastrointestinal: Negative for abdominal pain, diarrhea, nausea and vomiting.    Neurological: Negative for dizziness, light-headedness and headaches. Objective:    Return in about 6 months (around 2/23/2024). Procedure: FL spine and pain procedure    Result Date: 8/22/2023  Narrative: 2 Level Transforaminal Epidural Steroid Injection Indication: Low back and leg pain Preoperative diagnosis: Lumbar radiculitis Postoperative diagnosis: Lumbar radiculitis Procedure: Fluoroscopically-guided bilateral L4 transforaminal epidural steroid injection under fluoroscopy After discussing the risks, benefits, and alternatives to the procedure, the patient expressed understanding and wished to proceed. The patient was brought to the fluoroscopy suite and placed in the prone position. A procedural pause was conducted to verify: correct patient identity, procedure to be performed and as applicable, correct side and site, correct patient position, and availability of implants, special equipment and special requirements. After identifying the bilateral L4 pedicles fluoroscopically with an oblique view, the skin was sterilely prepped and draped in the usual fashion using Chloraprep skin prep. The skin and subcutaneous tissue were anesthetized with 1% lidocaine. A 5 inch 22 gauge spinal needle was then advanced under fluoroscopic guidance to the posterior aspect of the bilateral L4 neural foramens. Appropriate foraminal depth was determined with a lateral fluoroscopic view, and AP visualization confirmed needle positioning at approximately the 6 o’clock position relative to the pedicles. After negative aspiration, 2 mils of Omnipaque 300 contrast was injected using live fluoroscopy/digital subtraction angiography, confirming appropriate transforaminal spread without evidence of intravascular or intrathecal uptake. Next, a local anesthetic test dose consisting of 1 mL of 2% lidocaine was injected through the needle at each level. After an appropriate period of observation, a directed neurological exam was performed which revealed no new neurologic deficits.  Next, a 1.5 ml solution consisting of 7.5 mg of dexamethasone in sterile saline was injected slowly and incrementally into the epidural space at each level. Following the injection the needles were withdrawn slightly and flushed with lidocaine as they were fully extracted. The patient tolerated the procedure well and there were no apparent complications. The patient did not develop any new neurologic deficits. After appropriate observation, the patient was dismissed from the clinic in good condition under their own power. COMMENTS The patient received a total steroid dose of 15 mg of dexamethasone. EBL: Minimal Specimen: None          No Known Allergies    Past Medical History:   Diagnosis Date   • Asthma    • Erectile dysfunction of non-organic origin     Resolved: 10/15/2014     Past Surgical History:   Procedure Laterality Date   • EYE SURGERY     • TONSILLECTOMY AND ADENOIDECTOMY     • UMBILICAL HERNIA REPAIR       Current Outpatient Medications on File Prior to Visit   Medication Sig Dispense Refill   • albuterol (2.5 mg/3 mL) 0.083 % nebulizer solution Inhale 2.5 mg every 6 (six) hours as needed for wheezing or shortness of breath     • albuterol (PROVENTIL HFA,VENTOLIN HFA) 90 mcg/act inhaler INHALE 2 PUFFS EVERY 6 HOURS AS NEEDED FOR WHEEZING 1 g 1   • Cholecalciferol (VITAMIN D3) 5000 units CAPS Take 5,000 Units by mouth daily     • Cinnamon 500 MG capsule Take 500 mg by mouth daily     • cyanocobalamin (VITAMIN B-12) 1000 MCG tablet Take 1,000 mcg by mouth daily     • fluticasone (FLONASE) 50 mcg/act nasal spray TWO SPRAYS EACH NOSTRIL ONCE DAILY AS NEEDED.  16 mL 11   • fluticasone (FLONASE) 50 mcg/act nasal spray 2 sprays into each nostril daily 1 g 0   • ibuprofen (MOTRIN) 200 mg tablet Take 200 mg by mouth every 6 (six) hours as needed for mild pain     • levocetirizine (XYZAL) 5 MG tablet TAKE 1 TABLET BY MOUTH EVERY DAY IN THE EVENING 30 tablet 3   • montelukast (SINGULAIR) 10 mg tablet TAKE 1 TABLET BY MOUTH EVERYDAY AT BEDTIME 30 tablet 4   • Omega-3 Fatty Acids (FISH OIL) 1,000 mg Take 3 capsules by mouth daily     • tadalafil (CIALIS) 20 MG tablet TAKE ONE TABLET BY MOUTH DAILY AS NEEDED for erectile dysfunction 30 tablet 0   • albuterol (ProAir HFA) 90 mcg/act inhaler Inhale 2 puffs every 6 (six) hours as needed for wheezing Use 2 puffs prior to exercise. (Patient not taking: Reported on 3/27/2023) 18 g 5     No current facility-administered medications on file prior to visit. Family History   Problem Relation Age of Onset   • Asthma Mother    • Heart disease Mother    • Rheum arthritis Mother    • Osteoporosis Mother    • Diabetes Father    • Heart disease Father    • Diabetes type II Father    • Lung disease Sister         Bronchiectasis. No clear history of cystic lung disease however   • Diabetes Sister    • Other Son         Thyroid disorder     Social History     Socioeconomic History   • Marital status: /Civil Union     Spouse name: Not on file   • Number of children: Not on file   • Years of education: Not on file   • Highest education level: Not on file   Occupational History   • Not on file   Tobacco Use   • Smoking status: Former     Packs/day: 1.00     Years: 8.00     Total pack years: 8.00     Types: Cigarettes     Start date: 1971     Quit date: 1979     Years since quittin.5   • Smokeless tobacco: Never   • Tobacco comments:     He smoked a pack a day of cigarettes for 8 years. He quite at the age of 22. Occasional cigar use.    Vaping Use   • Vaping Use: Never used   Substance and Sexual Activity   • Alcohol use: Yes     Comment: Social   • Drug use: Never   • Sexual activity: Yes   Other Topics Concern   • Not on file   Social History Narrative    Caffeine use    Work-related stress     Social Determinants of Health     Financial Resource Strain: Not on file   Food Insecurity: Not on file   Transportation Needs: Not on file   Physical Activity: Not on file   Stress: Not on file   Social Connections: Not on file   Intimate Partner Violence: Not on file   Housing Stability: Not on file     Vitals:    08/23/23 1539   BP: 128/64   Pulse: 75   Resp: 16   SpO2: 96%   Weight: 110 kg (242 lb 12.8 oz)   Height: 5' 7" (1.702 m)     Results for orders placed or performed in visit on 06/14/23   Creatinine, urine, 24 hour   Result Value Ref Range    Creatinine, 24H Ur 2.6 (H) 0.8 - 1.8 g/24Hr    TOTAL URINE VOLUME 2,300 ml   Protein, urine, 24 hour   Result Value Ref Range    24H Urine Volume 2,300 mL    Protein, 24H Urine 253 (H) 40 - 150 mg/24 hrs     Weight (last 2 days)     None        Body mass index is 38.03 kg/m². BP      Temp      Pulse     Resp      SpO2        Vitals:    08/23/23 1539   Weight: 110 kg (242 lb 12.8 oz)     Vitals:    08/23/23 1539   Weight: 110 kg (242 lb 12.8 oz)       /64   Pulse 75   Resp 16   Ht 5' 7" (1.702 m)   Wt 110 kg (242 lb 12.8 oz)   SpO2 96%   BMI 38.03 kg/m²          Physical Exam  Vitals and nursing note reviewed. Constitutional:       General: He is not in acute distress. Appearance: Normal appearance. He is well-developed. He is obese. He is not ill-appearing, toxic-appearing or diaphoretic. HENT:      Head: Normocephalic and atraumatic. Right Ear: External ear normal.      Left Ear: External ear normal.   Eyes:      General: No scleral icterus. Right eye: No discharge. Left eye: No discharge. Conjunctiva/sclera: Conjunctivae normal.      Pupils: Pupils are equal, round, and reactive to light. Cardiovascular:      Rate and Rhythm: Normal rate and regular rhythm. Heart sounds: Normal heart sounds. No murmur heard. No friction rub. No gallop. Pulmonary:      Effort: No respiratory distress. Breath sounds: No wheezing or rales. Abdominal:      General: Bowel sounds are normal. There is no distension. Palpations: Abdomen is soft. There is no mass. Tenderness:  There is no abdominal tenderness. There is no guarding or rebound. Musculoskeletal:         General: No deformity. Cervical back: Neck supple. Lymphadenopathy:      Cervical: No cervical adenopathy. Neurological:      Mental Status: He is alert.

## 2023-08-27 PROBLEM — R80.8 OTHER PROTEINURIA: Status: ACTIVE | Noted: 2023-08-27

## 2023-08-27 NOTE — ASSESSMENT & PLAN NOTE
Back exercises, core muscle strengthening, lumbar support during exercise he has worked pain management which has been helpful

## 2023-08-27 NOTE — ASSESSMENT & PLAN NOTE
reviewed 24-hour urine collection positive for mild proteinuria patient does have appointment with nephrology is concerned hypothyroidism might be contributory we will be checking thyroid function testing this visit

## 2023-08-29 ENCOUNTER — TELEPHONE (OUTPATIENT)
Dept: PAIN MEDICINE | Facility: CLINIC | Age: 63
End: 2023-08-29

## 2023-08-29 NOTE — TELEPHONE ENCOUNTER
We will see how the patient does over the next week.   Okay to take ibuprofen 600 mg every 8 hours as needed

## 2023-08-29 NOTE — TELEPHONE ENCOUNTER
Patient Reports   70      %     improvement post injection    Pain Level  1-2   /10  Has been taking Ibuprofen, is that ok ?  Can L/M

## 2023-08-30 ENCOUNTER — LAB (OUTPATIENT)
Dept: LAB | Age: 63
End: 2023-08-30
Payer: COMMERCIAL

## 2023-08-30 ENCOUNTER — HOSPITAL ENCOUNTER (OUTPATIENT)
Dept: RADIOLOGY | Age: 63
Discharge: HOME | End: 2023-08-30
Payer: COMMERCIAL

## 2023-08-30 DIAGNOSIS — R53.83 OTHER FATIGUE: ICD-10-CM

## 2023-08-30 DIAGNOSIS — R91.8 PULMONARY NODULES: ICD-10-CM

## 2023-08-30 LAB
T4 FREE SERPL-MCNC: 0.76 NG/DL (ref 0.61–1.12)
TSH SERPL DL<=0.05 MIU/L-ACNC: 3.93 UIU/ML (ref 0.45–4.5)

## 2023-08-30 PROCEDURE — 84439 ASSAY OF FREE THYROXINE: CPT

## 2023-08-30 PROCEDURE — 36415 COLL VENOUS BLD VENIPUNCTURE: CPT

## 2023-08-30 PROCEDURE — 86376 MICROSOMAL ANTIBODY EACH: CPT

## 2023-08-30 PROCEDURE — G1004 CDSM NDSC: HCPCS

## 2023-08-30 PROCEDURE — 84443 ASSAY THYROID STIM HORMONE: CPT

## 2023-08-30 PROCEDURE — 71250 CT THORAX DX C-: CPT

## 2023-08-31 LAB — THYROPEROXIDASE AB SERPL-ACNC: <9 IU/ML (ref 0–34)

## 2023-09-05 DIAGNOSIS — N52.9 ERECTILE DYSFUNCTION, UNSPECIFIED ERECTILE DYSFUNCTION TYPE: ICD-10-CM

## 2023-09-05 RX ORDER — TADALAFIL 20 MG/1
TABLET ORAL
Qty: 30 TABLET | Refills: 0 | Status: SHIPPED | OUTPATIENT
Start: 2023-09-05

## 2023-09-28 ENCOUNTER — TELEPHONE (OUTPATIENT)
Dept: CARDIOLOGY CLINIC | Facility: CLINIC | Age: 63
End: 2023-09-28

## 2023-09-28 DIAGNOSIS — E78.5 HYPERLIPIDEMIA, UNSPECIFIED HYPERLIPIDEMIA TYPE: Primary | ICD-10-CM

## 2023-09-28 NOTE — TELEPHONE ENCOUNTER
P/C would like to have his   Cardio IQ(R) Advanced Lipid Panel  And if other bw is needed. Pt script had .    Pt has an appt in Oct    Please advise

## 2023-10-03 DIAGNOSIS — N52.9 ERECTILE DYSFUNCTION, UNSPECIFIED ERECTILE DYSFUNCTION TYPE: ICD-10-CM

## 2023-10-03 RX ORDER — TADALAFIL 20 MG/1
TABLET ORAL
Qty: 30 TABLET | Refills: 0 | Status: SHIPPED | OUTPATIENT
Start: 2023-10-03

## 2023-10-05 ENCOUNTER — OFFICE VISIT (OUTPATIENT)
Dept: CARDIOLOGY CLINIC | Facility: CLINIC | Age: 63
End: 2023-10-05
Payer: COMMERCIAL

## 2023-10-05 VITALS
WEIGHT: 234.6 LBS | HEART RATE: 79 BPM | BODY MASS INDEX: 36.82 KG/M2 | SYSTOLIC BLOOD PRESSURE: 128 MMHG | OXYGEN SATURATION: 97 % | DIASTOLIC BLOOD PRESSURE: 76 MMHG | HEIGHT: 67 IN

## 2023-10-05 DIAGNOSIS — E78.5 HYPERLIPIDEMIA, UNSPECIFIED HYPERLIPIDEMIA TYPE: Primary | ICD-10-CM

## 2023-10-05 PROCEDURE — 99214 OFFICE O/P EST MOD 30 MIN: CPT | Performed by: INTERNAL MEDICINE

## 2023-10-05 PROCEDURE — 93000 ELECTROCARDIOGRAM COMPLETE: CPT | Performed by: INTERNAL MEDICINE

## 2023-10-05 NOTE — PROGRESS NOTES
Cardiology Follow Up    Sindy Sloan  1960  [id number]  [facility]  [address] [place] [address] 101  400 43[facility]  [phone]    1. Hyperlipidemia, unspecified hyperlipidemia type  POCT ECG    CT coronary calcium score          Diagnoses and all orders for this visit:    Hyperlipidemia, unspecified hyperlipidemia type  -     POCT ECG  -     CT coronary calcium score; Future      I had the pleasure of seeing Sindy Sloan for a follow up visit. Interval History: none    History of the presenting illness, Discussion/Summary and My Plan are as follows:::    He is a pleasant now 64-year-old gentleman without a history of hypertension but with mild dyslipidemia with elevated LDL particle number and small LDL, which with lifestyle changes has improved. He did have prediabetes with his A1c was around 5.7 - most recently 5.6 (April 2022). Also has idiopathic cystic disease of the lung and SWAPNIL - sees       In 2017 lipid profile showed a total cholesterol 196, triglycerides 107, HDL 50,    in February 2017- underwent an advanced lipid profile that showed high LDL particle number-1500 (<1000), high small LDL-531 (50th percentile). March 2018 -total cholesterol 166, triglycerides 78, average LDL size-214, density pattern -pattern B    May 2019: Total cholesterol 168, triglycerides 98, HDL 51, LDL 98, non , LDL particle 1278 (optimal< 1138, moderate 4517-4117), small  (optimal< 142), LDL density pattern B, LDL size to 15 (optimal>223), apolipoprotein B 86 (optimal<80), lipoprotein a:  13 (<75)     March 2020:   Total cholesterol 183, triglycerides 131, HDL 45,      FOR COMPARISON:    May 2019:   LDL particle 1278 (optimal< 1138, moderate 2135-4903), small  (optimal< 142)     August 2020: LDL: 98, LDL particle-910, small LDL-197, both have improved since the last 1 in May 2019    Cardio IQ-advanced lipid profile-July 2021    Total cholesterol 169, HDL 45, triglycerides 102, LDL direct 115, LDL particle 1513, small , Apo B 90, lipoprotein (a) 17    April 2022: Total cholesterol 149, triglycerides 151, HDL 46,     Advanced lipid panel-   August 2022 Total cholesterol 163, HDL 36, triglycerides 156,  calculated , , small , Apo B  87, lipoprotein (a)  12    At baseline, he is physically active now doing weights now limited by back spasms ,  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 - apparently he is a carrier for alpha-1 antitrypsin deficiency  And is due for another CT scan. Family history of coronary artery disease in his father in his 76s, possibly in his mother also in her 76s, no family history of premature vascular disease in first-degree relatives.     He did have an exercise stress echo test in 2013-12 minutes-13.4 METs, no ischemia, remains physically active and is asymptomatic. Normal echo in 2017. Continue to be active doing cardio and weights although more limited by back pain but remains asymptomatic.     Plan:     Dyslipidemia:  Direct LDL, 'apo B both within normal limits, marginally low LDL size,   LDL particle number is elevated but improved by 2020 compared to 2017 but increased again By 2021 and has remained stable since then   A1c has been stable. He remains on fish oil alone. Overall risk profile is still intermediate considering that overall his LDL level is normal. However he does have the typical pattern that is found in patients with insulin resistance/metabolic syndrome. His advanced lipid panel has not been completely resulted yet but HDL has improved slightly, non-HDL and LDL have been able stable or improved. Await the rest of the panel. 2017-Calcium score was 1- 29th percentile for age matched controls, this is reassuring. His personal preference is to stay off a statin.   This is reasonable based on the last guideline statement from 2019. Now that it is 6 years since his last calcium score in 2017, will repeat        Therapeutic lifestyle changes were reiterated with the patient- Specifically:  1. Decreasing saturated fat intake to less than 13 g per day. Watch intake of cheese  2. Increase soluble fiber in the diet-beans, pears, oatmeal  3. Weight loss of even 5-10 pounds will also help. Decrease caloric intake by about 100 brandon a day-should lead to a weight loss of 1-2 pounds over one month -   This is very important if we need to avoid any medications in the future. 4. Increase aerobic physical activity -   Does mostly aerobic activity -doing weights. The above changes will decrease his LDL, change the pattern of his LDL subfractions (to lower small LDL levels and particle number) as well as decrease his risk helping diabetes. Last stress test was in 2013, he remains asymptomatic at good levels of physical exertion but considering dyslipidemia, remains asymptomatic    Lifestyle changes are to continue -weight has stayed stable over the last 2 years     His numbers were much better in   April 2022,  and hence will continue to follow with lifestyle changes and await pending results  Holding off on routine stress test, while awaiting calcium score,, currently remains active without any symptom    Will order an advanced lipid profile for next year -patient will call next year for an updated new prescription for a cardio IQ panel    Follow-up in 1 year    Results for Lorella Litten (MRN [id number]) as of 7/9/2020 08:20   Ref.  Range 1/25/2017 12:45 7/26/2017 09:58 3/8/2018 08:15 8/13/2018 08:12 2/22/2019 08:26 5/28/2019 08:48 9/3/2019 09:38 3/10/2020 07:33   Cholesterol Latest Ref Range: 50 - 200 mg/dL 196 172 166 168 176 168 176 183   Triglycerides Latest Ref Range: <=150 mg/dL  131 78 109 110 98 114 131   HDL Latest Ref Range: >=40 mg/dL  41 48 46 49 51 49 45   HDL-P(TOTAL) Latest Ref Range: >=30.5 umol/L 28.4 (L) HDL CHOLESTEROL LIPOPROTEIN Latest Ref Range: >39 mg/dL 43          Non-HDL Cholesterol Latest Ref Range: <130 mg/dL (calc)  131 118   117     LDL CHOLESTEROL Latest Units: mg/dL  105         LDL Calculated Latest Ref Range: 0 - 100 mg/dL 131 (H)  101 (H) 100 105 (H) 98 104 (H) 112 (H)   LDL DENSITY PATTERN Latest Ref Range: A Pattern  B (A) B (A)   B (A)     Small LDL-P Latest Ref Range: <=527 nmol/L 531 (H)          LDL SIZE Latest Ref Range: > OR = 217.4 Angstrom 20.8 217.4 (L) 214.0 (L)   215.0 (L)     Chol HDLC Ratio Latest Ref Range: <5.0 calc  4.2 3.5   3.3     APOLIPOPROTEIN B Latest Ref Range: 52 - 109 mg/dL  83 84   86     LP-IR SCORE Latest Ref Range: <=45  52 (H)          LDL PARTICLE NUMBER Latest Ref Range: 1016 - 2185 nmol/L  1300         LIPOPROTEIN (A) Latest Ref Range: <75 nmol/L  24 16   13     Triglycerides Latest Ref Range: 0 - 149 mg/dL 111               Latest Reference Range & Units 10/31/22 08:38 05/11/23 07:56   Hemoglobin A1C  5.8 (H) 5.6   eAG, EST AVG Glucose mg/dl 120 114   (H): Data is abnormally high        Patient Active Problem List   Diagnosis   • Abnormal blood sugar   • Enlarged prostate without lower urinary tract symptoms (luts)   • Esophageal reflux   • Obesity   • Sleep apnea   • Hyperlipidemia   • Screening PSA (prostate specific antigen)   • Seasonal allergic rhinitis   • Vitamin D deficiency   • Plantar fasciitis of right foot   • Screening for diabetes mellitus   • Need for vaccination   • Class 2 obesity due to excess calories without serious comorbidity with body mass index (BMI) of 35.0 to 35.9 in adult   • Exercise-induced asthma   • Gastroesophageal reflux disease without esophagitis   • Prediabetes   • Mild intermittent asthma without complication   • Seasonal allergies   • Carrier of alpha-1-antitrypsin deficiency   • Multiple idiopathic cysts of lung   • Dyspnea on exertion   • COVID-19   • Plantar fasciitis   • Abnormal laboratory test   • Vitamin B12 deficiency   • Pulmonary nodules   • Left ankle sprain   • Mid back pain   • Erectile dysfunction   • Chronic right-sided low back pain with right-sided sciatica   • Low back pain with sciatica   • Myofascial pain   • Bronchitis   • Lumbar radiculopathy   • SWAPNIL (obstructive sleep apnea)   • Wellness examination   • Other fatigue   • Fixed dilated pupils of both eyes   • Tremor   • Chronic low back pain without sciatica   • Other proteinuria     Past Medical History:   Diagnosis Date   • Asthma    • Erectile dysfunction of non-organic origin     Resolved: 10/15/2014     Social History     Socioeconomic History   • Marital status: /Civil Union     Spouse name: Not on file   • Number of children: Not on file   • Years of education: Not on file   • Highest education level: Not on file   Occupational History   • Not on file   Tobacco Use   • Smoking status: Former     Packs/day: 1.00     Years: 8.00     Total pack years: 8.00     Types: Cigarettes     Start date: 1971     Quit date: 1979     Years since quittin.6   • Smokeless tobacco: Never   • Tobacco comments:     He smoked a pack a day of cigarettes for 8 years. He quite at the age of 22. Occasional cigar use.    Vaping Use   • Vaping Use: Never used   Substance and Sexual Activity   • Alcohol use: Yes     Comment: Social   • Drug use: Never   • Sexual activity: Yes   Other Topics Concern   • Not on file   Social History Narrative    Caffeine use    Work-related stress     Social Determinants of Health     Financial Resource Strain: Not on file   Food Insecurity: Not on file   Transportation Needs: Not on file   Physical Activity: Not on file   Stress: Not on file   Social Connections: Not on file   Intimate Partner Violence: Not on file   Housing Stability: Not on file      Family History   Problem Relation Age of Onset   • Asthma Mother    • Heart disease Mother    • Rheum arthritis Mother    • Osteoporosis Mother    • Diabetes Father    • Heart disease Father    • Diabetes type II Father    • Lung disease Sister         Bronchiectasis. No clear history of cystic lung disease however   • Diabetes Sister    • Other Son         Thyroid disorder     Past Surgical History:   Procedure Laterality Date   • EYE SURGERY     • TONSILLECTOMY AND ADENOIDECTOMY     • UMBILICAL HERNIA REPAIR         Current Outpatient Medications:   •  albuterol (2.5 mg/3 mL) 0.083 % nebulizer solution, Inhale 2.5 mg every 6 (six) hours as needed for wheezing or shortness of breath, Disp: , Rfl:   •  albuterol (PROVENTIL HFA,VENTOLIN HFA) 90 mcg/act inhaler, INHALE 2 PUFFS EVERY 6 HOURS AS NEEDED FOR WHEEZING, Disp: 1 g, Rfl: 1  •  Cholecalciferol (VITAMIN D3) 5000 units CAPS, Take 5,000 Units by mouth daily, Disp: , Rfl:   •  Cinnamon 500 MG capsule, Take 500 mg by mouth daily, Disp: , Rfl:   •  cyanocobalamin (VITAMIN B-12) 1000 MCG tablet, Take 1,000 mcg by mouth daily, Disp: , Rfl:   •  fluticasone (FLONASE) 50 mcg/act nasal spray, 2 sprays into each nostril daily, Disp: 1 g, Rfl: 0  •  levocetirizine (XYZAL) 5 MG tablet, TAKE 1 TABLET BY MOUTH EVERY DAY IN THE EVENING, Disp: 30 tablet, Rfl: 3  •  montelukast (SINGULAIR) 10 mg tablet, TAKE 1 TABLET BY MOUTH EVERYDAY AT BEDTIME, Disp: 30 tablet, Rfl: 4  •  Omega-3 Fatty Acids (FISH OIL) 1,000 mg, Take 3 capsules by mouth daily, Disp: , Rfl:   •  tadalafil (CIALIS) 20 MG tablet, TAKE ONE TABLET BY MOUTH DAILY AS NEEDED FOR ERECTILE DYSFUNCTION, Disp: 30 tablet, Rfl: 0  •  albuterol (ProAir HFA) 90 mcg/act inhaler, Inhale 2 puffs every 6 (six) hours as needed for wheezing Use 2 puffs prior to exercise. (Patient not taking: Reported on 3/27/2023), Disp: 18 g, Rfl: 5  •  fluticasone (FLONASE) 50 mcg/act nasal spray, TWO SPRAYS EACH NOSTRIL ONCE DAILY AS NEEDED.  (Patient not taking: Reported on 10/5/2023), Disp: 16 mL, Rfl: 11  •  ibuprofen (MOTRIN) 200 mg tablet, Take 200 mg by mouth every 6 (six) hours as needed for mild pain, Disp: , Rfl:   No Known Allergies    Imaging: No results found. Review of Systems:  Review of Systems   Constitutional: Negative. HENT: Negative. Eyes: Negative. Respiratory: Negative. Cardiovascular: Negative. Gastrointestinal: Negative. Endocrine: Negative. Musculoskeletal: Negative. Allergic/Immunologic: Negative. Hematological: Negative. Physical Exam:  /76 (BP Location: Left arm, Patient Position: Sitting, Cuff Size: Large)   Pulse 79   Ht 5' 7" (1.702 m)   Wt 106 kg (234 lb 9.6 oz)   SpO2 97%   BMI 36.74 kg/m²   Physical Exam  Constitutional:       General: He is not in acute distress. Appearance: He is well-developed. He is not diaphoretic. HENT:      Head: Normocephalic and atraumatic. Eyes:      General: No scleral icterus. Right eye: No discharge. Left eye: No discharge. Conjunctiva/sclera: Conjunctivae normal.      Pupils: Pupils are equal, round, and reactive to light. Neck:      Thyroid: No thyromegaly. Vascular: No JVD. Trachea: No tracheal deviation. Cardiovascular:      Rate and Rhythm: Normal rate and regular rhythm. Heart sounds: No murmur heard. No friction rub. Pulmonary:      Effort: Pulmonary effort is normal. No respiratory distress. Breath sounds: Normal breath sounds. No stridor. Abdominal:      General: Bowel sounds are normal. There is no distension. Palpations: Abdomen is soft. Tenderness: There is no abdominal tenderness. There is no guarding. Musculoskeletal:         General: No deformity. Normal range of motion. Cervical back: Normal range of motion. Right lower leg: Edema (1 plus) present. Left lower leg: Edema (1 plus) present. Skin:     General: Skin is warm. Coloration: Skin is not pale. Findings: No erythema or rash.

## 2023-10-07 LAB
CHOLEST/HDLC SERPL: 3.9 CALC
LDLC SERPL CALC-MCNC: 100 MG/DL (CALC)
NONHDLC SERPL-MCNC: 120 MG/DL (CALC)

## 2023-10-11 ENCOUNTER — OFFICE VISIT (OUTPATIENT)
Dept: NEPHROLOGY | Facility: CLINIC | Age: 63
End: 2023-10-11

## 2023-10-11 VITALS
SYSTOLIC BLOOD PRESSURE: 140 MMHG | WEIGHT: 236 LBS | HEIGHT: 67 IN | BODY MASS INDEX: 37.04 KG/M2 | DIASTOLIC BLOOD PRESSURE: 76 MMHG

## 2023-10-11 DIAGNOSIS — R80.8 OTHER PROTEINURIA: Primary | ICD-10-CM

## 2023-10-11 DIAGNOSIS — E55.9 VITAMIN D DEFICIENCY: ICD-10-CM

## 2023-10-11 DIAGNOSIS — R94.4 DECREASED GFR: ICD-10-CM

## 2023-10-11 DIAGNOSIS — E87.8 NARROW ANION GAP: ICD-10-CM

## 2023-10-11 NOTE — PATIENT INSTRUCTIONS
Proteinuria - 24 hr urine protein 253 mg as of June 14, 2023-mildly elevated ? Age related vs exercise related  -could not provide in office urine sample  -will check formal UA with microscopy  -some proteinuria can be noted in men with aging. Check UpCr and microalb:Cr  -prior UA bland  -avoid nonsteroidals including ibuprofen, aleve, advil, motrin, naproxen, celebrex, indomethacin, toradol  -sCr 1, with eGFFR 78ml/min - likely age related nephron loss vs increased muscle mass    2. Low anion gap - present since May 2023, repeat BMP, pursue myeloma work up if still low. Of note, not anemic with last Hgb 13.7 as of May 2023    3. Vitamin D deficiency - last level normal range as of 5/11/23, remains on 5000u vitamin D    4. Borderline BP in office - systolic 465, possibly due to anxiety or pain    5. Prediabetes - improving A1C, per PCP    6. ? CKD stage 2 vs increased muscle mass - check cystatin C to calculate eGFR per CKD EPI equation    RTC in 6 months.

## 2023-10-11 NOTE — PROGRESS NOTES
NEPHROLOGY OUTPATIENT CONSULTATION   Mukesh Diaz 70 y.o. male MRN: [id number]  Date: 10/11/2023  Reason for consultation:   Chief Complaint   Patient presents with    Consult    High levels of protein in urine        Patient instructions:  Patient Instructions   Proteinuria - 24 hr urine protein 253 mg as of June 14, 2023-mildly elevated ? Age related vs exercise related  -could not provide in office urine sample  -will check formal UA with microscopy  -some proteinuria can be noted in men with aging. Check UpCr and microalb:Cr  -prior UA bland  -avoid nonsteroidals including ibuprofen, aleve, advil, motrin, naproxen, celebrex, indomethacin, toradol  -sCr 1, with eGFFR 78ml/min - likely age related nephron loss vs increased muscle mass    2. Low anion gap - present since May 2023, repeat BMP, pursue myeloma work up if still low. Of note, not anemic with last Hgb 13.7 as of May 2023    3. Vitamin D deficiency - last level normal range as of 5/11/23, remains on 5000u vitamin D    4. Borderline BP in office - systolic 033, possibly due to anxiety or pain    5. Prediabetes - improving A1C, per PCP    6. ? CKD stage 2 vs increased muscle mass - check cystatin C to calculate eGFR per CKD EPI equation    RTC in 6 months. Adriana Frazier was seen today for consult and high levels of protein in urine . Diagnoses and all orders for this visit:    Other proteinuria  -     Basic metabolic panel; Future  -     Cystatin C With eGFR; Future  -     Urinalysis with microscopic; Future  -     Protein / creatinine ratio, urine; Future  -     Albumin / creatinine urine ratio; Future    Narrow anion gap    Decreased GFR  -     Basic metabolic panel; Future  -     Cystatin C With eGFR; Future    Vitamin D deficiency        ASSESSMENT and PLAN:  Proteinuria - 24 hr urine protein 253 mg as of June 14, 2023-mildly elevated ?  Age related vs exercise related  -could not provide in office urine sample  -will check formal UA with microscopy  -some proteinuria can be noted in men with aging. Check UpCr and microalb:Cr  -prior UA bland  -avoid nonsteroidals including ibuprofen, aleve, advil, motrin, naproxen, celebrex, indomethacin, toradol  -sCr 1, with eGFFR 78ml/min - likely age related nephron loss vs increased muscle mass    2. Low anion gap - present since May 2023, repeat BMP, pursue myeloma work up if still low. Of note, not anemic with last Hgb 13.7 as of May 2023    3. Vitamin D deficiency - last level normal range as of 5/11/23, remains on 5000u vitamin D    4. Borderline BP in office - systolic 473, possibly due to anxiety or pain    5. Prediabetes - improving A1C, per PCP    6. ? CKD stage 2 vs increased muscle mass - check cystatin C to calculate eGFR per CKD EPI equation    HISTORY OF PRESENT ILLNESS:  Requesting Physician: Reine Goldmann, DO    Shona Marrufo is a 70 y.o. male with history of asthma who presents in consultation for proteinuria. Denies use of herbal/OTC medications, history of UTIs or nephrolithiasis. Denies history of prior known kidney disease. Does use ibuprofen for pain, has only used it twice in the last month. Took hot shower today for back pain. Worked as . Had lifted heavy weights and hurt his back. Does primarily weight lifting since age 23 or 21. Has not taken protein shakes in years. Has a protein bar (20g). No creatine. No other supplement. Does not use pre work out. Is trying to drink more water now.     PAST MEDICAL HISTORY:  Past Medical History:   Diagnosis Date    Asthma     Erectile dysfunction of non-organic origin     Resolved: 10/15/2014       PAST SURGICAL HISTORY:  Past Surgical History:   Procedure Laterality Date    EYE SURGERY      TONSILLECTOMY AND ADENOIDECTOMY      UMBILICAL HERNIA REPAIR         ALLERGIES:  No Known Allergies    SOCIAL HISTORY:  Social History     Substance and Sexual Activity   Alcohol Use Yes    Comment: Social     Social History Substance and Sexual Activity   Drug Use Never     Social History     Tobacco Use   Smoking Status Former    Packs/day: 1.00    Years: 8.00    Total pack years: 8.00    Types: Cigarettes    Start date: 1971    Quit date: 1979    Years since quittin.6   Smokeless Tobacco Never   Tobacco Comments    He smoked a pack a day of cigarettes for 8 years. He quite at the age of 22. Occasional cigar use. FAMILY HISTORY:  Family History   Problem Relation Age of Onset    Asthma Mother     Heart disease Mother     Rheum arthritis Mother     Osteoporosis Mother     Diabetes Father     Heart disease Father     Diabetes type II Father     Lung disease Sister         Bronchiectasis.   No clear history of cystic lung disease however    Diabetes Sister     Other Son         Thyroid disorder       MEDICATIONS:    Current Outpatient Medications:     albuterol (2.5 mg/3 mL) 0.083 % nebulizer solution, Inhale 2.5 mg every 6 (six) hours as needed for wheezing or shortness of breath, Disp: , Rfl:     albuterol (PROVENTIL HFA,VENTOLIN HFA) 90 mcg/act inhaler, INHALE 2 PUFFS EVERY 6 HOURS AS NEEDED FOR WHEEZING, Disp: 1 g, Rfl: 1    Cholecalciferol (VITAMIN D3) 5000 units CAPS, Take 5,000 Units by mouth daily, Disp: , Rfl:     Cinnamon 500 MG capsule, Take 500 mg by mouth daily, Disp: , Rfl:     cyanocobalamin (VITAMIN B-12) 1000 MCG tablet, Take 1,000 mcg by mouth daily, Disp: , Rfl:     fluticasone (FLONASE) 50 mcg/act nasal spray, 2 sprays into each nostril daily, Disp: 1 g, Rfl: 0    ibuprofen (MOTRIN) 200 mg tablet, Take 200 mg by mouth every 6 (six) hours as needed for mild pain, Disp: , Rfl:     levocetirizine (XYZAL) 5 MG tablet, Take 1 tablet (5 mg total) by mouth every evening, Disp: 30 tablet, Rfl: 5    montelukast (SINGULAIR) 10 mg tablet, TAKE 1 TABLET BY MOUTH EVERYDAY AT BEDTIME, Disp: 30 tablet, Rfl: 4    Omega-3 Fatty Acids (FISH OIL) 1,000 mg, Take 3 capsules by mouth daily, Disp: , Rfl:     tadalafil (CIALIS) 20 MG tablet, TAKE ONE TABLET BY MOUTH DAILY AS NEEDED FOR ERECTILE DYSFUNCTION, Disp: 30 tablet, Rfl: 0    albuterol (ProAir HFA) 90 mcg/act inhaler, Inhale 2 puffs every 6 (six) hours as needed for wheezing Use 2 puffs prior to exercise. (Patient not taking: Reported on 3/27/2023), Disp: 18 g, Rfl: 5    fluticasone (FLONASE) 50 mcg/act nasal spray, TWO SPRAYS EACH NOSTRIL ONCE DAILY AS NEEDED. (Patient not taking: Reported on 10/5/2023), Disp: 16 mL, Rfl: 11    REVIEW OF SYSTEMS:  Review of Systems   Constitutional:  Negative for chills and fever. HENT:  Positive for postnasal drip. Negative for sore throat. Eyes:  Negative for visual disturbance. Respiratory:  Positive for cough. Negative for shortness of breath. Cardiovascular:  Positive for leg swelling (left ankle since twisted ankle 1.5 years ago). Negative for chest pain. Gastrointestinal:  Negative for abdominal pain, constipation, diarrhea, nausea and vomiting. Endocrine: Negative for polyuria. Genitourinary:  Negative for decreased urine volume, difficulty urinating, dysuria and hematuria. Musculoskeletal:  Positive for back pain (getting epidural shots in back which helps). Negative for myalgias. Skin:  Negative for rash. Neurological:  Negative for dizziness, light-headedness and numbness. Psychiatric/Behavioral:  Negative for confusion. PHYSICAL EXAM:   Vitals:    10/11/23 0858   BP: 140/76   BP Location: Left arm   Patient Position: Sitting   Cuff Size: Adult   Weight: 107 kg (236 lb)   Height: 5' 7" (1.702 m)     Body mass index is 36.96 kg/m². Physical Exam  Vitals reviewed. Constitutional:       General: He is not in acute distress. Appearance: Normal appearance. He is well-developed. He is not diaphoretic. HENT:      Head: Normocephalic and atraumatic. Nose: Nose normal.      Mouth/Throat:      Mouth: Mucous membranes are moist.      Pharynx: No oropharyngeal exudate.    Eyes:      General: No scleral icterus. Right eye: No discharge. Left eye: No discharge. Comments: eyeglasses   Neck:      Thyroid: No thyromegaly. Cardiovascular:      Rate and Rhythm: Normal rate and regular rhythm. Heart sounds: Normal heart sounds. Pulmonary:      Effort: Pulmonary effort is normal.      Breath sounds: Normal breath sounds. No wheezing or rales. Abdominal:      General: Bowel sounds are normal. There is no distension. Palpations: Abdomen is soft. Tenderness: There is no abdominal tenderness. Musculoskeletal:         General: Swelling (trace left ankle) present. Normal range of motion. Cervical back: Neck supple. Lymphadenopathy:      Cervical: No cervical adenopathy. Skin:     General: Skin is warm and dry. Findings: No rash. Neurological:      General: No focal deficit present. Mental Status: He is alert. Comments: awake   Psychiatric:         Mood and Affect: Mood normal.         Behavior: Behavior normal.           Laboratory results:   Lab Results   Component Value Date    SODIUM 137 06/02/2023    K 4.3 06/02/2023     06/02/2023    CO2 27 06/02/2023    BUN 19 06/02/2023    CREATININE 1.00 06/02/2023    GLUC 99 08/08/2020    CALCIUM 9.2 06/02/2023        Imaging: none relevant on file    Portions of the record may have been created with voice recognition software. Occasional wrong word or "sound a like" substitutions may have occurred due to the inherent limitations of voice recognition software. Read the chart carefully and recognize, using context, where substitutions have occurred.

## 2023-10-12 ENCOUNTER — HOSPITAL ENCOUNTER (OUTPATIENT)
Dept: RADIOLOGY | Facility: HOSPITAL | Age: 63
Discharge: HOME | End: 2023-10-12
Attending: INTERNAL MEDICINE
Payer: COMMERCIAL

## 2023-10-12 DIAGNOSIS — E78.5 HYPERLIPIDEMIA, UNSPECIFIED HYPERLIPIDEMIA TYPE: ICD-10-CM

## 2023-10-12 PROCEDURE — G1004 CDSM NDSC: HCPCS

## 2023-10-12 PROCEDURE — 75571 CT HRT W/O DYE W/CA TEST: CPT

## 2023-10-13 LAB
APO B SERPL-MCNC: 88 MG/DL
CHOLEST SERPL-MCNC: 162 MG/DL
CHOLEST/HDLC SERPL: 3.9 CALC
HDLC SERPL-MCNC: 42 MG/DL
HLD.LARGE SERPL-SCNC: 6627 NMOL/L
LDL SERPL QN: 212.1 ANGSTROM
LDL SERPL-SCNC: 1612 NMOL/L
LDL SMALL SERPL-SCNC: 416 NMOL/L
LDLC REAL SIZE PAT SERPL: ABNORMAL PATTERN
LDLC SERPL CALC-MCNC: 100 MG/DL (CALC)
LPA SERPL-SCNC: <10 NMOL/L
NONHDLC SERPL-MCNC: 120 MG/DL (CALC)
SL AMB LDL MEDIUM: 314 NMOL/L
TRIGL SERPL-MCNC: 105 MG/DL

## 2023-10-18 ENCOUNTER — DOCUMENTATION (OUTPATIENT)
Dept: CARDIOLOGY CLINIC | Facility: MEDICAL CENTER | Age: 63
End: 2023-10-18

## 2023-10-18 NOTE — PROGRESS NOTES
Advanced lipid panel numbers on the current blood work-from 10/2/2023 are very similar since the last time they were checked in May 2023      Calcium score was 1 in 2019, 9 now in 2023--overall similar to prior, putting him in the 40-42nd percentile for  males (the report from [facility]-automatically assumes that Sonya Atkinson is a white male-hence there is a discrepancy in the percentile noted in the reports -28/29th percentile)    Current guidelines recommend using low dose of a statin medication if calcium score is 1-100 and below the 75th percentile    Statins have a low likelihood of muscle aches-about 1 and 4 to 1 and 5 patients going on a statin could develop side effects, but the majority of patients who have side effects are still able to tolerate a different statin or a different dose  There is about a 1% risk of accelerated onset of diabetes especially in people who are in the prediabetic range        If a nonstatin medication is desired since Sonya Atkinson has significant crepitation about statins and potential side effects, Zetia/ezetimibe is another option which is a nonstatin medication, causes about a 20% reduction in bad cholesterol/LDL levels.   This is a generic medication and should not be expensive    If a statin medication is acceptable, could use low-dose generic statin such as atorvastatin 10 mg (previously Lipitor) or rosuvastatin 5 mg (previously Crestor))      Discussion was held on the phone, currently he is vacationing in [place]        Result Notes             Component Ref Range & Units 2 wk ago  (10/2/23) 5 mo ago  (5/11/23) 11 mo ago  (10/31/22) 1 yr ago  (8/25/22) 1 yr ago  (4/13/22) 2 yr ago  (10/8/21) 2 yr ago  (7/7/21)   Total Cholesterol <200 mg/dL 162 164 R,  R,  149 R,  R,    HDL >39 mg/dL 42 42 R 42 R, CM 36 Low  42 R, CM 46 R, CM 45 R   Triglycerides <150 mg/dL 105 118 R,  R,  High  109 R,  High  R,    LDL Calculated <100 mg/dL (calc) 100 High  98 R,  High  R,  High  CM 85 R,  High  R,  High  CM   Comment: Desirable range <100 mg/dL for primary prevention; <70  mg/dL for patients with CHD or diabetic patients with >= 2  CHD risk factors. (http://[employer]/faq/NCL122)   Chol HDLC Ratio <5.0 calc 3.9   4.5 High  R   3.8   Non-HDL Cholesterol <130 mg/dL (calc) 120   127 CM   124 CM   Comment: For patients with diabetes plus 1 major ASCVD risk factor,  treating to a non-HDL-C goal of <100 mg/dL (LDL-C of <70  mg/dL) is considered a therapeutic option. For patients with diabetes plus 1 major ASCVD risk  factor, treating to a non-HDL-C goal of <100 mg/dL  (LDL-C of <70 mg/dL) is considered a therapeutic  option. LDL-P <1,138 nmol/L 1,612 High    1,684 High  CM   1,513 High  CM   Comment: Relative Risk: Optimal <1138; Moderate [id number]; High  >1409. Male and Female Reference Range: 1016 to 2185 nmol/L. LDL Small <142 nmol/L 416 High    398 High  CM   424 High  CM   Comment: Relative Risk: Optimal <142; Moderate 142-219; High >219. Male Reference Range:?123 to 441 nmol/L; Female Reference  Range: 115 to 386 nmol/L. LDL Medium <215 nmol/L 314 High    328 High  CM   347 High  CM   Comment: Relative Risk: Optimal <215; Moderate 215-301; High >301. Male Reference Range:?167 to 485 nmol/L; Female Reference  Range: 121 to 397 nmol/L. HDL Large >6,729 nmol/L 6,627 Low    6,763 CM   5,452 Low  CM   Comment: Relative Risk: Optimal >6729; Moderate [id number]; High  <5353. Male Reference VURQI:?2382 to 64242?nmol/L; Female  Reference Range: 5038 to 81218 nmol/L. LDL Density Pattern A Pattern B Abnormal    B Abnormal  CM   B Abnormal  CM   Comment: Relative Risk: Optimal Pattern A; High Pattern B.  Reference Range: Pattern A.   LDL Size >222.9 Angstrom 212.1 Low    212.7 Low  CM   213.3 Low  CM        Apolipoprotein B <90 mg/dL 88   87 CM   90 High  R, CM   Comment: Risk: Optimal <90 mg/dL; Moderate  mg/dL; High >= 120  mg/dL; Cardiovascular event risk category cut points  (optimal, moderate, high) are based on National Lipid  Association recommendations-  Bryce MARTIN et al. J of Clin  Lipid. 2015; 9: 129-169 and Leandro CEDILLO et al. Yariel Spare  Pract. 2017;23(Suppl 2):1-87. Lipoprotein (a) <75 nmol/L <10   12 CM   17 CM   Comment: Risk: Optimal <75 nmol/L; Moderate  nmol/L; High >125  nmol/L. Cardiovascular event risk category cut points  (optimal, moderate, high) are based on Shantanu Solorio Nettie  0811;72:516-993.    Resulting Agency  [facility] [address] [facility] [address] LAB BE LAB [facility] [address] [place] [address]

## 2023-10-20 ENCOUNTER — OFFICE VISIT (OUTPATIENT)
Dept: PULMONOLOGY | Facility: CLINIC | Age: 63
End: 2023-10-20

## 2023-10-20 VITALS
DIASTOLIC BLOOD PRESSURE: 68 MMHG | HEART RATE: 72 BPM | OXYGEN SATURATION: 99 % | BODY MASS INDEX: 36.87 KG/M2 | SYSTOLIC BLOOD PRESSURE: 128 MMHG | TEMPERATURE: 96.9 F | HEIGHT: 67 IN | WEIGHT: 234.9 LBS

## 2023-10-20 DIAGNOSIS — J98.4 MULTIPLE IDIOPATHIC CYSTS OF LUNG: Primary | ICD-10-CM

## 2023-10-20 DIAGNOSIS — G47.33 OBSTRUCTIVE SLEEP APNEA: ICD-10-CM

## 2023-10-20 DIAGNOSIS — J45.990 EXERCISE-INDUCED ASTHMA: ICD-10-CM

## 2023-10-20 DIAGNOSIS — R91.8 PULMONARY NODULES: ICD-10-CM

## 2023-10-20 PROBLEM — J45.20 MILD INTERMITTENT ASTHMA WITHOUT COMPLICATION: Status: RESOLVED | Noted: 2020-09-22 | Resolved: 2023-10-20

## 2023-10-20 PROBLEM — E87.8 NARROW ANION GAP: Status: RESOLVED | Noted: 2023-10-11 | Resolved: 2023-10-20

## 2023-10-20 PROBLEM — R89.9 ABNORMAL LABORATORY TEST: Status: RESOLVED | Noted: 2021-04-05 | Resolved: 2023-10-20

## 2023-10-20 PROBLEM — U07.1 COVID-19: Status: RESOLVED | Noted: 2021-02-06 | Resolved: 2023-10-20

## 2023-10-20 PROBLEM — Z23 NEED FOR VACCINATION: Status: RESOLVED | Noted: 2019-09-09 | Resolved: 2023-10-20

## 2023-10-20 PROBLEM — R06.09 DYSPNEA ON EXERTION: Status: RESOLVED | Noted: 2020-12-23 | Resolved: 2023-10-20

## 2023-10-20 PROBLEM — S93.402A LEFT ANKLE SPRAIN: Status: RESOLVED | Noted: 2021-10-12 | Resolved: 2023-10-20

## 2023-10-20 NOTE — PROGRESS NOTES
Pulmonary Follow-up   Elan Washington 70 y.o. male MRN: [id number]  10/20/2023    Assessment:  Pulmonary nodule  Recent CT showing 6 mm nodule in the right middle lobe  Multiple cysts of the lung  Recent CT chest on 10/12/2023 showing stable cysts  Alpha-1-antitrypsin deficiency carrier  Exercise-induced asthma  Currently on albuterol inhaler as needed  Prescribed by PMD  Obstructive sleep apnea  On CPAP at home  Class II obesity  Sleep study from 1/13/2021 showing AHI of 39.7    Plan:  - Follow-up in 8 months  - Repeat CT chest in 8 months  - Continue with albuterol inhaler as needed  - Instructed patient to clean his CPAP using soap and water rather than with the machine provided by [employer]    History of Present Illness   HPI:  Ealn Washington is a 70 y.o. male with past medical history of multiple cysts of the lung, alpha 1 antitrypsin deficiency carrier, exercise-induced asthma who presents to the pulmonary clinic for follow-up. Patient denies fever, chills, unintentional weight loss, night sweats, nausea, vomiting, chest pain, shortness of breath. Patient denies any changes in his activities of daily living, or exercise tolerance. Review of Systems   Constitutional:  Negative for fatigue, fever and unexpected weight change. HENT:  Negative for congestion. Respiratory:  Negative for cough and shortness of breath. Cardiovascular:  Negative for chest pain. Gastrointestinal:  Negative for abdominal pain. All other systems reviewed and are negative.       Occupational History: Retired     Historical Information   Past Medical History:   Diagnosis Date    Asthma     Erectile dysfunction of non-organic origin     Resolved: 10/15/2014     Past Surgical History:   Procedure Laterality Date    EYE SURGERY      TONSILLECTOMY AND ADENOIDECTOMY      UMBILICAL HERNIA REPAIR       Family History   Problem Relation Age of Onset    Asthma Mother     Heart disease Mother     Rheum arthritis Mother     Osteoporosis Mother     Diabetes Father     Heart disease Father     Diabetes type II Father     Lung disease Sister         Bronchiectasis. No clear history of cystic lung disease however    Diabetes Sister     Other Son         Thyroid disorder       Social History:   Social History     Tobacco Use   Smoking Status Former    Packs/day: 1.00    Years: 8.00    Total pack years: 8.00    Types: Cigarettes    Start date: 1971    Quit date: 1979    Years since quittin.6   Smokeless Tobacco Never   Tobacco Comments    He smoked a pack a day of cigarettes for 8 years. He quite at the age of 22. Occasional cigar use.        Meds/Allergies     Current Outpatient Medications:     albuterol (2.5 mg/3 mL) 0.083 % nebulizer solution, Inhale 2.5 mg every 6 (six) hours as needed for wheezing or shortness of breath, Disp: , Rfl:     albuterol (PROVENTIL HFA,VENTOLIN HFA) 90 mcg/act inhaler, INHALE 2 PUFFS EVERY 6 HOURS AS NEEDED FOR WHEEZING, Disp: 1 g, Rfl: 1    Cholecalciferol (VITAMIN D3) 5000 units CAPS, Take 5,000 Units by mouth daily, Disp: , Rfl:     Cinnamon 500 MG capsule, Take 500 mg by mouth daily, Disp: , Rfl:     cyanocobalamin (VITAMIN B-12) 1000 MCG tablet, Take 1,000 mcg by mouth daily, Disp: , Rfl:     fluticasone (FLONASE) 50 mcg/act nasal spray, TWO SPRAYS EACH NOSTRIL ONCE DAILY AS NEEDED., Disp: 16 mL, Rfl: 11    ibuprofen (MOTRIN) 200 mg tablet, Take 200 mg by mouth every 6 (six) hours as needed for mild pain, Disp: , Rfl:     levocetirizine (XYZAL) 5 MG tablet, Take 1 tablet (5 mg total) by mouth every evening, Disp: 30 tablet, Rfl: 5    montelukast (SINGULAIR) 10 mg tablet, TAKE 1 TABLET BY MOUTH EVERYDAY AT BEDTIME, Disp: 30 tablet, Rfl: 4    Omega-3 Fatty Acids (FISH OIL) 1,000 mg, Take 3 capsules by mouth daily, Disp: , Rfl:     tadalafil (CIALIS) 20 MG tablet, TAKE ONE TABLET BY MOUTH DAILY AS NEEDED FOR ERECTILE DYSFUNCTION, Disp: 30 tablet, Rfl: 0    albuterol (ProAir HFA) 90 mcg/act inhaler, Inhale 2 puffs every 6 (six) hours as needed for wheezing Use 2 puffs prior to exercise. (Patient not taking: Reported on 10/20/2023), Disp: 18 g, Rfl: 5    fluticasone (FLONASE) 50 mcg/act nasal spray, 2 sprays into each nostril daily (Patient not taking: Reported on 10/20/2023), Disp: 1 g, Rfl: 0  No Known Allergies    Vitals: Blood pressure 128/68, pulse 72, temperature (!) 96.9 °F (36.1 °C), temperature source Tympanic, height 5' 7" (1.702 m), weight 107 kg (234 lb 14.4 oz), SpO2 99 %. , Body mass index is 36.79 kg/m². Oxygen Therapy  SpO2: 99 %  Oxygen Therapy: None (Room air)    Physical Exam  Physical Exam  Constitutional:       Appearance: He is obese. HENT:      Head: Normocephalic and atraumatic. Cardiovascular:      Rate and Rhythm: Normal rate and regular rhythm. Pulses: Normal pulses. Heart sounds: Normal heart sounds. No murmur heard. No friction rub. No gallop. Pulmonary:      Effort: No respiratory distress. Breath sounds: No stridor. No wheezing, rhonchi or rales. Chest:      Chest wall: No tenderness. Musculoskeletal:         General: Normal range of motion. Skin:     Capillary Refill: Capillary refill takes less than 2 seconds. Neurological:      Mental Status: He is alert. Psychiatric:         Mood and Affect: Mood normal.       Labs: I have personally reviewed pertinent lab results.   Lab Results   Component Value Date    WBC 5.77 05/23/2023    HGB 13.7 05/23/2023    HCT 40.1 05/23/2023    MCV 90 05/23/2023     05/23/2023     Lab Results   Component Value Date    GLUCOSE 98 07/26/2015    CALCIUM 9.2 06/02/2023     07/26/2015    K 4.3 06/02/2023    CO2 27 06/02/2023     06/02/2023    BUN 19 06/02/2023    CREATININE 1.00 06/02/2023     No results found for: "IGE"  Lab Results   Component Value Date    ALT 29 05/11/2023    AST 19 05/11/2023    ALKPHOS 54 05/11/2023    BILITOT 0.58 07/26/2015       Imaging and other studies: I have personally reviewed pertinent films in PACS  CT coronary calcium score    Result Date: 10/15/2023  Impression: Total coronary calcium score equals 9. For more useful information regarding the prognostic significance of the calcium score, please consult the calculator at the website https://www.juanito-young.org/. aspx. Pulmonary emphysema. Workstation performed: [id number]     Pulmonary function testing:    PFT 1/21:  FEV1/FVC 73%, residual volume 78%, TLC 80%, RV/TLC ratio 32%, DLCO 73%. Findings consistent with mild restrictive airflow limitation however normal lung volumes, mildly impaired diffusion capacity. EKG, Pathology, and Other Studies: I have personally reviewed pertinent reports. Disclaimer: Portions of the record may have been created with voice recognition software. Occasional wrong word or "sound a like" substitutions may have occurred due to the inherent limitations of voice recognition software. Careful consideration should be taken to recognize, using context, where substitutions have occurred.     Deshaun Nicole DO   Pulmonary & Critical Care Medicine Fellow PGY-IV  [facility]

## 2023-10-20 NOTE — LETTER
October 20, 2023     Garima Coelho, [address] [address]    Patient: Daphney Inman   YOB: 1960   Date of Visit: 10/20/2023       Dear Dr. Noemi Bhardwja: Thank you for referring Bennett Diaz to me for evaluation. Below are my notes for this consultation. If you have questions, please do not hesitate to call me. I look forward to following your patient along with you. Sincerely,        Malinda Mott MD        CC: No Recipients    Mendoza Huffman, DO  10/20/2023 11:33 AM  Cosign Needed  Pulmonary Follow-up   Daphney Inman 70 y.o. male MRN: [id number]  10/20/2023    Assessment:  Pulmonary nodule  Recent CT showing 6 mm nodule in the right middle lobe  Multiple cysts of the lung  Recent CT chest on 10/12/2023 showing stable cysts  Alpha-1-antitrypsin deficiency carrier  Exercise-induced asthma  Currently on albuterol inhaler as needed  Prescribed by PMD  Obstructive sleep apnea  On CPAP at home  Class II obesity  Sleep study from 1/13/2021 showing AHI of 39.7    Plan:  - Follow-up in 8 months  - Repeat CT chest in 8 months  - Continue with albuterol inhaler as needed  - Instructed patient to clean his CPAP using soap and water rather than with the machine provided by [employer]    History of Present Illness  HPI:  Daphney Inman is a 70 y.o. male with past medical history of multiple cysts of the lung, alpha 1 antitrypsin deficiency carrier, exercise-induced asthma who presents to the pulmonary clinic for follow-up. Patient denies fever, chills, unintentional weight loss, night sweats, nausea, vomiting, chest pain, shortness of breath. Patient denies any changes in his activities of daily living, or exercise tolerance. Review of Systems   Constitutional:  Negative for fatigue, fever and unexpected weight change. HENT:  Negative for congestion. Respiratory:  Negative for cough and shortness of breath. Cardiovascular:  Negative for chest pain. Gastrointestinal:  Negative for abdominal pain. All other systems reviewed and are negative. Occupational History: Retired     Historical Information  Past Medical History:   Diagnosis Date   • Asthma    • Erectile dysfunction of non-organic origin     Resolved: 10/15/2014     Past Surgical History:   Procedure Laterality Date   • EYE SURGERY     • TONSILLECTOMY AND ADENOIDECTOMY     • UMBILICAL HERNIA REPAIR       Family History   Problem Relation Age of Onset   • Asthma Mother    • Heart disease Mother    • Rheum arthritis Mother    • Osteoporosis Mother    • Diabetes Father    • Heart disease Father    • Diabetes type II Father    • Lung disease Sister         Bronchiectasis. No clear history of cystic lung disease however   • Diabetes Sister    • Other Son         Thyroid disorder       Social History:   Social History     Tobacco Use   Smoking Status Former   • Packs/day: 1.00   • Years: 8.00   • Total pack years: 8.00   • Types: Cigarettes   • Start date: 1971   • Quit date: 1979   • Years since quittin.6   Smokeless Tobacco Never   Tobacco Comments    He smoked a pack a day of cigarettes for 8 years. He quite at the age of 22. Occasional cigar use.        Meds/Allergies    Current Outpatient Medications:   •  albuterol (2.5 mg/3 mL) 0.083 % nebulizer solution, Inhale 2.5 mg every 6 (six) hours as needed for wheezing or shortness of breath, Disp: , Rfl:   •  albuterol (PROVENTIL HFA,VENTOLIN HFA) 90 mcg/act inhaler, INHALE 2 PUFFS EVERY 6 HOURS AS NEEDED FOR WHEEZING, Disp: 1 g, Rfl: 1  •  Cholecalciferol (VITAMIN D3) 5000 units CAPS, Take 5,000 Units by mouth daily, Disp: , Rfl:   •  Cinnamon 500 MG capsule, Take 500 mg by mouth daily, Disp: , Rfl:   •  cyanocobalamin (VITAMIN B-12) 1000 MCG tablet, Take 1,000 mcg by mouth daily, Disp: , Rfl:   •  fluticasone (FLONASE) 50 mcg/act nasal spray, TWO SPRAYS EACH NOSTRIL ONCE DAILY AS NEEDED., Disp: 16 mL, Rfl: 11  •  ibuprofen (MOTRIN) 200 mg tablet, Take 200 mg by mouth every 6 (six) hours as needed for mild pain, Disp: , Rfl:   •  levocetirizine (XYZAL) 5 MG tablet, Take 1 tablet (5 mg total) by mouth every evening, Disp: 30 tablet, Rfl: 5  •  montelukast (SINGULAIR) 10 mg tablet, TAKE 1 TABLET BY MOUTH EVERYDAY AT BEDTIME, Disp: 30 tablet, Rfl: 4  •  Omega-3 Fatty Acids (FISH OIL) 1,000 mg, Take 3 capsules by mouth daily, Disp: , Rfl:   •  tadalafil (CIALIS) 20 MG tablet, TAKE ONE TABLET BY MOUTH DAILY AS NEEDED FOR ERECTILE DYSFUNCTION, Disp: 30 tablet, Rfl: 0  •  albuterol (ProAir HFA) 90 mcg/act inhaler, Inhale 2 puffs every 6 (six) hours as needed for wheezing Use 2 puffs prior to exercise. (Patient not taking: Reported on 10/20/2023), Disp: 18 g, Rfl: 5  •  fluticasone (FLONASE) 50 mcg/act nasal spray, 2 sprays into each nostril daily (Patient not taking: Reported on 10/20/2023), Disp: 1 g, Rfl: 0  No Known Allergies    Vitals: Blood pressure 128/68, pulse 72, temperature (!) 96.9 °F (36.1 °C), temperature source Tympanic, height 5' 7" (1.702 m), weight 107 kg (234 lb 14.4 oz), SpO2 99 %. , Body mass index is 36.79 kg/m². Oxygen Therapy  SpO2: 99 %  Oxygen Therapy: None (Room air)    Physical Exam  Physical Exam  Constitutional:       Appearance: He is obese. HENT:      Head: Normocephalic and atraumatic. Cardiovascular:      Rate and Rhythm: Normal rate and regular rhythm. Pulses: Normal pulses. Heart sounds: Normal heart sounds. No murmur heard. No friction rub. No gallop. Pulmonary:      Effort: No respiratory distress. Breath sounds: No stridor. No wheezing, rhonchi or rales. Chest:      Chest wall: No tenderness. Musculoskeletal:         General: Normal range of motion. Skin:     Capillary Refill: Capillary refill takes less than 2 seconds. Neurological:      Mental Status: He is alert. Psychiatric:         Mood and Affect: Mood normal.       Labs:  I have personally reviewed pertinent lab results. Lab Results   Component Value Date    WBC 5.77 05/23/2023    HGB 13.7 05/23/2023    HCT 40.1 05/23/2023    MCV 90 05/23/2023     05/23/2023     Lab Results   Component Value Date    GLUCOSE 98 07/26/2015    CALCIUM 9.2 06/02/2023     07/26/2015    K 4.3 06/02/2023    CO2 27 06/02/2023     06/02/2023    BUN 19 06/02/2023    CREATININE 1.00 06/02/2023     No results found for: "IGE"  Lab Results   Component Value Date    ALT 29 05/11/2023    AST 19 05/11/2023    ALKPHOS 54 05/11/2023    BILITOT 0.58 07/26/2015       Imaging and other studies: I have personally reviewed pertinent films in PACS  CT coronary calcium score    Result Date: 10/15/2023  Impression: Total coronary calcium score equals 9. For more useful information regarding the prognostic significance of the calcium score, please consult the calculator at the website https://www.juanito-young.org/. aspx. Pulmonary emphysema. Workstation performed: [id number]     Pulmonary function testing:    PFT 1/21:  FEV1/FVC 73%, residual volume 78%, TLC 80%, RV/TLC ratio 32%, DLCO 73%. Findings consistent with mild restrictive airflow limitation however normal lung volumes, mildly impaired diffusion capacity. EKG, Pathology, and Other Studies: I have personally reviewed pertinent reports. Disclaimer: Portions of the record may have been created with voice recognition software. Occasional wrong word or "sound a like" substitutions may have occurred due to the inherent limitations of voice recognition software. Careful consideration should be taken to recognize, using context, where substitutions have occurred.     Nilsa Chung DO   Pulmonary & Critical Care Medicine Fellow PGY-IV  [facility]

## 2023-10-22 ENCOUNTER — APPOINTMENT (OUTPATIENT)
Dept: LAB | Age: 63
End: 2023-10-22
Payer: COMMERCIAL

## 2023-10-22 DIAGNOSIS — R80.8 OTHER PROTEINURIA: ICD-10-CM

## 2023-10-22 DIAGNOSIS — R94.4 DECREASED GFR: ICD-10-CM

## 2023-10-22 LAB
ANION GAP SERPL CALCULATED.3IONS-SCNC: 5 MMOL/L
BACTERIA UR QL AUTO: ABNORMAL /HPF
BILIRUB UR QL STRIP: NEGATIVE
BUN SERPL-MCNC: 21 MG/DL (ref 5–25)
CALCIUM SERPL-MCNC: 9 MG/DL (ref 8.4–10.2)
CHLORIDE SERPL-SCNC: 105 MMOL/L (ref 96–108)
CLARITY UR: CLEAR
CO2 SERPL-SCNC: 30 MMOL/L (ref 21–32)
COLOR UR: ABNORMAL
CREAT SERPL-MCNC: 0.98 MG/DL (ref 0.6–1.3)
CREAT UR-MCNC: 119.2 MG/DL
GFR SERPL CREATININE-BSD FRML MDRD: 80 ML/MIN/1.73SQ M
GLUCOSE P FAST SERPL-MCNC: 106 MG/DL (ref 65–99)
GLUCOSE UR STRIP-MCNC: NEGATIVE MG/DL
HGB UR QL STRIP.AUTO: NEGATIVE
KETONES UR STRIP-MCNC: NEGATIVE MG/DL
LEUKOCYTE ESTERASE UR QL STRIP: NEGATIVE
NITRITE UR QL STRIP: NEGATIVE
NON-SQ EPI CELLS URNS QL MICRO: ABNORMAL /HPF
PH UR STRIP.AUTO: 6.5 [PH]
POTASSIUM SERPL-SCNC: 4.5 MMOL/L (ref 3.5–5.3)
PROT UR STRIP-MCNC: ABNORMAL MG/DL
PROT UR-MCNC: 11 MG/DL
PROT/CREAT UR: 0.09 MG/G{CREAT} (ref 0–0.1)
RBC #/AREA URNS AUTO: ABNORMAL /HPF
SODIUM SERPL-SCNC: 140 MMOL/L (ref 135–147)
SP GR UR STRIP.AUTO: 1.02 (ref 1–1.03)
UROBILINOGEN UR STRIP-ACNC: <2 MG/DL
WBC #/AREA URNS AUTO: ABNORMAL /HPF

## 2023-10-22 PROCEDURE — 82570 ASSAY OF URINE CREATININE: CPT

## 2023-10-22 PROCEDURE — 81001 URINALYSIS AUTO W/SCOPE: CPT

## 2023-10-22 PROCEDURE — 84156 ASSAY OF PROTEIN URINE: CPT

## 2023-10-22 PROCEDURE — 36415 COLL VENOUS BLD VENIPUNCTURE: CPT

## 2023-10-22 PROCEDURE — 82610 CYSTATIN C: CPT

## 2023-10-22 PROCEDURE — 80048 BASIC METABOLIC PNL TOTAL CA: CPT

## 2023-10-24 ENCOUNTER — TELEPHONE (OUTPATIENT)
Dept: NEPHROLOGY | Facility: CLINIC | Age: 63
End: 2023-10-24

## 2023-10-24 LAB
CYSTATIN C SERPL-MCNC: 0.84 MG/L (ref 0.72–1.16)
GFR/BSA.PRED SERPLBLD CYS-BASED-ARV: 96 ML/MIN/1.73

## 2023-10-24 NOTE — TELEPHONE ENCOUNTER
----- Message from Rigo Guzman DO sent at 10/24/2023 10:04 AM EDT -----  Cystatin C 0.84 suggesting eGFR 96ml/min - this is normal range. He also does not have significant protein on the urine test. He no longer requires nephrology follow up unless he feels it is warranted. Thanks.

## 2023-11-07 ENCOUNTER — IMMUNIZATIONS (OUTPATIENT)
Dept: INTERNAL MEDICINE CLINIC | Facility: CLINIC | Age: 63
End: 2023-11-07
Payer: COMMERCIAL

## 2023-11-07 DIAGNOSIS — Z23 NEED FOR VACCINATION: Primary | ICD-10-CM

## 2023-11-07 PROCEDURE — 90662 IIV NO PRSV INCREASED AG IM: CPT

## 2023-11-07 PROCEDURE — 90471 IMMUNIZATION ADMIN: CPT

## 2023-12-04 DIAGNOSIS — N52.9 ERECTILE DYSFUNCTION, UNSPECIFIED ERECTILE DYSFUNCTION TYPE: ICD-10-CM

## 2023-12-04 RX ORDER — TADALAFIL 20 MG/1
TABLET ORAL
Qty: 30 TABLET | Refills: 0 | Status: SHIPPED | OUTPATIENT
Start: 2023-12-04

## 2023-12-21 DIAGNOSIS — J30.2 SEASONAL ALLERGIES: ICD-10-CM

## 2023-12-21 RX ORDER — MONTELUKAST SODIUM 10 MG/1
TABLET ORAL
Qty: 30 TABLET | Refills: 5 | Status: SHIPPED | OUTPATIENT
Start: 2023-12-21

## 2024-01-08 DIAGNOSIS — N52.9 ERECTILE DYSFUNCTION, UNSPECIFIED ERECTILE DYSFUNCTION TYPE: ICD-10-CM

## 2024-01-09 RX ORDER — TADALAFIL 20 MG/1
TABLET ORAL
Qty: 30 TABLET | Refills: 1 | Status: SHIPPED | OUTPATIENT
Start: 2024-01-09

## 2024-02-12 ENCOUNTER — TELEPHONE (OUTPATIENT)
Dept: NEUROLOGY | Facility: CLINIC | Age: 64
End: 2024-02-12

## 2024-02-12 NOTE — TELEPHONE ENCOUNTER
Patients wife Sofía, called in to reschedule patients appointment with Dr Leyva on 2/14/24, due to work conflict.    Patient was rescheduled for Dr Leyva on 6/12/24 at 7:30am in [place].    Patient was placed on wait list in [place] and [place] locations.

## 2024-02-21 PROBLEM — Z13.1 SCREENING FOR DIABETES MELLITUS: Status: RESOLVED | Noted: 2019-09-09 | Resolved: 2024-02-21

## 2024-02-21 PROBLEM — Z12.5 SCREENING PSA (PROSTATE SPECIFIC ANTIGEN): Status: RESOLVED | Noted: 2018-06-18 | Resolved: 2024-02-21

## 2024-03-12 DIAGNOSIS — N52.9 ERECTILE DYSFUNCTION, UNSPECIFIED ERECTILE DYSFUNCTION TYPE: ICD-10-CM

## 2024-03-12 RX ORDER — TADALAFIL 20 MG/1
TABLET ORAL
Qty: 30 TABLET | Refills: 5 | Status: SHIPPED | OUTPATIENT
Start: 2024-03-12

## 2024-04-06 DIAGNOSIS — J30.2 SEASONAL ALLERGIES: ICD-10-CM

## 2024-04-06 RX ORDER — LEVOCETIRIZINE DIHYDROCHLORIDE 5 MG/1
5 TABLET, FILM COATED ORAL EVERY EVENING
Qty: 30 TABLET | Refills: 5 | Status: SHIPPED | OUTPATIENT
Start: 2024-04-06

## 2024-04-15 DIAGNOSIS — N52.9 ERECTILE DYSFUNCTION, UNSPECIFIED ERECTILE DYSFUNCTION TYPE: ICD-10-CM

## 2024-04-16 RX ORDER — TADALAFIL 20 MG/1
20 TABLET ORAL DAILY PRN
Qty: 30 TABLET | Refills: 0 | Status: SHIPPED | OUTPATIENT
Start: 2024-04-16

## 2024-04-18 ENCOUNTER — TELEPHONE (OUTPATIENT)
Age: 64
End: 2024-04-18

## 2024-04-18 DIAGNOSIS — M54.16 LUMBAR RADICULOPATHY: Primary | ICD-10-CM

## 2024-04-18 NOTE — TELEPHONE ENCOUNTER
S/w pt, pt last had BL L4 TFESI 8/22/23, reports 90% relief for roughly 8 months, the same pain came back roughly 8 days ago, 7-9/10.  Pt denies blood thinners or being diabetic.  Nurse advised pt nurse to discuss with  and SPA  to call pt to schedule once okay is given to /repeat injection.  Last OVS 3/27/23.

## 2024-04-18 NOTE — TELEPHONE ENCOUNTER
Caller: Patient     Doctor: Sudarshan    Reason for call: Had injection done in August and looking to repeat that as the pain is coming back     Please advise     Call back#:  [phone]

## 2024-04-18 NOTE — TELEPHONE ENCOUNTER
Called patient scheduled procedure. Reviewed all instructions by phone upon scheduling  Mailed copy to home

## 2024-04-24 ENCOUNTER — HOSPITAL ENCOUNTER (OUTPATIENT)
Dept: RADIOLOGY | Facility: CLINIC | Age: 64
Discharge: HOME | End: 2024-04-24
Payer: COMMERCIAL

## 2024-04-24 VITALS
HEART RATE: 71 BPM | SYSTOLIC BLOOD PRESSURE: 149 MMHG | RESPIRATION RATE: 18 BRPM | OXYGEN SATURATION: 98 % | TEMPERATURE: 98.4 F | DIASTOLIC BLOOD PRESSURE: 82 MMHG

## 2024-04-24 DIAGNOSIS — M54.16 LUMBAR RADICULOPATHY: ICD-10-CM

## 2024-04-24 PROCEDURE — 64483 NJX AA&/STRD TFRM EPI L/S 1: CPT | Performed by: ANESTHESIOLOGY

## 2024-04-24 RX ORDER — PAPAVERINE HCL 150 MG
15 CAPSULE, EXTENDED RELEASE ORAL ONCE
Status: COMPLETED | OUTPATIENT
Start: 2024-04-24 | End: 2024-04-24

## 2024-04-24 RX ADMIN — IOHEXOL 2 ML: 300 INJECTION, SOLUTION INTRAVENOUS at 14:28

## 2024-04-24 RX ADMIN — LIDOCAINE HYDROCHLORIDE 2 ML: 20 INJECTION, SOLUTION EPIDURAL; INFILTRATION; INTRACAUDAL; PERINEURAL at 14:26

## 2024-04-24 RX ADMIN — DEXAMETHASONE SODIUM PHOSPHATE 15 MG: 10 INJECTION, SOLUTION INTRAMUSCULAR; INTRAVENOUS at 14:28

## 2024-04-24 NOTE — DISCHARGE INSTR - LAB
Epidural Steroid Injection   WHAT YOU NEED TO KNOW:   An epidural steroid injection (JUANA) is a procedure to inject steroid medicine into the epidural space. The epidural space is between your spinal cord and vertebrae. Steroids reduce inflammation and fluid buildup in your spine that may be causing pain. You may be given pain medicine along with the steroids.          ACTIVITY  Do not drive or operate machinery today.  No strenuous activity today - bending, lifting, etc.  You may resume normal activites starting tomorrow - start slowly and as tolerated.  You may shower today, but no tub baths or hot tubs.  You may have numbness for several hours from the local anesthetic. Please use caution and common sense, especially with weight-bearing activities.    CARE OF THE INJECTION SITE  If you have soreness or pain, apply ice to the area today (20 minutes on/20 minutes off).  Starting tomorrow, you may use warm, moist heat or ice if needed.  You may have an increase or change in your discomfort for 36-48 hours after your treatment.  Apply ice and continue with any pain medication you have been prescribed.  Notify the [facility] if you have any of the following: redness, drainage, swelling, headache, stiff neck or fever above 100°F.    SPECIAL INSTRUCTIONS  Our office will contact you in approximately 7 days for a progress report.    MEDICATIONS  Continue to take all routine medications.  Our office may have instructed you to hold some medications.    As no general anesthesia was used in today's procedure, you should not experience any side effects related to anesthesia.     If you are diabetic, the steroids used in today's injection may temporarily increase your blood sugar levels after the first few days after your injection. Please keep a close eye on your sugars and alert the doctor who manages your diabetes if your sugars are significantly high from your baseline or you are symptomatic.     If you have a  problem specifically related to your procedure, please call our office at [phone].  Problems not related to your procedure should be directed to your primary care physician.

## 2024-04-24 NOTE — H&P
History of Present Illness: The patient is a 64 y.o. male who presents with complaints of low back and leg pain.    Past Medical History:   Diagnosis Date    Asthma     Erectile dysfunction of non-organic origin     Resolved: 10/15/2014    Left ankle sprain 10/12/2021       Past Surgical History:   Procedure Laterality Date    EYE SURGERY      TONSILLECTOMY AND ADENOIDECTOMY      UMBILICAL HERNIA REPAIR           Current Outpatient Medications:     albuterol (2.5 mg/3 mL) 0.083 % nebulizer solution, Inhale 2.5 mg every 6 (six) hours as needed for wheezing or shortness of breath, Disp: , Rfl:     albuterol (ProAir HFA) 90 mcg/act inhaler, Inhale 2 puffs every 6 (six) hours as needed for wheezing Use 2 puffs prior to exercise., Disp: 18 g, Rfl: 5    albuterol (PROVENTIL HFA,VENTOLIN HFA) 90 mcg/act inhaler, INHALE 2 PUFFS EVERY 6 HOURS AS NEEDED FOR WHEEZING, Disp: 1 g, Rfl: 1    Cholecalciferol (VITAMIN D3) 5000 units CAPS, Take 5,000 Units by mouth daily, Disp: , Rfl:     Cinnamon 500 MG capsule, Take 500 mg by mouth daily, Disp: , Rfl:     cyanocobalamin (VITAMIN B-12) 1000 MCG tablet, Take 1,000 mcg by mouth daily, Disp: , Rfl:     fluticasone (FLONASE) 50 mcg/act nasal spray, TWO SPRAYS EACH NOSTRIL ONCE DAILY AS NEEDED., Disp: 16 mL, Rfl: 11    fluticasone (FLONASE) 50 mcg/act nasal spray, 2 sprays into each nostril daily, Disp: 1 g, Rfl: 0    ibuprofen (MOTRIN) 200 mg tablet, Take 200 mg by mouth every 6 (six) hours as needed for mild pain, Disp: , Rfl:     levocetirizine (XYZAL) 5 MG tablet, TAKE 1 TABLET BY MOUTH EVERY DAY IN THE EVENING, Disp: 30 tablet, Rfl: 5    montelukast (SINGULAIR) 10 mg tablet, TAKE 1 TABLET BY MOUTH EVERYDAY AT BEDTIME, Disp: 30 tablet, Rfl: 5    Omega-3 Fatty Acids (FISH OIL) 1,000 mg, Take 3 capsules by mouth daily, Disp: , Rfl:     tadalafil (CIALIS) 20 MG tablet, Take 1 tablet (20 mg total) by mouth daily as needed for erectile dysfunction, Disp: 30 tablet, Rfl: 0    No Known  Allergies    Physical Exam:   Vitals:    04/24/24 1413   BP: 128/71   Pulse: 65   Resp: 18   Temp: 98.4 °F (36.9 °C)   SpO2: 98%     General: Awake, Alert, Oriented x 3, Mood and affect appropriate  Respiratory: Respirations even and unlabored  Cardiovascular: Peripheral pulses intact; no edema  Musculoskeletal Exam: Normal gait    ASA Score: 3    Patient/Chart Verification  Patient ID Verified: Verbal  ID Band Applied: No  Consents Confirmed: Procedural, To be obtained in the Pre-Procedure area  H&P( within 30 days) Verified: To be obtained in the Pre-Procedure area  Allergies Reviewed: Yes  Anticoag/NSAID held?: No  Currently on antibiotics?: No    Assessment:   1. Lumbar radiculopathy        Plan: B/L L4 TFESI

## 2024-05-01 ENCOUNTER — TELEPHONE (OUTPATIENT)
Dept: PAIN MEDICINE | Facility: CLINIC | Age: 64
End: 2024-05-01

## 2024-05-01 NOTE — TELEPHONE ENCOUNTER
Patient Reports     100    %     improvement post injection    Pain Level  0   /10  Discomfort in different area of back

## 2024-05-14 DIAGNOSIS — Z12.5 SCREENING PSA (PROSTATE SPECIFIC ANTIGEN): ICD-10-CM

## 2024-05-14 DIAGNOSIS — R73.03 PREDIABETES: Primary | ICD-10-CM

## 2024-05-14 DIAGNOSIS — Z13.6 SCREENING FOR CARDIOVASCULAR CONDITION: ICD-10-CM

## 2024-05-15 ENCOUNTER — RA CDI HCC (OUTPATIENT)
Dept: OTHER | Facility: HOSPITAL | Age: 64
End: 2024-05-15

## 2024-05-15 PROBLEM — Z00.00 WELLNESS EXAMINATION: Status: RESOLVED | Noted: 2023-05-15 | Resolved: 2024-05-15

## 2024-05-15 NOTE — PROGRESS NOTES
HCC coding opportunities          Chart Reviewed number of suggestions sent to Provider: 1  [id number]     Patients Insurance        Commercial Insurance: [employer]

## 2024-05-16 ENCOUNTER — APPOINTMENT (OUTPATIENT)
Dept: LAB | Age: 64
End: 2024-05-16
Payer: COMMERCIAL

## 2024-05-16 DIAGNOSIS — Z13.6 SCREENING FOR CARDIOVASCULAR CONDITION: ICD-10-CM

## 2024-05-16 DIAGNOSIS — R73.03 PREDIABETES: ICD-10-CM

## 2024-05-16 LAB
ALBUMIN SERPL BCP-MCNC: 3.9 G/DL (ref 3.5–5)
ALP SERPL-CCNC: 46 U/L (ref 34–104)
ALT SERPL W P-5'-P-CCNC: 21 U/L (ref 7–52)
ANION GAP SERPL CALCULATED.3IONS-SCNC: 7 MMOL/L (ref 4–13)
AST SERPL W P-5'-P-CCNC: 19 U/L (ref 13–39)
BILIRUB SERPL-MCNC: 0.68 MG/DL (ref 0.2–1)
BUN SERPL-MCNC: 19 MG/DL (ref 5–25)
CALCIUM SERPL-MCNC: 9.1 MG/DL (ref 8.4–10.2)
CHLORIDE SERPL-SCNC: 104 MMOL/L (ref 96–108)
CHOLEST SERPL-MCNC: 175 MG/DL
CO2 SERPL-SCNC: 27 MMOL/L (ref 21–32)
CREAT SERPL-MCNC: 0.88 MG/DL (ref 0.6–1.3)
EST. AVERAGE GLUCOSE BLD GHB EST-MCNC: 114 MG/DL
GFR SERPL CREATININE-BSD FRML MDRD: 89 ML/MIN/1.73SQ M
GLUCOSE P FAST SERPL-MCNC: 103 MG/DL (ref 65–99)
HBA1C MFR BLD: 5.6 %
HDLC SERPL-MCNC: 45 MG/DL
LDLC SERPL CALC-MCNC: 104 MG/DL (ref 0–100)
POTASSIUM SERPL-SCNC: 4 MMOL/L (ref 3.5–5.3)
PROT SERPL-MCNC: 7 G/DL (ref 6.4–8.4)
SODIUM SERPL-SCNC: 138 MMOL/L (ref 135–147)
TRIGL SERPL-MCNC: 130 MG/DL

## 2024-05-16 PROCEDURE — 36415 COLL VENOUS BLD VENIPUNCTURE: CPT

## 2024-05-16 PROCEDURE — 80061 LIPID PANEL: CPT

## 2024-05-16 PROCEDURE — 80053 COMPREHEN METABOLIC PANEL: CPT

## 2024-05-16 PROCEDURE — 83036 HEMOGLOBIN GLYCOSYLATED A1C: CPT

## 2024-05-17 ENCOUNTER — OFFICE VISIT (OUTPATIENT)
Dept: INTERNAL MEDICINE CLINIC | Facility: CLINIC | Age: 64
End: 2024-05-17
Payer: COMMERCIAL

## 2024-05-17 VITALS
WEIGHT: 235 LBS | BODY MASS INDEX: 37.77 KG/M2 | RESPIRATION RATE: 18 BRPM | OXYGEN SATURATION: 96 % | SYSTOLIC BLOOD PRESSURE: 138 MMHG | HEIGHT: 66 IN | HEART RATE: 64 BPM | TEMPERATURE: 97.6 F | DIASTOLIC BLOOD PRESSURE: 80 MMHG

## 2024-05-17 DIAGNOSIS — Z12.11 COLON CANCER SCREENING: ICD-10-CM

## 2024-05-17 DIAGNOSIS — E66.09 CLASS 2 OBESITY DUE TO EXCESS CALORIES WITHOUT SERIOUS COMORBIDITY WITH BODY MASS INDEX (BMI) OF 35.0 TO 35.9 IN ADULT: Primary | ICD-10-CM

## 2024-05-17 DIAGNOSIS — E78.5 HYPERLIPIDEMIA, UNSPECIFIED HYPERLIPIDEMIA TYPE: ICD-10-CM

## 2024-05-17 DIAGNOSIS — M75.82 ROTATOR CUFF TENDINITIS, LEFT: ICD-10-CM

## 2024-05-17 DIAGNOSIS — E53.8 VITAMIN B12 DEFICIENCY: ICD-10-CM

## 2024-05-17 DIAGNOSIS — M54.16 LUMBAR RADICULOPATHY: ICD-10-CM

## 2024-05-17 DIAGNOSIS — K21.9 GASTROESOPHAGEAL REFLUX DISEASE WITHOUT ESOPHAGITIS: ICD-10-CM

## 2024-05-17 DIAGNOSIS — R73.03 PREDIABETES: ICD-10-CM

## 2024-05-17 DIAGNOSIS — E55.9 VITAMIN D DEFICIENCY: ICD-10-CM

## 2024-05-17 DIAGNOSIS — H16.139 ACTINIC KERATITIS, UNSPECIFIED LATERALITY: ICD-10-CM

## 2024-05-17 PROCEDURE — 99214 OFFICE O/P EST MOD 30 MIN: CPT | Performed by: INTERNAL MEDICINE

## 2024-05-17 RX ORDER — ROSUVASTATIN CALCIUM 5 MG/1
5 TABLET, COATED ORAL DAILY
Qty: 30 TABLET | Refills: 5 | Status: SHIPPED | OUTPATIENT
Start: 2024-05-17

## 2024-05-17 NOTE — PROGRESS NOTES
Assessment/Plan:    Class 2 obesity due to excess calories without serious comorbidity with body mass index (BMI) of 35.0 to 35.9 in adult  Obesity -I have counseled patient following healthy and balanced diet, I would like the patient to lose weight, I would like the patient exercise routinely; we will continue monitor the patient's progress.    Gastroesophageal reflux disease without esophagitis  Will have patient see GI ulcer free diet    Hyperlipidemia  Hyperlipidemia suboptimal control elevation of the ASCVD risk score start Crestor 5 mg once daily reviewed the risk benefits and side effects check CMP and lipid panel 1 month    Lumbar radiculopathy  Would like the patient to follow-up with pain management for consideration of nerve block will also consult chiropractory    Prediabetes  Pre diabetes -I have counseled the patient to follow a healthy balanced diet, I have counseled patient reduce carbohydrates and sweets in the diet, I would like the patient exercise routinely.  I will be checking hemoglobin A1c and comprehensive metabolic panel.  Have counseled patient about the prevention of diabetes, and the risk of progression to type 2 diabetes.    Vitamin B12 deficiency  Check vitamin B12 level    Vitamin D deficiency  Will check vitamin D         Problem List Items Addressed This Visit        Digestive    Gastroesophageal reflux disease without esophagitis     Will have patient see GI ulcer free diet         Relevant Orders    Ambulatory Referral to Gastroenterology       Nervous and Auditory    Lumbar radiculopathy     Would like the patient to follow-up with pain management for consideration of nerve block will also consult chiropractory         Relevant Orders    Ambulatory Referral to Chiropractic       Musculoskeletal and Integument    Rotator cuff tendinitis, left    Relevant Orders    Ambulatory Referral to Chiropractic       Other    Hyperlipidemia     Hyperlipidemia suboptimal control elevation of  the ASCVD risk score start Crestor 5 mg once daily reviewed the risk benefits and side effects check CMP and lipid panel 1 month         Relevant Medications    rosuvastatin (CRESTOR) 5 mg tablet    Other Relevant Orders    Comprehensive metabolic panel    Lipid Panel with Direct LDL reflex    Vitamin D deficiency     Will check vitamin D         Relevant Orders    Vitamin D 25 hydroxy    Class 2 obesity due to excess calories without serious comorbidity with body mass index (BMI) of 35.0 to 35.9 in adult - Primary     Obesity -I have counseled patient following healthy and balanced diet, I would like the patient to lose weight, I would like the patient exercise routinely; we will continue monitor the patient's progress.         Prediabetes     Pre diabetes -I have counseled the patient to follow a healthy balanced diet, I have counseled patient reduce carbohydrates and sweets in the diet, I would like the patient exercise routinely.  I will be checking hemoglobin A1c and comprehensive metabolic panel.  Have counseled patient about the prevention of diabetes, and the risk of progression to type 2 diabetes.         Vitamin B12 deficiency     Check vitamin B12 level         Relevant Orders    Vitamin B12   Other Visit Diagnoses     Colon cancer screening        Relevant Orders    Ambulatory Referral to Gastroenterology    Actinic keratitis, unspecified laterality        Relevant Orders    Ambulatory Referral to Dermatology      RTO in 3 months call if any problems        Subjective:      Patient ID: Isaak De Los Santos is a 64 y.o. male.  Impression:  Exercise-induced asthma, controlled  Idiopathic cystic lung disease  Pulmonary nodule  Obstructive sleep apnea     HPI 64-year old male coming in for a follow up visit regarding obesity class II, hyperlipidemia, prediabetes, GERD, B12 deficiency, actinic keratosis, chronic lower back pain and left rotator cuff tendinitis; the patient reports me compliant taking medications  without untoward side effects the.  The patient is here to review his medical condition, update me on the medical condition and the patient reports me no hospitalizations and no ER visits.  Patient does report to me he had taken a break from exercise and then started lifting weights again he developed left shoulder pain with motion chronic lumbar radiculopathy , stopped lifting for 2 months with in 2 days unable to  the arm took ibp and swelling down , worse with internal rotation , activity levels are down , eating less.  Here to review laboratories in detail    The following portions of the patient's history were reviewed and updated as appropriate: allergies, current medications, past family history, past medical history, past social history, past surgical history and problem list.    Review of Systems   Constitutional:  Negative for activity change, appetite change and unexpected weight change.   HENT:  Negative for congestion and postnasal drip.    Eyes:  Negative for visual disturbance.   Respiratory:  Negative for cough and shortness of breath.    Cardiovascular:  Negative for chest pain.   Gastrointestinal:  Negative for abdominal pain, diarrhea, nausea and vomiting.   Neurological:  Negative for dizziness, light-headedness and headaches.   Hematological:  Negative for adenopathy.    Left shoulder pain with motion, chronic lower back pain with radiation down the right leg    Objective:    No follow-ups on file.    Procedure: FL spine and pain procedure    Result Date: 4/24/2024  Narrative: Bilateral L4 transforaminal Epidural Steroid Injection Indication: Low back and leg pain Preoperative diagnosis: Lumbar radiculitis Postoperative diagnosis: Lumbar radiculitis Procedure: Fluoroscopically-guided bilateral L4 transforaminal epidural steroid injection under fluoroscopy After discussing the risks, benefits, and alternatives to the procedure, the patient expressed understanding and wished to proceed. The  patient was brought to the fluoroscopy suite and placed in the prone position. A procedural pause was conducted to verify: correct patient identity, procedure to be performed and as applicable, correct side and site, correct patient position, and availability of implants, special equipment and special requirements. After identifying the bilateral L4 pedicles fluoroscopically with an oblique view, the skin was sterilely prepped and draped in the usual fashion using Chloraprep skin prep. The skin and subcutaneous tissue were anesthetized with 1% lidocaine. A 5 inch 22 gauge spinal needle was then advanced under fluoroscopic guidance to the posterior aspect of the bilateral L4 neural foramens. Appropriate foraminal depth was determined with a lateral fluoroscopic view, and AP visualization confirmed needle positioning at approximately the 6 o’clock position relative to the pedicles. After negative aspiration, 2 mL of Omnipaque 300 contrast was injected using live fluoroscopy/digital subtraction angiography, confirming appropriate transforaminal spread without evidence of intravascular or intrathecal uptake. Next, a local anesthetic test dose consisting of 1 mL of 2% lidocaine was injected through the needle at each level. After an appropriate period of observation, a directed neurological exam was performed which revealed no new neurologic deficits. Next, a 1.5 ml solution consisting of 7.5 mg of dexamethasone in sterile saline was injected slowly and incrementally into the epidural space at each level. Following the injection the needles were withdrawn slightly and flushed with lidocaine as they were fully extracted. The patient tolerated the procedure well and there were no apparent complications. The patient did not develop any new neurologic deficits. After appropriate observation, the patient was dismissed from the clinic in good condition under their own power. COMMENTS The patient received a total steroid dose  of 15 mg of dexamethasone. EBL: Minimal Specimen: None        No Known Allergies    Past Medical History:   Diagnosis Date   • Allergic    • Asthma    • Erectile dysfunction of non-organic origin     Resolved: 10/15/2014   • Left ankle sprain 10/12/2021   • Pneumonia 2016 or 2017    Twice in the same year     Past Surgical History:   Procedure Laterality Date   • EYE SURGERY     • TONSILLECTOMY AND ADENOIDECTOMY     • UMBILICAL HERNIA REPAIR       Current Outpatient Medications on File Prior to Visit   Medication Sig Dispense Refill   • albuterol (2.5 mg/3 mL) 0.083 % nebulizer solution Inhale 2.5 mg every 6 (six) hours as needed for wheezing or shortness of breath     • albuterol (ProAir HFA) 90 mcg/act inhaler Inhale 2 puffs every 6 (six) hours as needed for wheezing Use 2 puffs prior to exercise. 18 g 5   • albuterol (PROVENTIL HFA,VENTOLIN HFA) 90 mcg/act inhaler INHALE 2 PUFFS EVERY 6 HOURS AS NEEDED FOR WHEEZING 1 g 1   • Cholecalciferol (VITAMIN D3) 5000 units CAPS Take 5,000 Units by mouth daily     • Cinnamon 500 MG capsule Take 500 mg by mouth daily     • cyanocobalamin (VITAMIN B-12) 1000 MCG tablet Take 1,000 mcg by mouth daily     • fluticasone (FLONASE) 50 mcg/act nasal spray TWO SPRAYS EACH NOSTRIL ONCE DAILY AS NEEDED. 16 mL 11   • fluticasone (FLONASE) 50 mcg/act nasal spray 2 sprays into each nostril daily 1 g 0   • ibuprofen (MOTRIN) 200 mg tablet Take 200 mg by mouth every 6 (six) hours as needed for mild pain     • levocetirizine (XYZAL) 5 MG tablet TAKE 1 TABLET BY MOUTH EVERY DAY IN THE EVENING 30 tablet 5   • montelukast (SINGULAIR) 10 mg tablet TAKE 1 TABLET BY MOUTH EVERYDAY AT BEDTIME 30 tablet 5   • Omega-3 Fatty Acids (FISH OIL) 1,000 mg Take 3 capsules by mouth daily     • tadalafil (CIALIS) 20 MG tablet Take 1 tablet (20 mg total) by mouth daily as needed for erectile dysfunction 30 tablet 0     No current facility-administered medications on file prior to visit.     Family History  "  Problem Relation Age of Onset   • Asthma Mother    • Heart disease Mother    • Rheum arthritis Mother    • Osteoporosis Mother    • Arthritis Mother    • Autoimmune disease Mother         Rheumatoid Arthritis   • Diabetes Father    • Heart disease Father    • Diabetes type II Father    • Lung disease Sister         Bronchiectasis.  No clear history of cystic lung disease however   • Diabetes Sister    • Other Son         Thyroid disorder     Social History     Socioeconomic History   • Marital status: /Civil Union     Spouse name: Not on file   • Number of children: Not on file   • Years of education: Not on file   • Highest education level: Not on file   Occupational History   • Not on file   Tobacco Use   • Smoking status: Former     Current packs/day: 0.00     Average packs/day: 1 pack/day for 8.1 years (8.1 ttl pk-yrs)     Types: Cigarettes     Start date: 1971     Quit date: 1979     Years since quittin.2   • Smokeless tobacco: Never   • Tobacco comments:     He smoked a pack a day of cigarettes for 8 years. He quite at the age of 19. Occasional cigar use.   Vaping Use   • Vaping status: Never Used   Substance and Sexual Activity   • Alcohol use: Yes     Comment: Social   • Drug use: Never   • Sexual activity: Yes     Partners: Female   Other Topics Concern   • Not on file   Social History Narrative    Caffeine use    Work-related stress     Social Determinants of Health     Financial Resource Strain: Not on file   Food Insecurity: Not on file   Transportation Needs: Not on file   Physical Activity: Not on file   Stress: Not on file   Social Connections: Not on file   Intimate Partner Violence: Not on file   Housing Stability: Not on file     Vitals:    24 0910   BP: 138/80   BP Location: Left arm   Patient Position: Sitting   Cuff Size: Standard   Pulse: 64   Resp: 18   Temp: 97.6 °F (36.4 °C)   TempSrc: Tympanic   SpO2: 96%   Weight: 107 kg (235 lb)   Height: 5' 5.75\" (1.67 m) " "    Results for orders placed or performed in visit on 05/16/24   Comprehensive metabolic panel   Result Value Ref Range    Sodium 138 135 - 147 mmol/L    Potassium 4.0 3.5 - 5.3 mmol/L    Chloride 104 96 - 108 mmol/L    CO2 27 21 - 32 mmol/L    ANION GAP 7 4 - 13 mmol/L    BUN 19 5 - 25 mg/dL    Creatinine 0.88 0.60 - 1.30 mg/dL    Glucose, Fasting 103 (H) 65 - 99 mg/dL    Calcium 9.1 8.4 - 10.2 mg/dL    AST 19 13 - 39 U/L    ALT 21 7 - 52 U/L    Alkaline Phosphatase 46 34 - 104 U/L    Total Protein 7.0 6.4 - 8.4 g/dL    Albumin 3.9 3.5 - 5.0 g/dL    Total Bilirubin 0.68 0.20 - 1.00 mg/dL    eGFR 89 ml/min/1.73sq m   Hemoglobin A1C   Result Value Ref Range    Hemoglobin A1C 5.6 Normal 4.0-5.6%; PreDiabetic 5.7-6.4%; Diabetic >=6.5%; Glycemic control for adults with diabetes <7.0% %     mg/dl   Lipid Panel with Direct LDL reflex   Result Value Ref Range    Cholesterol 175 See Comment mg/dL    Triglycerides 130 See Comment mg/dL    HDL, Direct 45 >=40 mg/dL    LDL Calculated 104 (H) 0 - 100 mg/dL     Weight (last 2 days)     Date/Time Weight    05/17/24 0910 107 (235)        Body mass index is 38.22 kg/m².  BP      Temp      Pulse     Resp      SpO2        Vitals:    05/17/24 0910   Weight: 107 kg (235 lb)     Vitals:    05/17/24 0910   Weight: 107 kg (235 lb)       /80 (BP Location: Left arm, Patient Position: Sitting, Cuff Size: Standard)   Pulse 64   Temp 97.6 °F (36.4 °C) (Tympanic)   Resp 18   Ht 5' 5.75\" (1.67 m)   Wt 107 kg (235 lb)   SpO2 96%   BMI 38.22 kg/m²          Physical Exam  Constitutional:       General: He is not in acute distress.     Appearance: He is well-developed. He is not diaphoretic.   HENT:      Head: Normocephalic and atraumatic.      Right Ear: External ear normal.      Left Ear: External ear normal.      Mouth/Throat:      Mouth: Oropharynx is clear and moist.   Eyes:      General: No scleral icterus.        Right eye: No discharge.         Left eye: No discharge.    "   Conjunctiva/sclera: Conjunctivae normal.      Pupils: Pupils are equal, round, and reactive to light.   Cardiovascular:      Rate and Rhythm: Normal rate and regular rhythm.      Heart sounds: Normal heart sounds. No murmur heard.     No friction rub. No gallop.   Pulmonary:      Effort: No respiratory distress.      Breath sounds: No wheezing or rales.   Abdominal:      General: Bowel sounds are normal. There is no distension.      Palpations: Abdomen is soft. There is no mass.      Tenderness: There is no abdominal tenderness. There is no guarding or rebound.   Musculoskeletal:         General: No deformity or edema.      Cervical back: Neck supple.   Lymphadenopathy:      Cervical: No cervical adenopathy.   Neurological:      Mental Status: He is alert.   Psychiatric:         Mood and Affect: Mood and affect normal.      Mild pain with internal/external rotation of the shoulder.  Left full range of motion

## 2024-05-18 PROBLEM — M75.82 ROTATOR CUFF TENDINITIS, LEFT: Status: ACTIVE | Noted: 2024-05-18

## 2024-05-19 DIAGNOSIS — N52.9 ERECTILE DYSFUNCTION, UNSPECIFIED ERECTILE DYSFUNCTION TYPE: ICD-10-CM

## 2024-05-19 NOTE — ASSESSMENT & PLAN NOTE
Would like the patient to follow-up with pain management for consideration of nerve block will also consult chiropractory

## 2024-05-19 NOTE — ASSESSMENT & PLAN NOTE
Hyperlipidemia suboptimal control elevation of the ASCVD risk score start Crestor 5 mg once daily reviewed the risk benefits and side effects check CMP and lipid panel 1 month

## 2024-05-20 ENCOUNTER — TELEPHONE (OUTPATIENT)
Dept: INTERNAL MEDICINE CLINIC | Facility: CLINIC | Age: 64
End: 2024-05-20

## 2024-05-20 RX ORDER — TADALAFIL 20 MG/1
20 TABLET ORAL DAILY PRN
Qty: 30 TABLET | Refills: 5 | Status: SHIPPED | OUTPATIENT
Start: 2024-05-20

## 2024-05-20 NOTE — TELEPHONE ENCOUNTER
----- Message from Yoshi Shine DO sent at 5/17/2024  4:35 PM EDT -----  Please let the patient know about the response from Dr. Heaton and have the patient set up a follow-up  ----- Message -----  From: Iasi Heaton DO  Sent: 5/17/2024  12:47 PM EDT  To: DO Denilson Baker,    An ablation would help with his back pain, but not his leg symptoms. We can certainly discuss this with him if he would like to schedule a f/u visit.    Jcarlos  ----- Message -----  From: Yoshi Shine DO  Sent: 5/17/2024   9:29 AM EDT  To: DO Denilson Alberto , would the pt be a candidate for Radiofrequency ablation, he would like to try avoid surgary.   Frankie

## 2024-06-03 ENCOUNTER — CONSULT (OUTPATIENT)
Age: 64
End: 2024-06-03
Payer: COMMERCIAL

## 2024-06-03 VITALS
HEART RATE: 80 BPM | BODY MASS INDEX: 37.77 KG/M2 | SYSTOLIC BLOOD PRESSURE: 152 MMHG | HEIGHT: 66 IN | WEIGHT: 235 LBS | DIASTOLIC BLOOD PRESSURE: 90 MMHG

## 2024-06-03 DIAGNOSIS — M54.16 LUMBAR RADICULOPATHY: ICD-10-CM

## 2024-06-03 DIAGNOSIS — G89.29 CHRONIC BILATERAL LOW BACK PAIN WITH RIGHT-SIDED SCIATICA: Primary | ICD-10-CM

## 2024-06-03 DIAGNOSIS — M54.41 CHRONIC BILATERAL LOW BACK PAIN WITH RIGHT-SIDED SCIATICA: Primary | ICD-10-CM

## 2024-06-03 PROCEDURE — 99203 OFFICE O/P NEW LOW 30 MIN: CPT | Performed by: CHIROPRACTOR

## 2024-06-03 PROCEDURE — 98941 CHIROPRACT MANJ 3-4 REGIONS: CPT | Performed by: CHIROPRACTOR

## 2024-06-12 ENCOUNTER — CONSULT (OUTPATIENT)
Dept: NEUROLOGY | Facility: CLINIC | Age: 64
End: 2024-06-12
Payer: COMMERCIAL

## 2024-06-12 VITALS
HEART RATE: 70 BPM | DIASTOLIC BLOOD PRESSURE: 72 MMHG | SYSTOLIC BLOOD PRESSURE: 120 MMHG | BODY MASS INDEX: 37.77 KG/M2 | TEMPERATURE: 98 F | WEIGHT: 235 LBS | HEIGHT: 66 IN

## 2024-06-12 DIAGNOSIS — R25.1 TREMOR: Primary | ICD-10-CM

## 2024-06-12 PROCEDURE — 99204 OFFICE O/P NEW MOD 45 MIN: CPT | Performed by: PSYCHIATRY & NEUROLOGY

## 2024-06-12 NOTE — ASSESSMENT & PLAN NOTE
Isaak De Los Santos is a 64 y.o. male who presents for evaluation of tremor. 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 Diagnosis and ancillary testing for PD discussed at length as well as patient's lack of diagnostic features. No indication for skin bx or JUAN scan given his clinical picture. Given his asthma, would likely avoid beta blockers as treatment but could consider primidone or topiramate if ever wanted. Currently he is not interested in medication particularly given that it is only symptom management and not preventative.

## 2024-06-12 NOTE — PROGRESS NOTES
Patient ID: Isaak De Los Santos is a 64 y.o. male.    Assessment/Plan:    Tremor  Isaak De Los Santos is a 64 y.o. male who presents for evaluation of tremor. 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 Diagnosis and ancillary testing for PD discussed at length as well as patient's lack of diagnostic features. No indication for skin bx or JUAN scan given his clinical picture. Given his asthma, would likely avoid beta blockers as treatment but could consider primidone or topiramate if ever wanted. Currently he is not interested in medication particularly given that it is only symptom management and not preventative.      Diagnoses and all orders for this visit:    Tremor       Subjective:    Isaak De Los Santos is a 64 y.o. male w/ PMH asthma and seasonal allergies who presents for evaluation of tremor. Patient notes that his sister is a physician who is recently being evaluated for tremor/Parkinson's.  Given his observed tremor particularly while eating or performing his firearms qualifying, she recommended that he get evaluated.  He notes that he has had a tremor possibly worsening in severity over the course of about a year or 2.  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  He is still qualifying without issue.  He denies significant change in gait or posture, voice or handwriting.  He denies constipation.  He denies significant dysautonomia though he is using Cialis for the last 6 months or so.  He is on minimal medications except for respiratory medications, allergy medications and Crestor.  He notes that his sister's tremor had improved once she reduced her asthma medications.  On exam he was not observed to have a resting tremor today. He only  exhibits a mild, high-frequency low amplitude kinetic and postural tremor and has no parkinsonian signs. He does not have any bradykinesia or increased tone.  His gait has normal stance, stride, arm swing.  He does have chronic and longstanding ptosis that was previously treated with an eyelid surgery and has worsened over the years but he exhibits no other signs of neuromuscular weakness.  He does have fixed and dilated pupils of unclear etiology though he does not endorse significant vision problems aside from sensitivity to light.  We discussed the diagnosis and management of PD at length.  We discussed that he lacks all the features that would prompt further ancillary testing like skin biopsy or DaTscan.  We can continue to monitor and offer some symptomatic management, but at this point he is declining additional medications.      The following portions of the patient's history were reviewed and updated as appropriate: He  has a past medical history of Allergic, Asthma, Erectile dysfunction of non-organic origin, Left ankle sprain (10/12/2021), and Pneumonia (2016 or 2017).  He   Patient Active Problem List    Diagnosis Date Noted    Rotator cuff tendinitis, left 05/18/2024    Decreased GFR 10/11/2023    Other proteinuria 08/27/2023    Chronic low back pain without sciatica 08/23/2023    Other fatigue 05/15/2023    Fixed dilated pupils of both eyes 05/15/2023    Tremor 05/15/2023    SWAPNIL (obstructive sleep apnea) 04/26/2023    Lumbar radiculopathy     Bronchitis 01/22/2023    Low back pain with sciatica 01/09/2023    Myofascial pain 01/09/2023    Erectile dysfunction 11/06/2022    Chronic right-sided low back pain with right-sided sciatica 11/06/2022    Mid back pain 08/23/2022    Pulmonary nodules 09/01/2021    Plantar fasciitis 04/05/2021    Vitamin B12 deficiency 04/05/2021    Carrier of alpha-1-antitrypsin deficiency 12/23/2020    Multiple idiopathic cysts of lung 12/23/2020    Prediabetes 09/22/2020     Seasonal allergies 09/22/2020    Exercise-induced asthma 03/16/2020    Gastroesophageal reflux disease without esophagitis 03/16/2020    Class 2 obesity due to excess calories without serious comorbidity with body mass index (BMI) of 35.0 to 35.9 in adult 09/09/2019    Plantar fasciitis of right foot 07/05/2019    Seasonal allergic rhinitis 08/17/2018    Vitamin D deficiency 08/17/2018    Obstructive sleep apnea 12/24/2013    Hyperlipidemia 03/07/2013    Esophageal reflux 12/10/2012    Obesity 12/10/2012    Abnormal blood sugar 07/25/2012    Enlarged prostate without lower urinary tract symptoms (luts) 06/06/2012     He  has a past surgical history that includes Tonsillectomy and adenoidectomy; Umbilical hernia repair; and Eye surgery.  His family history includes Arthritis in his mother; Asthma in his mother; Autoimmune disease in his mother; Diabetes in his father and sister; Diabetes type II in his father; Heart disease in his father and mother; Lung disease in his sister; Osteoporosis in his mother; Other in his son; Rheum arthritis in his mother.  He  reports that he quit smoking about 45 years ago. His smoking use included cigarettes. He started smoking about 53 years ago. He has a 8.1 pack-year smoking history. He has never used smokeless tobacco. He reports current alcohol use. He reports that he does not use drugs.  Current Outpatient Medications   Medication Sig Dispense Refill    albuterol (2.5 mg/3 mL) 0.083 % nebulizer solution Inhale 2.5 mg every 6 (six) hours as needed for wheezing or shortness of breath      albuterol (ProAir HFA) 90 mcg/act inhaler Inhale 2 puffs every 6 (six) hours as needed for wheezing Use 2 puffs prior to exercise. 18 g 5    albuterol (PROVENTIL HFA,VENTOLIN HFA) 90 mcg/act inhaler INHALE 2 PUFFS EVERY 6 HOURS AS NEEDED FOR WHEEZING 1 g 1    Cholecalciferol (VITAMIN D3) 5000 units CAPS Take 5,000 Units by mouth daily      Cinnamon 500 MG capsule Take 500 mg by mouth daily       cyanocobalamin (VITAMIN B-12) 1000 MCG tablet Take 1,000 mcg by mouth daily      fluticasone (FLONASE) 50 mcg/act nasal spray TWO SPRAYS EACH NOSTRIL ONCE DAILY AS NEEDED. 16 mL 11    ibuprofen (MOTRIN) 200 mg tablet Take 200 mg by mouth every 6 (six) hours as needed for mild pain      levocetirizine (XYZAL) 5 MG tablet TAKE 1 TABLET BY MOUTH EVERY DAY IN THE EVENING 30 tablet 5    montelukast (SINGULAIR) 10 mg tablet TAKE 1 TABLET BY MOUTH EVERYDAY AT BEDTIME 30 tablet 5    Omega-3 Fatty Acids (FISH OIL) 1,000 mg Take 3 capsules by mouth daily      rosuvastatin (CRESTOR) 5 mg tablet Take 1 tablet (5 mg total) by mouth daily 30 tablet 5    tadalafil (CIALIS) 20 MG tablet Take 1 tablet (20 mg total) by mouth daily as needed for erectile dysfunction 30 tablet 5    fluticasone (FLONASE) 50 mcg/act nasal spray 2 sprays into each nostril daily 1 g 0     No current facility-administered medications for this visit.     Current Outpatient Medications on File Prior to Visit   Medication Sig    albuterol (2.5 mg/3 mL) 0.083 % nebulizer solution Inhale 2.5 mg every 6 (six) hours as needed for wheezing or shortness of breath    albuterol (ProAir HFA) 90 mcg/act inhaler Inhale 2 puffs every 6 (six) hours as needed for wheezing Use 2 puffs prior to exercise.    albuterol (PROVENTIL HFA,VENTOLIN HFA) 90 mcg/act inhaler INHALE 2 PUFFS EVERY 6 HOURS AS NEEDED FOR WHEEZING    Cholecalciferol (VITAMIN D3) 5000 units CAPS Take 5,000 Units by mouth daily    Cinnamon 500 MG capsule Take 500 mg by mouth daily    cyanocobalamin (VITAMIN B-12) 1000 MCG tablet Take 1,000 mcg by mouth daily    fluticasone (FLONASE) 50 mcg/act nasal spray TWO SPRAYS EACH NOSTRIL ONCE DAILY AS NEEDED.    ibuprofen (MOTRIN) 200 mg tablet Take 200 mg by mouth every 6 (six) hours as needed for mild pain    levocetirizine (XYZAL) 5 MG tablet TAKE 1 TABLET BY MOUTH EVERY DAY IN THE EVENING    montelukast (SINGULAIR) 10 mg tablet TAKE 1 TABLET BY MOUTH EVERYDAY AT  "BEDTIME    Omega-3 Fatty Acids (FISH OIL) 1,000 mg Take 3 capsules by mouth daily    rosuvastatin (CRESTOR) 5 mg tablet Take 1 tablet (5 mg total) by mouth daily    tadalafil (CIALIS) 20 MG tablet Take 1 tablet (20 mg total) by mouth daily as needed for erectile dysfunction    fluticasone (FLONASE) 50 mcg/act nasal spray 2 sprays into each nostril daily     No current facility-administered medications on file prior to visit.     He has No Known Allergies..    Objective: Blood pressure 120/72, pulse 70, temperature 98 °F (36.7 °C), height 5' 5.75\" (1.67 m), weight 107 kg (235 lb).    Physical Exam Vitals reviewed.   Constitutional:    Not in acute distress. Normal appearance. Not ill-appearing, toxic-appearing or diaphoretic.   HENT:   Normocephalic and atraumatic.  External ear normal b/l. Nose normal. No congestion or rhinorrhea. Mucous membranes are moist.  Oropharynx is clear.   Eyes:    No scleral icterus.  No discharge b/l.  Conjunctivae normal.   Cardiovascular: no obvious edema  Pulmonary:  No respiratory distress.   Musculoskeletal: no gross deformities  Skin:    Skin is not pale.   Psychiatric:      Mood normal. Affect congruent  Neurologic: Mental Status: Alert and oriented to person, place, and time. Attention is normal. Speech is fluent w/o dysarthria Cranial Nerves: Visual fields full. Pupils fixed and dilated, right lid ptosis > L, EOMI. No nystagmus. Facial sensation and expression full, symmetric. Hearing intact. Palate symmetric. Trapezius strength symmetric. No dysarthria, tongue symmetric w/o atrophy or fasciculations. Motor Exam: Muscle bulk grossly normal. Tone normal. Pronator drift absent b/l. Strength intact and symmetric BUE/BLE. Strength: R/L  Deltoid: 5/5 (some left shoulder ROM limitation 2/2 rotator cuff injury)    Biceps: 5/5   Triceps: 5/5 : 5/5    Iliopsoas: 5/5   Quads: 5/5   Hamstrin/5   TA: 5/5   Gastroc: 5/5  Sensory Exam : Light touch symmetric BUE/BLE Gait, Coordination, " and Reflexes : FTN normal b/l (mild intention tremor at end); mild, kinetic and postural high frequency, low amplitude tremor observed. Reflexes: Brachioradialis: 2+ b/l    Biceps: 2+ b/l    Patellar: 2+ b/l. Normal casual gait with average stance, stride length, arm swing    UPDRS negative (0)    ROS:    Review of Systems   Constitutional:  Negative for appetite change, fatigue and fever.   HENT: Negative.  Negative for hearing loss, tinnitus, trouble swallowing and voice change.    Eyes:  Positive for visual disturbance. Negative for photophobia and pain.        Pupils don't react to light     Respiratory: Negative.  Negative for shortness of breath.    Cardiovascular: Negative.  Negative for palpitations.   Gastrointestinal: Negative.  Negative for nausea and vomiting.   Endocrine: Negative.  Negative for cold intolerance.   Genitourinary: Negative.  Negative for dysuria, frequency and urgency.   Musculoskeletal:  Negative for back pain, gait problem, myalgias, neck pain and neck stiffness.   Skin: Negative.  Negative for rash.   Allergic/Immunologic: Negative.    Neurological:  Positive for tremors. Negative for dizziness, seizures, syncope, facial asymmetry, speech difficulty, weakness, light-headedness, numbness and headaches.        Random times hands will start to shake.  and when he is holding his weapon he can see the tremor. Started few years ago   Hematological: Negative.  Does not bruise/bleed easily.   Psychiatric/Behavioral: Negative.  Negative for confusion, hallucinations and sleep disturbance.    All other systems reviewed and are negative.        ~~~~~~~~~~~~~~~~~~~  Randa Fernandez MD  Neurology Resident, PGY-4  [facility]

## 2024-06-14 ENCOUNTER — PROCEDURE VISIT (OUTPATIENT)
Age: 64
End: 2024-06-14
Payer: COMMERCIAL

## 2024-06-14 VITALS
BODY MASS INDEX: 37.77 KG/M2 | HEIGHT: 66 IN | WEIGHT: 235 LBS | DIASTOLIC BLOOD PRESSURE: 86 MMHG | HEART RATE: 84 BPM | SYSTOLIC BLOOD PRESSURE: 132 MMHG

## 2024-06-14 DIAGNOSIS — M54.41 CHRONIC BILATERAL LOW BACK PAIN WITH RIGHT-SIDED SCIATICA: ICD-10-CM

## 2024-06-14 DIAGNOSIS — G89.29 CHRONIC BILATERAL LOW BACK PAIN WITH RIGHT-SIDED SCIATICA: ICD-10-CM

## 2024-06-14 DIAGNOSIS — M54.16 LUMBAR RADICULOPATHY: Primary | ICD-10-CM

## 2024-06-14 PROCEDURE — 98941 CHIROPRACT MANJ 3-4 REGIONS: CPT | Performed by: CHIROPRACTOR

## 2024-06-14 PROCEDURE — 97140 MANUAL THERAPY 1/> REGIONS: CPT | Performed by: CHIROPRACTOR

## 2024-06-17 NOTE — PROGRESS NOTES
"      HPI:  Javier returns for treatment today reports ongoing symptomatology low back pain occasional radiation right posterior thigh.  He feels that he has a \"knot\" in his back every morning.      The following portions of the patient's history were reviewed and updated as appropriate: allergies, current medications, past family history, past medical history, past social history, past surgical history, and problem list.    Review of Systems    Physical Exam:  Exam reveals paralumbar hypertonicity noted right greater than left active triggers in the right erector spinae quadratus lumborum musculature as well as the right gluteus medius.  Myofascial involvement thoracolumbar fascia right greater than left.  Range of motion lumbar spine remains reduced.  Involvement right sacroiliac joint L4-L5 L5-S1.    Assessment:   Diagnosis ICD-10-CM Associated Orders   1. Lumbar radiculopathy  M54.16       2. Chronic bilateral low back pain with right-sided sciatica  M54.41     G89.29                 Treatment: 11252  Manipulation to the right innominate, sacrum, L5 L4 levels via drop maneuver and flexion distraction well-tolerated.    Therapeutic myofascial release performed to the thoracolumbar fascia and right hip utilizing Graston technique.  I used GT 2, GT 3, GT for instruments.  Stretching mobilizations performed this is well-tolerated by the patient this is a 12-minute procedure.    Discussion:  Reviewed stretching he will continue this I will see him back for follow-up.  "

## 2024-06-17 NOTE — PROGRESS NOTES
HPI:  Back Pain  This is a recurrent problem. The current episode started more than 1 year ago. The problem occurs intermittently. The problem has been waxing and waning since onset. The pain is present in the lumbar spine and sacro-iliac. The quality of the pain is described as aching and shooting. The pain radiates to the right thigh. The pain is at a severity of 4/10. The pain is moderate. The symptoms are aggravated by sitting and standing. He has tried NSAIDs, home exercises and heat for the symptoms. The treatment provided mild relief.     Isaak De Los Santos is a 64 y.o. male who presents for evaluation and possible treatment at the request of his PCP Dr. Shine.  He notes a chronic history of intermittent low back pain which is bilateral and constant worse in the morning or when he is doing a lot of work with his hands.  No 1 incident of causation.  On occasion he reports referral symptoms into the right posterior thigh but no lower than that.  He denies any numbness or tingling.  He denies any lower extremity weakness.  Reports no changes in bowel bladder habits.    He has tried stretching anti-inflammatories with some minimal relief.  He has been evaluated in pain management and has had an injection with some relief.  He is in today for consult to see if possible conservative chiropractic care may be of assistance to him.      Chief Complaint   Patient presents with   • Back Pain     Low back pain referred by pcp, bilateral, constant, worse in the morning, worse on exertion and when he is working with his hands, going on for a few years, can radiate into his thoracic area, about a 2 currently    • Leg Pain     Right leg pain radiating down from low back to the middle of the back of his leg, going on for 3 years, constant, about a 2 but a 4 at its worst      Past Medical History:   Diagnosis Date   • Allergic    • Asthma    • Erectile dysfunction of non-organic origin     Resolved: 10/15/2014   • Left  ankle sprain 10/12/2021   • Pneumonia 2016 or 2017    Twice in the same year      Past Surgical History:   Procedure Laterality Date   • EYE SURGERY     • TONSILLECTOMY AND ADENOIDECTOMY     • UMBILICAL HERNIA REPAIR       Family History   Problem Relation Age of Onset   • Asthma Mother    • Heart disease Mother    • Rheum arthritis Mother    • Osteoporosis Mother    • Arthritis Mother    • Autoimmune disease Mother         Rheumatoid Arthritis   • Diabetes Father    • Heart disease Father    • Diabetes type II Father    • Lung disease Sister         Bronchiectasis.  No clear history of cystic lung disease however   • Diabetes Sister    • Other Son         Thyroid disorder     Social History     Socioeconomic History   • Marital status: /Civil Union     Spouse name: None   • Number of children: None   • Years of education: None   • Highest education level: None   Occupational History   • None   Tobacco Use   • Smoking status: Former     Current packs/day: 0.00     Average packs/day: 1 pack/day for 8.1 years (8.1 ttl pk-yrs)     Types: Cigarettes     Start date: 1971     Quit date: 1979     Years since quittin.3   • Smokeless tobacco: Never   • Tobacco comments:     He smoked a pack a day of cigarettes for 8 years. He quite at the age of 19. Occasional cigar use.   Vaping Use   • Vaping status: Never Used   Substance and Sexual Activity   • Alcohol use: Yes     Comment: Social   • Drug use: Never   • Sexual activity: Yes     Partners: Female   Other Topics Concern   • None   Social History Narrative    Caffeine use    Work-related stress     Social Determinants of Health     Financial Resource Strain: Not on file   Food Insecurity: Not on file   Transportation Needs: Not on file   Physical Activity: Not on file   Stress: Not on file   Social Connections: Not on file   Intimate Partner Violence: Not on file   Housing Stability: Not on file       Current Outpatient Medications:   •  albuterol (2.5  mg/3 mL) 0.083 % nebulizer solution, Inhale 2.5 mg every 6 (six) hours as needed for wheezing or shortness of breath, Disp: , Rfl:   •  albuterol (ProAir HFA) 90 mcg/act inhaler, Inhale 2 puffs every 6 (six) hours as needed for wheezing Use 2 puffs prior to exercise., Disp: 18 g, Rfl: 5  •  albuterol (PROVENTIL HFA,VENTOLIN HFA) 90 mcg/act inhaler, INHALE 2 PUFFS EVERY 6 HOURS AS NEEDED FOR WHEEZING, Disp: 1 g, Rfl: 1  •  Cholecalciferol (VITAMIN D3) 5000 units CAPS, Take 5,000 Units by mouth daily, Disp: , Rfl:   •  Cinnamon 500 MG capsule, Take 500 mg by mouth daily, Disp: , Rfl:   •  cyanocobalamin (VITAMIN B-12) 1000 MCG tablet, Take 1,000 mcg by mouth daily, Disp: , Rfl:   •  fluticasone (FLONASE) 50 mcg/act nasal spray, TWO SPRAYS EACH NOSTRIL ONCE DAILY AS NEEDED., Disp: 16 mL, Rfl: 11  •  fluticasone (FLONASE) 50 mcg/act nasal spray, 2 sprays into each nostril daily, Disp: 1 g, Rfl: 0  •  ibuprofen (MOTRIN) 200 mg tablet, Take 200 mg by mouth every 6 (six) hours as needed for mild pain, Disp: , Rfl:   •  levocetirizine (XYZAL) 5 MG tablet, TAKE 1 TABLET BY MOUTH EVERY DAY IN THE EVENING, Disp: 30 tablet, Rfl: 5  •  montelukast (SINGULAIR) 10 mg tablet, TAKE 1 TABLET BY MOUTH EVERYDAY AT BEDTIME, Disp: 30 tablet, Rfl: 5  •  Omega-3 Fatty Acids (FISH OIL) 1,000 mg, Take 3 capsules by mouth daily, Disp: , Rfl:   •  rosuvastatin (CRESTOR) 5 mg tablet, Take 1 tablet (5 mg total) by mouth daily, Disp: 30 tablet, Rfl: 5  •  tadalafil (CIALIS) 20 MG tablet, Take 1 tablet (20 mg total) by mouth daily as needed for erectile dysfunction, Disp: 30 tablet, Rfl: 5  Allergies as of 06/03/2024   • (No Known Allergies)         The following portions of the patient's history were reviewed and updated as appropriate: allergies, current medications, past family history, past medical history, past social history, past surgical history, and problem list.    Review of Systems   Constitutional: Negative.    Musculoskeletal:   Positive for back pain.   Neurological: Negative.    Psychiatric/Behavioral: Negative.         Physical Exam:  Physical Exam  Vitals reviewed.   Constitutional:       Appearance: Normal appearance.   Cardiovascular:      Rate and Rhythm: Normal rate.      Pulses: Normal pulses.   Pulmonary:      Effort: Pulmonary effort is normal.   Musculoskeletal:      Lumbar back: Spasms and tenderness present. Decreased range of motion. Positive right straight leg raise test.        Back:       Comments: Pelvic obliquity noted there is a leg length inequality there is involvement of the right sacroiliac joint as well as the L4-L5 L5-S1 levels   Skin:     General: Skin is warm and dry.   Neurological:      General: No focal deficit present.      Mental Status: He is alert and oriented to person, place, and time.      Sensory: Sensation is intact.      Motor: Motor function is intact.      Gait: Gait is intact.      Deep Tendon Reflexes: Reflexes are normal and symmetric.   Psychiatric:         Mood and Affect: Mood normal.         Behavior: Behavior normal.         Thought Content: Thought content normal.     MRI LUMBAR SPINE WITHOUT CONTRAST     INDICATION: M54.16: Radiculopathy, lumbar region.   Low back pain radiating into the posterior lateral aspect of bilateral lower extremities indication the in the right greater than left calves.     COMPARISON:  None.     TECHNIQUE:  Multiplanar, multisequence imaging of the lumbar spine was performed. .          IMAGE QUALITY:  Diagnostic     FINDINGS:     VERTEBRAL BODIES:  There are 5 lumbar type vertebral bodies.  Normal alignment of the lumbar spine.  No spondylolysis or spondylolisthesis. No scoliosis.  No compression fracture.    Normal marrow signal is identified within the visualized bony   structures.  No discrete marrow lesion.     SACRUM:  Normal signal within the sacrum. No evidence of insufficiency or stress fracture.     DISTAL CORD AND CONUS:  Normal size and signal  within the distal cord and conus.     PARASPINAL SOFT TISSUES:  Paraspinal soft tissues are unremarkable.     LOWER THORACIC DISC SPACES:  Normal disc height and signal.  No disc herniation, canal stenosis or foraminal narrowing.     LUMBAR DISC SPACES:     L1-L2:  Normal.     L2-L3:  Minimal annular bulge without central or foraminal narrowing.     L3-L4:  Moderate facet hypertrophy with ligamentous infolding and minimal annular bulge.  Small left foraminal protrusion contacts the L3 foraminal nerve root.  Symptomatic, this would result in a left L3 radiculopathy.  Mild central stenosis.     L4-L5:  Mild facet hypertrophy with mild annular bulging results in mild bilateral foraminal narrowing with disc material contacting bilateral L4 nerve roots.  Correlate for L4 radiculopathy.  Mild central stenosis.     L5-S1:  Mild annular bulge.  No significant central stenosis.  Minimal right foraminal narrowing.     OTHER FINDINGS:  None.     IMPRESSION:     Left foraminal protrusion type disc herniation L3-4 contacts L3 nerve root.  If symptomatically, this would result in a left L3 radiculopathy.     Annular bulging L4-5 results in mild bilateral foraminal narrowing with discogenic contacting bilateral L4 nerve roots.  Correlate for L4 radiculopathy.              Workstation performed: [id number]    Assessment:  Diagnoses and all orders for this visit:    Chronic bilateral low back pain with right-sided sciatica    Lumbar radiculopathy  -     Ambulatory Referral to Chiropractic         Treatment:  [id number]  Manipulation to the right innominate, sacrum, L5 L4 levels via drop maneuver as well as flexion distraction to the L4 and L5 disc levels.    Discussion:  I reviewed past medical notes.  Discussed case with patient today.  Trial of chiropractic care recommended we will see him over the next 4 weeks reassess at the end of that time.  No follow-ups on file.

## 2024-06-24 ENCOUNTER — PROCEDURE VISIT (OUTPATIENT)
Age: 64
End: 2024-06-24
Payer: COMMERCIAL

## 2024-06-24 VITALS
WEIGHT: 235 LBS | SYSTOLIC BLOOD PRESSURE: 147 MMHG | BODY MASS INDEX: 37.77 KG/M2 | HEART RATE: 74 BPM | DIASTOLIC BLOOD PRESSURE: 85 MMHG | HEIGHT: 66 IN

## 2024-06-24 DIAGNOSIS — M25.812 IMPINGEMENT OF LEFT SHOULDER: ICD-10-CM

## 2024-06-24 DIAGNOSIS — M54.16 LUMBAR RADICULOPATHY: ICD-10-CM

## 2024-06-24 DIAGNOSIS — M54.41 CHRONIC BILATERAL LOW BACK PAIN WITH RIGHT-SIDED SCIATICA: Primary | ICD-10-CM

## 2024-06-24 DIAGNOSIS — G89.29 CHRONIC BILATERAL LOW BACK PAIN WITH RIGHT-SIDED SCIATICA: Primary | ICD-10-CM

## 2024-06-24 PROCEDURE — 98941 CHIROPRACT MANJ 3-4 REGIONS: CPT | Performed by: CHIROPRACTOR

## 2024-06-24 PROCEDURE — 97110 THERAPEUTIC EXERCISES: CPT | Performed by: CHIROPRACTOR

## 2024-06-24 NOTE — PROGRESS NOTES
HPI:  Javier returns for treatment today reports ongoing symptomatology low back pain occasional radiation right posterior thigh.  He is working on daily stretching as well with the left shoulder still very tight    The following portions of the patient's history were reviewed and updated as appropriate: allergies, current medications, past family history, past medical history, past social history, past surgical history, and problem list.    Review of Systems    Physical Exam:  Exam reveals paralumbar hypertonicity noted right greater than left active triggers in the right erector spinae quadratus lumborum musculature as well as the right gluteus medius.  Myofascial involvement thoracolumbar fascia right greater than left.  Range of motion lumbar spine remains reduced.  Involvement right sacroiliac joint L4-L5 L5-S1.    Assessment:   Diagnosis ICD-10-CM Associated Orders   1. Chronic bilateral low back pain with right-sided sciatica  M54.41     G89.29       2. Lumbar radiculopathy  M54.16       3. Impingement of left shoulder  M25.812                 Treatment: 08919  Manipulation to the right innominate, sacrum, L5 L4 levels via drop maneuver and flexion distraction well-tolerated.    Therapeutic stretching utilizing Graston technique today first to the thoracolumbar fascia and bilateral hips I used GT 2, GT 3, and GT for instruments well-tolerated.  I turned my attention the left shoulder internal/external rotation stretches and long axis traction performed.  He still restricted in external rotation which is painful.  12-minute procedure    Discussion:  Reviewed stretching he will continue this I will see him back for follow-up.

## 2024-06-25 DIAGNOSIS — N52.9 ERECTILE DYSFUNCTION, UNSPECIFIED ERECTILE DYSFUNCTION TYPE: ICD-10-CM

## 2024-06-26 RX ORDER — TADALAFIL 20 MG/1
20 TABLET ORAL DAILY PRN
Qty: 30 TABLET | Refills: 5 | Status: SHIPPED | OUTPATIENT
Start: 2024-06-26

## 2024-07-08 ENCOUNTER — PROCEDURE VISIT (OUTPATIENT)
Age: 64
End: 2024-07-08
Payer: COMMERCIAL

## 2024-07-08 VITALS
BODY MASS INDEX: 37.77 KG/M2 | SYSTOLIC BLOOD PRESSURE: 150 MMHG | HEART RATE: 79 BPM | WEIGHT: 235 LBS | DIASTOLIC BLOOD PRESSURE: 85 MMHG | HEIGHT: 66 IN

## 2024-07-08 DIAGNOSIS — E78.5 HYPERLIPIDEMIA, UNSPECIFIED HYPERLIPIDEMIA TYPE: ICD-10-CM

## 2024-07-08 DIAGNOSIS — M54.41 CHRONIC BILATERAL LOW BACK PAIN WITH RIGHT-SIDED SCIATICA: Primary | ICD-10-CM

## 2024-07-08 DIAGNOSIS — J30.2 SEASONAL ALLERGIES: ICD-10-CM

## 2024-07-08 DIAGNOSIS — N52.9 ERECTILE DYSFUNCTION, UNSPECIFIED ERECTILE DYSFUNCTION TYPE: ICD-10-CM

## 2024-07-08 DIAGNOSIS — M54.16 LUMBAR RADICULOPATHY: ICD-10-CM

## 2024-07-08 DIAGNOSIS — G89.29 CHRONIC BILATERAL LOW BACK PAIN WITH RIGHT-SIDED SCIATICA: Primary | ICD-10-CM

## 2024-07-08 DIAGNOSIS — M25.812 IMPINGEMENT OF LEFT SHOULDER: ICD-10-CM

## 2024-07-08 PROCEDURE — 97110 THERAPEUTIC EXERCISES: CPT | Performed by: CHIROPRACTOR

## 2024-07-08 PROCEDURE — 98941 CHIROPRACT MANJ 3-4 REGIONS: CPT | Performed by: CHIROPRACTOR

## 2024-07-08 RX ORDER — MONTELUKAST SODIUM 10 MG/1
10 TABLET ORAL
Qty: 30 TABLET | Refills: 5 | Status: SHIPPED | OUTPATIENT
Start: 2024-07-08 | End: 2024-07-10 | Stop reason: SDUPTHER

## 2024-07-08 RX ORDER — ROSUVASTATIN CALCIUM 5 MG/1
5 TABLET, COATED ORAL DAILY
Qty: 30 TABLET | Refills: 5 | Status: SHIPPED | OUTPATIENT
Start: 2024-07-08 | End: 2024-07-10 | Stop reason: SDUPTHER

## 2024-07-08 RX ORDER — TADALAFIL 20 MG/1
20 TABLET ORAL DAILY PRN
Qty: 30 TABLET | Refills: 5 | Status: SHIPPED | OUTPATIENT
Start: 2024-07-08 | End: 2024-07-10 | Stop reason: SDUPTHER

## 2024-07-08 RX ORDER — LEVOCETIRIZINE DIHYDROCHLORIDE 5 MG/1
5 TABLET, FILM COATED ORAL EVERY EVENING
Qty: 30 TABLET | Refills: 5 | Status: SHIPPED | OUTPATIENT
Start: 2024-07-08 | End: 2024-07-10 | Stop reason: SDUPTHER

## 2024-07-08 NOTE — TELEPHONE ENCOUNTER
Patient is requesting multiple medications to be refilled and sent through [facility].90 day supply with 3 refills.

## 2024-07-10 DIAGNOSIS — J30.2 SEASONAL ALLERGIES: ICD-10-CM

## 2024-07-10 DIAGNOSIS — N52.9 ERECTILE DYSFUNCTION, UNSPECIFIED ERECTILE DYSFUNCTION TYPE: ICD-10-CM

## 2024-07-10 DIAGNOSIS — E78.5 HYPERLIPIDEMIA, UNSPECIFIED HYPERLIPIDEMIA TYPE: ICD-10-CM

## 2024-07-10 RX ORDER — TADALAFIL 20 MG/1
20 TABLET ORAL DAILY PRN
Qty: 90 TABLET | Refills: 1 | Status: SHIPPED | OUTPATIENT
Start: 2024-07-10

## 2024-07-10 RX ORDER — MONTELUKAST SODIUM 10 MG/1
10 TABLET ORAL
Qty: 90 TABLET | Refills: 1 | Status: SHIPPED | OUTPATIENT
Start: 2024-07-10

## 2024-07-10 RX ORDER — ROSUVASTATIN CALCIUM 5 MG/1
5 TABLET, COATED ORAL DAILY
Qty: 90 TABLET | Refills: 1 | Status: SHIPPED | OUTPATIENT
Start: 2024-07-10

## 2024-07-10 RX ORDER — LEVOCETIRIZINE DIHYDROCHLORIDE 5 MG/1
5 TABLET, FILM COATED ORAL EVERY EVENING
Qty: 90 TABLET | Refills: 1 | Status: SHIPPED | OUTPATIENT
Start: 2024-07-10

## 2024-07-10 NOTE — PROGRESS NOTES
HPI:  Javier returns for treatment today reports ongoing symptomatology low back pain occasional radiation right posterior thigh.  He is working on daily stretching as well with the left shoulder which is improved.    The following portions of the patient's history were reviewed and updated as appropriate: allergies, current medications, past family history, past medical history, past social history, past surgical history, and problem list.    Review of Systems    Physical Exam:  Exam reveals paralumbar hypertonicity noted right greater than left active triggers in the right erector spinae quadratus lumborum musculature as well as the right gluteus medius.  Myofascial involvement thoracolumbar fascia right greater than left.  Range of motion lumbar spine remains reduced.  Involvement right sacroiliac joint L4-L5 L5-S1.    Assessment:   Diagnosis ICD-10-CM Associated Orders   1. Chronic bilateral low back pain with right-sided sciatica  M54.41     G89.29       2. Lumbar radiculopathy  M54.16       3. Impingement of left shoulder  M25.812                 Treatment: 61253  Manipulation to the right innominate, sacrum, L5 L4 levels via drop maneuver and flexion distraction well-tolerated.    Therapeutic stretching utilizing Graston technique today first to the thoracolumbar fascia and bilateral hips I used GT 2, GT 3, and GT for instruments well-tolerated.  I turned my attention the left shoulder internal/external rotation stretches and long axis traction performed.  He still restricted in external rotation which is painful.  12-minute procedure    Discussion:  Reviewed stretching he will continue this I will see him back for follow-up.

## 2024-07-11 ENCOUNTER — TELEPHONE (OUTPATIENT)
Dept: INTERNAL MEDICINE CLINIC | Facility: CLINIC | Age: 64
End: 2024-07-11

## 2024-07-18 NOTE — TELEPHONE ENCOUNTER
PA for tadalafil (CIALIS) 20 MG tablet not required     Reason        Patient advised by          [x] MyChart Message  [] Phone call   []LMOM  []L/M to call office as no active Communication consent on file  []Unable to leave detailed message as VM not approved on Communication consent       Pharmacy advised by    [x]Fax  []Phone call

## 2024-07-18 NOTE — TELEPHONE ENCOUNTER
PA for tadalafil (CIALIS) 20 MG tablet    Submitted via    []CMM-KEY   [x]"[employer]-Case ID # [id number]  []Faxed to plan   []Other website   []Phone call Case ID #     Office notes sent, clinical questions answered. Awaiting determination    Turnaround time for your insurance to make a decision on your Prior Authorization can take 7-21 business days.

## 2024-07-19 ENCOUNTER — PROCEDURE VISIT (OUTPATIENT)
Age: 64
End: 2024-07-19
Payer: COMMERCIAL

## 2024-07-19 ENCOUNTER — APPOINTMENT (OUTPATIENT)
Dept: LAB | Age: 64
End: 2024-07-19
Payer: COMMERCIAL

## 2024-07-19 VITALS — BODY MASS INDEX: 37.77 KG/M2 | HEIGHT: 66 IN | WEIGHT: 235 LBS

## 2024-07-19 DIAGNOSIS — G89.29 CHRONIC BILATERAL LOW BACK PAIN WITH RIGHT-SIDED SCIATICA: Primary | ICD-10-CM

## 2024-07-19 DIAGNOSIS — M54.16 LUMBAR RADICULOPATHY: ICD-10-CM

## 2024-07-19 DIAGNOSIS — E53.8 VITAMIN B12 DEFICIENCY: ICD-10-CM

## 2024-07-19 DIAGNOSIS — M54.41 CHRONIC BILATERAL LOW BACK PAIN WITH RIGHT-SIDED SCIATICA: Primary | ICD-10-CM

## 2024-07-19 DIAGNOSIS — E78.5 HYPERLIPIDEMIA, UNSPECIFIED HYPERLIPIDEMIA TYPE: ICD-10-CM

## 2024-07-19 DIAGNOSIS — E55.9 VITAMIN D DEFICIENCY: ICD-10-CM

## 2024-07-19 LAB
25(OH)D3 SERPL-MCNC: 46 NG/ML (ref 30–100)
ALBUMIN SERPL BCG-MCNC: 4.1 G/DL (ref 3.5–5)
ALP SERPL-CCNC: 51 U/L (ref 34–104)
ALT SERPL W P-5'-P-CCNC: 31 U/L (ref 7–52)
ANION GAP SERPL CALCULATED.3IONS-SCNC: 5 MMOL/L (ref 4–13)
AST SERPL W P-5'-P-CCNC: 22 U/L (ref 13–39)
BILIRUB SERPL-MCNC: 0.44 MG/DL (ref 0.2–1)
BUN SERPL-MCNC: 16 MG/DL (ref 5–25)
CALCIUM SERPL-MCNC: 9.1 MG/DL (ref 8.4–10.2)
CHLORIDE SERPL-SCNC: 105 MMOL/L (ref 96–108)
CHOLEST SERPL-MCNC: 132 MG/DL
CO2 SERPL-SCNC: 27 MMOL/L (ref 21–32)
CREAT SERPL-MCNC: 0.87 MG/DL (ref 0.6–1.3)
GFR SERPL CREATININE-BSD FRML MDRD: 89 ML/MIN/1.73SQ M
GLUCOSE P FAST SERPL-MCNC: 100 MG/DL (ref 65–99)
HDLC SERPL-MCNC: 43 MG/DL
LDLC SERPL CALC-MCNC: 67 MG/DL (ref 0–100)
POTASSIUM SERPL-SCNC: 4.2 MMOL/L (ref 3.5–5.3)
PROT SERPL-MCNC: 7.1 G/DL (ref 6.4–8.4)
SODIUM SERPL-SCNC: 137 MMOL/L (ref 135–147)
TRIGL SERPL-MCNC: 110 MG/DL
VIT B12 SERPL-MCNC: 523 PG/ML (ref 180–914)

## 2024-07-19 PROCEDURE — 80061 LIPID PANEL: CPT

## 2024-07-19 PROCEDURE — 82306 VITAMIN D 25 HYDROXY: CPT

## 2024-07-19 PROCEDURE — 36415 COLL VENOUS BLD VENIPUNCTURE: CPT

## 2024-07-19 PROCEDURE — 97110 THERAPEUTIC EXERCISES: CPT | Performed by: CHIROPRACTOR

## 2024-07-19 PROCEDURE — 82607 VITAMIN B-12: CPT

## 2024-07-19 PROCEDURE — 80053 COMPREHEN METABOLIC PANEL: CPT

## 2024-07-19 PROCEDURE — 98941 CHIROPRACT MANJ 3-4 REGIONS: CPT | Performed by: CHIROPRACTOR

## 2024-07-19 NOTE — PROGRESS NOTES
HPI:  Javier returns for treatment today reports ongoing symptomatology low back pain occasional radiation right posterior thigh.  He is working on daily stretching as well with the left shoulder which is improved.    The following portions of the patient's history were reviewed and updated as appropriate: allergies, current medications, past family history, past medical history, past social history, past surgical history, and problem list.    Review of Systems    Physical Exam:  Exam reveals paralumbar hypertonicity noted right greater than left active triggers in the right erector spinae quadratus lumborum musculature as well as the right gluteus medius.  Myofascial involvement thoracolumbar fascia right greater than left.  Range of motion lumbar spine remains reduced.  Involvement right sacroiliac joint L4-L5 L5-S1.    Assessment:   Diagnosis ICD-10-CM Associated Orders   1. Chronic bilateral low back pain with right-sided sciatica  M54.41     G89.29       2. Lumbar radiculopathy  M54.16                 Treatment: 28187  Manipulation to the right innominate, sacrum, L5 L4 levels via drop maneuver and flexion distraction well-tolerated.    Therapeutic stretching utilizing Graston technique today first to the thoracolumbar fascia and bilateral hips I used GT 2, GT 3, and GT for instruments well-tolerated.  I turned my attention the left shoulder internal/external rotation stretches and long axis traction performed.  He still restricted in external rotation which is painful.  12-minute procedure    Discussion:  Reviewed stretching he will continue this I will see him back for follow-up.

## 2024-07-25 ENCOUNTER — OFFICE VISIT (OUTPATIENT)
Dept: INTERNAL MEDICINE CLINIC | Facility: CLINIC | Age: 64
End: 2024-07-25
Payer: COMMERCIAL

## 2024-07-25 VITALS
BODY MASS INDEX: 38.57 KG/M2 | SYSTOLIC BLOOD PRESSURE: 140 MMHG | WEIGHT: 240 LBS | HEART RATE: 69 BPM | OXYGEN SATURATION: 96 % | DIASTOLIC BLOOD PRESSURE: 76 MMHG | HEIGHT: 66 IN

## 2024-07-25 DIAGNOSIS — N52.9 ERECTILE DYSFUNCTION OF ORGANIC ORIGIN: ICD-10-CM

## 2024-07-25 DIAGNOSIS — E78.49 OTHER HYPERLIPIDEMIA: ICD-10-CM

## 2024-07-25 DIAGNOSIS — Z00.00 ANNUAL PHYSICAL EXAM: Primary | ICD-10-CM

## 2024-07-25 DIAGNOSIS — E66.01 CLASS 2 SEVERE OBESITY DUE TO EXCESS CALORIES WITH SERIOUS COMORBIDITY AND BODY MASS INDEX (BMI) OF 39.0 TO 39.9 IN ADULT (HCC): ICD-10-CM

## 2024-07-25 PROCEDURE — 1159F MED LIST DOCD IN RCRD: CPT

## 2024-07-25 PROCEDURE — 1160F RVW MEDS BY RX/DR IN RCRD: CPT

## 2024-07-25 PROCEDURE — 99214 OFFICE O/P EST MOD 30 MIN: CPT

## 2024-07-25 RX ORDER — TADALAFIL 20 MG/1
20 TABLET ORAL DAILY PRN
Qty: 30 TABLET | Refills: 0 | Status: SHIPPED | OUTPATIENT
Start: 2024-07-25

## 2024-07-25 NOTE — PROGRESS NOTES
"Ambulatory Visit  Name: Isaak De Los Santos      : 1960      MRN: [id number]  Encounter Provider: Marie Frye MD  Encounter Date: 2024   [facility]    Assessment & Plan   1. Annual physical exam  -     Cologuard  -     Hemoglobin A1C; Future; Expected date: 2025  -     PSA, Total Screen; Future; Expected date: 2025  -     Comprehensive metabolic panel; Future; Expected date: 2025  -     Cardio IQ(R) Advanced Lipid Panel; Future; Expected date: 2025  2. Erectile dysfunction of organic origin  -     tadalafil (CIALIS) 20 MG tablet; Take 1 tablet (20 mg total) by mouth daily as needed for erectile dysfunction  3. Class 2 severe obesity due to excess calories with serious comorbidity and body mass index (BMI) of 39.0 to 39.9 in adult (HCC)  Assessment & Plan:  Counseled regarding importance of exercise and  healthy diet  Patient unable to f ollow a structued execrcise pattern due to low back pain, however pt is trying to exercise    4. Other hyperlipidemia  Assessment & Plan:  Patient recently started on crestor 5 mg daily-tolerating well  Lipid panel within normal limits now  Plan  Follow up in 6 months  Lifestyle changes  Continue crestor       History of Present Illness     HPI  64 y old male with h/o HLD, SWAPNIL, exercise induced asthma, vit b12 and vit D deficiency here for follow up.   He was recently started on Crestor for HLD. Patient reports that he's tolerating the Crestor well, no myalgias.  He overall feels well.He continues to have lower back pain , but improving now .  He reports that the lower back pain is limiting his exercise but has started slowly lifting weights.Patient ptherwise feels well.  Review of Systems   Musculoskeletal:  Positive for back pain.   All other systems reviewed and are negative.      Objective     /76   Pulse 69   Ht 5' 5.75\" (1.67 m)   Wt 109 kg (240 lb)   SpO2 96%   BMI 39.03 " kg/m²     Physical Exam  Constitutional:       Appearance: He is obese.   HENT:      Head: Normocephalic.      Right Ear: Tympanic membrane, ear canal and external ear normal.      Left Ear: Tympanic membrane, ear canal and external ear normal.   Eyes:      Extraocular Movements: Extraocular movements intact.      Conjunctiva/sclera: Conjunctivae normal.   Cardiovascular:      Rate and Rhythm: Normal rate and regular rhythm.      Pulses: Normal pulses.      Heart sounds: Normal heart sounds.   Pulmonary:      Effort: Pulmonary effort is normal.      Breath sounds: Normal breath sounds.   Abdominal:      General: Abdomen is flat.      Palpations: Abdomen is soft.   Musculoskeletal:         General: Normal range of motion.      Cervical back: Normal range of motion.   Skin:     General: Skin is warm.      Capillary Refill: Capillary refill takes less than 2 seconds.   Neurological:      Mental Status: He is alert and oriented to person, place, and time. Mental status is at baseline.       Administrative Statements

## 2024-07-29 NOTE — ASSESSMENT & PLAN NOTE
Patient recently started on crestor 5 mg daily-tolerating well  Lipid panel within normal limits now  Plan  Follow up in 6 months  Lifestyle changes  Continue crestor

## 2024-07-29 NOTE — ASSESSMENT & PLAN NOTE
Counseled regarding importance of exercise and  healthy diet  Patient unable to f ollow a structued execrcise pattern due to low back pain, however pt is trying to exercise

## 2024-07-31 ENCOUNTER — PROCEDURE VISIT (OUTPATIENT)
Age: 64
End: 2024-07-31
Payer: COMMERCIAL

## 2024-07-31 VITALS
SYSTOLIC BLOOD PRESSURE: 154 MMHG | BODY MASS INDEX: 38.57 KG/M2 | WEIGHT: 240 LBS | DIASTOLIC BLOOD PRESSURE: 84 MMHG | HEART RATE: 69 BPM | HEIGHT: 66 IN

## 2024-07-31 DIAGNOSIS — M54.41 CHRONIC BILATERAL LOW BACK PAIN WITH RIGHT-SIDED SCIATICA: Primary | ICD-10-CM

## 2024-07-31 DIAGNOSIS — M25.812 IMPINGEMENT OF LEFT SHOULDER: ICD-10-CM

## 2024-07-31 DIAGNOSIS — M54.16 LUMBAR RADICULOPATHY: ICD-10-CM

## 2024-07-31 DIAGNOSIS — G89.29 CHRONIC BILATERAL LOW BACK PAIN WITH RIGHT-SIDED SCIATICA: Primary | ICD-10-CM

## 2024-07-31 PROCEDURE — 97110 THERAPEUTIC EXERCISES: CPT | Performed by: CHIROPRACTOR

## 2024-07-31 PROCEDURE — 98941 CHIROPRACT MANJ 3-4 REGIONS: CPT | Performed by: CHIROPRACTOR

## 2024-07-31 NOTE — PROGRESS NOTES
HPI:  Javier returns for treatment today reports ongoing symptomatology low back pain occasional radiation right posterior thigh.  He is working on daily stretching as well with the left shoulder which is improved.    The following portions of the patient's history were reviewed and updated as appropriate: allergies, current medications, past family history, past medical history, past social history, past surgical history, and problem list.    Review of Systems    Physical Exam:  Exam reveals paralumbar hypertonicity noted right greater than left active triggers in the right erector spinae quadratus lumborum musculature as well as the right gluteus medius.  Myofascial involvement thoracolumbar fascia right greater than left.  Range of motion lumbar spine remains reduced.  Involvement right sacroiliac joint L4-L5 L5-S1.    Assessment:   Diagnosis ICD-10-CM Associated Orders   1. Chronic bilateral low back pain with right-sided sciatica  M54.41     G89.29       2. Lumbar radiculopathy  M54.16       3. Impingement of left shoulder  M25.812                 Treatment: 96742  Manipulation to the right innominate, sacrum, L5 L4 levels via drop maneuver and flexion distraction well-tolerated.    Therapeutic stretching utilizing Graston technique today first to the thoracolumbar fascia and bilateral hips I used GT 2, GT 3, and GT for instruments well-tolerated.  I turned my attention the left shoulder internal/external rotation stretches and long axis traction performed.  He still restricted in external rotation which is painful.  12-minute procedure    Discussion:  Reviewed stretching he will continue this I will see him back for follow-up.

## 2024-08-12 ENCOUNTER — PROCEDURE VISIT (OUTPATIENT)
Age: 64
End: 2024-08-12
Payer: COMMERCIAL

## 2024-08-12 VITALS
SYSTOLIC BLOOD PRESSURE: 152 MMHG | WEIGHT: 240 LBS | DIASTOLIC BLOOD PRESSURE: 86 MMHG | BODY MASS INDEX: 38.57 KG/M2 | HEART RATE: 82 BPM | HEIGHT: 66 IN

## 2024-08-12 DIAGNOSIS — G89.29 CHRONIC BILATERAL LOW BACK PAIN WITH RIGHT-SIDED SCIATICA: Primary | ICD-10-CM

## 2024-08-12 DIAGNOSIS — M54.41 CHRONIC BILATERAL LOW BACK PAIN WITH RIGHT-SIDED SCIATICA: Primary | ICD-10-CM

## 2024-08-12 DIAGNOSIS — M54.16 LUMBAR RADICULOPATHY: ICD-10-CM

## 2024-08-12 DIAGNOSIS — M25.812 IMPINGEMENT OF LEFT SHOULDER: ICD-10-CM

## 2024-08-12 PROCEDURE — 98941 CHIROPRACT MANJ 3-4 REGIONS: CPT | Performed by: CHIROPRACTOR

## 2024-08-12 PROCEDURE — 97110 THERAPEUTIC EXERCISES: CPT | Performed by: CHIROPRACTOR

## 2024-08-12 NOTE — PROGRESS NOTES
HPI:  Javier returns for treatment today reports ongoing symptomatology low back pain occasional radiation right posterior thigh.  He is working on daily stretching as well with the left shoulder which is improved.    The following portions of the patient's history were reviewed and updated as appropriate: allergies, current medications, past family history, past medical history, past social history, past surgical history, and problem list.    Review of Systems    Physical Exam:  Exam reveals paralumbar hypertonicity noted right greater than left active triggers in the right erector spinae quadratus lumborum musculature as well as the right gluteus medius.  Myofascial involvement thoracolumbar fascia right greater than left.  Range of motion lumbar spine remains reduced.  Involvement right sacroiliac joint L4-L5 L5-S1.    Assessment:   Diagnosis ICD-10-CM Associated Orders   1. Chronic bilateral low back pain with right-sided sciatica  M54.41     G89.29       2. Lumbar radiculopathy  M54.16       3. Impingement of left shoulder  M25.812                 Treatment: 18337  Manipulation to the right innominate, sacrum, L5 L4 levels via drop maneuver and flexion distraction well-tolerated.    Therapeutic stretching utilizing Graston technique today.  I used GT 2, GT 3, and GT 4 instruments.  Therapeutic stretching through internal/external rotation and scapular mobilizations performed.  Well-tolerated.  12-minute procedure    Discussion:  Reviewed stretching he will continue this I will see him back for follow-up.

## 2024-08-14 ENCOUNTER — HOSPITAL ENCOUNTER (OUTPATIENT)
Dept: CT IMAGING | Facility: HOSPITAL | Age: 64
Discharge: HOME | End: 2024-08-14
Payer: COMMERCIAL

## 2024-08-14 DIAGNOSIS — R91.8 PULMONARY NODULES: ICD-10-CM

## 2024-08-14 PROCEDURE — G1004 CDSM NDSC: HCPCS

## 2024-08-14 PROCEDURE — 71250 CT THORAX DX C-: CPT

## 2024-08-20 ENCOUNTER — OFFICE VISIT (OUTPATIENT)
Dept: GASTROENTEROLOGY | Facility: AMBULARY SURGERY CENTER | Age: 64
End: 2024-08-20
Payer: COMMERCIAL

## 2024-08-20 VITALS
OXYGEN SATURATION: 96 % | DIASTOLIC BLOOD PRESSURE: 70 MMHG | WEIGHT: 245.8 LBS | HEIGHT: 66 IN | SYSTOLIC BLOOD PRESSURE: 132 MMHG | BODY MASS INDEX: 39.5 KG/M2 | HEART RATE: 76 BPM

## 2024-08-20 DIAGNOSIS — Z12.11 COLON CANCER SCREENING: ICD-10-CM

## 2024-08-20 DIAGNOSIS — K21.9 GASTROESOPHAGEAL REFLUX DISEASE WITHOUT ESOPHAGITIS: Primary | ICD-10-CM

## 2024-08-20 PROCEDURE — 99204 OFFICE O/P NEW MOD 45 MIN: CPT | Performed by: INTERNAL MEDICINE

## 2024-08-20 RX ORDER — SODIUM PICOSULFATE, MAGNESIUM OXIDE, AND ANHYDROUS CITRIC ACID 12; 3.5; 1 G/175ML; G/175ML; MG/175ML
LIQUID ORAL
Qty: 350 ML | Refills: 0 | Status: SHIPPED | OUTPATIENT
Start: 2024-08-20

## 2024-08-20 NOTE — ASSESSMENT & PLAN NOTE
Gastroesophageal reflux disease - Patient has the symptoms of chronic acid reflux for a long time.  Possible hiatal hernia or LES weakness.  Should rule out Manzanares's esophagus because of chronic symptoms.    -Patient was explained about the lifestyle and dietary modifications.  Advised to avoid fatty foods, chocolates, caffeine, alcohol and any other triggering foods.  Avoid eating for at least 3 hours before going to bed.    -May take Pepcid regularly    -Schedule EGD

## 2024-08-20 NOTE — PATIENT INSTRUCTIONS
Scheduled date of EGD/colonoscopy (as of today):  10/16/24  Physician performing EGD/colonoscopy: Dr Price  Location of EGD/colonoscopy: [facility]   Desired bowel prep reviewed with patient: Clenpiq  Instructions reviewed with patient by: Sofiya BERNAL   Clearances:  n/a

## 2024-08-20 NOTE — PROGRESS NOTES
Consultation -  [facility]  Isaak De Los Santos 1960 male         ASSESSMENT & PLAN:    Gastroesophageal reflux disease without esophagitis  Gastroesophageal reflux disease - Patient has the symptoms of chronic acid reflux for a long time.  Possible hiatal hernia or LES weakness.  Should rule out Manzanares's esophagus because of chronic symptoms.    -Patient was explained about the lifestyle and dietary modifications.  Advised to avoid fatty foods, chocolates, caffeine, alcohol and any other triggering foods.  Avoid eating for at least 3 hours before going to bed.    -May take Pepcid regularly    -Schedule EGD    Colon cancer screening  Screening for colon cancer - patient is at average risk for colon cancer screening.  Rule out colorectal lesions including polyps or malignancy.    -Schedule for colonoscopy.    -High-fiber diet.    -Patient was given instructions about the colonoscopy prep.    -Patient was explained about the risks and benefits of the procedure. Risks including but not limited to bleeding, infection, perforation were explained in detail. Also explained about less than 100% sensitivity with the exam and other alternatives.      Chief Complaint: For EGD and colonoscopy    HPI: 64-year-old male with history of GERD, hyperlipidemia, asthma was referred for colonoscopy. Patient reports having acid reflux at least every other day when he takes antacids as needed with help.  He took Nexium in the past.  He had last colonoscopy about 10 years ago.  Denies any difficulty swallowing.  Good appetite, no recent weight loss particular bowel movements and denies any blood Demix in the stool.  No abdominal pain, nausea or vomiting.    Chaperon: MsMisha Cale    REVIEW OF SYSTEMS: Review of Systems   Constitutional:  Negative for activity change, appetite change, chills, diaphoresis, fatigue, fever and unexpected weight change.   HENT:  Negative for ear discharge, ear pain, facial swelling,  hearing loss, nosebleeds, sore throat, tinnitus and voice change.    Eyes:  Negative for pain, discharge, redness, itching and visual disturbance.   Respiratory:  Negative for apnea, cough, chest tightness, shortness of breath and wheezing.    Cardiovascular:  Negative for chest pain and palpitations.   Gastrointestinal:         As noted in HPI   Endocrine: Negative for cold intolerance, heat intolerance and polyuria.   Genitourinary:  Negative for difficulty urinating, dysuria, flank pain, hematuria and urgency.   Musculoskeletal:  Positive for arthralgias. Negative for back pain, gait problem, joint swelling and myalgias.   Skin:  Negative for rash and wound.   Neurological:  Negative for dizziness, tremors, seizures, speech difficulty, light-headedness, numbness and headaches.   Hematological:  Negative for adenopathy. Does not bruise/bleed easily.   Psychiatric/Behavioral:  Negative for agitation, behavioral problems and confusion. The patient is not nervous/anxious.         Past Medical History:   Diagnosis Date    Allergic     Asthma     Erectile dysfunction of non-organic origin     Resolved: 10/15/2014    Left ankle sprain 10/12/2021    Pneumonia 2016 or 2017    Twice in the same year      Past Surgical History:   Procedure Laterality Date    EYE SURGERY      TONSILLECTOMY AND ADENOIDECTOMY      UMBILICAL HERNIA REPAIR       Social History     Socioeconomic History    Marital status: /Civil Union     Spouse name: Not on file    Number of children: Not on file    Years of education: Not on file    Highest education level: Not on file   Occupational History    Not on file   Tobacco Use    Smoking status: Former     Current packs/day: 0.00     Average packs/day: 1 pack/day for 8.1 years (8.1 ttl pk-yrs)     Types: Cigarettes     Start date: 1971     Quit date: 1979     Years since quittin.5    Smokeless tobacco: Never    Tobacco comments:     He smoked a pack a day of cigarettes for 8  years. He quite at the age of 19. Occasional cigar use.   Vaping Use    Vaping status: Never Used   Substance and Sexual Activity    Alcohol use: Yes     Comment: Social    Drug use: Never    Sexual activity: Yes     Partners: Female   Other Topics Concern    Not on file   Social History Narrative    Caffeine use    Work-related stress     Social Determinants of Health     Financial Resource Strain: Not on file   Food Insecurity: Not on file   Transportation Needs: Not on file   Physical Activity: Not on file   Stress: Not on file   Social Connections: Not on file   Intimate Partner Violence: Not on file   Housing Stability: Not on file     Family History   Problem Relation Age of Onset    Asthma Mother     Heart disease Mother     Rheum arthritis Mother     Osteoporosis Mother     Arthritis Mother     Autoimmune disease Mother         Rheumatoid Arthritis    Diabetes Father     Heart disease Father     Diabetes type II Father     Lung disease Sister         Bronchiectasis.  No clear history of cystic lung disease however    Diabetes Sister     Other Son         Thyroid disorder     Patient has no known allergies.  Current Outpatient Medications   Medication Sig Dispense Refill    albuterol (2.5 mg/3 mL) 0.083 % nebulizer solution Inhale 2.5 mg every 6 (six) hours as needed for wheezing or shortness of breath      albuterol (ProAir HFA) 90 mcg/act inhaler Inhale 2 puffs every 6 (six) hours as needed for wheezing Use 2 puffs prior to exercise. 18 g 5    albuterol (PROVENTIL HFA,VENTOLIN HFA) 90 mcg/act inhaler INHALE 2 PUFFS EVERY 6 HOURS AS NEEDED FOR WHEEZING 1 g 1    Cholecalciferol (VITAMIN D3) 5000 units CAPS Take 5,000 Units by mouth daily      Cinnamon 500 MG capsule Take 500 mg by mouth daily      cyanocobalamin (VITAMIN B-12) 1000 MCG tablet Take 1,000 mcg by mouth daily      fluticasone (FLONASE) 50 mcg/act nasal spray TWO SPRAYS EACH NOSTRIL ONCE DAILY AS NEEDED. 16 mL 11    fluticasone (FLONASE) 50  "mcg/act nasal spray 2 sprays into each nostril daily 1 g 0    ibuprofen (MOTRIN) 200 mg tablet Take 200 mg by mouth every 6 (six) hours as needed for mild pain      levocetirizine (XYZAL) 5 MG tablet Take 1 tablet (5 mg total) by mouth every evening 90 tablet 1    montelukast (SINGULAIR) 10 mg tablet Take 1 tablet (10 mg total) by mouth daily at bedtime 90 tablet 1    Omega-3 Fatty Acids (FISH OIL) 1,000 mg Take 3 capsules by mouth daily      rosuvastatin (CRESTOR) 5 mg tablet Take 1 tablet (5 mg total) by mouth daily 90 tablet 1    sodium picosulfate, magnesium oxide, citric acid (Clenpiq) oral solution Take 175 mL (1 bottle) the evening before the colonoscopy, between 5 PM and 9 PM, followed by a second 175 mL bottle 5 hours before the colonoscopy. 350 mL 0    tadalafil (CIALIS) 20 MG tablet Take 1 tablet (20 mg total) by mouth daily as needed for erectile dysfunction 90 tablet 1    tadalafil (CIALIS) 20 MG tablet Take 1 tablet (20 mg total) by mouth daily as needed for erectile dysfunction 30 tablet 0     No current facility-administered medications for this visit.       Blood pressure 132/70, pulse 76, height 5' 5.75\" (1.67 m), weight 111 kg (245 lb 12.8 oz), SpO2 96%.    PHYSICAL EXAM: Physical Exam  Constitutional:       Appearance: He is well-developed.   HENT:      Head: Normocephalic and atraumatic.   Eyes:      General: No scleral icterus.        Right eye: No discharge.         Left eye: No discharge.      Conjunctiva/sclera: Conjunctivae normal.      Pupils: Pupils are equal, round, and reactive to light.   Neck:      Thyroid: No thyromegaly.      Vascular: No JVD.      Trachea: No tracheal deviation.   Cardiovascular:      Rate and Rhythm: Normal rate and regular rhythm.      Heart sounds: Normal heart sounds. No murmur heard.     No friction rub. No gallop.   Pulmonary:      Effort: Pulmonary effort is normal. No respiratory distress.      Breath sounds: Normal breath sounds. No wheezing or rales. " "  Chest:      Chest wall: No tenderness.   Abdominal:      General: Bowel sounds are normal. There is no distension.      Palpations: Abdomen is soft. There is no mass.      Tenderness: There is no abdominal tenderness. There is no guarding or rebound.      Hernia: No hernia is present.   Musculoskeletal:      Cervical back: Neck supple.   Lymphadenopathy:      Cervical: No cervical adenopathy.   Skin:     General: Skin is warm and dry.      Findings: No erythema or rash.   Neurological:      Mental Status: He is alert and oriented to person, place, and time.   Psychiatric:         Behavior: Behavior normal.         Thought Content: Thought content normal.          Lab Results   Component Value Date    WBC 5.77 05/23/2023    HGB 13.7 05/23/2023    HCT 40.1 05/23/2023    MCV 90 05/23/2023     05/23/2023     Lab Results   Component Value Date    GLUCOSE 98 07/26/2015    CALCIUM 9.1 07/19/2024     07/26/2015    K 4.2 07/19/2024    CO2 27 07/19/2024     07/19/2024    BUN 16 07/19/2024    CREATININE 0.87 07/19/2024     Lab Results   Component Value Date    ALT 31 07/19/2024    AST 22 07/19/2024    ALKPHOS 51 07/19/2024    BILITOT 0.58 07/26/2015     No results found for: \"INR\", \"PROTIME\"    CT chest without contrast    Result Date: 8/16/2024  Impression: Stable small pulmonary nodules. Workstation performed: [id number]           "

## 2024-08-26 DIAGNOSIS — N52.9 ERECTILE DYSFUNCTION OF ORGANIC ORIGIN: ICD-10-CM

## 2024-08-26 NOTE — TELEPHONE ENCOUNTER
Medication: tadalafil (CIALIS) 20 MG tablet     Dose/Frequency: Take 1 tablet (20 mg total) by mouth daily as needed for erectile dysfunction     Quantity: 30    Pharmacy: [facility] - [place], [place] - [address]      Office:   [x] PCP/Provider -   [] Speciality/Provider -     Does the patient have enough for 3 days?   [x] Yes   [] No - Send as HP to POD

## 2024-08-27 RX ORDER — TADALAFIL 20 MG/1
20 TABLET ORAL DAILY PRN
Qty: 30 TABLET | Refills: 5 | Status: SHIPPED | OUTPATIENT
Start: 2024-08-27

## 2024-08-28 ENCOUNTER — PROCEDURE VISIT (OUTPATIENT)
Age: 64
End: 2024-08-28
Payer: COMMERCIAL

## 2024-08-28 ENCOUNTER — OFFICE VISIT (OUTPATIENT)
Dept: PULMONOLOGY | Facility: CLINIC | Age: 64
End: 2024-08-28
Payer: COMMERCIAL

## 2024-08-28 VITALS
DIASTOLIC BLOOD PRESSURE: 78 MMHG | SYSTOLIC BLOOD PRESSURE: 126 MMHG | HEART RATE: 74 BPM | TEMPERATURE: 97.4 F | WEIGHT: 243 LBS | OXYGEN SATURATION: 95 % | HEIGHT: 66 IN | BODY MASS INDEX: 39.05 KG/M2

## 2024-08-28 VITALS — BODY MASS INDEX: 39.05 KG/M2 | HEIGHT: 66 IN | WEIGHT: 243 LBS

## 2024-08-28 DIAGNOSIS — G47.33 OBSTRUCTIVE SLEEP APNEA: ICD-10-CM

## 2024-08-28 DIAGNOSIS — M54.16 LUMBAR RADICULOPATHY: ICD-10-CM

## 2024-08-28 DIAGNOSIS — M25.812 IMPINGEMENT OF LEFT SHOULDER: ICD-10-CM

## 2024-08-28 DIAGNOSIS — M54.41 CHRONIC BILATERAL LOW BACK PAIN WITH RIGHT-SIDED SCIATICA: Primary | ICD-10-CM

## 2024-08-28 DIAGNOSIS — J98.4 MULTIPLE IDIOPATHIC CYSTS OF LUNG: Primary | ICD-10-CM

## 2024-08-28 DIAGNOSIS — G89.29 CHRONIC BILATERAL LOW BACK PAIN WITH RIGHT-SIDED SCIATICA: Primary | ICD-10-CM

## 2024-08-28 DIAGNOSIS — J45.990 EXERCISE-INDUCED ASTHMA: ICD-10-CM

## 2024-08-28 PROBLEM — J40 BRONCHITIS: Status: RESOLVED | Noted: 2023-01-22 | Resolved: 2024-08-28

## 2024-08-28 PROCEDURE — 99214 OFFICE O/P EST MOD 30 MIN: CPT | Performed by: INTERNAL MEDICINE

## 2024-08-28 PROCEDURE — 97110 THERAPEUTIC EXERCISES: CPT | Performed by: CHIROPRACTOR

## 2024-08-28 PROCEDURE — 98941 CHIROPRACT MANJ 3-4 REGIONS: CPT | Performed by: CHIROPRACTOR

## 2024-08-28 RX ORDER — ALBUTEROL SULFATE 90 UG/1
2 AEROSOL, METERED RESPIRATORY (INHALATION) EVERY 6 HOURS PRN
Qty: 18 G | Refills: 5 | Status: SHIPPED | OUTPATIENT
Start: 2024-08-28

## 2024-08-28 RX ORDER — ALBUTEROL SULFATE 0.83 MG/ML
2.5 SOLUTION RESPIRATORY (INHALATION) EVERY 6 HOURS PRN
Qty: 75 ML | Refills: 3 | Status: SHIPPED | OUTPATIENT
Start: 2024-08-28

## 2024-08-28 NOTE — ASSESSMENT & PLAN NOTE
Stable on most recent CT scan in  August  I have suggested that we extend the follow-up interval to 18 months.  These have been stable over several serial scans without substantial change in symptoms  Patient is agreeable

## 2024-08-28 NOTE — ASSESSMENT & PLAN NOTE
CPAP compliance is excellent  Biggest complaint is mouth dryness in the morning which has been ongoing since he initiated CPAP.  He does not believe he sleeps with his mouth open and uses the highest humidity setting on his machine  Uses nasal pillows as he is not able to tolerate any other mask due to claustrophobia  I have suggested a change to AutoSet since he does have an AutoSet machine.  Pressure will be adjusted to 5 to 15 cm of water

## 2024-08-28 NOTE — PROGRESS NOTES
Progress note - Pulmonary Medicine   Isaak De Los Santos 64 y.o. male MRN: [id number]       Impression & Plan:     Multiple idiopathic cysts of lung  Stable on most recent CT scan in  August  I have suggested that we extend the follow-up interval to 18 months.  These have been stable over several serial scans without substantial change in symptoms  Patient is agreeable    Exercise-induced asthma  Allergies and in fact his inhaler supply is outdated  I did order albuterol inhaler and albuterol nebulizer medication.  He does use this when he gets sick or on an urgent basis and should maintain a supply    Obstructive sleep apnea  CPAP compliance is excellent  Biggest complaint is mouth dryness in the morning which has been ongoing since he initiated CPAP.  He does not believe he sleeps with his mouth open and uses the highest humidity setting on his machine  Uses nasal pillows as he is not able to tolerate any other mask due to claustrophobia  I have suggested a change to AutoSet since he does have an AutoSet machine.  Pressure will be adjusted to 5 to 15 cm of water    Return in about 1 year (around 8/28/2025).      ______________________________________________________________________    HPI:    Isaak De Los Santos presents today for follow-up of multiple lung cysts, pulmonary nodules that have been stable, and exercise-induced asthma.  He also has obstructive sleep apnea and is on CPAP therapy.  He has previously been followed by Dr. Nance for his CPAP until he recently left the [facility].  He has been on a setting of 10 cm of water with a nasal mask.  He is very claustrophobic and cannot tolerate other mask types.  He has done well with CPAP and uses it nightly.  His only complaint is that he always wakes up with a very dry mouth despite using the highest humidification setting.      His breathing otherwise has been quite good.  He has been having some issues with his back so he does report that he has gained  some weight.  He continues to sleep well with CPAP despite this.  Denies chest pain.  No increasing respiratory symptoms.  He has not had the occasion to use his albuterol nebulizer or albuterol rescue inhaler but still does report some shortness of breath with peak exercise.  He has a component of exercise-induced bronchospasm but does not do a lot of running or cardiovascular exercise that requires bronchodilation prior to exercise.      Current Medications:    Current Outpatient Medications:     albuterol (2.5 mg/3 mL) 0.083 % nebulizer solution, Take 3 mL (2.5 mg total) by nebulization every 6 (six) hours as needed for wheezing or shortness of breath, Disp: 75 mL, Rfl: 3    albuterol (ProAir HFA) 90 mcg/act inhaler, Inhale 2 puffs every 6 (six) hours as needed for wheezing Use 2 puffs prior to exercise., Disp: 18 g, Rfl: 5    Cholecalciferol (VITAMIN D3) 5000 units CAPS, Take 5,000 Units by mouth daily, Disp: , Rfl:     Cinnamon 500 MG capsule, Take 500 mg by mouth daily, Disp: , Rfl:     cyanocobalamin (VITAMIN B-12) 1000 MCG tablet, Take 1,000 mcg by mouth daily, Disp: , Rfl:     fluticasone (FLONASE) 50 mcg/act nasal spray, TWO SPRAYS EACH NOSTRIL ONCE DAILY AS NEEDED., Disp: 16 mL, Rfl: 11    fluticasone (FLONASE) 50 mcg/act nasal spray, 2 sprays into each nostril daily, Disp: 1 g, Rfl: 0    ibuprofen (MOTRIN) 200 mg tablet, Take 200 mg by mouth every 6 (six) hours as needed for mild pain, Disp: , Rfl:     levocetirizine (XYZAL) 5 MG tablet, Take 1 tablet (5 mg total) by mouth every evening, Disp: 90 tablet, Rfl: 1    montelukast (SINGULAIR) 10 mg tablet, Take 1 tablet (10 mg total) by mouth daily at bedtime, Disp: 90 tablet, Rfl: 1    Omega-3 Fatty Acids (FISH OIL) 1,000 mg, Take 3 capsules by mouth daily, Disp: , Rfl:     rosuvastatin (CRESTOR) 5 mg tablet, Take 1 tablet (5 mg total) by mouth daily, Disp: 90 tablet, Rfl: 1    sodium picosulfate, magnesium oxide, citric acid (Clenpiq) oral solution, Take  "175 mL (1 bottle) the evening before the colonoscopy, between 5 PM and 9 PM, followed by a second 175 mL bottle 5 hours before the colonoscopy., Disp: 350 mL, Rfl: 0    tadalafil (CIALIS) 20 MG tablet, Take 1 tablet (20 mg total) by mouth daily as needed for erectile dysfunction, Disp: 90 tablet, Rfl: 1    tadalafil (CIALIS) 20 MG tablet, Take 1 tablet (20 mg total) by mouth daily as needed for erectile dysfunction, Disp: 30 tablet, Rfl: 5    Review of Systems:    Aside from what is mentioned in the HPI, the review of systems is otherwise negative    Past medical history, surgical history, and family history were reviewed and updated as appropriate    Social history updates:  Social History     Tobacco Use   Smoking Status Former    Current packs/day: 0.00    Average packs/day: 1 pack/day for 8.1 years (8.1 ttl pk-yrs)    Types: Cigarettes    Start date: 1971    Quit date: 1979    Years since quittin.5   Smokeless Tobacco Never   Tobacco Comments    He smoked a pack a day of cigarettes for 8 years. He quite at the age of 19. Occasional cigar use.       PhysicalExamination:  Vitals:   /78 (BP Location: Left arm, Patient Position: Sitting, Cuff Size: Large)   Pulse 74   Temp (!) 97.4 °F (36.3 °C) (Tympanic)   Ht 5' 5.75\" (1.67 m)   Wt 110 kg (243 lb)   SpO2 95%   BMI 39.52 kg/m²     Gen:  Comfortable on room air.  No conversational dyspnea  HEENT:  Conjugate gaze.  sclerae anicteric.  Oropharynx moist  Neck: Trachea is midline. No JVD. No adenopathy  Chest: Lungs are clear  Cardiac: Regular. no murmur  Abdomen:  benign  Extremities: No edema  Neuro:  Normal speech and mentation    Diagnostic Data:  Labs:  I personally reviewed the most recent laboratory data pertinent to today's visit    Lab Results   Component Value Date    WBC 5.77 2023    HGB 13.7 2023    HCT 40.1 2023    MCV 90 2023     2023     Lab Results   Component Value Date    SODIUM 137 " 07/19/2024    K 4.2 07/19/2024    CO2 27 07/19/2024     07/19/2024    BUN 16 07/19/2024    CREATININE 0.87 07/19/2024    GLUCOSE 98 07/26/2015    CALCIUM 9.1 07/19/2024         Imaging:  I personally reviewed the images on the PAC system pertinent to today's visit  Most recent imaging again shows multiple scattered thin-walled lung cysts.  Small nodules persist.  These have not changed and no new nodules identified.    Other studies:  Compliance report reviewed.  He has excellent compliance with an average use of over 6 hours when using the device and has 97% daily compliance with CPAP.  Treatment associated AHI is 1.3 with minimal leak    Yoshi Aguilar MD

## 2024-08-28 NOTE — ASSESSMENT & PLAN NOTE
Allergies and in fact his inhaler supply is outdated  I did order albuterol inhaler and albuterol nebulizer medication.  He does use this when he gets sick or on an urgent basis and should maintain a supply

## 2024-08-28 NOTE — PROGRESS NOTES
HPI:  Javier returns for treatment today reports ongoing symptomatology low back pain occasional radiation right posterior thigh.  He is working on daily stretching as well with the left shoulder which is improved.    The following portions of the patient's history were reviewed and updated as appropriate: allergies, current medications, past family history, past medical history, past social history, past surgical history, and problem list.    Review of Systems    Physical Exam:  Exam reveals paralumbar hypertonicity noted right greater than left active triggers in the right erector spinae quadratus lumborum musculature as well as the right gluteus medius.  Myofascial involvement thoracolumbar fascia right greater than left.  Range of motion lumbar spine remains reduced.  Involvement right sacroiliac joint L4-L5 L5-S1.    Assessment:   Diagnosis ICD-10-CM Associated Orders   1. Chronic bilateral low back pain with right-sided sciatica  M54.41     G89.29       2. Lumbar radiculopathy  M54.16       3. Impingement of left shoulder  M25.812                 Treatment: 14793  Manipulation to the right innominate, sacrum, L5 L4 levels via drop maneuver and flexion distraction well-tolerated.    Therapeutic stretching utilizing Graston technique today.  I used GT 2, GT 3, and GT 4 instruments.  Therapeutic stretching through internal/external rotation and scapular mobilizations performed.  Well-tolerated.  12-minute procedure    Discussion:  Reviewed stretching he will continue this I will see him back for follow-up 1 month for recheck

## 2024-09-03 ENCOUNTER — TELEPHONE (OUTPATIENT)
Age: 64
End: 2024-09-03

## 2024-09-03 NOTE — TELEPHONE ENCOUNTER
PA for CIALIS 20 MG SUBMITTED     via    []CMM-KEY:   [x]Jurgen-Case ID # [id number]   []Faxed to plan   []Other website   []Phone call Case ID #     Office notes sent, clinical questions answered. Awaiting determination    Turnaround time for your insurance to make a decision on your Prior Authorization can take 7-21 business days.

## 2024-09-04 ENCOUNTER — TELEPHONE (OUTPATIENT)
Age: 64
End: 2024-09-04

## 2024-09-04 NOTE — TELEPHONE ENCOUNTER
PA for cialis 20 mg DENIED    Reason:(Screenshot if applicable)      Message sent to office clinical pool Yes    Denial letter scanned into Media Yes    Appeal started No (Provider will need to decide if appeal is warranted and send clinical documentation to Prior Authorization Team for initiation.)    **Please follow up with your patient regarding denial and next steps**

## 2024-09-05 DIAGNOSIS — E78.5 HYPERLIPIDEMIA, UNSPECIFIED HYPERLIPIDEMIA TYPE: Primary | ICD-10-CM

## 2024-09-19 PROBLEM — Z12.11 COLON CANCER SCREENING: Status: RESOLVED | Noted: 2024-08-20 | Resolved: 2024-09-19

## 2024-09-25 ENCOUNTER — PROCEDURE VISIT (OUTPATIENT)
Age: 64
End: 2024-09-25
Payer: COMMERCIAL

## 2024-09-25 VITALS
WEIGHT: 243 LBS | BODY MASS INDEX: 39.05 KG/M2 | HEIGHT: 66 IN | HEART RATE: 87 BPM | DIASTOLIC BLOOD PRESSURE: 83 MMHG | SYSTOLIC BLOOD PRESSURE: 150 MMHG

## 2024-09-25 DIAGNOSIS — M54.16 LUMBAR RADICULOPATHY: ICD-10-CM

## 2024-09-25 DIAGNOSIS — G89.29 CHRONIC BILATERAL LOW BACK PAIN WITH RIGHT-SIDED SCIATICA: Primary | ICD-10-CM

## 2024-09-25 DIAGNOSIS — M54.41 CHRONIC BILATERAL LOW BACK PAIN WITH RIGHT-SIDED SCIATICA: Primary | ICD-10-CM

## 2024-09-25 DIAGNOSIS — Z00.6 ENCOUNTER FOR EXAMINATION FOR NORMAL COMPARISON OR CONTROL IN CLINICAL RESEARCH PROGRAM: ICD-10-CM

## 2024-09-25 DIAGNOSIS — M25.812 IMPINGEMENT OF LEFT SHOULDER: ICD-10-CM

## 2024-09-25 PROCEDURE — 98941 CHIROPRACT MANJ 3-4 REGIONS: CPT | Performed by: CHIROPRACTOR

## 2024-09-25 NOTE — PROGRESS NOTES
HPI:  Javier returns for treatment today reports ongoing symptomatology low back pain occasional radiation right posterior thigh.  1 on a pain scale.    The following portions of the patient's history were reviewed and updated as appropriate: allergies, current medications, past family history, past medical history, past social history, past surgical history, and problem list.    Review of Systems    Physical Exam:  Exam reveals paralumbar hypertonicity noted right greater than left active triggers in the right erector spinae quadratus lumborum musculature as well as the right gluteus medius.  Myofascial involvement thoracolumbar fascia right greater than left.  Range of motion lumbar spine remains reduced.  Involvement right sacroiliac joint L4-L5 L5-S1.    Assessment:   Diagnosis ICD-10-CM Associated Orders   1. Chronic bilateral low back pain with right-sided sciatica  M54.41     G89.29       2. Lumbar radiculopathy  M54.16       3. Impingement of left shoulder  M25.812                 Treatment: 62504  Manipulation to the right innominate, sacrum, L5 L4 levels via drop maneuver and flexion distraction well-tolerated.      Discussion:  Reviewed stretching he will continue this I will see him back for follow-up 1 month for recheck and re-assess for discharge.

## 2024-09-30 LAB
CHOLEST/HDLC SERPL: 2.4 CALC
LDLC SERPL CALC-MCNC: 47 MG/DL (CALC)
NONHDLC SERPL-MCNC: 67 MG/DL (CALC)

## 2024-10-03 ENCOUNTER — OFFICE VISIT (OUTPATIENT)
Dept: CARDIOLOGY CLINIC | Facility: CLINIC | Age: 64
End: 2024-10-03
Payer: COMMERCIAL

## 2024-10-03 VITALS
BODY MASS INDEX: 38.89 KG/M2 | HEART RATE: 65 BPM | SYSTOLIC BLOOD PRESSURE: 128 MMHG | HEIGHT: 66 IN | DIASTOLIC BLOOD PRESSURE: 72 MMHG | OXYGEN SATURATION: 97 % | WEIGHT: 242 LBS

## 2024-10-03 DIAGNOSIS — E78.5 HYPERLIPIDEMIA, UNSPECIFIED HYPERLIPIDEMIA TYPE: Primary | ICD-10-CM

## 2024-10-03 PROCEDURE — 99214 OFFICE O/P EST MOD 30 MIN: CPT | Performed by: INTERNAL MEDICINE

## 2024-10-03 PROCEDURE — 93000 ELECTROCARDIOGRAM COMPLETE: CPT | Performed by: INTERNAL MEDICINE

## 2024-10-03 NOTE — PROGRESS NOTES
Cardiology Follow Up    Isaak De Los Santos  1960  [id number]  [facility]  [address]  [address]  [place] [address] [phone]    1. Hyperlipidemia, unspecified hyperlipidemia type  POCT ECG          Diagnoses and all orders for this visit:    Hyperlipidemia, unspecified hyperlipidemia type  -     POCT ECG      I had the pleasure of seeing Isaak De Los Santos for a follow up visit.     Interval History: none    History of the presenting illness, Discussion/Summary and My Plan are as follows:::    He is a pleasant now 64-year-old gentleman without a history of hypertension but with mild dyslipidemia with elevated LDL particle number and small LDL, which with lifestyle changes has improved. He did have prediabetes with his A1c was around 5.7 - most recently 5.6 (April 2022). Also has idiopathic cystic disease of the lung and SWAPNIL - sees       In 2017 lipid profile showed a total cholesterol 196, triglycerides 107, HDL 50,    in February 2017- underwent an advanced lipid profile that showed high LDL particle number-1500 (<1000), high small LDL-531 (50th percentile).   March 2018 -total cholesterol 166, triglycerides 78, average LDL size-214, density pattern -pattern B    May 2019:  Total cholesterol 168, triglycerides 98, HDL 51, LDL 98, non , LDL particle 1278 (optimal< 1138, moderate 0164-6159), small  (optimal< 142), LDL density pattern B, LDL size to 15 (optimal>223), apolipoprotein B 86 (optimal<80), lipoprotein a:  13 (<75)     March 2020:  Total cholesterol 183, triglycerides 131, HDL 45,      FOR COMPARISON:    May 2019:   LDL particle 1278 (optimal< 1138, moderate 8470-6845), small  (optimal< 142)     August 2020: LDL: 98, LDL particle-910, small LDL-197, both have improved since the last 1 in May 2019    [facility]-advanced lipid profile-July 2021    Total cholesterol 169, HDL  45, triglycerides 102, LDL direct 115, LDL particle 1513, small , Apo B 90, lipoprotein (a) 17    April 2022:  Total cholesterol 149, triglycerides 151, HDL 46,     Advanced lipid panel-   August 2022 Total cholesterol 163, HDL 36, triglycerides 156,  calculated , , small , Apo B  87, lipoprotein (a)  12    At baseline, he is physically active now doing weights now limited by back spasms ,  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 - apparently he is a carrier for alpha-1 antitrypsin deficiency  And is due for another CT scan.    Family history of coronary artery disease in his father in his 70s, possibly in his mother also in her 70s, no family history of premature vascular disease in first-degree relatives.     He did have an exercise stress echo test in 2013-12 minutes-13.4 METs, no ischemia, remains physically active and is asymptomatic. Normal echo in 2017.      Continue to be active doing cardio and weights although more limited by back pain but remains asymptomatic.     Plan:     Dyslipidemia:  Direct LDL, 'apo B both within normal limits, marginally low LDL size,   LDL particle number is elevated but improved by 2020 compared to 2017 but increased again By 2021 and has remained stable since then   He does have the typical pattern that is found in patients with insulin resistance/metabolic syndrome.    I agree with initiation of statin since his coronary calcium score increased slightly between-  2017-20 23: Most recent lipid profile shows markedly decreased LDL and non-HDL but advanced with panel has not been resulted yet.    See below for most recent advanced lipid panel from October 2023 at which time also he was not on any medications.  At that time his LDL particle number was 1612, small LDL of 416, ApoB 88, overall similar numbers to prior.    Repeated calcium score-around October 2023, which showed a score of 9, while the percentile remained the same, since it had increased and his  numbers have been borderline, we decided to start a medication, he started rosuvastatin 5 mg daily since around April 2024 after which his numbers have come down significantly, had a recent diagnostic panel through [facility] but has not been resulted yet.        Therapeutic lifestyle changes were reiterated with the patient- Specifically:  1. Decreasing saturated fat intake to less than 13 g per day. Watch intake of cheese  2. Increase soluble fiber in the diet-beans, pears, oatmeal  3. Weight loss of even 5-10 pounds will also help. Decrease caloric intake by about 100 brandon a day-should lead to a weight loss of 1-2 pounds over one month -   This is very important if we need to avoid any medications in the future.  4. Increase aerobic physical activity -   Does mostly aerobic activity -doing weights.  The above changes will decrease his LDL, change the pattern of his LDL subfractions (to lower small LDL levels and particle number) as well as decrease his risk helping diabetes.    Last stress test was in 2013, he remains asymptomatic at good levels of physical exertion but considering dyslipidemia, remains asymptomatic    Lifestyle changes are to continue -weight has stayed stable over the last 2 years     If medication needs further adjustment, I will call him, at this time continue rosuvastatin 5 mg.  Has not had any side effects and continues to workout at higher levels without any symptoms    Follow-up in 1 year     Latest Reference Range & Units 08/08/20 08:32 03/30/21 08:01 07/07/21 08:40 10/08/21 07:40 04/13/22 07:29 08/25/22 07:52 10/31/22 08:38 05/11/23 07:56 10/02/23 07:56 05/16/24 08:07 07/19/24 09:36 09/26/24 07:56   Cholesterol See Comment mg/dL 181 175 169 184 149 163 171 164 162 175 132 See Comment (IP)   Triglycerides See Comment mg/dL 120 112 102 151 (H) 109 156 (H) 116 118 105 130 110 See Comment (IP)   HDL >=40 mg/dL 46 48 45 46 42 36 (L) 42 42 42 45 43 See Comment (IP)   Non-HDL Cholesterol <130 mg/dL  (calc) 135 (H)  124   127   120   67 (IP)   LDL Calculated <100 mg/dL (calc) 112 (H) 105 (H) 104 (H) 108 (H) 85 102 (H) 106 (H) 98 100 (H) 104 (H) 67 47 (IP)   LDL DENSITY PATTERN A Pattern B !  B !   B !   B !   See Comment (IP)   LDL SIZE >222.9 Angstrom 215.6 (L)  213.3 (L)   212.7 (L)   212.1 (L)   See Comment (IP)   Chol/HDL Ratio <5.0 calc 3.9  3.8   4.5 (H)   3.9   2.4 (IP)   APOLIPOPROTEIN B <90 mg/dL 95 (H)  90 (H)   87   88   See Comment (IP)   (H): Data is abnormally high  (L): Data is abnormally low  !: Data is abnormal  (IP): In Process  Results for LIAN GREENFIELD (MRN [id number]) as of 7/9/2020 08:20   Ref. Range 1/25/2017 12:45 7/26/2017 09:58 3/8/2018 08:15 8/13/2018 08:12 2/22/2019 08:26 5/28/2019 08:48 9/3/2019 09:38 3/10/2020 07:33   Cholesterol Latest Ref Range: 50 - 200 mg/dL 196 172 166 168 176 168 176 183   Triglycerides Latest Ref Range: <=150 mg/dL  131 78 109 110 98 114 131   HDL Latest Ref Range: >=40 mg/dL  41 48 46 49 51 49 45   HDL-P(TOTAL) Latest Ref Range: >=30.5 umol/L 28.4 (L)          HDL CHOLESTEROL LIPOPROTEIN Latest Ref Range: >39 mg/dL 43          Non-HDL Cholesterol Latest Ref Range: <130 mg/dL (calc)  131 118   117     LDL CHOLESTEROL Latest Units: mg/dL  105         LDL Calculated Latest Ref Range: 0 - 100 mg/dL 131 (H)  101 (H) 100 105 (H) 98 104 (H) 112 (H)   LDL DENSITY PATTERN Latest Ref Range: A Pattern  B (A) B (A)   B (A)     Small LDL-P Latest Ref Range: <=527 nmol/L 531 (H)          LDL SIZE Latest Ref Range: > OR = 217.4 Angstrom 20.8 217.4 (L) 214.0 (L)   215.0 (L)     Chol HDLC Ratio Latest Ref Range: <5.0 calc  4.2 3.5   3.3     APOLIPOPROTEIN B Latest Ref Range: 52 - 109 mg/dL  83 84   86     LP-IR SCORE Latest Ref Range: <=45  52 (H)          LDL PARTICLE NUMBER Latest Ref Range: 1016 - 2185 nmol/L  1300         LIPOPROTEIN (A) Latest Ref Range: <75 nmol/L  24 16   13     Triglycerides Latest Ref Range: 0 - 149 mg/dL 111               Latest Reference Range  & Units 10/31/22 08:38 23 07:56   Hemoglobin A1C  5.8 (H) 5.6   eAG, EST AVG Glucose mg/dl 120 114   (H): Data is abnormally high        Patient Active Problem List   Diagnosis    Abnormal blood sugar    Enlarged prostate without lower urinary tract symptoms (luts)    Esophageal reflux    Obesity    Obstructive sleep apnea    Hyperlipidemia    Seasonal allergic rhinitis    Vitamin D deficiency    Plantar fasciitis of right foot    Class 2 obesity due to excess calories without serious comorbidity with body mass index (BMI) of 35.0 to 35.9 in adult    Exercise-induced asthma    Gastroesophageal reflux disease without esophagitis    Prediabetes    Seasonal allergies    Carrier of alpha-1-antitrypsin deficiency    Multiple idiopathic cysts of lung    Plantar fasciitis    Vitamin B12 deficiency    Pulmonary nodules    Mid back pain    Erectile dysfunction    Chronic right-sided low back pain with right-sided sciatica    Low back pain with sciatica    Myofascial pain    Lumbar radiculopathy    SWAPNIL (obstructive sleep apnea)    Other fatigue    Fixed dilated pupils of both eyes    Tremor    Chronic low back pain without sciatica    Other proteinuria    Decreased GFR    Rotator cuff tendinitis, left     Past Medical History:   Diagnosis Date    Allergic     Asthma     Erectile dysfunction of non-organic origin     Resolved: 10/15/2014    Left ankle sprain 10/12/2021    Pneumonia 2016 or 2017    Twice in the same year     Social History     Socioeconomic History    Marital status: /Civil Union     Spouse name: Not on file    Number of children: Not on file    Years of education: Not on file    Highest education level: Not on file   Occupational History    Not on file   Tobacco Use    Smoking status: Former     Current packs/day: 0.00     Average packs/day: 1 pack/day for 8.1 years (8.1 ttl pk-yrs)     Types: Cigarettes     Start date: 1971     Quit date: 1979     Years since quittin.6    Smokeless  tobacco: Never    Tobacco comments:     He smoked a pack a day of cigarettes for 8 years. He quite at the age of 19. Occasional cigar use.   Vaping Use    Vaping status: Never Used   Substance and Sexual Activity    Alcohol use: Yes     Comment: Social    Drug use: Never    Sexual activity: Yes     Partners: Female   Other Topics Concern    Not on file   Social History Narrative    Caffeine use    Work-related stress     Social Determinants of Health     Financial Resource Strain: Not on file   Food Insecurity: Not on file   Transportation Needs: Not on file   Physical Activity: Not on file   Stress: Not on file   Social Connections: Not on file   Intimate Partner Violence: Not on file   Housing Stability: Not on file      Family History   Problem Relation Age of Onset    Asthma Mother     Heart disease Mother     Rheum arthritis Mother     Osteoporosis Mother     Arthritis Mother     Autoimmune disease Mother         Rheumatoid Arthritis    Diabetes Father     Heart disease Father     Diabetes type II Father     Lung disease Sister         Bronchiectasis.  No clear history of cystic lung disease however    Diabetes Sister     Other Son         Thyroid disorder     Past Surgical History:   Procedure Laterality Date    EYE SURGERY      TONSILLECTOMY AND ADENOIDECTOMY      UMBILICAL HERNIA REPAIR         Current Outpatient Medications:     albuterol (2.5 mg/3 mL) 0.083 % nebulizer solution, Take 3 mL (2.5 mg total) by nebulization every 6 (six) hours as needed for wheezing or shortness of breath, Disp: 75 mL, Rfl: 3    albuterol (ProAir HFA) 90 mcg/act inhaler, Inhale 2 puffs every 6 (six) hours as needed for wheezing Use 2 puffs prior to exercise., Disp: 18 g, Rfl: 5    Cholecalciferol (VITAMIN D3) 5000 units CAPS, Take 5,000 Units by mouth daily, Disp: , Rfl:     Cinnamon 500 MG capsule, Take 500 mg by mouth daily, Disp: , Rfl:     cyanocobalamin (VITAMIN B-12) 1000 MCG tablet, Take 1,000 mcg by mouth daily, Disp: ,  "Rfl:     fluticasone (FLONASE) 50 mcg/act nasal spray, TWO SPRAYS EACH NOSTRIL ONCE DAILY AS NEEDED., Disp: 16 mL, Rfl: 11    fluticasone (FLONASE) 50 mcg/act nasal spray, 2 sprays into each nostril daily, Disp: 1 g, Rfl: 0    ibuprofen (MOTRIN) 200 mg tablet, Take 200 mg by mouth every 6 (six) hours as needed for mild pain, Disp: , Rfl:     levocetirizine (XYZAL) 5 MG tablet, Take 1 tablet (5 mg total) by mouth every evening, Disp: 90 tablet, Rfl: 1    montelukast (SINGULAIR) 10 mg tablet, Take 1 tablet (10 mg total) by mouth daily at bedtime, Disp: 90 tablet, Rfl: 1    Omega-3 Fatty Acids (FISH OIL) 1,000 mg, Take 3 capsules by mouth daily, Disp: , Rfl:     rosuvastatin (CRESTOR) 5 mg tablet, Take 1 tablet (5 mg total) by mouth daily, Disp: 90 tablet, Rfl: 1    sodium picosulfate, magnesium oxide, citric acid (Clenpiq) oral solution, Take 175 mL (1 bottle) the evening before the colonoscopy, between 5 PM and 9 PM, followed by a second 175 mL bottle 5 hours before the colonoscopy., Disp: 350 mL, Rfl: 0    tadalafil (CIALIS) 20 MG tablet, Take 1 tablet (20 mg total) by mouth daily as needed for erectile dysfunction, Disp: 90 tablet, Rfl: 1    tadalafil (CIALIS) 20 MG tablet, Take 1 tablet (20 mg total) by mouth daily as needed for erectile dysfunction, Disp: 30 tablet, Rfl: 5  No Known Allergies    Imaging: No results found.    Review of Systems:  Review of Systems   Constitutional: Negative.    HENT: Negative.     Eyes: Negative.    Respiratory: Negative.     Cardiovascular: Negative.    Gastrointestinal: Negative.    Endocrine: Negative.    Musculoskeletal: Negative.    Allergic/Immunologic: Negative.    Hematological: Negative.        Physical Exam:  /72 (BP Location: Left arm, Patient Position: Sitting, Cuff Size: Large)   Pulse 65   Ht 5' 5.75\" (1.67 m)   Wt 110 kg (242 lb)   SpO2 97%   BMI 39.36 kg/m²   Physical Exam  Constitutional:       General: He is not in acute distress.     Appearance: He is " well-developed. He is not diaphoretic.   HENT:      Head: Normocephalic and atraumatic.   Eyes:      General: No scleral icterus.        Right eye: No discharge.         Left eye: No discharge.      Conjunctiva/sclera: Conjunctivae normal.      Pupils: Pupils are equal, round, and reactive to light.   Neck:      Thyroid: No thyromegaly.      Vascular: No JVD.      Trachea: No tracheal deviation.   Cardiovascular:      Rate and Rhythm: Normal rate and regular rhythm.      Heart sounds: No murmur heard.     No friction rub.   Pulmonary:      Effort: Pulmonary effort is normal. No respiratory distress.      Breath sounds: Normal breath sounds. No stridor.   Abdominal:      General: Bowel sounds are normal. There is no distension.      Palpations: Abdomen is soft.      Tenderness: There is no abdominal tenderness. There is no guarding.   Musculoskeletal:         General: No deformity. Normal range of motion.      Cervical back: Normal range of motion.      Right lower leg: Edema (1 plus) present.      Left lower leg: Edema (1 plus) present.   Skin:     General: Skin is warm.      Coloration: Skin is not pale.      Findings: No erythema or rash.

## 2024-10-11 LAB
APO B SERPL-MCNC: 57 MG/DL
CHOLEST SERPL-MCNC: 114 MG/DL
CHOLEST/HDLC SERPL: 2.4 CALC
HDLC SERPL-MCNC: 47 MG/DL
HLD.LARGE SERPL-SCNC: 4681 NMOL/L
LDL SERPL QN: 210.3 ANGSTROM
LDL SERPL-SCNC: 984 NMOL/L
LDL SMALL SERPL-SCNC: 203 NMOL/L
LDLC REAL SIZE PAT SERPL: ABNORMAL PATTERN
LDLC SERPL CALC-MCNC: 47 MG/DL (CALC)
LPA SERPL-SCNC: <10 NMOL/L
NONHDLC SERPL-MCNC: 67 MG/DL (CALC)
SL AMB LDL MEDIUM: 145 NMOL/L
TRIGL SERPL-MCNC: 110 MG/DL

## 2024-10-12 ENCOUNTER — APPOINTMENT (OUTPATIENT)
Dept: LAB | Age: 64
End: 2024-10-12
Payer: COMMERCIAL

## 2024-10-12 DIAGNOSIS — Z00.6 ENCOUNTER FOR EXAMINATION FOR NORMAL COMPARISON OR CONTROL IN CLINICAL RESEARCH PROGRAM: ICD-10-CM

## 2024-10-12 PROCEDURE — 36415 COLL VENOUS BLD VENIPUNCTURE: CPT

## 2024-10-16 ENCOUNTER — ANESTHESIA (OUTPATIENT)
Dept: GASTROENTEROLOGY | Facility: AMBULARY SURGERY CENTER | Age: 64
End: 2024-10-16
Payer: COMMERCIAL

## 2024-10-16 ENCOUNTER — TELEPHONE (OUTPATIENT)
Dept: CARDIOLOGY CLINIC | Facility: CLINIC | Age: 64
End: 2024-10-16

## 2024-10-16 ENCOUNTER — TELEPHONE (OUTPATIENT)
Dept: GASTROENTEROLOGY | Facility: CLINIC | Age: 64
End: 2024-10-16

## 2024-10-16 ENCOUNTER — HOSPITAL ENCOUNTER (OUTPATIENT)
Dept: GASTROENTEROLOGY | Facility: AMBULARY SURGERY CENTER | Age: 64
Setting detail: OUTPATIENT SURGERY
Discharge: HOME | End: 2024-10-16
Attending: INTERNAL MEDICINE
Payer: COMMERCIAL

## 2024-10-16 VITALS
SYSTOLIC BLOOD PRESSURE: 139 MMHG | BODY MASS INDEX: 37.04 KG/M2 | HEART RATE: 66 BPM | WEIGHT: 236 LBS | RESPIRATION RATE: 16 BRPM | TEMPERATURE: 97.3 F | OXYGEN SATURATION: 97 % | DIASTOLIC BLOOD PRESSURE: 78 MMHG | HEIGHT: 67 IN

## 2024-10-16 DIAGNOSIS — K21.9 GASTROESOPHAGEAL REFLUX DISEASE WITHOUT ESOPHAGITIS: ICD-10-CM

## 2024-10-16 DIAGNOSIS — Z12.11 COLON CANCER SCREENING: ICD-10-CM

## 2024-10-16 PROCEDURE — 88305 TISSUE EXAM BY PATHOLOGIST: CPT | Performed by: SPECIALIST

## 2024-10-16 PROCEDURE — 45380 COLONOSCOPY AND BIOPSY: CPT | Performed by: INTERNAL MEDICINE

## 2024-10-16 PROCEDURE — 45385 COLONOSCOPY W/LESION REMOVAL: CPT | Performed by: INTERNAL MEDICINE

## 2024-10-16 PROCEDURE — 43235 EGD DIAGNOSTIC BRUSH WASH: CPT | Performed by: INTERNAL MEDICINE

## 2024-10-16 RX ORDER — SODIUM CHLORIDE, SODIUM LACTATE, POTASSIUM CHLORIDE, CALCIUM CHLORIDE 600; 310; 30; 20 MG/100ML; MG/100ML; MG/100ML; MG/100ML
INJECTION, SOLUTION INTRAVENOUS CONTINUOUS PRN
Status: DISCONTINUED | OUTPATIENT
Start: 2024-10-16 | End: 2024-10-16

## 2024-10-16 RX ORDER — PANTOPRAZOLE SODIUM 40 MG/1
40 TABLET, DELAYED RELEASE ORAL DAILY
Qty: 30 TABLET | Refills: 2 | Status: SHIPPED | OUTPATIENT
Start: 2024-10-16 | End: 2024-10-16 | Stop reason: SDUPTHER

## 2024-10-16 RX ORDER — LIDOCAINE HYDROCHLORIDE 20 MG/ML
INJECTION, SOLUTION EPIDURAL; INFILTRATION; INTRACAUDAL; PERINEURAL AS NEEDED
Status: DISCONTINUED | OUTPATIENT
Start: 2024-10-16 | End: 2024-10-16

## 2024-10-16 RX ORDER — PANTOPRAZOLE SODIUM 40 MG/1
40 TABLET, DELAYED RELEASE ORAL DAILY
Qty: 30 TABLET | Refills: 5 | Status: SHIPPED | OUTPATIENT
Start: 2024-10-16 | End: 2024-10-16 | Stop reason: SDUPTHER

## 2024-10-16 RX ORDER — PROPOFOL 10 MG/ML
INJECTION, EMULSION INTRAVENOUS AS NEEDED
Status: DISCONTINUED | OUTPATIENT
Start: 2024-10-16 | End: 2024-10-16

## 2024-10-16 RX ORDER — PANTOPRAZOLE SODIUM 40 MG/1
40 TABLET, DELAYED RELEASE ORAL DAILY
Qty: 90 TABLET | Refills: 2 | Status: SHIPPED | OUTPATIENT
Start: 2024-10-16

## 2024-10-16 RX ADMIN — PROPOFOL 180 MG: 10 INJECTION, EMULSION INTRAVENOUS at 12:02

## 2024-10-16 RX ADMIN — LIDOCAINE HYDROCHLORIDE 100 MG: 20 INJECTION, SOLUTION EPIDURAL; INFILTRATION; INTRACAUDAL at 12:02

## 2024-10-16 RX ADMIN — PROPOFOL 50 MG: 10 INJECTION, EMULSION INTRAVENOUS at 12:16

## 2024-10-16 RX ADMIN — PROPOFOL 50 MG: 10 INJECTION, EMULSION INTRAVENOUS at 12:09

## 2024-10-16 RX ADMIN — PROPOFOL 50 MG: 10 INJECTION, EMULSION INTRAVENOUS at 12:06

## 2024-10-16 RX ADMIN — PROPOFOL 50 MG: 10 INJECTION, EMULSION INTRAVENOUS at 12:12

## 2024-10-16 RX ADMIN — SODIUM CHLORIDE, SODIUM LACTATE, POTASSIUM CHLORIDE, AND CALCIUM CHLORIDE: .6; .31; .03; .02 INJECTION, SOLUTION INTRAVENOUS at 11:34

## 2024-10-16 NOTE — TELEPHONE ENCOUNTER
Spoke with pt, advised pt of Dr. Fournier's message. Pt verbally understood.     Pt wants to know if there is anything he can do to keep the LDL small particles on lower side? Please advise.

## 2024-10-16 NOTE — ANESTHESIA POSTPROCEDURE EVALUATION
Post-Op Assessment Note    CV Status:  Stable    Pain management: adequate       Mental Status:  Alert and awake   Hydration Status:  Euvolemic   PONV Controlled:  Controlled   Airway Patency:  Patent     Post Op Vitals Reviewed: Yes    No anethesia notable event occurred.    Staff: Anesthesiologist           Last Filed PACU Vitals:  Vitals Value Taken Time   Temp 97.3 °F (36.3 °C) 10/16/24 1222   Pulse 66 10/16/24 1241   /78 10/16/24 1241   Resp 16 10/16/24 1241   SpO2 97 % 10/16/24 1241       Modified Aldo:  Activity: 2 (10/16/2024 12:42 PM)  Respiration: 2 (10/16/2024 12:42 PM)  Circulation: 2 (10/16/2024 12:42 PM)  Consciousness: 2 (10/16/2024 12:42 PM)  Oxygen Saturation: 2 (10/16/2024 12:42 PM)  Modified Aldo Score: 10 (10/16/2024 12:42 PM)

## 2024-10-16 NOTE — TELEPHONE ENCOUNTER
----- Message from Delfina BOYKIN sent at 10/16/2024  8:49 AM EDT -----    ----- Message -----  From: Mo Fournier MD  Sent: 10/16/2024   8:47 AM EDT  To: [facility]    Overall his advanced lipid panel shows that his cholesterol levels (including the small LDL and LDL particle number )have substantially decreased to close to half of where they were before, this is good, continue the same dose of the medication without any changes

## 2024-10-16 NOTE — TELEPHONE ENCOUNTER
----- Message from Tori Price MD sent at 10/16/2024 12:25 PM EDT -----    Repeat EGD 3 months - esophageal erosion

## 2024-10-16 NOTE — ANESTHESIA POSTPROCEDURE EVALUATION
Post-Op Assessment Note    CV Status:  Stable    Pain management: adequate       Mental Status:  Alert and awake   Hydration Status:  Euvolemic   PONV Controlled:  Controlled   Airway Patency:  Patent     Post Op Vitals Reviewed: Yes    No anethesia notable event occurred.    Staff: CRNA           Last Filed PACU Vitals:  Vitals Value Taken Time   Temp 97.3 °F (36.3 °C) 10/16/24 1222   Pulse 74 10/16/24 1222   /61 10/16/24 1222   Resp 20 10/16/24 1222   SpO2 96 % 10/16/24 1222       Modified Aldo:  Activity: 0 (10/16/2024 12:22 PM)  Respiration: 2 (10/16/2024 12:22 PM)  Circulation: 1 (10/16/2024 12:22 PM)  Consciousness: 0 (10/16/2024 12:22 PM)  Oxygen Saturation: 2 (10/16/2024 12:22 PM)  Modified Aldo Score: 5 (10/16/2024 12:22 PM)

## 2024-10-16 NOTE — ANESTHESIA PREPROCEDURE EVALUATION
Procedure:  COLONOSCOPY  EGD    Relevant Problems   ANESTHESIA (within normal limits)      CARDIO   (+) Hyperlipidemia      ENDO (within normal limits)      GI/HEPATIC   (+) Esophageal reflux   (+) Gastroesophageal reflux disease without esophagitis      /RENAL (within normal limits)      HEMATOLOGY (within normal limits)      MUSCULOSKELETAL   (+) Chronic low back pain without sciatica   (+) Chronic right-sided low back pain with right-sided sciatica   (+) Low back pain with sciatica   (+) Mid back pain      NEURO/PSYCH   (+) Chronic low back pain without sciatica   (+) Chronic right-sided low back pain with right-sided sciatica      PULMONARY   (+) Exercise-induced asthma   (+) SWAPNIL (obstructive sleep apnea)   (+) Obstructive sleep apnea        Physical Exam    Airway    Mallampati score: III  TM Distance: >3 FB  Neck ROM: full     Dental   No notable dental hx     Cardiovascular  Rhythm: regular, Rate: normal, Cardiovascular exam normal    Pulmonary  Pulmonary exam normal Breath sounds clear to auscultation    Other Findings  post-pubertal.      Anesthesia Plan  ASA Score- 3     Anesthesia Type- IV sedation with anesthesia with ASA Monitors.         Additional Monitors:     Airway Plan:            Plan Factors-Exercise tolerance (METS): >4 METS.    Chart reviewed. EKG reviewed. Imaging results reviewed. Existing labs reviewed. Patient summary reviewed.    Patient is not a current smoker.  Patient instructed to abstain from smoking on day of procedure. Patient did not smoke on day of surgery.    Obstructive sleep apnea risk education given perioperatively.        Induction- intravenous.    Postoperative Plan- Plan for postoperative opioid use.     Perioperative Resuscitation Plan - Level 1 - Full Code.       Informed Consent- Anesthetic plan and risks discussed with patient.  I personally reviewed this patient with the CRNA. Discussed and agreed on the Anesthesia Plan with the CRNA..

## 2024-10-16 NOTE — H&P
"History and Physical -  [facility]  Isaak De Los Santos 64 y.o. male MRN: [id number]        HPI: 64-year-old male with history of GERD was referred for colonoscopy.  Regular bowel movements.    Historical Information   Past Medical History:   Diagnosis Date    Allergic     Asthma     Colon polyp     Erectile dysfunction of non-organic origin     Resolved: 10/15/2014    Left ankle sprain 10/12/2021    Pneumonia 2016 or 2017    Twice in the same year     Past Surgical History:   Procedure Laterality Date    COLONOSCOPY      EYE SURGERY      TONSILLECTOMY AND ADENOIDECTOMY      UMBILICAL HERNIA REPAIR       Social History   Social History     Substance and Sexual Activity   Alcohol Use Yes    Comment: Social     Social History     Substance and Sexual Activity   Drug Use Never     Social History     Tobacco Use   Smoking Status Former    Current packs/day: 0.00    Average packs/day: 1 pack/day for 8.1 years (8.1 ttl pk-yrs)    Types: Cigarettes    Start date: 1971    Quit date: 1979    Years since quittin.6   Smokeless Tobacco Never   Tobacco Comments    He smoked a pack a day of cigarettes for 8 years. He quite at the age of 19. Occasional cigar use.     Family History   Problem Relation Age of Onset    Asthma Mother     Heart disease Mother     Rheum arthritis Mother     Osteoporosis Mother     Arthritis Mother     Autoimmune disease Mother         Rheumatoid Arthritis    Diabetes Father     Heart disease Father     Diabetes type II Father     Lung disease Sister         Bronchiectasis.  No clear history of cystic lung disease however    Diabetes Sister     Other Son         Thyroid disorder       Meds/Allergies     Not in a hospital admission.    No Known Allergies    Objective     Blood pressure 145/88, pulse 71, temperature (!) 97.2 °F (36.2 °C), temperature source Temporal, resp. rate 16, height 5' 7\" (1.702 m), weight 107 kg (236 lb), SpO2 96%.    Physical Exam:    Chest- " CTA  Heart- RRR  Abdomen- NT/ND  Extremities- No edema    ASSESSMENT:     Screening for colon cancer    PLAN:    Colonoscopy

## 2024-10-18 NOTE — TELEPHONE ENCOUNTER
Scheduled date of EGD(as of today): 1/20/25  Physician performing EGD: Dr Price  Location of EGD: [facility]  Instructions reviewed with patient by: ls via my chart & email  Clearances: n/a

## 2024-10-21 PROCEDURE — 88305 TISSUE EXAM BY PATHOLOGIST: CPT | Performed by: SPECIALIST

## 2024-10-25 LAB
APOB+LDLR+PCSK9 GENE MUT ANL BLD/T: NOT DETECTED
BRCA1+BRCA2 DEL+DUP + FULL MUT ANL BLD/T: NOT DETECTED
MLH1+MSH2+MSH6+PMS2 GN DEL+DUP+FUL M: NOT DETECTED

## 2024-11-06 ENCOUNTER — PROCEDURE VISIT (OUTPATIENT)
Age: 64
End: 2024-11-06
Payer: COMMERCIAL

## 2024-11-06 VITALS
WEIGHT: 236 LBS | HEART RATE: 87 BPM | SYSTOLIC BLOOD PRESSURE: 152 MMHG | DIASTOLIC BLOOD PRESSURE: 85 MMHG | BODY MASS INDEX: 37.04 KG/M2 | HEIGHT: 67 IN

## 2024-11-06 DIAGNOSIS — M54.41 CHRONIC BILATERAL LOW BACK PAIN WITH RIGHT-SIDED SCIATICA: Primary | ICD-10-CM

## 2024-11-06 DIAGNOSIS — G89.29 CHRONIC BILATERAL LOW BACK PAIN WITH RIGHT-SIDED SCIATICA: Primary | ICD-10-CM

## 2024-11-06 DIAGNOSIS — M54.16 LUMBAR RADICULOPATHY: ICD-10-CM

## 2024-11-06 PROCEDURE — 98941 CHIROPRACT MANJ 3-4 REGIONS: CPT | Performed by: CHIROPRACTOR

## 2024-11-06 NOTE — PROGRESS NOTES
HPI:  Javier returns for treatment today reports ongoing symptomatology low back pain occasional radiation right posterior thigh.  1 on a pain scale.  Neck and shoulders have been doing well.  He is working out on a regular basis without issue.    The following portions of the patient's history were reviewed and updated as appropriate: allergies, current medications, past family history, past medical history, past social history, past surgical history, and problem list.    Review of Systems    Physical Exam:  Exam reveals paralumbar hypertonicity noted right greater than left active triggers in the right erector spinae quadratus lumborum musculature as well as the right gluteus medius.  Myofascial involvement thoracolumbar fascia right greater than left.  Range of motion lumbar spine remains reduced.  Involvement right sacroiliac joint L4-L5 L5-S1.    Assessment:   Diagnosis ICD-10-CM Associated Orders   1. Chronic bilateral low back pain with right-sided sciatica  M54.41     G89.29       2. Lumbar radiculopathy  M54.16                 Treatment: 46568  Manipulation to the right innominate, sacrum, L5 L4 levels via drop maneuver and flexion distraction well-tolerated.      Discussion:  Reviewed stretching he will continue this I will see him back for follow-up as needed.

## 2024-11-13 ENCOUNTER — IMMUNIZATIONS (OUTPATIENT)
Dept: INTERNAL MEDICINE CLINIC | Facility: CLINIC | Age: 64
End: 2024-11-13
Payer: COMMERCIAL

## 2024-11-13 DIAGNOSIS — Z23 NEED FOR INFLUENZA VACCINATION: Primary | ICD-10-CM

## 2024-11-13 PROCEDURE — 90662 IIV NO PRSV INCREASED AG IM: CPT

## 2024-11-13 PROCEDURE — 90471 IMMUNIZATION ADMIN: CPT

## 2024-12-19 DIAGNOSIS — J30.2 SEASONAL ALLERGIES: ICD-10-CM

## 2024-12-19 DIAGNOSIS — E78.5 HYPERLIPIDEMIA, UNSPECIFIED HYPERLIPIDEMIA TYPE: ICD-10-CM

## 2024-12-19 RX ORDER — LEVOCETIRIZINE DIHYDROCHLORIDE 5 MG/1
5 TABLET, FILM COATED ORAL EVERY EVENING
Qty: 90 TABLET | Refills: 1 | Status: SHIPPED | OUTPATIENT
Start: 2024-12-19

## 2024-12-19 RX ORDER — MONTELUKAST SODIUM 10 MG/1
10 TABLET ORAL
Qty: 90 TABLET | Refills: 1 | Status: SHIPPED | OUTPATIENT
Start: 2024-12-19

## 2024-12-19 RX ORDER — ROSUVASTATIN CALCIUM 5 MG/1
5 TABLET, COATED ORAL DAILY
Qty: 90 TABLET | Refills: 1 | Status: SHIPPED | OUTPATIENT
Start: 2024-12-19

## 2024-12-30 ENCOUNTER — DOCUMENTATION (OUTPATIENT)
Dept: PULMONOLOGY | Facility: CLINIC | Age: 64
End: 2024-12-30

## 2024-12-30 DIAGNOSIS — G47.33 OBSTRUCTIVE SLEEP APNEA: Primary | ICD-10-CM

## 2025-01-06 ENCOUNTER — ANESTHESIA (OUTPATIENT)
Dept: ANESTHESIOLOGY | Facility: HOSPITAL | Age: 65
End: 2025-01-06

## 2025-01-06 ENCOUNTER — ANESTHESIA EVENT (OUTPATIENT)
Dept: ANESTHESIOLOGY | Facility: HOSPITAL | Age: 65
End: 2025-01-06

## 2025-01-09 DIAGNOSIS — U07.1 COVID-19: Primary | ICD-10-CM

## 2025-01-09 RX ORDER — NIRMATRELVIR AND RITONAVIR 300-100 MG
3 KIT ORAL 2 TIMES DAILY
Qty: 30 TABLET | Refills: 0 | Status: SHIPPED | OUTPATIENT
Start: 2025-01-09 | End: 2025-01-10 | Stop reason: SDUPTHER

## 2025-01-10 ENCOUNTER — NURSE TRIAGE (OUTPATIENT)
Age: 65
End: 2025-01-10

## 2025-01-10 DIAGNOSIS — U07.1 COVID-19: ICD-10-CM

## 2025-01-10 RX ORDER — NIRMATRELVIR AND RITONAVIR 300-100 MG
3 KIT ORAL 2 TIMES DAILY
Qty: 30 TABLET | Refills: 0 | Status: SHIPPED | OUTPATIENT
Start: 2025-01-10 | End: 2025-01-15

## 2025-01-10 NOTE — TELEPHONE ENCOUNTER
"Reason for Disposition   Prescription prescribed recently is not at pharmacy and triager has access to patient's EMR and prescription is recorded in the EMR    Answer Assessment - Initial Assessment Questions  1. NAME of MEDICINE: \"What medicine(s) are you calling about?\"      paxlovid  2. QUESTION: \"What is your question?\" (e.g., double dose of medicine, side effect)      Wrong pharmacy  3. PRESCRIBER: \"Who prescribed the medicine?\" Reason: if prescribed by specialist, call should be referred to that group.      PCP    Protocols used: Medication Question Call-Adult-[place]    "

## 2025-01-25 DIAGNOSIS — J30.2 SEASONAL ALLERGIES: ICD-10-CM

## 2025-01-25 RX ORDER — MONTELUKAST SODIUM 10 MG/1
10 TABLET ORAL
Qty: 30 TABLET | Refills: 5 | Status: SHIPPED | OUTPATIENT
Start: 2025-01-25

## 2025-01-26 ENCOUNTER — APPOINTMENT (OUTPATIENT)
Dept: LAB | Age: 65
End: 2025-01-26
Payer: COMMERCIAL

## 2025-01-26 DIAGNOSIS — Z00.00 ANNUAL PHYSICAL EXAM: ICD-10-CM

## 2025-01-26 LAB
ALBUMIN SERPL BCG-MCNC: 4 G/DL (ref 3.5–5)
ALP SERPL-CCNC: 52 U/L (ref 34–104)
ALT SERPL W P-5'-P-CCNC: 24 U/L (ref 7–52)
ANION GAP SERPL CALCULATED.3IONS-SCNC: 5 MMOL/L (ref 4–13)
AST SERPL W P-5'-P-CCNC: 18 U/L (ref 13–39)
BILIRUB SERPL-MCNC: 0.53 MG/DL (ref 0.2–1)
BUN SERPL-MCNC: 17 MG/DL (ref 5–25)
CALCIUM SERPL-MCNC: 9.1 MG/DL (ref 8.4–10.2)
CHLORIDE SERPL-SCNC: 104 MMOL/L (ref 96–108)
CO2 SERPL-SCNC: 30 MMOL/L (ref 21–32)
CREAT SERPL-MCNC: 0.92 MG/DL (ref 0.6–1.3)
EST. AVERAGE GLUCOSE BLD GHB EST-MCNC: 120 MG/DL
GFR SERPL CREATININE-BSD FRML MDRD: 86 ML/MIN/1.73SQ M
GLUCOSE P FAST SERPL-MCNC: 108 MG/DL (ref 65–99)
HBA1C MFR BLD: 5.8 %
POTASSIUM SERPL-SCNC: 4.3 MMOL/L (ref 3.5–5.3)
PROT SERPL-MCNC: 7.5 G/DL (ref 6.4–8.4)
PSA SERPL-MCNC: 0.16 NG/ML (ref 0–4)
SODIUM SERPL-SCNC: 139 MMOL/L (ref 135–147)

## 2025-01-26 PROCEDURE — 36415 COLL VENOUS BLD VENIPUNCTURE: CPT

## 2025-01-26 PROCEDURE — G0103 PSA SCREENING: HCPCS

## 2025-01-26 PROCEDURE — 83036 HEMOGLOBIN GLYCOSYLATED A1C: CPT

## 2025-01-26 PROCEDURE — 80053 COMPREHEN METABOLIC PANEL: CPT

## 2025-02-03 ENCOUNTER — PATIENT MESSAGE (OUTPATIENT)
Dept: INTERNAL MEDICINE CLINIC | Facility: CLINIC | Age: 65
End: 2025-02-03

## 2025-02-03 ENCOUNTER — OFFICE VISIT (OUTPATIENT)
Dept: INTERNAL MEDICINE CLINIC | Facility: CLINIC | Age: 65
End: 2025-02-03
Payer: COMMERCIAL

## 2025-02-03 VITALS
HEIGHT: 67 IN | WEIGHT: 247.4 LBS | BODY MASS INDEX: 38.83 KG/M2 | SYSTOLIC BLOOD PRESSURE: 130 MMHG | DIASTOLIC BLOOD PRESSURE: 74 MMHG | OXYGEN SATURATION: 96 % | HEART RATE: 81 BPM

## 2025-02-03 DIAGNOSIS — M54.16 LUMBAR RADICULOPATHY: Primary | ICD-10-CM

## 2025-02-03 DIAGNOSIS — U07.1 COVID-19: ICD-10-CM

## 2025-02-03 DIAGNOSIS — R73.03 PREDIABETES: ICD-10-CM

## 2025-02-03 DIAGNOSIS — E66.01 CLASS 2 SEVERE OBESITY DUE TO EXCESS CALORIES WITH SERIOUS COMORBIDITY AND BODY MASS INDEX (BMI) OF 39.0 TO 39.9 IN ADULT (HCC): ICD-10-CM

## 2025-02-03 DIAGNOSIS — E78.5 HYPERLIPIDEMIA, UNSPECIFIED HYPERLIPIDEMIA TYPE: ICD-10-CM

## 2025-02-03 DIAGNOSIS — E66.812 CLASS 2 SEVERE OBESITY DUE TO EXCESS CALORIES WITH SERIOUS COMORBIDITY AND BODY MASS INDEX (BMI) OF 39.0 TO 39.9 IN ADULT (HCC): ICD-10-CM

## 2025-02-03 DIAGNOSIS — Z00.00 WELLNESS EXAMINATION: ICD-10-CM

## 2025-02-03 PROCEDURE — 99397 PER PM REEVAL EST PAT 65+ YR: CPT | Performed by: INTERNAL MEDICINE

## 2025-02-03 PROCEDURE — 99214 OFFICE O/P EST MOD 30 MIN: CPT | Performed by: INTERNAL MEDICINE

## 2025-02-03 RX ORDER — TIRZEPATIDE 2.5 MG/.5ML
2.5 INJECTION, SOLUTION SUBCUTANEOUS WEEKLY
Qty: 2 ML | Refills: 0 | Status: SHIPPED | OUTPATIENT
Start: 2025-02-03 | End: 2025-02-03

## 2025-02-03 RX ORDER — TIRZEPATIDE 2.5 MG/.5ML
2.5 INJECTION, SOLUTION SUBCUTANEOUS WEEKLY
Qty: 2 ML | Refills: 0 | Status: SHIPPED | OUTPATIENT
Start: 2025-02-03

## 2025-02-03 RX ORDER — METHYLPREDNISOLONE 4 MG/1
TABLET ORAL
Qty: 21 EACH | Refills: 0 | Status: SHIPPED | OUTPATIENT
Start: 2025-02-03

## 2025-02-03 RX ORDER — NIRMATRELVIR AND RITONAVIR 300-100 MG
3 KIT ORAL 2 TIMES DAILY
Qty: 30 TABLET | Refills: 0 | Status: SHIPPED | OUTPATIENT
Start: 2025-02-03 | End: 2025-02-08

## 2025-02-03 NOTE — ASSESSMENT & PLAN NOTE
Hyperlipidemia controlled continue with current medical regiment recommend a low-cholesterol diet and recommend routine exercise we will continue to monitor the progress.  Continue Crestor 5 mg once daily reviewed cardiology notes Dr. Anderson  Orders:  •  Lipid Panel with Direct LDL reflex; Future

## 2025-02-03 NOTE — ASSESSMENT & PLAN NOTE
Assessment and plan 1.  Health maintenance annual wellness examination overall the patient is clinically stable and doing well, we encouraged the patient to follow a healthy and balanced diet.  We recommend that the patient exercise routinely approximately 30 minutes 5 times per week .  We have reviewed the patient's vaccines and have made recommendations for updates if necessary vaccines are up-to-date including flu shot    .  We will be ordering screening laboratories which are age appropriate.  Return to the office in   3 months   call if any problems.

## 2025-02-03 NOTE — ASSESSMENT & PLAN NOTE
Clinically stable and doing well continue the current medical regiment will continue monitor.  Patient had undergone epidural steroid injections in the past he is concerned for travel he will be developed recurrent symptoms he did question if he should go for epidural steroid but I did explain to him usually this is recommended when he is in pain currently is not in pain I have prescribed him an Rx for Medrol Dosepak No. 1 as directed in case he were to develop a radiculopathy I did explain the risk benefits and side effects of the medication  Orders:  •  methylPREDNISolone 4 MG tablet therapy pack; Use as directed on package

## 2025-02-03 NOTE — ASSESSMENT & PLAN NOTE
Pre diabetes -I have counseled the patient to follow a healthy balanced diet, I have counseled patient reduce carbohydrates and sweets in the diet, I would like the patient exercise routinely.  I will be checking hemoglobin A1c and comprehensive metabolic panel.  Have counseled patient about the prevention of diabetes, and the risk of progression to type 2 diabetes.  Orders:  •  Comprehensive metabolic panel; Future  •  Hemoglobin A1C; Future

## 2025-02-03 NOTE — ASSESSMENT & PLAN NOTE
I have counselled the pt to follow a healthy and balanced diet ,and recommend routine exercise.  Obesity -I have counseled patient following healthy and balanced diet, I would like the patient to lose weight, I would like the patient exercise routinely; we will continue monitor the patient's progress.  We did discuss treatment options he has multiple comorbidities including prediabetes, metabolic syndrome, sleep apnea we did discuss starting a GLP-1 patient is interested no history of pancreatitis no history of thyroid C-cell cancer Rx for Zepbound 2.5 mg subcu weekly see pharmacist for guidance starting medication and up titration  Orders:  •  Zepbound 2.5 MG/0.5ML auto-injector; Inject 0.5 mL (2.5 mg total) under the skin once a week  •  Ambulatory referral to clinical pharmacy; Future

## 2025-02-04 ENCOUNTER — TELEPHONE (OUTPATIENT)
Dept: INTERNAL MEDICINE CLINIC | Facility: CLINIC | Age: 65
End: 2025-02-04

## 2025-02-04 NOTE — TELEPHONE ENCOUNTER
Patient needs a prior authorization for:    Zepbound 2.5 mg/0.5 ml Auto Injectors    [facility] [phone]      MYERS # [id number]

## 2025-02-07 ENCOUNTER — TELEPHONE (OUTPATIENT)
Dept: INTERNAL MEDICINE CLINIC | Facility: CLINIC | Age: 65
End: 2025-02-07

## 2025-02-07 NOTE — TELEPHONE ENCOUNTER
PA for ZEPBOUND 2.5MG SUBMITTED to [employer]    via      [x][employer]-Case ID #     [x]PA sent as URGENT    All office notes, labs and other pertaining documents and studies sent. Clinical questions answered. Awaiting determination from insurance company.     Turnaround time for your insurance to make a decision on your Prior Authorization can take 7-21 business days.

## 2025-02-07 NOTE — TELEPHONE ENCOUNTER
PA for ZEPBOUND 2.5MG APPROVED     Date(s) approved         Patient advised by          [x]MyChart Message  []Phone call   []LMOM  []L/M to call office as no active Communication consent on file  []Unable to leave detailed message as VM not approved on Communication consent       Pharmacy advised by    [x]Fax  []Phone call

## 2025-02-07 NOTE — TELEPHONE ENCOUNTER
Please contact patient to schedule Clinical Pharmacist Appointment     Reason for appointment: Zepbound management  When to schedule with Pharmacist: after Zepbound is obtained from pharmacy  What should the patient bring to the appointment: n/a    Appointment Department:  [facility]  Pharmacist patient will be seeing: Juan Montanez  Visit Type Preference (ie Phone, Video, In-person): in person or virtual ok  In-person visits will be at: [facility]  Virtual visits will be completed via [employer] or Phone - please clarify patient preference in appointment notes    Please respond to this note to keep track of the number of patient outreaches.     Please try to reach out to patient on 2 separate days

## 2025-02-18 DIAGNOSIS — E66.9 OBESITY, UNSPECIFIED CLASS, UNSPECIFIED OBESITY TYPE, UNSPECIFIED WHETHER SERIOUS COMORBIDITY PRESENT: Primary | ICD-10-CM

## 2025-02-19 ENCOUNTER — OFFICE VISIT (OUTPATIENT)
Dept: INTERNAL MEDICINE CLINIC | Facility: CLINIC | Age: 65
End: 2025-02-19

## 2025-02-19 DIAGNOSIS — E66.01 CLASS 2 SEVERE OBESITY DUE TO EXCESS CALORIES WITH SERIOUS COMORBIDITY AND BODY MASS INDEX (BMI) OF 39.0 TO 39.9 IN ADULT (HCC): ICD-10-CM

## 2025-02-19 DIAGNOSIS — E66.9 OBESITY, UNSPECIFIED CLASS, UNSPECIFIED OBESITY TYPE, UNSPECIFIED WHETHER SERIOUS COMORBIDITY PRESENT: ICD-10-CM

## 2025-02-19 DIAGNOSIS — E66.812 CLASS 2 SEVERE OBESITY DUE TO EXCESS CALORIES WITH SERIOUS COMORBIDITY AND BODY MASS INDEX (BMI) OF 39.0 TO 39.9 IN ADULT (HCC): ICD-10-CM

## 2025-02-19 PROCEDURE — PBNCHG PB NO CHARGE PLACEHOLDER: Performed by: PHARMACIST

## 2025-02-19 RX ORDER — TIRZEPATIDE 5 MG/.5ML
5 INJECTION, SOLUTION SUBCUTANEOUS WEEKLY
Qty: 2 ML | Refills: 0 | Status: SHIPPED | OUTPATIENT
Start: 2025-02-19

## 2025-02-19 NOTE — PROGRESS NOTES
[facility] Clinical Pharmacy Services  Khushboo Arreola, Pharm.D.      ASSESSMENT/PLAN                                                                                     1. Class 2 severe obesity due to excess calories with serious comorbidity and body mass index (BMI) of 39.0 to 39.9 in adult ([facility])  Assessment & Plan:  Instructed on storage, administration, side effects, expectations, titration  Patient will use local [facility] until established dose for 3 mo via mail order  Orders:  -     Ambulatory referral to clinical pharmacy  -     tirzepatide (Zepbound) 5 mg/0.5 mL auto-injector; Inject 0.5 mL (5 mg total) under the skin once a week  2. Obesity, unspecified class, unspecified obesity type, unspecified whether serious comorbidity present  -     Ambulatory referral to clinical pharmacy          Discontinuation of therapy: If the patient has not lost at least 5% of baseline body weight after 12 weeks at the maintenance dosage, discontinue therapy; clinically meaningful weight loss is unlikely with continued treatment.    Follow-Up: 3 weeks via [facility]      SUBJECTIVE                                                                                                              Medication Adherence: Medication list reviewed with patient, reports the following discrepancies/problems:  albuterol  Cinnamon  cyanocobalamin  fish oil  fluticasone  ibuprofen  levocetirizine  methylPREDNISolone  montelukast  pantoprazole  rosuvastatin  tadalafil  Vitamin D3 Caps  Zepbound Soaj    Pancreatitis History: No     Thyroid Cancer History: No     Gallstone History: No     Kidney Stone History: No     2. Lifestyle:     Previous weight loss regimens: none  Dietician: no         OBJECTIVE                                                                                                      Previous Weight Loss medications trialed:  none    Wt Readings from Last 5 Encounters:   02/03/25 112 kg (247 lb 6.4 oz)   11/06/24 107 kg (236 lb)   10/16/24  107 kg (236 lb)   10/03/24 110 kg (242 lb)   09/25/24 110 kg (243 lb)       BP Readings from Last 3 Encounters:   02/03/25 130/74   11/06/24 152/85   10/16/24 139/78       Pulse Readings from Last 3 Encounters:   02/03/25 81   11/06/24 87   10/16/24 66       Pertinent Lab Data:  Lab Results   Component Value Date     07/26/2015    SODIUM 139 01/26/2025    K 4.3 01/26/2025     01/26/2025    CO2 30 01/26/2025    ANIONGAP 4 07/26/2015    AGAP 5 01/26/2025    BUN 17 01/26/2025    CREATININE 0.92 01/26/2025    GLUC 99 08/08/2020    GLUF 108 (H) 01/26/2025    CALCIUM 9.1 01/26/2025    AST 18 01/26/2025    ALT 24 01/26/2025    ALKPHOS 52 01/26/2025    PROT 7.5 07/26/2015    TP 7.5 01/26/2025    BILITOT 0.58 07/26/2015    TBILI 0.53 01/26/2025    EGFR 86 01/26/2025       Lab Results   Component Value Date    HGBA1C 5.8 (H) 01/26/2025       Lab Results   Component Value Date    SYN5AANKOIWE 3.926 08/30/2023         Pharmacist Tracking Tool  Reason For Outreach: Embedded Pharmacist  Demographics:  Intervention Method: In Person  Type of Intervention: New  Topics Addressed: Obesity  Pharmacologic Interventions: Medication Initiation, Prevent or Manage DOROHTY, and Med Rec  Non-Pharmacologic Interventions: Adherence addressed, Care coordination, Chart update, Disease state education, and Medication/Device education  Time:  Direct Patient Care:  30  mins   Care Coordination:  20  mins  Recommendation Recipient: Patient/Caregiver  Outcome: Accepted

## 2025-02-19 NOTE — ASSESSMENT & PLAN NOTE
Instructed on storage, administration, side effects, expectations, titration  [facility] established dose for 3 mo via mail order

## 2025-02-25 ENCOUNTER — PATIENT MESSAGE (OUTPATIENT)
Dept: INTERNAL MEDICINE CLINIC | Facility: CLINIC | Age: 65
End: 2025-02-25

## 2025-02-27 ENCOUNTER — ANESTHESIA EVENT (OUTPATIENT)
Dept: ANESTHESIOLOGY | Facility: HOSPITAL | Age: 65
End: 2025-02-27

## 2025-02-27 ENCOUNTER — ANESTHESIA (OUTPATIENT)
Dept: ANESTHESIOLOGY | Facility: HOSPITAL | Age: 65
End: 2025-02-27

## 2025-03-05 DIAGNOSIS — N52.9 ERECTILE DYSFUNCTION, UNSPECIFIED ERECTILE DYSFUNCTION TYPE: ICD-10-CM

## 2025-03-06 ENCOUNTER — NURSE TRIAGE (OUTPATIENT)
Age: 65
End: 2025-03-06

## 2025-03-06 ENCOUNTER — TELEPHONE (OUTPATIENT)
Dept: GASTROENTEROLOGY | Facility: CLINIC | Age: 65
End: 2025-03-06

## 2025-03-06 DIAGNOSIS — N52.9 ERECTILE DYSFUNCTION, UNSPECIFIED ERECTILE DYSFUNCTION TYPE: ICD-10-CM

## 2025-03-06 RX ORDER — TADALAFIL 20 MG/1
20 TABLET ORAL DAILY PRN
Qty: 90 TABLET | Refills: 1 | Status: SHIPPED | OUTPATIENT
Start: 2025-03-06 | End: 2025-03-06 | Stop reason: SDUPTHER

## 2025-03-06 RX ORDER — TADALAFIL 20 MG/1
20 TABLET ORAL DAILY PRN
Qty: 90 TABLET | Refills: 1 | Status: SHIPPED | OUTPATIENT
Start: 2025-03-06 | End: 2025-03-06 | Stop reason: CLARIF

## 2025-03-06 RX ORDER — TADALAFIL 20 MG/1
20 TABLET ORAL DAILY PRN
Qty: 90 TABLET | Refills: 1 | Status: SHIPPED | OUTPATIENT
Start: 2025-03-06

## 2025-03-06 NOTE — TELEPHONE ENCOUNTER
Regarding: Different pharmacy  ----- Message from Sri BALLESTEROS sent at 3/6/2025  2:31 PM EST -----  Dr Shine,      I received a notification that my tadafil script was sent to [facility]......   The script needs to go to [facility] pharmacy on [address]...   Thank you.      Javier De Los Santos

## 2025-03-06 NOTE — TELEPHONE ENCOUNTER
Spoke to pt confirming pt's egd scheduled on 3/13/25 at [facility] with Dr Price.  Informed [facility] would be calling the day prior with the arrival time. Advised pt to hold Zepbound 7 days prior to the arrival time.  Informed pt to be npo after midnight, could do clear liquids up to 4 hours prior to the arrival time and would need a  due to being under sedation.

## 2025-03-06 NOTE — TELEPHONE ENCOUNTER
"FOLLOW UP: none     REASON FOR CONVERSATION: No Triage Call    SYMPTOMS: NA    OTHER: Prescription for tadalafil (CIALIS) 20 MG tablet  resent to [facility] per patient request and protocol.    DISPOSITION: No disposition on file.      Reason for Disposition   Prescription not at pharmacy and was prescribed by PCP recently  (Exception: triager has access to EMR and prescription is recorded there. Go to Home Care and confirm for pharmacy.)    Answer Assessment - Initial Assessment Questions  1. NAME of MEDICINE: \"What medicine(s) are you calling about?\"  tadalafil (CIALIS) 20 MG tablet   2. QUESTION: \"What is your question?\" (e.g., double dose of medicine, side effect)      Resend prescription to [facility]  3. PRESCRIBER: \"Who prescribed the medicine?\" Reason: if prescribed by specialist, call should be referred to that group.      Dr. Shine    Protocols used: Medication Question Call-Adult-[place]    "

## 2025-03-13 ENCOUNTER — ANESTHESIA EVENT (OUTPATIENT)
Dept: GASTROENTEROLOGY | Facility: AMBULARY SURGERY CENTER | Age: 65
End: 2025-03-13
Payer: COMMERCIAL

## 2025-03-13 ENCOUNTER — HOSPITAL ENCOUNTER (OUTPATIENT)
Dept: GASTROENTEROLOGY | Facility: AMBULARY SURGERY CENTER | Age: 65
Setting detail: OUTPATIENT SURGERY
End: 2025-03-13
Attending: INTERNAL MEDICINE
Payer: COMMERCIAL

## 2025-03-13 VITALS
HEIGHT: 67 IN | HEART RATE: 70 BPM | RESPIRATION RATE: 22 BRPM | SYSTOLIC BLOOD PRESSURE: 145 MMHG | TEMPERATURE: 97 F | WEIGHT: 243 LBS | DIASTOLIC BLOOD PRESSURE: 82 MMHG | OXYGEN SATURATION: 92 % | BODY MASS INDEX: 38.14 KG/M2

## 2025-03-13 DIAGNOSIS — K22.10 ESOPHAGEAL EROSIONS: ICD-10-CM

## 2025-03-13 PROCEDURE — 43235 EGD DIAGNOSTIC BRUSH WASH: CPT | Performed by: INTERNAL MEDICINE

## 2025-03-13 RX ORDER — ALBUTEROL SULFATE 0.83 MG/ML
2.5 SOLUTION RESPIRATORY (INHALATION) ONCE
Status: COMPLETED | OUTPATIENT
Start: 2025-03-13 | End: 2025-03-13

## 2025-03-13 RX ORDER — SODIUM CHLORIDE, SODIUM LACTATE, POTASSIUM CHLORIDE, CALCIUM CHLORIDE 600; 310; 30; 20 MG/100ML; MG/100ML; MG/100ML; MG/100ML
INJECTION, SOLUTION INTRAVENOUS CONTINUOUS PRN
Status: DISCONTINUED | OUTPATIENT
Start: 2025-03-13 | End: 2025-03-13

## 2025-03-13 RX ORDER — PROPOFOL 10 MG/ML
INJECTION, EMULSION INTRAVENOUS AS NEEDED
Status: DISCONTINUED | OUTPATIENT
Start: 2025-03-13 | End: 2025-03-13

## 2025-03-13 RX ADMIN — PROPOFOL 150 MG: 10 INJECTION, EMULSION INTRAVENOUS at 10:49

## 2025-03-13 RX ADMIN — SODIUM CHLORIDE, SODIUM LACTATE, POTASSIUM CHLORIDE, AND CALCIUM CHLORIDE: .6; .31; .03; .02 INJECTION, SOLUTION INTRAVENOUS at 09:15

## 2025-03-13 RX ADMIN — ALBUTEROL SULFATE 2.5 MG: 2.5 SOLUTION RESPIRATORY (INHALATION) at 09:52

## 2025-03-13 RX ADMIN — PROPOFOL 50 MG: 10 INJECTION, EMULSION INTRAVENOUS at 10:52

## 2025-03-13 NOTE — ANESTHESIA PREPROCEDURE EVALUATION
Procedure:  EGD    Relevant Problems   CARDIO   (+) Hyperlipidemia      GI/HEPATIC   (+) Esophageal reflux   (+) Gastroesophageal reflux disease without esophagitis      MUSCULOSKELETAL   (+) Chronic low back pain without sciatica   (+) Chronic right-sided low back pain with right-sided sciatica   (+) Low back pain with sciatica   (+) Mid back pain      NEURO/PSYCH   (+) Chronic low back pain without sciatica   (+) Chronic right-sided low back pain with right-sided sciatica      PULMONARY   (+) Exercise-induced asthma   (+) SWAPNIL (obstructive sleep apnea)   (+) Obstructive sleep apnea        Physical Exam    Airway    Mallampati score: II  TM Distance: >3 FB  Neck ROM: full     Dental       Cardiovascular      Pulmonary      Other Findings        Anesthesia Plan  ASA Score- 3     Anesthesia Type- IV sedation with anesthesia with ASA Monitors.         Additional Monitors:     Airway Plan: ETT.           Plan Factors-Exercise tolerance (METS): >4 METS.    Chart reviewed.        Patient is not a current smoker.  Patient did not smoke on day of surgery.            Induction- intravenous.    Postoperative Plan-     Perioperative Resuscitation Plan - Level 1 - Full Code.       Informed Consent- Anesthetic plan and risks discussed with patient.  I personally reviewed this patient with the CRNA. Discussed and agreed on the Anesthesia Plan with the CRNA..      NPO Status:  Vitals Value Taken Time   Date of last liquid 03/13/25 03/13/25 0941   Time of last liquid 0800 03/13/25 0941   Date of last solid 03/12/25 03/13/25 0941   Time of last solid 1830 03/13/25 0941     NPO appropriate. Discussed benefits/risks of monitored anesthetic care and discussed providing a dynamic level of mild to deep sedation. Risks include awareness, airway obstruction, aspiration which may necessitate conversion to general anesthesia. All questions answered. Patient understands and wishes to proceed.    Anesthesia plan and consent discussed with  Isaak who expressed understanding and agreement. Risks/benefits and alternatives discussed with patient including possible PONV, sore throat, damage to teeth/lips/gums and possibility of rare anesthetic and surgical emergencies.

## 2025-03-13 NOTE — PERIOPERATIVE NURSING NOTE
"Pts pulse ox dropping to 89% while sitting up. Pt is awake and describes a \"ball\" in his throat and states it easier to breath when laying flat. Pulse ox tested while laying flat, PO2 increases to 94-95% consistently. Pt reports he has a chronic cough. [place] was [place] but has increased since coming home in the last couple of days. Pt denies any fever or chest pain. Educated patient on need to use CPAP if he takes a nap today after having anesthesia. Educated patient on need to utilize inhaler if he starts with a coughing fit or his nebulizer if he feels tight. Encouraged to call PCP or pulmonologist by Monday if symptomatic. Pt reports they have a pulse ox monitor at home. Pt able to get up and get himself dressed. Denies any dizziness while standing.   "

## 2025-03-13 NOTE — H&P
History and Physical - [facility]  Isaak De Los Santos 65 y.o. male MRN: [id number]        HPI: 65-year-old male was noted to have ulceration at the GE junction on the previous upper endoscopy.  He reports doing well.    Historical Information   Past Medical History:   Diagnosis Date    Allergic     Asthma     Colon polyp     CPAP (continuous positive airway pressure) dependence     Erectile dysfunction of non-organic origin     Resolved: 10/15/2014    Left ankle sprain 10/12/2021    Pneumonia 2016 or 2017    Twice in the same year    Sleep apnea      Past Surgical History:   Procedure Laterality Date    COLONOSCOPY      EGD      EYE SURGERY Bilateral     eye lift    TONSILLECTOMY AND ADENOIDECTOMY      UMBILICAL HERNIA REPAIR       Social History   Social History     Substance and Sexual Activity   Alcohol Use Yes    Comment: Social     Social History     Substance and Sexual Activity   Drug Use Never     Social History     Tobacco Use   Smoking Status Former    Current packs/day: 0.00    Average packs/day: 1 pack/day for 8.1 years (8.1 ttl pk-yrs)    Types: Cigarettes    Start date: 1971    Quit date: 1979    Years since quittin.0   Smokeless Tobacco Never   Tobacco Comments    He smoked a pack a day of cigarettes for 8 years. He quite at the age of 19. Occasional cigar use.     Family History   Problem Relation Age of Onset    Asthma Mother     Heart disease Mother     Rheum arthritis Mother     Osteoporosis Mother     Arthritis Mother     Autoimmune disease Mother         Rheumatoid Arthritis    Diabetes Father     Heart disease Father     Diabetes type II Father     Lung disease Sister         Bronchiectasis.  No clear history of cystic lung disease however    Diabetes Sister     Other Son         Thyroid disorder       Meds/Allergies     Not in a hospital admission.    No Known Allergies    Objective     Blood pressure 136/63, pulse 74, temperature 97.6 °F (36.4 °C), temperature  "source Temporal, resp. rate 16, height 5' 7\" (1.702 m), weight 110 kg (243 lb), SpO2 97%.    Physical Exam:    Chest- CTA  Heart- RRR  Abdomen- NT/ND  Extremities- No edema    ASSESSMENT:     History of GE junction ulceration    PLAN:    EGD              "

## 2025-03-13 NOTE — ANESTHESIA POSTPROCEDURE EVALUATION
Post-Op Assessment Note    CV Status:  Stable  Pain Score: 0    Pain management: adequate       Mental Status:  Alert and awake   Hydration Status:  Euvolemic   PONV Controlled:  Controlled   Airway Patency:  Patent     Post Op Vitals Reviewed: Yes    No anethesia notable event occurred.    Staff: CRNA           Last Filed PACU Vitals:  Vitals Value Taken Time   Temp Rn temp    Pulse 79    /60    Resp 17    SpO2 96% 6Lo 2mask

## 2025-03-19 ENCOUNTER — PATIENT MESSAGE (OUTPATIENT)
Dept: INTERNAL MEDICINE CLINIC | Facility: CLINIC | Age: 65
End: 2025-03-19

## 2025-03-19 ENCOUNTER — PATIENT MESSAGE (OUTPATIENT)
Dept: GASTROENTEROLOGY | Facility: AMBULARY SURGERY CENTER | Age: 65
End: 2025-03-19

## 2025-03-19 DIAGNOSIS — E66.01 CLASS 2 SEVERE OBESITY DUE TO EXCESS CALORIES WITH SERIOUS COMORBIDITY AND BODY MASS INDEX (BMI) OF 39.0 TO 39.9 IN ADULT (HCC): ICD-10-CM

## 2025-03-19 DIAGNOSIS — K21.9 GASTROESOPHAGEAL REFLUX DISEASE WITHOUT ESOPHAGITIS: ICD-10-CM

## 2025-03-19 DIAGNOSIS — E66.812 CLASS 2 SEVERE OBESITY DUE TO EXCESS CALORIES WITH SERIOUS COMORBIDITY AND BODY MASS INDEX (BMI) OF 39.0 TO 39.9 IN ADULT (HCC): ICD-10-CM

## 2025-03-19 RX ORDER — TIRZEPATIDE 5 MG/.5ML
5 INJECTION, SOLUTION SUBCUTANEOUS WEEKLY
Qty: 2 ML | Refills: 0 | Status: SHIPPED | OUTPATIENT
Start: 2025-03-19

## 2025-03-21 RX ORDER — PANTOPRAZOLE SODIUM 40 MG/1
40 TABLET, DELAYED RELEASE ORAL DAILY
Qty: 90 TABLET | Refills: 2 | Status: SHIPPED | OUTPATIENT
Start: 2025-03-21

## 2025-04-10 ENCOUNTER — RESULTS FOLLOW-UP (OUTPATIENT)
Dept: INTERNAL MEDICINE CLINIC | Facility: CLINIC | Age: 65
End: 2025-04-10

## 2025-04-15 ENCOUNTER — PATIENT MESSAGE (OUTPATIENT)
Dept: INTERNAL MEDICINE CLINIC | Facility: CLINIC | Age: 65
End: 2025-04-15

## 2025-04-15 DIAGNOSIS — E66.812 CLASS 2 OBESITY DUE TO EXCESS CALORIES WITHOUT SERIOUS COMORBIDITY WITH BODY MASS INDEX (BMI) OF 35.0 TO 35.9 IN ADULT: Primary | ICD-10-CM

## 2025-04-15 DIAGNOSIS — E66.09 CLASS 2 OBESITY DUE TO EXCESS CALORIES WITHOUT SERIOUS COMORBIDITY WITH BODY MASS INDEX (BMI) OF 35.0 TO 35.9 IN ADULT: Primary | ICD-10-CM

## 2025-04-18 RX ORDER — TIRZEPATIDE 7.5 MG/.5ML
7.5 INJECTION, SOLUTION SUBCUTANEOUS WEEKLY
Qty: 2 ML | Refills: 0 | Status: SHIPPED | OUTPATIENT
Start: 2025-04-18

## 2025-04-30 ENCOUNTER — PATIENT MESSAGE (OUTPATIENT)
Dept: INTERNAL MEDICINE CLINIC | Facility: CLINIC | Age: 65
End: 2025-04-30

## 2025-04-30 DIAGNOSIS — E66.812 CLASS 2 SEVERE OBESITY DUE TO EXCESS CALORIES WITH SERIOUS COMORBIDITY AND BODY MASS INDEX (BMI) OF 39.0 TO 39.9 IN ADULT (HCC): Primary | ICD-10-CM

## 2025-04-30 DIAGNOSIS — E66.01 CLASS 2 SEVERE OBESITY DUE TO EXCESS CALORIES WITH SERIOUS COMORBIDITY AND BODY MASS INDEX (BMI) OF 39.0 TO 39.9 IN ADULT (HCC): Primary | ICD-10-CM

## 2025-05-02 ENCOUNTER — PATIENT MESSAGE (OUTPATIENT)
Dept: INTERNAL MEDICINE CLINIC | Facility: CLINIC | Age: 65
End: 2025-05-02

## 2025-05-02 RX ORDER — TIRZEPATIDE 10 MG/.5ML
10 INJECTION, SOLUTION SUBCUTANEOUS WEEKLY
Qty: 2 ML | Refills: 0 | Status: SHIPPED | OUTPATIENT
Start: 2025-05-02

## 2025-05-18 ENCOUNTER — APPOINTMENT (OUTPATIENT)
Dept: LAB | Age: 65
End: 2025-05-18
Payer: COMMERCIAL

## 2025-05-18 DIAGNOSIS — E78.5 HYPERLIPIDEMIA, UNSPECIFIED HYPERLIPIDEMIA TYPE: ICD-10-CM

## 2025-05-18 DIAGNOSIS — R73.03 PREDIABETES: ICD-10-CM

## 2025-05-18 LAB
ALBUMIN SERPL BCG-MCNC: 4.1 G/DL (ref 3.5–5)
ALP SERPL-CCNC: 45 U/L (ref 34–104)
ALT SERPL W P-5'-P-CCNC: 18 U/L (ref 7–52)
ANION GAP SERPL CALCULATED.3IONS-SCNC: 4 MMOL/L (ref 4–13)
AST SERPL W P-5'-P-CCNC: 20 U/L (ref 13–39)
BILIRUB SERPL-MCNC: 0.49 MG/DL (ref 0.2–1)
BUN SERPL-MCNC: 18 MG/DL (ref 5–25)
CALCIUM SERPL-MCNC: 9.2 MG/DL (ref 8.4–10.2)
CHLORIDE SERPL-SCNC: 106 MMOL/L (ref 96–108)
CHOLEST SERPL-MCNC: 89 MG/DL (ref ?–200)
CO2 SERPL-SCNC: 28 MMOL/L (ref 21–32)
CREAT SERPL-MCNC: 0.97 MG/DL (ref 0.6–1.3)
EST. AVERAGE GLUCOSE BLD GHB EST-MCNC: 111 MG/DL
GFR SERPL CREATININE-BSD FRML MDRD: 80 ML/MIN/1.73SQ M
GLUCOSE P FAST SERPL-MCNC: 95 MG/DL (ref 65–99)
HBA1C MFR BLD: 5.5 %
HDLC SERPL-MCNC: 37 MG/DL
LDLC SERPL CALC-MCNC: 36 MG/DL (ref 0–100)
POTASSIUM SERPL-SCNC: 4.4 MMOL/L (ref 3.5–5.3)
PROT SERPL-MCNC: 7.5 G/DL (ref 6.4–8.4)
SODIUM SERPL-SCNC: 138 MMOL/L (ref 135–147)
TRIGL SERPL-MCNC: 78 MG/DL (ref ?–150)

## 2025-05-18 PROCEDURE — 83036 HEMOGLOBIN GLYCOSYLATED A1C: CPT

## 2025-05-18 PROCEDURE — 36415 COLL VENOUS BLD VENIPUNCTURE: CPT

## 2025-05-18 PROCEDURE — 80053 COMPREHEN METABOLIC PANEL: CPT

## 2025-05-18 PROCEDURE — 80061 LIPID PANEL: CPT

## 2025-05-23 ENCOUNTER — OFFICE VISIT (OUTPATIENT)
Dept: INTERNAL MEDICINE CLINIC | Facility: CLINIC | Age: 65
End: 2025-05-23
Payer: COMMERCIAL

## 2025-05-23 VITALS
WEIGHT: 235.6 LBS | HEIGHT: 67 IN | OXYGEN SATURATION: 96 % | HEART RATE: 83 BPM | BODY MASS INDEX: 36.98 KG/M2 | DIASTOLIC BLOOD PRESSURE: 68 MMHG | SYSTOLIC BLOOD PRESSURE: 142 MMHG

## 2025-05-23 DIAGNOSIS — E66.812 CLASS 2 OBESITY DUE TO EXCESS CALORIES WITHOUT SERIOUS COMORBIDITY WITH BODY MASS INDEX (BMI) OF 35.0 TO 35.9 IN ADULT: ICD-10-CM

## 2025-05-23 DIAGNOSIS — K21.9 GASTROESOPHAGEAL REFLUX DISEASE WITHOUT ESOPHAGITIS: ICD-10-CM

## 2025-05-23 DIAGNOSIS — G89.29 CHRONIC RIGHT-SIDED LOW BACK PAIN WITH RIGHT-SIDED SCIATICA: ICD-10-CM

## 2025-05-23 DIAGNOSIS — E78.5 HYPERLIPIDEMIA, UNSPECIFIED HYPERLIPIDEMIA TYPE: Primary | ICD-10-CM

## 2025-05-23 DIAGNOSIS — J30.2 SEASONAL ALLERGIES: ICD-10-CM

## 2025-05-23 DIAGNOSIS — G47.33 OBSTRUCTIVE SLEEP APNEA: ICD-10-CM

## 2025-05-23 DIAGNOSIS — M54.41 CHRONIC RIGHT-SIDED LOW BACK PAIN WITH RIGHT-SIDED SCIATICA: ICD-10-CM

## 2025-05-23 DIAGNOSIS — E66.09 CLASS 2 OBESITY DUE TO EXCESS CALORIES WITHOUT SERIOUS COMORBIDITY WITH BODY MASS INDEX (BMI) OF 35.0 TO 35.9 IN ADULT: ICD-10-CM

## 2025-05-23 PROBLEM — R73.03 PREDIABETES: Status: RESOLVED | Noted: 2020-09-22 | Resolved: 2025-05-23

## 2025-05-23 PROCEDURE — 99214 OFFICE O/P EST MOD 30 MIN: CPT | Performed by: INTERNAL MEDICINE

## 2025-05-23 NOTE — ASSESSMENT & PLAN NOTE
Hyperlipidemia controlled continue with current medical regiment recommend a low-cholesterol diet and recommend routine exercise we will continue to monitor the progress.  Continue Crestor 5 mg once daily  Orders:  •  Comprehensive metabolic panel; Future  •  Lipid Panel with Direct LDL reflex; Future

## 2025-05-23 NOTE — PROGRESS NOTES
Name: Isaak De Los Santos      : 1960      MRN: [id number]  Encounter Provider: Yoshi Shine DO  Encounter Date: 2025   Enc[facility]: [facility]  :  Assessment & Plan  Hyperlipidemia, unspecified hyperlipidemia type  Hyperlipidemia controlled continue with current medical regiment recommend a low-cholesterol diet and recommend routine exercise we will continue to monitor the progress.  Continue Crestor 5 mg once daily  Orders:  •  Comprehensive metabolic panel; Future  •  Lipid Panel with Direct LDL reflex; Future    Class 2 obesity due to excess calories without serious comorbidity with body mass index (BMI) of 35.0 to 35.9 in adult  Obesity -I have counseled patient following healthy and balanced diet, I would like the patient to lose weight, I would like the patient exercise routinely; we will continue monitor the patient's progress.  Tolerating Zepbound without side effect making good progress.  Patient does describe mild GERD symptoms which improved with over-the-counter antacid we did talk about this possibly being a side effect of Zepbound at this point in time his symptoms are intermittent and infrequent and bovid with over-the-counter antacid.  We shared decision making we will continue with the Zepbound benefits outweigh risk but if patient notices increase in the symptoms please notify me         Chronic right-sided low back pain with right-sided sciatica  Continue core muscle strengthening continue weight loss       Obstructive sleep apnea  Compliant using CPAP routinely doing very well       Seasonal allergies  Allergies recommend use of Flonase 2 sprays each nostril once daily, Xyzal 5 mg once daily singular 10 mg daily       Gastroesophageal reflux disease without esophagitis  Patient does describe mild intermittent GERD controlled with OTC antacids possibly related to Zepbound ulcer free diet avoiding eating 3 hours prior to bed continue weight loss,  "wedge pillow,  which will likely help the GERD symptoms we did discuss possibly stopping Zepbound this point in time symptoms are very infrequent and managed very well with OTC medications we will observe if patient change, worsen or persist do not improve we could consider change in GLP-1 patient can contact me if any problems no red flag symptoms       RTO in 6 months call if any problems       History of Present Illness   HPI 65-year old male coming in for a follow up visit regarding hyperlipidemia, class II obesity, chronic lower back pain, SWAPNIL and allergies; the patient reports me compliant taking medications without untoward side effects the.  The patient is here to review his medical condition, update me on the medical condition and the patient reports me no hospitalizations and no ER visits.  No injuries no illnesses here to review laboratories in detail.  Patient making good progress with Zepbound tolerating well he does report to me mild GERD like symptoms pnd glob of mucous green mucous and better middle of night didn't sleep didn't  able to sleep cpap  nausea , eats 1/3  less on protonix no red flag symptoms  Review of Systems   Constitutional:  Negative for activity change, appetite change and unexpected weight change.   HENT:  Positive for congestion and postnasal drip.    Eyes:  Negative for visual disturbance.   Respiratory:  Negative for cough and shortness of breath.    Cardiovascular:  Negative for chest pain.   Gastrointestinal:  Negative for abdominal pain, diarrhea, nausea and vomiting.   Neurological:  Negative for dizziness, light-headedness and headaches.   Hematological:  Negative for adenopathy.   Mild GERD intermittent    Objective   /68 (BP Location: Left arm, Patient Position: Sitting, Cuff Size: Large)   Pulse 83   Ht 5' 7\" (1.702 m)   Wt 107 kg (235 lb 9.6 oz)   SpO2 96%   BMI 36.90 kg/m²      Physical Exam  Vitals and nursing note reviewed.   Constitutional:       General: " He is not in acute distress.     Appearance: Normal appearance. He is well-developed. He is obese. He is not ill-appearing, toxic-appearing or diaphoretic.   HENT:      Head: Normocephalic and atraumatic.      Right Ear: External ear normal.      Left Ear: External ear normal.      Nose: Nose normal.     Eyes:      Pupils: Pupils are equal, round, and reactive to light.       Cardiovascular:      Rate and Rhythm: Normal rate and regular rhythm.      Heart sounds: Normal heart sounds. No murmur heard.  Pulmonary:      Effort: Pulmonary effort is normal.      Breath sounds: Normal breath sounds.   Abdominal:      General: There is no distension.      Palpations: Abdomen is soft.      Tenderness: There is no abdominal tenderness. There is no guarding.     Musculoskeletal:      Right lower leg: No edema.      Left lower leg: No edema.     Neurological:      Mental Status: He is alert.     Negative edema distal pulses 2/2  Administrative Statements   I have spent a total time of 30 minutes in caring for this patient on the day of the visit/encounter including Diagnostic results, Instructions for management, Impressions, Counseling / Coordination of care, Documenting in the medical record, Reviewing/placing orders in the medical record (including tests, medications, and/or procedures), and Obtaining or reviewing history  .

## 2025-05-26 NOTE — ASSESSMENT & PLAN NOTE
Obesity -I have counseled patient following healthy and balanced diet, I would like the patient to lose weight, I would like the patient exercise routinely; we will continue monitor the patient's progress.  Tolerating Zepbound without side effect making good progress.  Patient does describe mild GERD symptoms which improved with over-the-counter antacid we did talk about this possibly being a side effect of Zepbound at this point in time his symptoms are intermittent and infrequent and bovid with over-the-counter antacid.  We shared decision making we will continue with the Zepbound benefits outweigh risk but if patient notices increase in the symptoms please notify me

## 2025-05-26 NOTE — ASSESSMENT & PLAN NOTE
Patient does describe mild intermittent GERD controlled with OTC antacids possibly related to Zepbound ulcer free diet avoiding eating 3 hours prior to bed continue weight loss, wedge pillow,  which will likely help the GERD symptoms we did discuss possibly stopping Zepbound this point in time symptoms are very infrequent and managed very well with OTC medications we will observe if patient change, worsen or persist do not improve we could consider change in GLP-1 patient can contact me if any problems no red flag symptoms

## 2025-05-26 NOTE — ASSESSMENT & PLAN NOTE
Allergies recommend use of Flonase 2 sprays each nostril once daily, Xyzal 5 mg once daily singular 10 mg daily

## 2025-05-27 DIAGNOSIS — J40 BRONCHITIS: ICD-10-CM

## 2025-05-27 NOTE — TELEPHONE ENCOUNTER
Message sent via [employer].     Dr. Shine,     I have been taking the Flonase as per your instructions, and just realized I am almost out of it.  I would not be able to complete the ten (10) days in a row you prescribed.  Could you send a new script to the [facility] on [address] for refill?  Thank you.        Javier De Los Santos

## 2025-05-28 RX ORDER — FLUTICASONE PROPIONATE 50 MCG
2 SPRAY, SUSPENSION (ML) NASAL DAILY
Qty: 1 G | Refills: 0 | Status: SHIPPED | OUTPATIENT
Start: 2025-05-28

## 2025-06-13 DIAGNOSIS — E66.812 CLASS 2 SEVERE OBESITY DUE TO EXCESS CALORIES WITH SERIOUS COMORBIDITY AND BODY MASS INDEX (BMI) OF 39.0 TO 39.9 IN ADULT (HCC): ICD-10-CM

## 2025-06-13 DIAGNOSIS — E66.01 CLASS 2 SEVERE OBESITY DUE TO EXCESS CALORIES WITH SERIOUS COMORBIDITY AND BODY MASS INDEX (BMI) OF 39.0 TO 39.9 IN ADULT (HCC): ICD-10-CM

## 2025-06-13 DIAGNOSIS — G47.33 OBSTRUCTIVE SLEEP APNEA: Primary | ICD-10-CM

## 2025-06-13 RX ORDER — TIRZEPATIDE 12.5 MG/.5ML
12.5 INJECTION, SOLUTION SUBCUTANEOUS WEEKLY
Qty: 2 ML | Refills: 0 | Status: SHIPPED | OUTPATIENT
Start: 2025-06-13

## 2025-06-16 ENCOUNTER — OFFICE VISIT (OUTPATIENT)
Dept: NEUROLOGY | Facility: CLINIC | Age: 65
End: 2025-06-16
Payer: COMMERCIAL

## 2025-06-16 VITALS
WEIGHT: 235.3 LBS | TEMPERATURE: 97.7 F | HEIGHT: 67 IN | DIASTOLIC BLOOD PRESSURE: 70 MMHG | SYSTOLIC BLOOD PRESSURE: 126 MMHG | BODY MASS INDEX: 36.93 KG/M2 | HEART RATE: 85 BPM | OXYGEN SATURATION: 98 %

## 2025-06-16 DIAGNOSIS — G25.0 ESSENTIAL TREMOR: Primary | ICD-10-CM

## 2025-06-16 PROCEDURE — 99213 OFFICE O/P EST LOW 20 MIN: CPT | Performed by: PSYCHIATRY & NEUROLOGY

## 2025-06-16 NOTE — PATIENT INSTRUCTIONS
The clinical presentation aligns with a diagnosis of essential tremor, characterized by bilateral postural and action tremors in both hands. There is no evidence of progression to another neurodegenerative condition such as Parkinson's disease. The tremor has been present for more than 10 years and is not likely secondary to medication use. The potential use of beta blockers like propranolol or metoprolol, and medications originally made for seizure control such as primidone, topiramate, or gabapentin was discussed. However, he is not interested in trying any medication for his tremors at this time. If the tremor worsens and begins to impact daily life, these medications can be considered.

## 2025-06-16 NOTE — PROGRESS NOTES
"Name: Isaak De Los Santos      : 1960      MRN: [id number]  Encounter Provider: Marianne Aldrich MD  Encounter Date: 2025   Encounter department: [facility] [place]  :  Assessment & Plan          History of Present Illness   HPI   Review of Systems   Constitutional:  Negative for chills and fever.   HENT:  Negative for ear pain and sore throat.    Eyes:  Negative for pain and visual disturbance.   Respiratory:  Negative for cough and shortness of breath.    Cardiovascular:  Negative for chest pain and palpitations.   Gastrointestinal:  Negative for abdominal pain and vomiting.   Genitourinary:  Negative for dysuria and hematuria.   Musculoskeletal:  Negative for arthralgias and back pain.   Skin:  Negative for color change and rash.   Neurological:  Positive for tremors (Both hands). Negative for seizures and syncope.   All other systems reviewed and are negative.   I have personally reviewed the MA's review of systems and made changes as necessary.         Objective   Ht 5' 7\" (1.702 m)   BMI 36.90 kg/m²     Physical Exam  Neurological Exam          "

## 2025-06-16 NOTE — ASSESSMENT & PLAN NOTE
The clinical presentation aligns with a diagnosis of essential tremor, characterized by bilateral postural and action tremors in both hands. There is no evidence of progression to another neurodegenerative condition such as Parkinson's disease. The tremor has been present for more than 10 years and is not likely secondary to medication use. The potential use of beta blockers like propranolol or metoprolol, and medications originally made for seizure control such as primidone, topiramate, or gabapentin was discussed. However, he is not interested in trying any medication for his tremors at this time. If the tremor worsens and begins to impact daily life, these medications can be considered.    He uses asthma inhalers infrequently, typically once a month or not at all, and takes Singulair daily. He has a history of bronchitis and pneumonia but has not had an episode in several years. Tremor unlikely to be medication induced.

## 2025-06-16 NOTE — PROGRESS NOTES
Name: Isaak De Los Santos      : 1960      MRN: [id number]  Encounter Provider: Marianne Aldrich MD  Encounter Date: 2025   Encounter department: [facility] [place]  :  Assessment & Plan  Essential tremor  The clinical presentation aligns with a diagnosis of essential tremor, characterized by bilateral postural and action tremors in both hands. There is no evidence of progression to another neurodegenerative condition such as Parkinson's disease. The tremor has been present for more than 10 years and is not likely secondary to medication use. The potential use of beta blockers like propranolol or metoprolol, and medications originally made for seizure control such as primidone, topiramate, or gabapentin was discussed. However, he is not interested in trying any medication for his tremors at this time. If the tremor worsens and begins to impact daily life, these medications can be considered.    He uses asthma inhalers infrequently, typically once a month or not at all, and takes Singulair daily. He has a history of bronchitis and pneumonia but has not had an episode in several years. Tremor unlikely to be medication induced.         Assessment & Plan          History of Present Illness   Isaak De Los Santos is a 65 y.o. right-handed male w/ PMH asthma and history of ptosis status post surgery who presents for follow-up for essential tremor.  Onset of progressive action tremor noted over 10-12 years ago.  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  He retired in 2018 and returned part time. His sister's had a tremor which improved with reduction in asthma medications.  Initially seen 2024 with a mild, high-frequency low amplitude kinetic and postural tremor without any signs of parkinsonism.   History of Present Illness  The patient presents for evaluation of tremors.    He reports no significant  change in the severity of his tremors, which have been present for  over 10 years. The tremors are most noticeable when holding a cup of tea or a fork during meals but do not interfere with daily activities. He can suppress the tremors with concentration. His handwriting remains unchanged, and he reports no alterations in voice, head or leg shaking, balance issues, stiffness, or difficulty performing daily tasks. 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 He first noticed the tremors at work while handling heavy objects, attributing them to job-related stress. He retired in 2018 and currently works part-time. He is not interested in trying any medication for his tremors.    He uses an asthma inhaler infrequently, typically once a month or not at all, and takes Singulair daily. He has a history of bronchitis and pneumonia but has not had an episode in several years. He has a scheduled appointment with a pulmonologist next month or in 08/2025. He believes the tremors predate the initiation of Singulair.  He has exertional asthma, which manifests as immediate wheezing upon exertion.    He is on Zepbound for his A1c, which has reduced his A1c by 4 points but causes severe side effects, including constant gas buildup in his stomach. He plans to discontinue this medication as soon as possible.      SOCIAL HISTORY:    Occupations: Retired in 2018, currently works part-time on cold cases.    Coffee/Tea/Caffeine-containing Drinks: Consumes tea.    FAMILY HISTORY  - Sister: Developed Parkinson's symptoms from asthma medication, resolved with a change in medication.  - Negative for Parkinson's disease and essential tremor in the family.         Review of Systems I have personally reviewed the MA's review of systems and made changes as necessary.         Objective   /70 (BP Location: Right arm, Patient Position: Sitting, Cuff Size: Standard)   Pulse 85   Temp  "97.7 °F (36.5 °C) (Temporal)   Ht 5' 7\" (1.702 m)   Wt 107 kg (235 lb 4.8 oz)   SpO2 98%   BMI 36.85 kg/m²     Physical Exam  Neurological Exam  Physical Exam  Mental Status Examination    Orientation: Awake and alert and oriented to time, place and situation    Visuospatial Skills: Able to draw a spiral with both hands.    Cranial Nerve Examination    CN III IV VI: Extraocular movements intact.  Mild ptosis bilaterally    CN VII: Facial movements and symmetry intact.    CN X: Palate elevation and voice quality normal.    CN XI: Shoulder shrug and head turn normal.    CN XII: Tongue is midline with normal movements.    Motor Examination    Muscle Bulk and Tone: Normal muscle bulk and tone.    Strength: 5/5 strength in upper and lower limbs bilaterally.    Coordination: Finger-to-nose test normal.    Involuntary Movements: Bilateral postural tremors of the hands were were mild in amplitude more pronounced on the left.    Mild to moderate intentional tremors of the hands which were high in frequency more pronounced on the left.   No leg tremors.  No head or vocal tremor.  Spirals with slight tremor bilaterally worse on left.  Handwriting normal.    Gait and Station    Gait: Normal walking pattern and stability.    Results          "

## 2025-06-17 DIAGNOSIS — J30.2 SEASONAL ALLERGIES: ICD-10-CM

## 2025-06-17 DIAGNOSIS — E78.5 HYPERLIPIDEMIA, UNSPECIFIED HYPERLIPIDEMIA TYPE: ICD-10-CM

## 2025-06-17 RX ORDER — MONTELUKAST SODIUM 10 MG/1
10 TABLET ORAL
Qty: 90 TABLET | Refills: 1 | Status: SHIPPED | OUTPATIENT
Start: 2025-06-17

## 2025-06-17 RX ORDER — ROSUVASTATIN CALCIUM 5 MG/1
5 TABLET, COATED ORAL DAILY
Qty: 90 TABLET | Refills: 1 | Status: SHIPPED | OUTPATIENT
Start: 2025-06-17

## 2025-06-17 RX ORDER — LEVOCETIRIZINE DIHYDROCHLORIDE 5 MG/1
5 TABLET, FILM COATED ORAL EVERY EVENING
Qty: 90 TABLET | Refills: 1 | Status: SHIPPED | OUTPATIENT
Start: 2025-06-17

## 2025-06-19 DIAGNOSIS — J40 BRONCHITIS: ICD-10-CM

## 2025-06-20 RX ORDER — FLUTICASONE PROPIONATE 50 MCG
SPRAY, SUSPENSION (ML) NASAL
Qty: 48 ML | Refills: 0 | Status: SHIPPED | OUTPATIENT
Start: 2025-06-20

## 2025-07-02 DIAGNOSIS — N52.9 ERECTILE DYSFUNCTION, UNSPECIFIED ERECTILE DYSFUNCTION TYPE: ICD-10-CM

## 2025-07-02 NOTE — TELEPHONE ENCOUNTER
Dr. Shine,     Could you send a three (3) month prescription for Tadalafil to [facility] [address] [place] [address], [place] - [address] Thank you in advance.     Javier De Los Santos.

## 2025-07-03 RX ORDER — TADALAFIL 20 MG/1
20 TABLET ORAL DAILY PRN
Qty: 90 TABLET | Refills: 1 | Status: SHIPPED | OUTPATIENT
Start: 2025-07-03

## 2025-07-08 DIAGNOSIS — E66.09 CLASS 2 OBESITY DUE TO EXCESS CALORIES WITHOUT SERIOUS COMORBIDITY WITH BODY MASS INDEX (BMI) OF 35.0 TO 35.9 IN ADULT: Primary | ICD-10-CM

## 2025-07-08 DIAGNOSIS — E66.812 CLASS 2 OBESITY DUE TO EXCESS CALORIES WITHOUT SERIOUS COMORBIDITY WITH BODY MASS INDEX (BMI) OF 35.0 TO 35.9 IN ADULT: Primary | ICD-10-CM

## 2025-07-08 RX ORDER — TIRZEPATIDE 15 MG/.5ML
15 INJECTION, SOLUTION SUBCUTANEOUS WEEKLY
Qty: 2 ML | Refills: 0 | Status: SHIPPED | OUTPATIENT
Start: 2025-07-08

## 2025-08-07 ENCOUNTER — PATIENT MESSAGE (OUTPATIENT)
Dept: INTERNAL MEDICINE CLINIC | Facility: CLINIC | Age: 65
End: 2025-08-07

## 2025-08-07 DIAGNOSIS — E66.09 CLASS 2 OBESITY DUE TO EXCESS CALORIES WITHOUT SERIOUS COMORBIDITY WITH BODY MASS INDEX (BMI) OF 35.0 TO 35.9 IN ADULT: ICD-10-CM

## 2025-08-07 DIAGNOSIS — E66.812 CLASS 2 OBESITY DUE TO EXCESS CALORIES WITHOUT SERIOUS COMORBIDITY WITH BODY MASS INDEX (BMI) OF 35.0 TO 35.9 IN ADULT: ICD-10-CM

## 2025-08-07 RX ORDER — TIRZEPATIDE 15 MG/.5ML
15 INJECTION, SOLUTION SUBCUTANEOUS WEEKLY
Qty: 2 ML | Refills: 0 | Status: SHIPPED | OUTPATIENT
Start: 2025-08-07

## 2025-08-07 RX ORDER — TIRZEPATIDE 15 MG/.5ML
15 INJECTION, SOLUTION SUBCUTANEOUS WEEKLY
Qty: 2 ML | Refills: 0 | Status: SHIPPED | OUTPATIENT
Start: 2025-08-07 | End: 2025-08-07 | Stop reason: SDUPTHER